# Patient Record
Sex: FEMALE | Race: WHITE | Employment: OTHER | URBAN - METROPOLITAN AREA
[De-identification: names, ages, dates, MRNs, and addresses within clinical notes are randomized per-mention and may not be internally consistent; named-entity substitution may affect disease eponyms.]

---

## 2017-01-05 DIAGNOSIS — E03.8 OTHER SPECIFIED HYPOTHYROIDISM: Primary | ICD-10-CM

## 2017-01-05 RX ORDER — LEVOTHYROXINE SODIUM 100 UG/1
100 TABLET ORAL DAILY
Qty: 31 TABLET | Status: SHIPPED
Start: 2017-01-05 | End: 2017-01-27

## 2017-01-23 NOTE — PROGRESS NOTES
[facility]  Chief Complaint   Patient presents with     Annual Visit       HPI:    Deanne Zayas is a 83 year old  (6/19/1933), who is being seen today for an annual comprehensive visit at [facility]. Today's concerns are:  Late onset Alzheimer's disease without behavioral disturbance  Appears stable in 24 hour memory care unit. Pleasantly conversed with writer. Currently taking Lexapro 10 mg PO qday.     Essential hypertension  /77 on lisinopril 40 mg PO qday and Norvasc 2.5 mg PO qday.   -Denies chest pain, SOB, or palpation.     Hypothyroidism due to acquired atrophy of thyroid  - Denies fatigue, constipation, depression.    Vitamin B12 deficiency disease  Receives cyanocobalamin 1000 mcg injections monthly.   Last vitamin B12 level 1254 on 10/20/16.     Mixed incontinence  Urinary incontinence leading to dermatitis - managed with nystatin powder  Hydrozyzine discontinued 10/2016- doing well off medication.      Based on JNC-8 goals,  patients age of 83 year old, no presence of diabetes or CKD, and goals of care goal BP is <150/90 mm Hg. patient is stable and continue without pharmacological invention with routine assessment.    Depression screen done: PHQ-9 Given screen score and clinical assessment patient is stable without any signs of depression and no futher interventions warrented at this time.    ALLERGIES: Donepezil  PROBLEM LIST:  Patient Active Problem List   Diagnosis     Hyperlipidemia LDL goal <130     Hypothyroidism due to acquired atrophy of thyroid     Vitamin B12 deficiency disease     Vitamin D deficiency disease     Essential hypertension     Osteopenia     Advance care planning     Hypertension goal BP (blood pressure) < 140/90     Mixed incontinence     Anxiety     Diaper dermatitis     Late onset Alzheimer's disease without behavioral disturbance     PAST MEDICAL HISTORY:  has no past medical history on file.  PAST SURGICAL HISTORY:  has past surgical  "history that includes Cholecystectomy; surgical history of -  (2012 ); and Cystoscopy (N/A, 8/29/2014).  FAMILY HISTORY: family history includes Family History Negative in her father and mother; Obesity in her sister; Unknown/Adopted in her mother.  SOCIAL HISTORY:  reports that she has never smoked. She has never used smokeless tobacco. She reports that she does not drink alcohol or use illicit drugs.  IMMUNIZATIONS:  Most Recent Immunizations   Administered Date(s) Administered     Influenza (High Dose) 3 valent vaccine 09/27/2016     Influenza (IIV3) 09/19/2013     Influenza Vaccine IM 3yrs+ 4 Valent IIV4 10/08/2014     Pneumococcal (PCV 13) 09/24/2015     Pneumococcal 23 valent 08/16/2005     TD (ADULT, 7+) 10/13/2009     TDAP (ADACEL AGES 11-64) 10/16/2012     Above immunizations pulled from [facility]. [facility] and facility records also reconciled. Outstanding information sent to  to update [facility] .  Future immunizations are not needed at this point as all recommended immunizations are up to date.   MEDICATIONS:  Current Outpatient Prescriptions   Medication Sig Dispense Refill     levothyroxine (SYNTHROID/LEVOTHROID) 100 MCG tablet Take 1 tablet (100 mcg) by mouth daily 31 tablet PRN     ferrous sulfate (IRON) 325 (65 FE) MG tablet Take 1 tablet (325 mg) by mouth daily (with breakfast) 30 tablet 2     cyanocobalamin (VITAMIN B12) 1000 MCG/ML injection Inject 1 mL (1,000 mcg) Subcutaneous every 30 days 1 mL 11     syringe/needle, disp, 25G X 1\" 3 ML MISC USE AS DIRECTED WITH B12 INECTION 1 each 11     ondansetron (ZOFRAN) 4 MG tablet Take 1 tablet (4 mg) by mouth every 8 hours as needed for nausea 18 tablet 6     escitalopram (LEXAPRO) 10 MG tablet Take 1 tablet (10 mg) by mouth daily 31 tablet PRN     aspirin 81 MG chewable tablet Take 1 tablet (81 mg) by mouth daily 31 tablet 12     senna-docusate (SENNA S) 8.6-50 MG per tablet Take 1 tablet by mouth daily 31 tablet 12     amLODIPine " (NORVASC) 2.5 MG tablet Take 1 tablet (2.5 mg) by mouth daily 31 tablet 12     lisinopril (PRINIVIL,ZESTRIL) 40 MG tablet Take 1 tablet (40 mg) by mouth daily 31 tablet 12     cholecalciferol (VITAMIN D3) 1000 UNIT tablet Take 2 tablets (2,000 Units) by mouth daily 62 tablet 12     nystatin (MYCOSTATIN) 291529 UNIT/GM POWD Apply topically as needed APPLY TOPICALLY TO AFFECTED AREA(S) TID PRN 30 g PRN     hydrOXYzine (VISTARIL) 25 MG capsule Take 1 capsule (25 mg) by mouth every evening as needed for itching 31 capsule 12     acetaminophen (TYLENOL) 325 MG tablet Take 2 tablets (650 mg) by mouth every 6 hours as needed for mild pain or headaches 120 tablet 12     menthol-zinc oxide (CALMOSEPTINE) 0.44-20.625 % OINT Apply topically 4 times daily as needed for skin protection 71 g 3     Medications reviewed:  Medications reconciled to facility chart and changes were made to reflect current medications as identified as above med list. Below are the changes that were made:   Medications stopped since last EPIC medication reconciliation:   There are no discontinued medications.    Medications started since last [facility] medication reconciliation:  No orders of the defined types were placed in this encounter.     Case Management:  I have reviewed the Assisted Living care plan, current immunizations and preventive care/cancer screening..Future cancer screening is not clinically indicated secondary to age/goals of care Patient's desire to return to the community is not present. Current Level of Care is appropriate.    Advance Directive Discussion:    I reviewed the current advanced directives as reflected in [facility] and the facility chart. I contacted the first party Leeanna Moran and left a message regarding the plan of Care. I reviewed the POLST, resigned, dated and sent to [facility].  I did not due to cognitive impairment review the advance directives with the resident.     Team Discussion:  I communicated with the  appropriate disciplines involved with the Plan of Care:   Nursing,  and Dietician    Patient Goal:  Patient's goal is pain control and comfort.    Information reviewed:  Medications, vital signs, orders, and nursing notes.    ROS:  4 point ROS including Respiratory, CV, GI and , other than that noted in the HPI,  is negative    Exam:  /77 mmHg  Pulse 69  Temp(Src) 98.9  F (37.2  C)  Resp 16  Wt 206 lb (93.441 kg)  SpO2 98%    GENERAL APPEARANCE:  Alert, in no distress  ENT:  Mouth and posterior oropharynx normal, moist mucous membranes  EYES:  EOM, conjunctivae, lids, pupils and irises normal  RESP:  respiratory effort and palpation of chest normal, lungs clear to auscultation , no respiratory distress  CV:  Palpation and auscultation of heart done , regular rate and rhythm, no murmur, rub, or gallop  ABDOMEN:  normal bowel sounds, soft, nontender, no hepatosplenomegaly or other masses  M/S:   Gait and station abnormal up with walker independently  Digits and nails normal  SKIN:  Inspection of skin and subcutaneous tissue baseline, Palpation of skin and subcutaneous tissue baseline, Per area not viewed during today's visit per resident's request  NEURO:   Cranial nerves 2-12 are normal tested and grossly at patient's baseline  PSYCH:  memory impaired , affect and mood normal     Lab/Diagnostic data:    NA      141   1/8/2015   POTASSIUM      3.9   10/20/2016  CHLORIDE      107   1/8/2015  RUBIN      8.7   1/8/2015  CO2       29   1/8/2015  BUN       17   1/8/2015  CR     1.14   10/20/2016  GLC      100   1/8/2015  WBC      4.4   7/10/2014  RBC     3.48   7/10/2014  HGB     11.0   11/17/2016  HCT     31.0   7/10/2014  MCV       89   7/10/2014  MCH     28.7   7/10/2014  MCHC     32.3   7/10/2014  RDW     13.5   7/10/2014  PLT      245   7/10/2014      ASSESSMENT/PLAN  (G30.1,  F02.80) Late onset Alzheimer's disease without behavioral disturbance  (primary encounter diagnosis)  Stable on current  medication. Resides on secure dementia unit.    (I10) Essential hypertension  Keep SBP> 130 mmHg and DBP > 65 mmHg (levels below these increase mortality as shown by standard studies and observations).     (E03.4) Hypothyroidism due to acquired atrophy of thyroid  TSH   Date Value Ref Range Status   10/20/2016 3.67 0.40 - 4.00 mU/L Final     -Currently taking Synthroid 100 mcg qday.   -Check TSH next lab day, and keep level b/w 4-5 in elderly.      (E53.8) Vitamin B12 deficiency disease  Continue supplement.   -Check B12 level next lab day.    (N39.46) Mixed incontinence  Stable on barrier cream. Continue current POC without change at this time and adjustments as needed.     (M81.0) Senile osteoporosis  Continue PRN acetaminophen as ordered.     (Z13.89) Screening for depression  See above    (Z13.6) Screening for hypertension  See above    Orders:  See Above.   Electronically signed by:  Florina Welch NP

## 2017-01-24 ENCOUNTER — ASSISTED LIVING VISIT (OUTPATIENT)
Dept: GERIATRICS | Facility: CLINIC | Age: 82
End: 2017-01-24
Payer: MEDICARE

## 2017-01-24 VITALS
HEART RATE: 69 BPM | WEIGHT: 206 LBS | TEMPERATURE: 98.9 F | BODY MASS INDEX: 34.28 KG/M2 | SYSTOLIC BLOOD PRESSURE: 145 MMHG | OXYGEN SATURATION: 98 % | DIASTOLIC BLOOD PRESSURE: 77 MMHG | RESPIRATION RATE: 16 BRPM

## 2017-01-24 DIAGNOSIS — G30.1 LATE ONSET ALZHEIMER'S DISEASE WITHOUT BEHAVIORAL DISTURBANCE (H): Primary | ICD-10-CM

## 2017-01-24 DIAGNOSIS — N39.46 MIXED INCONTINENCE: ICD-10-CM

## 2017-01-24 DIAGNOSIS — E03.4 HYPOTHYROIDISM DUE TO ACQUIRED ATROPHY OF THYROID: ICD-10-CM

## 2017-01-24 DIAGNOSIS — E53.8 VITAMIN B12 DEFICIENCY DISEASE: ICD-10-CM

## 2017-01-24 DIAGNOSIS — I10 ESSENTIAL HYPERTENSION: ICD-10-CM

## 2017-01-24 DIAGNOSIS — Z13.31 SCREENING FOR DEPRESSION: ICD-10-CM

## 2017-01-24 DIAGNOSIS — M81.0 SENILE OSTEOPOROSIS: ICD-10-CM

## 2017-01-24 DIAGNOSIS — Z13.6 SCREENING FOR HYPERTENSION: ICD-10-CM

## 2017-01-24 DIAGNOSIS — F02.80 LATE ONSET ALZHEIMER'S DISEASE WITHOUT BEHAVIORAL DISTURBANCE (H): Primary | ICD-10-CM

## 2017-01-24 PROCEDURE — 99207 ZZC CDG-CORRECTLY CODED, REVIEWED AND AGREE: CPT | Performed by: NURSE PRACTITIONER

## 2017-01-24 PROCEDURE — 99318 ZZC ANNUAL NURSING FAC ASSESSMNT, STABLE: CPT | Performed by: NURSE PRACTITIONER

## 2017-01-24 ASSESSMENT — PAIN SCALES - GENERAL: PAINLEVEL: NO PAIN (0)

## 2017-01-26 ENCOUNTER — HOSPITAL LABORATORY (OUTPATIENT)
Facility: OTHER | Age: 82
End: 2017-01-26

## 2017-01-26 LAB
TSH SERPL DL<=0.05 MIU/L-ACNC: 9.96 MU/L (ref 0.4–4)
VIT B12 SERPL-MCNC: 452 PG/ML (ref 193–986)

## 2017-01-27 ENCOUNTER — TELEPHONE (OUTPATIENT)
Dept: GERIATRICS | Facility: CLINIC | Age: 82
End: 2017-01-27

## 2017-01-27 DIAGNOSIS — E03.8 OTHER SPECIFIED HYPOTHYROIDISM: Primary | ICD-10-CM

## 2017-01-27 RX ORDER — LEVOTHYROXINE SODIUM 112 UG/1
112 TABLET ORAL DAILY
Qty: 60 TABLET | Refills: 3 | Status: SHIPPED
Start: 2017-01-27 | End: 2017-10-13

## 2017-01-27 NOTE — TELEPHONE ENCOUNTER
TSH   Date Value Ref Range Status   01/26/2017 9.96* 0.40 - 4.00 mU/L Final   10/20/2016 3.67 0.40 - 4.00 mU/L Final   10/15/2015 1.09 0.40 - 4.00 mU/L Final   04/02/2015 0.53 0.40 - 4.00 mU/L Final     Comment:     Effective 7/30/2014, the reference range for this assay has changed to reflect   new instrumentation/methodology.     04/17/2014 1.90 0.4 - 5.0 mU/L Final     Other specified hypothyroidism  Sent to [facility]. Increased Synthroid to 112 mcg. Recheck in 6 weeks.   - levothyroxine (SYNTHROID/LEVOTHROID) 112 MCG tablet; Take 1 tablet (112 mcg) by mouth daily

## 2017-02-01 DIAGNOSIS — D50.8 IRON DEFICIENCY ANEMIA SECONDARY TO INADEQUATE DIETARY IRON INTAKE: Primary | ICD-10-CM

## 2017-02-02 RX ORDER — FERROUS SULFATE 325(65) MG
325 TABLET ORAL DAILY
Qty: 31 TABLET | Status: SHIPPED
Start: 2017-02-02 | End: 2018-01-02

## 2017-03-09 ENCOUNTER — HOSPITAL LABORATORY (OUTPATIENT)
Facility: OTHER | Age: 82
End: 2017-03-09

## 2017-03-09 LAB — TSH SERPL DL<=0.05 MIU/L-ACNC: 4.42 MU/L (ref 0.4–4)

## 2017-04-24 DIAGNOSIS — B37.31 YEAST INFECTION OF THE VAGINA: ICD-10-CM

## 2017-04-24 RX ORDER — NYSTATIN 100000 [USP'U]/G
POWDER TOPICAL PRN
Qty: 30 G | Status: SHIPPED | OUTPATIENT
Start: 2017-04-24 | End: 2017-07-31

## 2017-04-26 DIAGNOSIS — M81.0 SENILE OSTEOPOROSIS: ICD-10-CM

## 2017-05-24 DIAGNOSIS — I10 BENIGN ESSENTIAL HYPERTENSION: ICD-10-CM

## 2017-05-25 RX ORDER — LISINOPRIL 40 MG/1
40 TABLET ORAL DAILY
Qty: 31 TABLET | Status: SHIPPED | OUTPATIENT
Start: 2017-05-25 | End: 2017-10-13

## 2017-06-21 DIAGNOSIS — I25.10 CORONARY ARTERY DISEASE INVOLVING NATIVE HEART WITHOUT ANGINA PECTORIS, UNSPECIFIED VESSEL OR LESION TYPE: ICD-10-CM

## 2017-06-21 DIAGNOSIS — K59.00 CONSTIPATION, UNSPECIFIED CONSTIPATION TYPE: ICD-10-CM

## 2017-06-21 DIAGNOSIS — I10 BENIGN ESSENTIAL HYPERTENSION: ICD-10-CM

## 2017-06-22 ENCOUNTER — DOCUMENTATION ONLY (OUTPATIENT)
Dept: OTHER | Facility: CLINIC | Age: 82
End: 2017-06-22

## 2017-06-22 DIAGNOSIS — Z71.89 ADVANCE CARE PLANNING: Chronic | ICD-10-CM

## 2017-06-22 RX ORDER — AMLODIPINE BESYLATE 2.5 MG/1
2.5 TABLET ORAL DAILY
Qty: 31 TABLET | Status: SHIPPED | OUTPATIENT
Start: 2017-06-22 | End: 2017-08-01

## 2017-07-19 DIAGNOSIS — I25.10 CORONARY ARTERY DISEASE INVOLVING NATIVE HEART WITHOUT ANGINA PECTORIS, UNSPECIFIED VESSEL OR LESION TYPE: ICD-10-CM

## 2017-07-19 DIAGNOSIS — K59.00 CONSTIPATION, UNSPECIFIED CONSTIPATION TYPE: ICD-10-CM

## 2017-07-19 RX ORDER — AMOXICILLIN 250 MG
1 CAPSULE ORAL DAILY
Qty: 31 TABLET | Status: SHIPPED | OUTPATIENT
Start: 2017-07-19 | End: 2017-12-12

## 2017-07-19 RX ORDER — ASPIRIN 81 MG/1
81 TABLET, CHEWABLE ORAL DAILY
Qty: 31 TABLET | Status: SHIPPED | OUTPATIENT
Start: 2017-07-19 | End: 2017-12-27

## 2017-07-24 ENCOUNTER — HOSPITAL ENCOUNTER (INPATIENT)
Facility: CLINIC | Age: 82
LOS: 1 days | Discharge: NURSING, REHAB OR LONG-TERM CARE | DRG: 065 | End: 2017-07-27
Attending: EMERGENCY MEDICINE | Admitting: FAMILY MEDICINE
Payer: MEDICARE

## 2017-07-24 ENCOUNTER — APPOINTMENT (OUTPATIENT)
Dept: CT IMAGING | Facility: CLINIC | Age: 82
DRG: 065 | End: 2017-07-24
Attending: EMERGENCY MEDICINE
Payer: MEDICARE

## 2017-07-24 ENCOUNTER — APPOINTMENT (OUTPATIENT)
Dept: GENERAL RADIOLOGY | Facility: CLINIC | Age: 82
DRG: 065 | End: 2017-07-24
Attending: EMERGENCY MEDICINE
Payer: MEDICARE

## 2017-07-24 ENCOUNTER — MEDICAL CORRESPONDENCE (OUTPATIENT)
Dept: HEALTH INFORMATION MANAGEMENT | Facility: CLINIC | Age: 82
End: 2017-07-24

## 2017-07-24 DIAGNOSIS — I63.312 CEREBROVASCULAR ACCIDENT (CVA) DUE TO THROMBOSIS OF LEFT MIDDLE CEREBRAL ARTERY (H): Primary | ICD-10-CM
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
## 2017-07-24 DIAGNOSIS — I63.012 CEREBROVASCULAR ACCIDENT (CVA) DUE TO THROMBOSIS OF LEFT VERTEBRAL ARTERY (H): ICD-10-CM

## 2017-07-24 DIAGNOSIS — N30.00 ACUTE CYSTITIS WITHOUT HEMATURIA: ICD-10-CM

## 2017-07-24 PROBLEM — N39.0 UTI (URINARY TRACT INFECTION): Status: ACTIVE | Noted: 2017-07-24

## 2017-07-24 PROBLEM — R29.810 FACIAL DROOP: Status: ACTIVE | Noted: 2017-07-24

## 2017-07-24 LAB
ABO + RH BLD: NORMAL
ABO + RH BLD: NORMAL
ALBUMIN SERPL-MCNC: 3.8 G/DL (ref 3.4–5)
ALBUMIN UR-MCNC: 10 MG/DL
ALP SERPL-CCNC: 69 U/L (ref 40–150)
ALT SERPL W P-5'-P-CCNC: 18 U/L (ref 0–50)
ANION GAP SERPL CALCULATED.3IONS-SCNC: 6 MMOL/L (ref 3–14)
APPEARANCE UR: ABNORMAL
AST SERPL W P-5'-P-CCNC: 16 U/L (ref 0–45)
BACTERIA #/AREA URNS HPF: ABNORMAL /HPF
BASOPHILS # BLD AUTO: 0.1 10E9/L (ref 0–0.2)
BASOPHILS NFR BLD AUTO: 0.9 %
BILIRUB SERPL-MCNC: 0.5 MG/DL (ref 0.2–1.3)
BILIRUB UR QL STRIP: NEGATIVE
BLD GP AB SCN SERPL QL: NORMAL
BLOOD BANK CMNT PATIENT-IMP: NORMAL
BUN SERPL-MCNC: 15 MG/DL (ref 7–30)
CALCIUM SERPL-MCNC: 9.6 MG/DL (ref 8.5–10.1)
CHLORIDE SERPL-SCNC: 106 MMOL/L (ref 94–109)
CO2 SERPL-SCNC: 28 MMOL/L (ref 20–32)
COLOR UR AUTO: YELLOW
CREAT SERPL-MCNC: 1.41 MG/DL (ref 0.52–1.04)
DIFFERENTIAL METHOD BLD: NORMAL
EOSINOPHIL # BLD AUTO: 0.2 10E9/L (ref 0–0.7)
EOSINOPHIL NFR BLD AUTO: 2.6 %
ERYTHROCYTE [DISTWIDTH] IN BLOOD BY AUTOMATED COUNT: 13.1 % (ref 10–15)
GFR SERPL CREATININE-BSD FRML MDRD: 36 ML/MIN/1.7M2
GLUCOSE BLDC GLUCOMTR-MCNC: 121 MG/DL (ref 70–99)
GLUCOSE SERPL-MCNC: 123 MG/DL (ref 70–99)
GLUCOSE UR STRIP-MCNC: NEGATIVE MG/DL
HCT VFR BLD AUTO: 37.8 % (ref 35–47)
HGB BLD-MCNC: 12.5 G/DL (ref 11.7–15.7)
HGB UR QL STRIP: ABNORMAL
IMM GRANULOCYTES # BLD: 0 10E9/L (ref 0–0.4)
IMM GRANULOCYTES NFR BLD: 0.2 %
INR PPP: 0.91 (ref 0.86–1.14)
KETONES UR STRIP-MCNC: NEGATIVE MG/DL
LEUKOCYTE ESTERASE UR QL STRIP: ABNORMAL
LYMPHOCYTES # BLD AUTO: 1 10E9/L (ref 0.8–5.3)
LYMPHOCYTES NFR BLD AUTO: 16.6 %
MCH RBC QN AUTO: 31.5 PG (ref 26.5–33)
MCHC RBC AUTO-ENTMCNC: 33.1 G/DL (ref 31.5–36.5)
MCV RBC AUTO: 95 FL (ref 78–100)
MONOCYTES # BLD AUTO: 0.6 10E9/L (ref 0–1.3)
MONOCYTES NFR BLD AUTO: 9.4 %
MUCOUS THREADS #/AREA URNS LPF: PRESENT /LPF
NEUTROPHILS # BLD AUTO: 4.1 10E9/L (ref 1.6–8.3)
NEUTROPHILS NFR BLD AUTO: 70.3 %
NITRATE UR QL: POSITIVE
PH UR STRIP: 7 PH (ref 5–7)
PLATELET # BLD AUTO: 233 10E9/L (ref 150–450)
POTASSIUM SERPL-SCNC: 4.3 MMOL/L (ref 3.4–5.3)
PROT SERPL-MCNC: 6.8 G/DL (ref 6.8–8.8)
RBC # BLD AUTO: 3.97 10E12/L (ref 3.8–5.2)
RBC #/AREA URNS AUTO: 18 /HPF (ref 0–2)
SODIUM SERPL-SCNC: 140 MMOL/L (ref 133–144)
SP GR UR STRIP: 1.02 (ref 1–1.03)
SPECIMEN EXP DATE BLD: NORMAL
SQUAMOUS #/AREA URNS AUTO: 10 /HPF (ref 0–1)
URN SPEC COLLECT METH UR: ABNORMAL
UROBILINOGEN UR STRIP-MCNC: NORMAL MG/DL (ref 0–2)
WBC # BLD AUTO: 5.8 10E9/L (ref 4–11)
WBC #/AREA URNS AUTO: 41 /HPF (ref 0–2)

## 2017-07-24 PROCEDURE — 73502 X-RAY EXAM HIP UNI 2-3 VIEWS: CPT

## 2017-07-24 PROCEDURE — 93005 ELECTROCARDIOGRAM TRACING: CPT

## 2017-07-24 PROCEDURE — 80053 COMPREHEN METABOLIC PANEL: CPT | Performed by: EMERGENCY MEDICINE

## 2017-07-24 PROCEDURE — 85025 COMPLETE CBC W/AUTO DIFF WBC: CPT | Performed by: EMERGENCY MEDICINE

## 2017-07-24 PROCEDURE — 25000125 ZZHC RX 250: Performed by: EMERGENCY MEDICINE

## 2017-07-24 PROCEDURE — 96365 THER/PROPH/DIAG IV INF INIT: CPT

## 2017-07-24 PROCEDURE — 00000146 ZZHCL STATISTIC GLUCOSE BY METER IP

## 2017-07-24 PROCEDURE — 85610 PROTHROMBIN TIME: CPT | Performed by: EMERGENCY MEDICINE

## 2017-07-24 PROCEDURE — 87186 SC STD MICRODIL/AGAR DIL: CPT | Performed by: EMERGENCY MEDICINE

## 2017-07-24 PROCEDURE — 70450 CT HEAD/BRAIN W/O DYE: CPT | Mod: XS

## 2017-07-24 PROCEDURE — 99207 ZZC CDG-CODE CATEGORY CHANGED: CPT | Performed by: FAMILY MEDICINE

## 2017-07-24 PROCEDURE — 86850 RBC ANTIBODY SCREEN: CPT | Performed by: EMERGENCY MEDICINE

## 2017-07-24 PROCEDURE — 25000128 H RX IP 250 OP 636: Performed by: EMERGENCY MEDICINE

## 2017-07-24 PROCEDURE — 81001 URINALYSIS AUTO W/SCOPE: CPT | Performed by: EMERGENCY MEDICINE

## 2017-07-24 PROCEDURE — G0378 HOSPITAL OBSERVATION PER HR: HCPCS

## 2017-07-24 PROCEDURE — 99285 EMERGENCY DEPT VISIT HI MDM: CPT | Mod: 25 | Performed by: EMERGENCY MEDICINE

## 2017-07-24 PROCEDURE — 99207 ZZC MOONLIGHTING INDICATOR: CPT | Performed by: FAMILY MEDICINE

## 2017-07-24 PROCEDURE — 87088 URINE BACTERIA CULTURE: CPT | Performed by: EMERGENCY MEDICINE

## 2017-07-24 PROCEDURE — 70498 CT ANGIOGRAPHY NECK: CPT

## 2017-07-24 PROCEDURE — 86900 BLOOD TYPING SEROLOGIC ABO: CPT | Performed by: EMERGENCY MEDICINE

## 2017-07-24 PROCEDURE — 87086 URINE CULTURE/COLONY COUNT: CPT | Performed by: EMERGENCY MEDICINE

## 2017-07-24 PROCEDURE — 86901 BLOOD TYPING SEROLOGIC RH(D): CPT | Performed by: EMERGENCY MEDICINE

## 2017-07-24 PROCEDURE — 99220 ZZC INITIAL OBSERVATION CARE,LEVL III: CPT | Performed by: FAMILY MEDICINE

## 2017-07-24 PROCEDURE — 93010 ELECTROCARDIOGRAM REPORT: CPT | Performed by: EMERGENCY MEDICINE

## 2017-07-24 PROCEDURE — 99285 EMERGENCY DEPT VISIT HI MDM: CPT | Mod: 25

## 2017-07-24 RX ORDER — ESCITALOPRAM OXALATE 10 MG/1
10 TABLET ORAL DAILY
Status: DISCONTINUED | OUTPATIENT
Start: 2017-07-25 | End: 2017-07-27 | Stop reason: HOSPADM

## 2017-07-24 RX ORDER — IOPAMIDOL 755 MG/ML
70 INJECTION, SOLUTION INTRAVASCULAR ONCE
Status: COMPLETED | OUTPATIENT
Start: 2017-07-24 | End: 2017-07-24

## 2017-07-24 RX ORDER — ONDANSETRON 2 MG/ML
4 INJECTION INTRAMUSCULAR; INTRAVENOUS EVERY 6 HOURS PRN
Status: DISCONTINUED | OUTPATIENT
Start: 2017-07-24 | End: 2017-07-27 | Stop reason: HOSPADM

## 2017-07-24 RX ORDER — ACETAMINOPHEN 325 MG/1
650 TABLET ORAL EVERY 4 HOURS PRN
Status: DISCONTINUED | OUTPATIENT
Start: 2017-07-24 | End: 2017-07-27 | Stop reason: HOSPADM

## 2017-07-24 RX ORDER — NALOXONE HYDROCHLORIDE 0.4 MG/ML
.1-.4 INJECTION, SOLUTION INTRAMUSCULAR; INTRAVENOUS; SUBCUTANEOUS
Status: DISCONTINUED | OUTPATIENT
Start: 2017-07-24 | End: 2017-07-27 | Stop reason: HOSPADM

## 2017-07-24 RX ORDER — CEFTRIAXONE 1 G/1
1 INJECTION, POWDER, FOR SOLUTION INTRAMUSCULAR; INTRAVENOUS EVERY 24 HOURS
Status: DISCONTINUED | OUTPATIENT
Start: 2017-07-24 | End: 2017-07-27 | Stop reason: HOSPADM

## 2017-07-24 RX ORDER — ASPIRIN 81 MG/1
81 TABLET, CHEWABLE ORAL DAILY
Status: DISCONTINUED | OUTPATIENT
Start: 2017-07-25 | End: 2017-07-27 | Stop reason: HOSPADM

## 2017-07-24 RX ORDER — FERROUS SULFATE 325(65) MG
325 TABLET ORAL DAILY
Status: DISCONTINUED | OUTPATIENT
Start: 2017-07-25 | End: 2017-07-27 | Stop reason: HOSPADM

## 2017-07-24 RX ORDER — MINERAL OIL/HYDROPHIL PETROLAT
OINTMENT (GRAM) TOPICAL PRN
COMMUNITY
End: 2017-07-28

## 2017-07-24 RX ORDER — AMLODIPINE BESYLATE 2.5 MG/1
2.5 TABLET ORAL DAILY
Status: DISCONTINUED | OUTPATIENT
Start: 2017-07-25 | End: 2017-07-27 | Stop reason: HOSPADM

## 2017-07-24 RX ORDER — AMOXICILLIN 250 MG
1 CAPSULE ORAL DAILY
Status: DISCONTINUED | OUTPATIENT
Start: 2017-07-25 | End: 2017-07-27 | Stop reason: HOSPADM

## 2017-07-24 RX ORDER — SULFAMETHOXAZOLE AND TRIMETHOPRIM 400; 80 MG/1; MG/1
1 TABLET ORAL 2 TIMES DAILY
Qty: 14 TABLET | Refills: 0 | Status: SHIPPED | OUTPATIENT
Start: 2017-07-24 | End: 2017-07-31

## 2017-07-24 RX ORDER — LEVOTHYROXINE SODIUM 112 UG/1
112 TABLET ORAL
Status: DISCONTINUED | OUTPATIENT
Start: 2017-07-25 | End: 2017-07-27 | Stop reason: HOSPADM

## 2017-07-24 RX ORDER — ONDANSETRON 4 MG/1
4 TABLET, ORALLY DISINTEGRATING ORAL EVERY 6 HOURS PRN
Status: DISCONTINUED | OUTPATIENT
Start: 2017-07-24 | End: 2017-07-27 | Stop reason: HOSPADM

## 2017-07-24 RX ORDER — LISINOPRIL 40 MG/1
40 TABLET ORAL DAILY
Status: DISCONTINUED | OUTPATIENT
Start: 2017-07-25 | End: 2017-07-27 | Stop reason: HOSPADM

## 2017-07-24 RX ADMIN — IOPAMIDOL 70 ML: 755 INJECTION, SOLUTION INTRAVENOUS at 15:07

## 2017-07-24 RX ADMIN — SODIUM CHLORIDE: 9 INJECTION, SOLUTION INTRAVENOUS at 14:31

## 2017-07-24 RX ADMIN — SODIUM CHLORIDE 100 ML: 9 INJECTION, SOLUTION INTRAVENOUS at 15:07

## 2017-07-24 RX ADMIN — CEFTRIAXONE 1 G: 1 INJECTION, POWDER, FOR SOLUTION INTRAMUSCULAR; INTRAVENOUS at 17:26

## 2017-07-24 ASSESSMENT — ENCOUNTER SYMPTOMS
CARDIOVASCULAR NEGATIVE: 1
FACIAL ASYMMETRY: 1
RESPIRATORY NEGATIVE: 1
GASTROINTESTINAL NEGATIVE: 1
CONSTITUTIONAL NEGATIVE: 1
HEMATOLOGIC/LYMPHATIC NEGATIVE: 1
ENDOCRINE NEGATIVE: 1
EYES NEGATIVE: 1
ALLERGIC/IMMUNOLOGIC NEGATIVE: 1
CONFUSION: 1
MUSCULOSKELETAL NEGATIVE: 1

## 2017-07-24 NOTE — IP AVS SNAPSHOT
` `     [facility] SURGICAL: [phone]            Medication Administration Report for Deanne Zayas as of 07/27/17 1123   Legend:    Given Hold Not Given Due Canceled Entry Other Actions    Time Time (Time) Time  Time-Action       Inactive    Active    Linked        Medications 07/21/17 07/22/17 07/23/17 07/24/17 07/25/17 07/26/17 07/27/17    acetaminophen (TYLENOL) tablet 650 mg  Dose: 650 mg Freq: EVERY 4 HOURS PRN Route: PO  PRN Reason: mild pain  Start: 07/24/17 2053   Admin Instructions: Alternate ibuprofen (if ordered) with acetaminophen.  Maximum acetaminophen dose from all sources = 75 mg/kg/day not to exceed 4 grams/day.               amLODIPine (NORVASC) tablet 2.5 mg  Dose: 2.5 mg Freq: DAILY Route: PO  Start: 07/25/17 0800        0834 (2.5 mg)-Given        1026 (2.5 mg)-Given        0811 (2.5 mg)-Given           aspirin chewable tablet 81 mg  Dose: 81 mg Freq: DAILY Route: PO  Start: 07/25/17 0800        0834 (81 mg)-Given        1027 (81 mg)-Given        0810 (81 mg)-Given           atorvastatin (LIPITOR) tablet 20 mg  Dose: 20 mg Freq: DAILY Route: PO  Start: 07/27/17 1045          [ ] 1045           cefTRIAXone (ROCEPHIN) 1 g vial to attach to  mL bag for ADULTS or NS 50 mL bag for PEDS  Dose: 1 g Freq: EVERY 24 HOURS Route: IV  Indications of Use: URINARY TRACT INFECTION  Last Dose: 0 g (07/24/17 1803)  Start: 07/24/17 1649       1726 (1 g)-New Bag       1803-Stopped        1611 (1 g)-New Bag        1719 (1 g)-New Bag        [ ] 1649           cholecalciferol (vitamin D) tablet 2,000 Units  Dose: 2,000 Units Freq: DAILY Route: PO  Start: 07/25/17 0800        0833 (2,000 Units)-Given        1027 (2,000 Units)-Given        0810 (2,000 Units)-Given           escitalopram (LEXAPRO) tablet 10 mg  Dose: 10 mg Freq: DAILY Route: PO  Start: 07/25/17 0800        0834 (10 mg)-Given        1027 (10 mg)-Given        0810 (10 mg)-Given           ferrous sulfate (IRON) tablet 325  mg  Dose: 325 mg Freq: DAILY Route: PO  Start: 07/25/17 0800   Admin Instructions: Absorbed best on an empty stomach. If stomach upset occurs, can take with meals.         0834 (325 mg)-Given        1026 (325 mg)-Given        0811 (325 mg)-Given           heparin sodium PF injection 5,000 Units  Dose: 5,000 Units Freq: EVERY 12 HOURS Route: SC  Start: 07/25/17 1815   Admin Instructions: High concentration HEParin. Not for line flush or cath care.         1930 (5,000 Units)-Given        1028 (5,000 Units)-Given       2005 (5,000 Units)-Given        0811 (5,000 Units)-Given       [ ] 2000           levothyroxine (SYNTHROID/LEVOTHROID) tablet 112 mcg  Dose: 112 mcg Freq: EVERY MORNING BEFORE BREAKFAST Route: PO  Start: 07/25/17 0630        0701 (112 mcg)-Given        0635 (112 mcg)-Given        0721 (112 mcg)-Given           lisinopril (PRINIVIL/ZESTRIL) tablet 40 mg  Dose: 40 mg Freq: DAILY Route: PO  Start: 07/25/17 0800        0834 (40 mg)-Given        1027 (40 mg)-Given        0811 (40 mg)-Given           miconazole (MICATIN; MICRO GUARD) 2 % powder  Freq: 2 TIMES DAILY Route: Top  Start: 07/24/17 2100   Admin Instructions: Apply to groin/perineum<br><br>Formulary alternate for Nystatin powder<br>        (2232)-Not Given [C]        0800 ( )-Given       2055 ( )-Given        1028 ( )-Given       2000 ( )-Given        [ ] 1000       [ ] 2000           naloxone (NARCAN) injection 0.1-0.4 mg  Dose: 0.1-0.4 mg Freq: EVERY 2 MIN PRN Route: IV  PRN Reason: opioid reversal  Start: 07/24/17 2053   Admin Instructions: For respiratory rate LESS than or EQUAL to 8.  Partial reversal dose:  0.1 mg titrated q 2 minutes for Analgesia Side Effects Monitoring Sedation Level of 3 (frequently drowsy, arousable, drifts to sleep during conversation).Full reversal dose:  0.4 mg bolus for Analgesia Side Effects Monitoring Sedation Level of 4 (somnolent, minimal or no response to stimulation).               ondansetron (ZOFRAN-ODT) ODT  tab 4 mg  Dose: 4 mg Freq: EVERY 6 HOURS PRN Route: PO  PRN Reasons: nausea,vomiting  Start: 07/24/17 2053   Admin Instructions: This is Step 1 of nausea and vomiting management.  If nausea not resolved in 15 minutes, go to Step 2 prochlorperazine (COMPAZINE). Do not push through foil backing. Peel back foil and gently remove. Place on tongue immediately. Administration with liquid unnecessary              Or  ondansetron (ZOFRAN) injection 4 mg  Dose: 4 mg Freq: EVERY 6 HOURS PRN Route: IV  PRN Reasons: nausea,vomiting  Start: 07/24/17 2053   Admin Instructions: This is Step 1 of nausea and vomiting management.  If nausea not resolved in 15 minutes, go to Step 2 prochlorperazine (COMPAZINE).  Irritant.               senna-docusate (SENOKOT-S;PERICOLACE) 8.6-50 MG per tablet 1 tablet  Dose: 1 tablet Freq: DAILY Route: PO  Start: 07/25/17 0800        0834 (1 tablet)-Given        1027 (1 tablet)-Given        0810 (1 tablet)-Given           sodium chloride (PF) 0.9% PF flush 3 mL  Dose: 3 mL Freq: EVERY 8 HOURS Route: IK  Start: 07/27/17 0330          0317 (3 mL)-Given [C]       [ ] 1130       [ ] 1930          Completed Medications  Medications 07/21/17 07/22/17 07/23/17 07/24/17 07/25/17 07/26/17 07/27/17         Dose: 500 mL Freq: ONCE Route: IV  Start: 07/24/17 1424   End: 07/24/17 1531       1431 ( )-New Bag                Dose: 70 mL Freq: ONCE Route: IV  Start: 07/24/17 1426   End: 07/24/17 1507       1507 (70 mL)-Given                Dose: 100 mL Freq: ONCE Route: AS INSTRUCTE  Start: 07/24/17 1426   End: 07/24/17 1507   Admin Instructions: This entry is for use by Radiology to intermittently used as a flush in patients receiving a CT scan.        1507 (100 mL)-Given

## 2017-07-24 NOTE — DISCHARGE INSTRUCTIONS
Uncertain Causes of Fall  You have had a fall today. But the cause of your fall is not certain. Falls can occur due to slipping, tripping or losing your balance. A fall can also occur from a fainting spell or seizure.  While a fall can happen for a simple reason (tripping over something), falls in elderly people are often caused by a combination of things:    Age-related decline in function with worsening balance, stability, vision, and muscle strength    Chronic illness such as heart arrhythmias, heart valve disease, vascular disease, COPD, diabetes, strokes, arthritis    Effects or side effects of medicines    Dehydration.    Environmental hazards such as uneven or slippery ground, unfamiliar place, obstacles, uneven surfaces, or slippery ground    Situational factors (related to the activity being done, e.g., rushing to the bathroom)  Because the cause of your fall today is not certain, it is possible that a fainting spell or seizure was the cause. This means that it could happen again, without warning. If you fall again, without a cause, then you should return to this facility promptly to have further tests. Otherwise, follow up with your doctor as explained below.  It is normal to feel sore and tight in your muscles and back the next day, and not just the muscles you initially injured. Remember, all the parts of your body are connected, so while initially one area hurts, the next day another may hurt. Also, when you injure yourself, it causes inflammation, which then causes the muscles to tighten up and hurt more. After the initial worsening, it should gradually improve over the next few days. However, more severe pain should be reported.  Even without a definite head injury, you can still get a concussion. Concussions and even bleeding can still occur, especially if you have had a recent injury or take blood thinner medicine. It is not unusual to have a mild headache and feel tired and even nauseous or  dizzy.  Home care    Rest today and resume your normal activities as soon as you are feeling back to normal. It is best to remain with someone who can check on you for the next 24 hours to watch for another episode of falling.    If you were injured during the fall, follow the advice from your doctor regarding care of your injury.     If you become light-headed or dizzy, lie down immediately or sit and lean forward with your head down.    As a precaution, do not drive a car or operate dangerous equipment, do not take a bath alone (use a shower instead) and do not swim alone until you see your doctor. A condition causing fainting or seizures must be ruled out before resuming these activities.    You may use acetaminophen or ibuprofen to control pain, unless another pain medicine was prescribed. If you have chronic liver or kidney disease or ever had a stomach ulcer or gastrointestinal bleeding, talk with your doctor before using these medicines.    Keep your appointments for any further testing that may have been scheduled for you.  Follow-up care  Follow up with your healthcare provider, or as advised.  If X-rays or CT scan were done, you will be notified if there is a change in the reading, especially if it affects treatment.  Call 911  Call 911 if any of these occur:    Trouble breathing    Confused or difficulty arousing    Fainting or loss of consciousness    Rapid or very slow heart rate    Seizure    Difficulty with speech or vision, weakness of an arm or leg    Difficulty walking or talking, loss of balance, numbness or weakness in one side of your body, facial droop  When to seek medical advice  Call your healthcare provider right away if any of these occur:    Another unexplained fall    Dizziness    Severe headache    Nausea and vomiting    Blood in vomit, stools (black or red color)  Date Last Reviewed: 11/5/2015 2000-2017 [place]. [address] [place] [address], [place] [address]. All rights  reserved. This information is not intended as a substitute for professional medical care. Always follow your healthcare professional's instructions.

## 2017-07-24 NOTE — ED PROVIDER NOTES
History     Chief Complaint   Patient presents with     Fall     FACIAL DROOP ENROUTE HERE     HPI  Deanne Love Zayas is a 84 year old female with a history of hyperlipidemia, hypothyroidism, hypertension, and dementia who presents after a fall. The patient was brought in by ambulance after a fall. The patients history is limited due to her dementia and is reported primarily by EMS. She lives at [facility] in the dementia unit  The patient fell in the bathroom at 1:10 PM and was propped up on her right side when she was found by her care takers. She complains of right hip pain. The patient began talking funny at 1:20/25 PM. The EMS were called at 1:38 PM. The EMS reported a left sided facial droop upon arrival in the ED. She usually uses a walker to walk and is very conversational which she wasn't this morning. The patient was seen at her baseline during lunch at 1:00 PM before returning to her room. The EMS report her blood sugar was 121. The patient denies a headache.     Social History: Lives at [facility] in the memory care unit.  Here in the emergency department alone.    Past Medical History: Hypertension, dementia, hypothyroidism, vitamin B12 deficiency,      Medications:  Current Outpatient Prescriptions   Medication Sig Dispense Refill     mineral oil-hydrophilic petrolatum (AQUAPHOR) Apply topically as needed       sulfamethoxazole-trimethoprim (BACTRIM/SEPTRA) 400-80 MG per tablet Take 1 tablet by mouth 2 times daily 14 tablet 0     aspirin 81 MG chewable tablet Take 1 tablet (81 mg) by mouth daily 31 tablet PRN     senna-docusate (SENNA S) 8.6-50 MG per tablet Take 1 tablet by mouth daily 31 tablet PRN     amLODIPine (NORVASC) 2.5 MG tablet Take 1 tablet (2.5 mg) by mouth daily 31 tablet PRN     lisinopril (PRINIVIL/ZESTRIL) 40 MG tablet Take 1 tablet (40 mg) by mouth daily 31 tablet PRN     cholecalciferol (VITAMIN D3) 1000 UNIT tablet Take 2 tablets (2,000 Units) by mouth daily 62  "tablet PRN     nystatin (MYCOSTATIN) 816445 UNIT/GM POWD Apply topically as needed APPLY TOPICALLY TO AFFECTED AREA(S) TID PRN (Patient taking differently: Apply topically 2 times daily APPLY TOPICALLY TO AFFECTED AREA(S) TID PRN) 30 g PRN     ferrous sulfate (IRON) 325 (65 FE) MG tablet Take 1 tablet (325 mg) by mouth daily 31 tablet PRN     levothyroxine (SYNTHROID/LEVOTHROID) 112 MCG tablet Take 1 tablet (112 mcg) by mouth daily 60 tablet 3     cyanocobalamin (VITAMIN B12) 1000 MCG/ML injection Inject 1 mL (1,000 mcg) Subcutaneous every 30 days 1 mL 11     escitalopram (LEXAPRO) 10 MG tablet Take 1 tablet (10 mg) by mouth daily 31 tablet PRN     syringe/needle, disp, 25G X 1\" 3 ML MISC USE AS DIRECTED WITH B12 INECTION 1 each 11       Allergies:     Allergies   Allergen Reactions     Donepezil Other (See Comments)     At 10 mg, tremulousness and decreased appetite       I have reviewed the Medications, Allergies, Past Medical and Surgical History, and Social History in the Epic system.         Review of Systems   Constitutional: Negative.    HENT: Negative.    Eyes: Negative.    Respiratory: Negative.    Cardiovascular: Negative.    Gastrointestinal: Negative.    Endocrine: Negative.    Genitourinary: Negative.    Musculoskeletal: Negative.         Right hip pain and discomfort.   Skin: Negative.    Allergic/Immunologic: Negative.    Neurological: Positive for facial asymmetry.   Hematological: Negative.    Psychiatric/Behavioral: Positive for confusion (with underlying history of dementia).       Physical Exam      Physical Exam   Constitutional: She appears well-developed and well-nourished. No distress.   HENT:   Head: Normocephalic and atraumatic.   Eyes: Conjunctivae and EOM are normal. Pupils are equal, round, and reactive to light. Right eye exhibits no discharge. Left eye exhibits no discharge. No scleral icterus.   Neck: Normal range of motion. Neck supple. No JVD present. No tracheal deviation present. " No thyromegaly present.   Cardiovascular: Normal rate.    Pulmonary/Chest: Effort normal and breath sounds normal. No stridor. No respiratory distress. She has no wheezes. She has no rales. She exhibits no tenderness.   Abdominal: Soft.   Lymphadenopathy:     She has no cervical adenopathy.   Neurological: She is alert. She displays no atrophy. She exhibits normal muscle tone. She displays no seizure activity. GCS eye subscore is 4. GCS verbal subscore is 5. GCS motor subscore is 6.   Skin: She is not diaphoretic.   Psychiatric: She has a normal mood and affect. Her behavior is normal. Judgment and thought content normal.       ED Course     ED Course     Procedures             EKG Interpretation:      Interpreted by Wily Carter  Time reviewed: 15:40  Symptoms at time of EKG: Facial droop, confusion, unwitnessed fall.   Rhythm: normal sinus   Rate: Normal  Axis: Normal  Ectopy: none  Conduction: left anterior fasciclar block  ST Segments/ T Waves: Non-specific ST-T wave changes  Q Waves: nonspecific  Comparison to prior: No old EKG available    Clinical Impression: no acute changes          Critical Care time:  none                 ED Medications:  Medications   cefTRIAXone (ROCEPHIN) 1 g vial to attach to  mL bag for ADULTS or NS 50 mL bag for PEDS (1 g Intravenous New Bag 7/24/17 1726)   0.9% sodium chloride BOLUS ( Intravenous New Bag 7/24/17 1431)   iopamidol (ISOVUE-370) solution 70 mL (70 mLs Intravenous Given 7/24/17 1507)   sodium chloride 0.9 % for CT scan flush dose 100 mL (100 mLs As instructed Given 7/24/17 1507)       ED Vitals:  Vitals:    07/24/17 1645 07/24/17 1700 07/24/17 1715 07/24/17 1730   BP: 168/81 (!) 166/96 149/77 150/72   Resp: 11 11 12 15   Temp:       TempSrc:       SpO2:    96%       ED Labs and Imaging:  Results for orders placed or performed during the hospital encounter of 07/24/17 (from the past 24 hour(s))   Glucose by meter   Result Value Ref Range    Glucose 121  (H) 70 - 99 mg/dL   CBC with platelets differential   Result Value Ref Range    WBC 5.8 4.0 - 11.0 10e9/L    RBC Count 3.97 3.8 - 5.2 10e12/L    Hemoglobin 12.5 11.7 - 15.7 g/dL    Hematocrit 37.8 35.0 - 47.0 %    MCV 95 78 - 100 fl    MCH 31.5 26.5 - 33.0 pg    MCHC 33.1 31.5 - 36.5 g/dL    RDW 13.1 10.0 - 15.0 %    Platelet Count 233 150 - 450 10e9/L    Diff Method Automated Method     % Neutrophils 70.3 %    % Lymphocytes 16.6 %    % Monocytes 9.4 %    % Eosinophils 2.6 %    % Basophils 0.9 %    % Immature Granulocytes 0.2 %    Absolute Neutrophil 4.1 1.6 - 8.3 10e9/L    Absolute Lymphocytes 1.0 0.8 - 5.3 10e9/L    Absolute Monocytes 0.6 0.0 - 1.3 10e9/L    Absolute Eosinophils 0.2 0.0 - 0.7 10e9/L    Absolute Basophils 0.1 0.0 - 0.2 10e9/L    Abs Immature Granulocytes 0.0 0 - 0.4 10e9/L   Comprehensive metabolic panel   Result Value Ref Range    Sodium 140 133 - 144 mmol/L    Potassium 4.3 3.4 - 5.3 mmol/L    Chloride 106 94 - 109 mmol/L    Carbon Dioxide 28 20 - 32 mmol/L    Anion Gap 6 3 - 14 mmol/L    Glucose 123 (H) 70 - 99 mg/dL    Urea Nitrogen 15 7 - 30 mg/dL    Creatinine 1.41 (H) 0.52 - 1.04 mg/dL    GFR Estimate 36 (L) >60 mL/min/1.7m2    GFR Estimate If Black 43 (L) >60 mL/min/1.7m2    Calcium 9.6 8.5 - 10.1 mg/dL    Bilirubin Total 0.5 0.2 - 1.3 mg/dL    Albumin 3.8 3.4 - 5.0 g/dL    Protein Total 6.8 6.8 - 8.8 g/dL    Alkaline Phosphatase 69 40 - 150 U/L    ALT 18 0 - 50 U/L    AST 16 0 - 45 U/L   ABO/Rh type and screen   Result Value Ref Range    ABO AB     RH(D)  Neg     Antibody Screen Neg     Test Valid Only At [facility]     Specimen Expires 07/27/2017    INR   Result Value Ref Range    INR 0.91 0.86 - 1.14   CT Head w/o Contrast    Narrative    CT SCAN OF THE HEAD WITHOUT CONTRAST   7/24/2017 2:51 PM     HISTORY: Unwitnessed fall at care facility. Left-sided facial droop.  History of dementia.    TECHNIQUE: Axial images of the head and coronal reformations without  IV contrast  material. Radiation dose for this scan was reduced using  automated exposure control, adjustment of the mA and/or kV according  to patient size, or iterative reconstruction technique.    COMPARISON: None.    FINDINGS: There is no evidence of intracranial hemorrhage, mass, acute  infarct or anomaly. There is generalized atrophy of the brain. There  is low attenuation in the white matter of the cerebral hemispheres  consistent with sequelae of small vessel ischemic disease.     The visualized portions of the sinuses and mastoids appear normal.  There is no evidence of trauma.       Impression    IMPRESSION:  1. No evidence of acute intracranial hemorrhage, mass, or herniation.  2. There is generalized atrophy of the brain. White matter changes are  present in the cerebral hemispheres that are consistent with small  vessel ischemic disease in this age patient.      YSABEL HAM MD   Pelvis XR w/ unilateral hip right    Narrative    PELVIS AND RIGHT HIP ONE VIEW  7/24/2017 3:24 PM     COMPARISON: None.    HISTORY: Unwitnessed fall onto right side. Right hip pain and  discomfort. Evaluate for acute bony abnormality versus other.    FINDINGS: The visualized bones and joint spaces are within normal  limits.      Impression    IMPRESSION: No evidence for fracture, dislocation or significant  degenerative change of the pelvis or right hip.   CT Head Neck Angio w/o & w Contrast    Narrative    CTA ANGIOGRAM HEAD NECK  7/24/2017 3:26 PM     HISTORY: Evaluate for dissection/thromboembolism, unwitnessed fall,  dementia.    TECHNIQUE:  Precontrast localizing scans were followed by CT  angiography with an injection of IV contrast with scans through the  head and neck.  Images were transferred to a separate 3-D workstation  where multiplanar reformations and 3-D images were created.  Estimates  of carotid stenoses are made relative to the distal internal carotid  artery diameters except as noted. Radiation dose for this scan  was  reduced using automated exposure control, adjustment of the mA and/or  kV according to patient size, or iterative reconstruction technique.    COMPARISON: None.    FINDINGS: Estimates of stenosis at the carotid bifurcations are  relative to the distal internal carotids.    Arch: Normal anatomy.    Neck:  Right Carotid:  The right common carotid artery is normal.  Calcified  atherosclerotic plaque is seen at the right carotid bifurcation. The  stenosis is less than 50%..  The right internal carotid artery is  negative.     Left Carotid:  The left common carotid artery is unremarkable.   Calcified atherosclerotic plaque is seen at the left carotid  bifurcation. The stenosis is less than 50%..  The left internal  carotid is negative.      Vertebrals:  The vertebral arteries are normal.    Head:  Right Carotid:No occluded vessels are seen. .    Left Carotid:  No occluded vessels are identified. .    Basilar:  The basilar artery and its branches appear normal.     Miscellaneous: The venous sinuses appear patent.      Impression    IMPRESSION:   1. No occluded intracranial vessels. No high-grade intracranial  vascular stenosis.  2. Atherosclerotic plaque is present at both carotid bifurcations. The  degree of stenosis is less than 50% at both carotid bifurcations.  3. No arterial dissection is identified.    STELLA ARREGUIN MD   UA reflex to Microscopic   Result Value Ref Range    Color Urine Yellow     Appearance Urine Slightly Cloudy     Glucose Urine Negative NEG mg/dL    Bilirubin Urine Negative NEG    Ketones Urine Negative NEG mg/dL    Specific Gravity Urine 1.018 1.003 - 1.035    Blood Urine Trace (A) NEG    pH Urine 7.0 5.0 - 7.0 pH    Protein Albumin Urine 10 (A) NEG mg/dL    Urobilinogen mg/dL Normal 0.0 - 2.0 mg/dL    Nitrite Urine Positive (A) NEG    Leukocyte Esterase Urine Large (A) NEG    Source Midstream Urine     RBC Urine 18 (H) 0 - 2 /HPF    WBC Urine 41 (H) 0 - 2 /HPF    Bacteria Urine Moderate (A) NEG  /HPF    Squamous Epithelial /HPF Urine 10 (H) 0 - 1 /HPF    Mucous Urine Present (A) NEG /LPF       2:16 PM Patient assessed.   2:21 PM Stroke evaluation called.     Assessments & Plan (with Medical Decision Making)   Clinical impression:  84-year-ol female who presented by EMS after an unwitnessed fall at her care facility.  History is limited due to underlying history of dementia.  History is obtained primarily from EMS and transfer records.  She has a history of hypertension and hypothyroidism.  Patient was last seen normal around lunchtime at her care facility around 1 PM.  She is reported to have return to her apartment and sustained a fall at 1:10 PM.  EMS was called to her care facility of 1:38 PM.  Patient arrived in the emergency department on 2.20PM  when the stroke evaluation was called. Patient was seen immediately on arrival in emergency departments.  She has no complaints.  She reports some discomfort over the right hip and right leg.  EMS tells me patient was found in the bathroom against her right side.  Patient is alert and oriented.  GCS is 15.  Blood sugar prior to arrival was 120.  Patient is able to follow commands but reports pain with attempted raising her right leg because of pain and discomfort.  EMS was transporting the patient is a fall.  On arrival in the emergency department she was noted to have facial droop over the left side.  There was concern she was having progressive neurologic symptoms with facial droop and concern for confusion and a stroke evaluation was called.  I talked with the EMS crew who transported the patient.       ED course and plan:  There was no family at the bedside during the time of assessment and evaluation.  EMS was able to talk to staff at the care facility to collaborate history of presentation.  Decision was made to evaluate the patient is a stroke evaluation in the context of fall.  It appears the patient fell first and then was noted to have some confusion  although she has history of dementia with facial droop per EMS.  It is hard to collaborate if she has a prior history of facial droop as there is no family at the bedside.  CT of the head as well as a CT of the angiogram head and neck was obtained patient is currently being seen 90 minutes after reported onset of symptoms.  Because of patient's age, inability to consent the patient for tPA with underlying history of dementia and family on their way I elected to proceed with care as a stroke evaluation.  Blood work was obtained. I reviewed her record from her care facility.  I elected to proceed with a CT angiogram of the head and neck to exclude ischemic process despite known history of renal dysfunction with a baseline creatinine ranging from 1.1-1.3.  Creatinine today is 1.41.  In light of EMS reports of progressive facial droop with an unwitnessed fall.  CT of the head without contrast showed no acute process. CT angiogram of the head and neck showed no acute process. See radiologist interpretation in detailed report above. Family later arrived during course of care and reported patient is at baseline and prone to urinary tract infection. Last urine culture on file is from 10/2014.   Patient grew Klebsiella pneumonia as well as mixed enoc that was pan-susceptible.  Ambulatory road testing in the ED showed pateint unable to ambulate independently without support.    Patient continued to have some intermittent episodes of speech changes although she would return to baseline per daughter.  Daughter at the bedside reports patient is DNR/DNI.  At 5:30 PM daughter is unsure if she would want her mother to have IV TPA even if she had an acute stroke.  At this time plan is to admit for IV hydration recheck of her labs including her creatinine in the morning.  Admit for serial neurologic exam given acute UTI, cannot exclude symptoms of delirium.    I spoke with Dr. Connie Whitaker who is the admitting hospitalist at 6:30  PM, who agreed to assume care and admission after reviewing rationale for hospitalization, ED course and workup, interventions to the emergency department and imaging. Patient is admitted for unwitnessed fall, acute urinary tract infection, left-sided facial droop and probable delirium in the context of urinary tract infection.      Disclaimer: This note consists of symbols derived from keyboarding, dictation and/or voice recognition software. As a result, there may be errors in the script that have gone undetected. Please consider this when interpreting information found in this chart.    I have reviewed the nursing notes.    I have reviewed the findings, diagnosis, plan and need for follow up with the patient.       New Prescriptions    SULFAMETHOXAZOLE-TRIMETHOPRIM (BACTRIM/SEPTRA) 400-80 MG PER TABLET    Take 1 tablet by mouth 2 times daily       Final diagnoses:   Fall, initial encounter - unwitnessed at care facility from standing height.   Acute cystitis without hematuria       7/24/2017   [facility] EMERGENCY DEPARTMEN history is limited as the patient has a history of dementia.  History is obtained from EMS as well as transfer records and care staff.  She has a history of hypertension, hypothyroidism, hypokalemia.  She has an allergy to donepezil.Wily Layne MD  07/24/17 6125

## 2017-07-24 NOTE — LETTER
Transition Communication Hand-off for Care Transitions to Next Level of Care Provider    Name: Deanne Zayas  MRN #: [id number]  Primary Care Provider: [facility]     Primary Clinic: No address on file  Primary Care Clinic Name:  ([facility] )  Reason for Hospitalization:  Acute cystitis without hematuria [N30.00]  Fall, initial encounter [W19.XXXA]  Admit Date/Time: 7/24/2017  2:15 PM  Discharge Date: 7/27/17  Payor Source: Payor: MEDICARE / Plan: MEDICARE / Product Type: Medicare /     Readmission Assessment Measure (MATTHIEU) Risk Score/category: Average           Reason for Communication Hand-off Referral: Fragility    Discharge Plan: [place] on [facility] (Phone: [phone] Fax: [phone])       Discharge Needs Assessment:  Needs       Most Recent Value    PAS Number [id number]          Follow-up plan:  Future Appointments  Date Time Provider Department Center   7/27/2017 12:30 PM Alexandrea Sim PT [facility]             [facility] Recommendations:  Patient is a [facility] Medicare Waiver patient    DAIANA Stanley  [facility] [phone]/ [facility] [phone]      AVS/Discharge Summary is the source of truth; this is a helpful guide for improved communication of patient story

## 2017-07-24 NOTE — IP AVS SNAPSHOT
"` `           [facility] SURGICAL: [phone]                                              INTERAGENCY TRANSFER FORM - NURSING   2017                    Hospital Admission Date: 2017  BOBBY KING   : 1933  Sex: Female        Attending Provider: Shaun Osman MD     Allergies:  Donepezil    Infection:  None   Service:  INTERNAL MED    Ht:  1.702 m (5' 7\")   Wt:  87.8 kg (193 lb 9 oz)   Admission Wt:  87.8 kg (193 lb 9 oz)    BMI:  30.32 kg/m 2   BSA:  2.04 m 2            Patient PCP Information     Provider PCP Type    [facility]      Current Code Status     Date Active Code Status Order ID Comments User Context       Prior      Code Status History     Date Active Date Inactive Code Status Order ID Comments User Context    2013 11:56 AM  DNR/DNI [id number]  Justine Small NP Outpatient    2013  3:39 PM 2013 11:56 AM Full Code [id number]  Justine Small NP Outpatient      Advance Directives        Does patient have a scanned Advance Directive/ACP document in EPIC?           Yes        Hospital Problems as of 2017              Priority Class Noted POA    Hyperlipidemia LDL goal <130 Medium  2012 Yes    Hypothyroidism due to acquired atrophy of thyroid Medium  2012 Yes    Hypertension goal BP (blood pressure) < 140/90 Medium  2013 Yes    Diaper dermatitis Medium  10/8/2015 Yes    Late onset Alzheimer's disease without behavioral disturbance Medium  12/10/2015 Yes    Facial droop Medium  2017 Yes    * (Principal)Acute cystitis without hematuria Medium  2017 Yes    UTI (urinary tract infection) Medium  2017 Yes    Fall Medium  2017 Yes    Stroke (H) Medium  2017 Yes      Non-Hospital Problems as of 2017              Priority Class Noted    Vitamin B12 deficiency disease Medium  2012    Vitamin D deficiency disease Medium  2012    Essential hypertension Medium  " "8/24/2012    Osteopenia Medium  9/28/2012    Advance care planning Medium  10/16/2012    Mixed incontinence Medium  9/25/2014    Anxiety Medium  10/9/2014      Immunizations     Name Date      Influenza (High Dose) 3 valent vaccine 09/27/16     Influenza (High Dose) 3 valent vaccine 10/07/15     Influenza (IIV3) 09/19/13     Influenza (IIV3) 09/28/12     Influenza (IIV3) 09/27/11     Influenza (IIV3) 10/19/10     Influenza (IIV3) 11/06/07     Influenza (IIV3) 11/03/03     Influenza Vaccine IM 3yrs+ 4 Valent IIV4 10/08/14     Pneumococcal (PCV 13) 09/24/15     Pneumococcal 23 valent 08/16/05     TD (ADULT, 7+) 10/13/09     TD (ADULT, 7+) 01/01/96     TDAP Vaccine (Adacel) 10/16/12          END      ASSESSMENT     Discharge Profile Flowsheet     EXPECTED DISCHARGE     Resources List  Skilled Nursing Facility 07/27/17 1103    Expected Discharge Date  07/26/17 07/25/17 1150   PAS Number  [id number] 07/27/17 1103    GASTROINTESTINAL (ADULT,PEDIATRIC,OB)     SKIN      GI WDL  WDL 07/27/17 0940   Inspection of bony prominences  Full 07/27/17 0940    Last Bowel Movement  07/26/17 07/27/17 0353   Skin WDL  ex 07/27/17 0940    Passing flatus  yes 07/26/17 2203   Skin Color/Characteristics  other (see comments) (kassandra/rectal redness) 07/27/17 0940    COMMUNICATION ASSESSMENT     SAFETY      Patient's communication style  spoken language (English or Bilingual) 07/24/17 1418   Safety WDL  WDL 07/27/17 0940    FINAL RESOURCES                        Assessment WDL (Within Defined Limits) Definitions           Safety WDL     Effective: 09/28/15    Row Information: <b>WDL Definition:</b> Bed in low position, wheels locked; call light in reach; upper side rails up x 2; ID band on<br> <font color=\"gray\"><i>Item=AS safety wdl>>List=AS safety wdl>>Version=F14</i></font>      Skin WDL     Effective: 09/28/15    Row Information: <b>WDL Definition:</b> Warm; dry; intact; elastic; without discoloration; pressure points without redness<br> " "<font color=\"gray\"><i>Item=AS skin wdl>>List=AS skin wdl>>Version=F14</i></font>      Vitals     Vital Signs Flowsheet     VITAL SIGNS     BMI (Calculated)  30.38 07/24/17 2059    Temp  97.5  F (36.4  C) 07/27/17 0730   JATINDER COMA SCALE      Temp src  Oral 07/27/17 0730   Best Eye Response  4-->(E4) spontaneous 07/26/17 2203    Resp  16 07/27/17 0730   Best Motor Response  6-->(M6) obeys commands 07/26/17 2203    Pulse  67 07/26/17 1507   Best Verbal Response  4-->(V4) confused 07/26/17 2203    Heart Rate  73 07/27/17 0730   [place] Coma Scale Score  14 07/26/17 2203    Pulse/Heart Rate Source  Monitor 07/27/17 0730   CLINICALLY ALIGNED PAIN ASSESSMENT (CAPA) ([facility], [facility] AND [facility] ADULTS ONLY)      BP  161/71 07/27/17 0730   Comfort  negligible pain 07/27/17 0318    BP Location  Left arm 07/27/17 0730   Change in Pain  getting better 07/24/17 2037    OXYGEN THERAPY     Pain Control  fully effective 07/24/17 2037    SpO2  93 % 07/27/17 0730   DAILY CARE      O2 Device  None (Room air) 07/27/17 0730   Activity Type  up in chair;up to commode 07/27/17 0902    PAIN/COMFORT     Activity Level of Assistance  assistance, 2 people 07/27/17 0902    Patient Currently in Pain  denies 07/25/17 1645   Activity Assistive Device  gait belt (pivot to commode and chair ) 07/27/17 0902    Patient's Stated Pain Goal  No pain 07/24/17 2037   Additional Documentation  Activity Device Assistance (Row) 07/25/17 1045    0-10 Pain Scale  0 07/24/17 1429   POSITIONING      HEIGHT AND WEIGHT     Body Position  left, side-lying 07/27/17 0452    Height  1.702 m (5' 7\") 07/24/17 2059   Head of Bed (HOB)  HOB at 20-30 degrees 07/27/17 0452    Height Method  Stated 07/24/17 2059   Chair  Upright in chair 07/26/17 1859    Weight  87.8 kg (193 lb 9 oz) 07/24/17 2059   Positioning/Transfer Devices  pillows;in use 07/27/17 0452    BSA (Calculated - sq m)  2.04 07/24/17 2059                 Patient Lines/Drains/Airways Status    Active " LINES/DRAINS/AIRWAYS     Name: Placement date: Placement time: Site: Days: Last dressing change:    Urethral Catheter Double-lumen;Non-latex;Straight-tip 16 fr 08/29/14 2012   Double-lumen;Non-latex;Straight-tip   1062     Peripheral IV 07/27/17 07/27/17   0358      less than 1             Patient Lines/Drains/Airways Status    Active PICC/CVC     None            Intake/Output Detail Report     Date Intake     Output Net    Shift P.O. I.V. IV Piggyback Total Urine Total       Noc 07/25/17 2300 - 07/26/17 0659 -- -- -- -- -- -- 0    Day 07/26/17 0700 - 07/26/17 1459 540 -- -- 540 -- -- 540    Winnie 07/26/17 1500 - 07/26/17 2259 420 -- -- 420 -- -- 420    Noc 07/26/17 2300 - 07/27/17 0659 0 -- -- -- -- -- 0    Day 07/27/17 0700 - 07/27/17 1459 -- -- -- -- -- -- 0      Last Void/BM       Most Recent Value    Urine Occurrence 1 at 07/27/2017 0318    Stool Occurrence       Case Management/Discharge Planning     Case Management/Discharge Planning Flowsheet     REFERRAL INFORMATION     COPING/STRESS      Did the Initial Social Work Assessment result in a Social Work Case?  Yes 07/25/17 1150   Major Change/Loss/Stressor  none 07/26/17 1443    Admission Type  observation 07/25/17 1150   EXPECTED DISCHARGE      Arrived From  long-term care 07/25/17 1150   Expected Discharge Date  07/26/17 07/25/17 1150    Referral Source  nursing 07/25/17 1150   FINAL NOTE      Primary Care Clinic Name  -- ([facility] ) 07/25/17 1150   Final Note  D/C to [facility]  07/27/17 1103    LIVING ENVIRONMENT     FINAL RESOURCES      Lives With  facility resident (MCU) 07/26/17 1219   Resources List  Skilled Nursing Facility 07/27/17 1103    Living Arrangements  assisted living 07/25/17 1636   PAS Number  [id number] 07/27/17 1103    Provides Primary Care For  no one, unable/limited ability to care for self 07/25/17 1150   ABUSE RISK SCREEN      Primary Care Provided By  child(claudia) (specify) 07/25/17 1150   QUESTION TO PATIENT:  Has a member of your family  or a partner(now or in the past) intimidated, hurt, manipulated, or controlled you in any way?  no 07/24/17 1418    ASSESSMENT OF FAMILY/SOCIAL SUPPORT     QUESTION TO PATIENT: Do you feel safe going back to the place where you are living?  yes 07/24/17 1418    Who is your support system?  Children;Facility resident(s)/Staff 07/25/17 1150   OBSERVATION: Is there reason to believe there has been maltreatment of a vulnerable adult (ie. Physical/Sexual/Emotional abuse, self neglect, lack of adequate food, shelter, medical care, or financial exploitation)?  no 07/24/17 1418    Description of Support System  Supportive;Involved 07/25/17 1150   (R) MENTAL HEALTH SUICIDE RISK      Support Assessment  Adequate family and caregiver support;Adequate social supports 07/25/17 1150   Are you depressed or being treated for depression?  No 07/24/17 2100

## 2017-07-24 NOTE — ED NOTES
Daughter came out to the nurses station and stated her mother was having slurred speech. Upon assessment of patient with provider, pt has garbled speech that lasted roughly 5 minutes then cleared up and back to normal.

## 2017-07-24 NOTE — IP AVS SNAPSHOT
"          [facility]: [phone]                                              INTERAGENCY TRANSFER FORM - LAB / IMAGING / EKG / EMG RESULTS   2017                    Hospital Admission Date: 2017  BOBBY KING   : 1933  Sex: Female        Attending Provider: Shaun Osman MD     Allergies:  Donepezil    Infection:  None   Service:  INTERNAL MED    Ht:  1.702 m (5' 7\")   Wt:  87.8 kg (193 lb 9 oz)   Admission Wt:  87.8 kg (193 lb 9 oz)    BMI:  30.32 kg/m 2   BSA:  2.04 m 2            Patient PCP Information     Provider PCP Type    [facility]         Lab Results - 3 Days      Urine Culture [[id number]] (Abnormal)  Resulted: 17 1035, Result status: Preliminary result    Ordering provider: Wily Carter MD  17 1648 Resulting lab: [facility]    Specimen Information    Type Source Collected On   Urine  17 1535          Components       Value Reference Range Flag Lab   Specimen Description Midstream Urine   59   Culture Micro --  A 59   Result:         >100,000 colonies/mL Gram negative rods  >100,000 colonies/mL Strain 2 Gram negative rods  >100,000 colonies/mL mixed urogenital enoc  Identification and susceptibility to follow     Micro Report Status Pending   59   Result:              Basic metabolic panel [[id number]] (Abnormal)  Resulted: 17 0716, Result status: Final result    Ordering provider: Shaun Osman MD  17 0000 Resulting lab: [facility]    Specimen Information    Type Source Collected On   Blood  17 0640          Components       Value Reference Range Flag Lab   Sodium 140 133 - 144 mmol/L  59   Potassium 3.7 3.4 - 5.3 mmol/L  59   Chloride 106 94 - 109 mmol/L  59   Carbon Dioxide 26 20 - 32 mmol/L  59   Anion Gap 8 3 - 14 mmol/L  59   Glucose 154 70 - 99 mg/dL H 59   Urea Nitrogen 17 7 - 30 mg/dL  59   Creatinine 1.19 0.52 - 1.04 mg/dL H 59   GFR " Estimate 43 >60 mL/min/1.7m2 L 59   Comment:  Non  GFR Calc   GFR Estimate If Black 52 >60 mL/min/1.7m2 L 59   Comment:  African American GFR Calc   Calcium 9.6 8.5 - 10.1 mg/dL  59            Basic metabolic panel [[id number]] (Abnormal)  Resulted: 07/25/17 0646, Result status: Final result    Ordering provider: Wily Carter MD  07/25/17 0001 Resulting lab: [facility]    Specimen Information    Type Source Collected On   Blood  07/25/17 0617          Components       Value Reference Range Flag Lab   Sodium 138 133 - 144 mmol/L  59   Potassium 3.7 3.4 - 5.3 mmol/L  59   Chloride 104 94 - 109 mmol/L  59   Carbon Dioxide 25 20 - 32 mmol/L  59   Anion Gap 9 3 - 14 mmol/L  59   Glucose 139 70 - 99 mg/dL H 59   Urea Nitrogen 12 7 - 30 mg/dL  59   Creatinine 0.99 0.52 - 1.04 mg/dL  59   GFR Estimate 53 >60 mL/min/1.7m2 L 59   Comment:  Non  GFR Calc   GFR Estimate If Black 65 >60 mL/min/1.7m2  59   Comment:  African American GFR Calc   Calcium 9.1 8.5 - 10.1 mg/dL  59            CBC with platelets differential [[id number]]  Resulted: 07/25/17 0624, Result status: Final result    Ordering provider: Wily Carter MD  07/25/17 0001 Resulting lab: [facility]    Specimen Information    Type Source Collected On   Blood  07/25/17 0617          Components       Value Reference Range Flag Lab   WBC 6.6 4.0 - 11.0 10e9/L  59   RBC Count 4.40 3.8 - 5.2 10e12/L  59   Hemoglobin 13.7 11.7 - 15.7 g/dL  59   Hematocrit 41.0 35.0 - 47.0 %  59   MCV 93 78 - 100 fl  59   MCH 31.1 26.5 - 33.0 pg  59   MCHC 33.4 31.5 - 36.5 g/dL  59   RDW 13.1 10.0 - 15.0 %  59   Platelet Count 231 150 - 450 10e9/L  59   Diff Method Automated Method   59   % Neutrophils 74.8 %  59   % Lymphocytes 11.3 %  59   % Monocytes 8.6 %  59   % Eosinophils 3.8 %  59   % Basophils 1.2 %  59   % Immature Granulocytes 0.3 %  59   Absolute Neutrophil 5.0 1.6 - 8.3 10e9/L  59    Absolute Lymphocytes 0.8 0.8 - 5.3 10e9/L  59   Absolute Monocytes 0.6 0.0 - 1.3 10e9/L  59   Absolute Eosinophils 0.3 0.0 - 0.7 10e9/L  59   Absolute Basophils 0.1 0.0 - 0.2 10e9/L  59   Abs Immature Granulocytes 0.0 0 - 0.4 10e9/L  59            UA reflex to Microscopic [[id number]] (Abnormal)  Resulted: 07/24/17 1554, Result status: Final result    Ordering provider: Wily Carter MD  07/24/17 1538 Resulting lab: [facility]    Specimen Information    Type Source Collected On   Urine Urine clean catch 07/24/17 1535          Components       Value Reference Range Flag Lab   Color Urine Yellow   59   Appearance Urine Slightly Cloudy   59   Glucose Urine Negative NEG mg/dL  59   Bilirubin Urine Negative NEG  59   Ketones Urine Negative NEG mg/dL  59   Specific Gravity Urine 1.018 1.003 - 1.035  59   Blood Urine Trace NEG A 59   pH Urine 7.0 5.0 - 7.0 pH  59   Protein Albumin Urine 10 NEG mg/dL A 59   Urobilinogen mg/dL Normal 0.0 - 2.0 mg/dL  59   Nitrite Urine Positive NEG A 59   Leukocyte Esterase Urine Large NEG A 59   Source Midstream Urine   59   RBC Urine 18 0 - 2 /HPF H 59   WBC Urine 41 0 - 2 /HPF H 59   Bacteria Urine Moderate NEG /HPF A 59   Squamous Epithelial /HPF Urine 10 0 - 1 /HPF H 59   Mucous Urine Present NEG /LPF A 59            Comprehensive metabolic panel [[id number]] (Abnormal)  Resulted: 07/24/17 1501, Result status: Final result    Ordering provider: Wily Carter MD  07/24/17 1423 Resulting lab: [facility]    Specimen Information    Type Source Collected On   Blood  07/24/17 1425          Components       Value Reference Range Flag Lab   Sodium 140 133 - 144 mmol/L  59   Potassium 4.3 3.4 - 5.3 mmol/L  59   Chloride 106 94 - 109 mmol/L  59   Carbon Dioxide 28 20 - 32 mmol/L  59   Anion Gap 6 3 - 14 mmol/L  59   Glucose 123 70 - 99 mg/dL H 59   Urea Nitrogen 15 7 - 30 mg/dL  59   Creatinine 1.41 0.52 - 1.04 mg/dL H 59   GFR Estimate 36  >60 mL/min/1.7m2 L 59   Comment:  Non  GFR Calc   GFR Estimate If Black 43 >60 mL/min/1.7m2 L 59   Comment:  African American GFR Calc   Calcium 9.6 8.5 - 10.1 mg/dL  59   Bilirubin Total 0.5 0.2 - 1.3 mg/dL  59   Albumin 3.8 3.4 - 5.0 g/dL  59   Protein Total 6.8 6.8 - 8.8 g/dL  59   Alkaline Phosphatase 69 40 - 150 U/L  59   ALT 18 0 - 50 U/L  59   AST 16 0 - 45 U/L  59            INR [[id number]]  Resulted: 07/24/17 1452, Result status: Final result    Ordering provider: Wily Carter MD  07/24/17 1423 Resulting lab: [facility]    Specimen Information    Type Source Collected On   Blood  07/24/17 1425          Components       Value Reference Range Flag Lab   INR 0.91 0.86 - 1.14  59            CBC with platelets differential [[id number]]  Resulted: 07/24/17 1444, Result status: Final result    Ordering provider: Wily Carter MD  07/24/17 1428 Resulting lab: [facility]    Specimen Information    Type Source Collected On   Blood  07/24/17 1425          Components       Value Reference Range Flag Lab   WBC 5.8 4.0 - 11.0 10e9/L  59   RBC Count 3.97 3.8 - 5.2 10e12/L  59   Hemoglobin 12.5 11.7 - 15.7 g/dL  59   Hematocrit 37.8 35.0 - 47.0 %  59   MCV 95 78 - 100 fl  59   MCH 31.5 26.5 - 33.0 pg  59   MCHC 33.1 31.5 - 36.5 g/dL  59   RDW 13.1 10.0 - 15.0 %  59   Platelet Count 233 150 - 450 10e9/L  59   Diff Method Automated Method   59   % Neutrophils 70.3 %  59   % Lymphocytes 16.6 %  59   % Monocytes 9.4 %  59   % Eosinophils 2.6 %  59   % Basophils 0.9 %  59   % Immature Granulocytes 0.2 %  59   Absolute Neutrophil 4.1 1.6 - 8.3 10e9/L  59   Absolute Lymphocytes 1.0 0.8 - 5.3 10e9/L  59   Absolute Monocytes 0.6 0.0 - 1.3 10e9/L  59   Absolute Eosinophils 0.2 0.0 - 0.7 10e9/L  59   Absolute Basophils 0.1 0.0 - 0.2 10e9/L  59   Abs Immature Granulocytes 0.0 0 - 0.4 10e9/L  59            Glucose by meter [[id number]] (Abnormal)  Resulted:  07/24/17 1426, Result status: Final result    Ordering provider: Wily Carter MD  07/24/17 1421 Resulting lab: POINT OF CARE TEST, GLUCOSE    Specimen Information    Type Source Collected On     07/24/17 1421          Components       Value Reference Range Flag Lab   Glucose 121 70 - 99 mg/dL H 170            Testing Performed By     Lab - Abbreviation Name Director Address Valid Date Range    59 - Unknown [facility] Unknown [address] [facility] [address] 12/31/14 1006 - Present    170 - Unknown POINT OF CARE TEST, GLUCOSE Unknown Unknown 10/31/11 1114 - Present            Unresulted Labs (24h ago through future)    Start       Ordered    07/28/17 0600  Platelet function P2Y12  AM DRAW,   Routine      07/27/17 1035         Imaging Results - 3 Days      MRI Brain w/o contrast [[id number]]  Resulted: 07/26/17 1851, Result status: Final result    Ordering provider: Shaun Osman MD  07/26/17 1147 Resulted by: Jaden Castro MD    Performed: 07/26/17 1415 - 07/26/17 1454 Resulting lab: RADIOLOGY RESULTS    Narrative:       MRI BRAIN WITHOUT CONTRAST  7/26/2017 2:54 PM    HISTORY: Weakness. Right upper limb with speech difficulty.    TECHNIQUE: Multiplanar, multisequence MRI of the brain without  gadolinium IV contrast material.    COMPARISON: None.    FINDINGS: Diffusion-weighted images show restricted diffusion in the  left paramedian russell, consistent with acute ischemic infarct. There is  no associated mass effect or hemorrhage. There is some associated  abnormal increased signal intensity in the T2 and FLAIR images. There  is also scattered supratentorial white matter signal hyperintensities  on FLAIR and T2-weighted images which are not associated with any mass  effect or enhancement. There is an old microbleed in the right basal  ganglia region. Moderate cerebral atrophy is present. Vascular  structures are patent at the skull base.      Impression:       IMPRESSION:  1.  Acute ischemic infarct in left paramedian russell.  2. Old microbleed in right basal ganglia region.  3. Cerebral atrophy with brainstem and supratentorial signal  abnormalities which are likely due to chronic small vessel ischemic  disease.    JADEN RAMIREZ MD      CT Head w/o Contrast [[id number]]  Resulted: 07/26/17 0757, Result status: Final result    Ordering provider: Shaun Osman MD  07/26/17 0005 Resulted by: Jaden Ramirez MD    Performed: 07/26/17 0727 - 07/26/17 0737 Resulting lab: RADIOLOGY RESULTS    Narrative:       CT SCAN OF THE HEAD WITHOUT CONTRAST   7/26/2017 7:37 AM     HISTORY: Right-sided weakness.    TECHNIQUE:  Axial images of the head and coronal reformations without  IV contrast material.  Radiation dose for this scan was reduced using  automated exposure control, adjustment of the mA and/or kV according  to patient size, or iterative reconstruction technique.    COMPARISON: 7/24/2017.    FINDINGS: Moderate cerebral atrophy is present. Patchy white matter  changes are seen in both hemispheres without mass effect. There is no  evidence for intracranial hemorrhage, mass effect, acute infarct, or  skull fracture.      Impression:       IMPRESSION: Chronic changes. No evidence for intracranial hemorrhage  or any acute process.    JADEN RAMIREZ MD      Pelvis XR w/ unilateral hip right [[id number]]  Resulted: 07/24/17 2112, Result status: Final result    Ordering provider: Wily Carter MD  07/24/17 1430 Resulted by: Ye Ritchie MD    Performed: 07/24/17 1512 - 07/24/17 1524 Resulting lab: RADIOLOGY RESULTS    Narrative:       PELVIS AND RIGHT HIP ONE VIEW  7/24/2017 3:24 PM     COMPARISON: None.    HISTORY: Unwitnessed fall onto right side. Right hip pain and  discomfort. Evaluate for acute bony abnormality versus other.    FINDINGS: The visualized bones and joint spaces are within normal  limits.      Impression:       IMPRESSION: No evidence for fracture, dislocation or  significant  degenerative change of the pelvis or right hip.    CLINTON BURGESS MD      CT Head w/o Contrast [[id number]]  Resulted: 07/24/17 1631, Result status: Final result    Ordering provider: Wily Carter MD  07/24/17 1422 Resulted by: Ysabel Samayoa MD    Performed: 07/24/17 1445 - 07/24/17 1451 Resulting lab: RADIOLOGY RESULTS    Narrative:       CT SCAN OF THE HEAD WITHOUT CONTRAST   7/24/2017 2:51 PM     HISTORY: Unwitnessed fall at care facility. Left-sided facial droop.  History of dementia.    TECHNIQUE: Axial images of the head and coronal reformations without  IV contrast material. Radiation dose for this scan was reduced using  automated exposure control, adjustment of the mA and/or kV according  to patient size, or iterative reconstruction technique.    COMPARISON: None.    FINDINGS: There is no evidence of intracranial hemorrhage, mass, acute  infarct or anomaly. There is generalized atrophy of the brain. There  is low attenuation in the white matter of the cerebral hemispheres  consistent with sequelae of small vessel ischemic disease.     The visualized portions of the sinuses and mastoids appear normal.  There is no evidence of trauma.       Impression:       IMPRESSION:  1. No evidence of acute intracranial hemorrhage, mass, or herniation.  2. There is generalized atrophy of the brain. White matter changes are  present in the cerebral hemispheres that are consistent with small  vessel ischemic disease in this age patient.      YSABEL SAMAYOA MD      CT Head Neck Angio w/o & w Contrast [[id number]]  Resulted: 07/24/17 1629, Result status: Final result    Ordering provider: Wily Carter MD  07/24/17 1422 Resulted by: Odilon Suresh MD    Performed: 07/24/17 1502 - 07/24/17 1526 Resulting lab: RADIOLOGY RESULTS    Narrative:       CTA ANGIOGRAM HEAD NECK  7/24/2017 3:26 PM     HISTORY: Evaluate for dissection/thromboembolism, unwitnessed fall,  dementia.    TECHNIQUE:   Precontrast localizing scans were followed by CT  angiography with an injection of IV contrast with scans through the  head and neck.  Images were transferred to a separate 3-D workstation  where multiplanar reformations and 3-D images were created.  Estimates  of carotid stenoses are made relative to the distal internal carotid  artery diameters except as noted. Radiation dose for this scan was  reduced using automated exposure control, adjustment of the mA and/or  kV according to patient size, or iterative reconstruction technique.    COMPARISON: None.    FINDINGS: Estimates of stenosis at the carotid bifurcations are  relative to the distal internal carotids.    Arch: Normal anatomy.    Neck:  Right Carotid:  The right common carotid artery is normal.  Calcified  atherosclerotic plaque is seen at the right carotid bifurcation. The  stenosis is less than 50%..  The right internal carotid artery is  negative.     Left Carotid:  The left common carotid artery is unremarkable.   Calcified atherosclerotic plaque is seen at the left carotid  bifurcation. The stenosis is less than 50%..  The left internal  carotid is negative.      Vertebrals:  The vertebral arteries are normal.    Head:  Right Carotid:No occluded vessels are seen. .    Left Carotid:  No occluded vessels are identified. .    Basilar:  The basilar artery and its branches appear normal.     Miscellaneous: The venous sinuses appear patent.      Impression:       IMPRESSION:   1. No occluded intracranial vessels. No high-grade intracranial  vascular stenosis.  2. Atherosclerotic plaque is present at both carotid bifurcations. The  degree of stenosis is less than 50% at both carotid bifurcations.  3. No arterial dissection is identified.    STELLA ARREGUIN MD      Testing Performed By     Lab - Abbreviation Name Director Address Valid Date Range    104 - Rad Rslts RADIOLOGY RESULTS Unknown Unknown 02/16/05 1553 - Present            Encounter-Level Documents:      There are no encounter-level documents.      Order-Level Documents:     There are no order-level documents.

## 2017-07-24 NOTE — ED NOTES
According to daughter who remains @ the bedside, pt lives in a memory care facility and had an unwitnessed fall in the bathroom, found by staff. Estimated time down was 20 minutes. Possible injuries to head and pelvis upon arrival. In route per EMS, pt has possible facial droop ( left side) and was evaluated for stroke. Pt is pleasant but confused to place and date, pt believes that she is 57 years old.

## 2017-07-24 NOTE — IP AVS SNAPSHOT
` ` Patient Information     Patient Name Sex Deanne Dee ([id number]) Female 1933       Room Bed    2401 7741-11      Patient Demographics     Address Phone    c/o Leeanna Moran  [address] [facility] [address] [phone] (Home)  [phone] (Mobile)      Patient Ethnicity & Race     Ethnic Group Patient Race    American White      Emergency Contact(s)     Name Relation Home Work Mobile    Leeanna Moran Daughter [phone]    DeanGutierrez quesada  [phone]    Mariela Chen  [phone]        Documents on File        Status Date Received Description       Documents for the Patient    Privacy Notice - [place]       Insurance Card       External Medication Information Consent       Patient ID Scan Refused 17     Consent for Services - Hospital/Clinic  ()      Insurance Card Received 12     Consent for Services - Hospital/Clinic Received () 12     External Medication Information Consent Accepted () 12     Privacy Notice - [place] Received 12     Consent to Communicate   Authorization to discuss protected information with daughter Mayra Moran -signed 2012    HIM [facility] Authorization - File Only   [facility] 12    Consent for EHR Access  13 Copied from existing Consent for services - C/HOD collected on 2012    [facility] Specified Other       Physical Therapy Certification Received 13 Medicare Cert Sent for 3/26/13 to 13  Dr Cristobal    Consent for Services - Hospital/Clinic Received () 13     External Medication Information Consent Patient Refused 13     Consent for Services - Geriatrics Received 13     Consent for Services - Geriatrics Received 13     Advance Directives and Living Will Received 13 POLST 2013     Advance Directives and Living Will Received 10/11/13 POLST 10/10/2013     Advance  Directives and Living Will Not Received  Validation of AD 2013    Advance Directives and Living Will Received 13 Health Care Directive 2013    Business/Insurance/Care Coordination/Health Form - Patient   [facility] registration Form    Business/Insurance/Care Coordination/Health Form - Patient   [facility] patient enrollment forms    Consent for Services - Geriatrics   [facility] Consent and HIPAA    HIM AMANDA Authorization   [facility]    Insurance Card   Medicare, [employer]    Consent for Services - Hospital/Clinic Received () 14     Consent for Services - Hospital/Clinic Received () 10/19/15     Business/Insurance/Care Coordination/Health Form - Patient  10/28/15 INVALID AUTHORIZATION NOTICE 10/19/2015    HIM AMANDA Authorization - File Only  10/28/15 [facility] [employer] [facility]  10/19/2015    [facility] AMANDA Authorization - File Only  16 [facility] [employer]    Consent for Services/Privacy Notice - Hospital/Clinic Received 17     Advance Directives and Living Will Received 16 POLST 2016       Documents for the Encounter    CMS IM for Patient Signature Received 17     Discharge Attachment   URINARY TRACT INFECTIONS (UTIS), UNDERSTANDING (ENGLISH)    Discharge Attachment   SULFAMETHOXAZOLE, TRIMETHOPRIM ORAL TABLET (ENGLISH)    EMS/Ambulance Record  17 [facility][note 3] AMBULANCE [facility] HANDOFF    ECG   ECG Report      Admission Information     Attending Provider Admitting Provider Admission Type Admission Date/Time    Shaun Osman MD Anderson, Connie PARSON MD Emergency 17  1415    Discharge Date Hospital Service Auth/Cert Status Service Area     Internal Medicine [facility]    Unit Room/Bed Admission Status       [facility] 2401/2401- Admission (Confirmed)       Admission     Complaint    UTI (urinary tract infection), Fall, Stroke (H)      Hospital  Account     Name Acct ID Class Status Primary Coverage    Deanne Zayas [id number] Inpatient Open MEDICARE - MEDICARE            Guarantor Account (for Hospital Account #[id number])     Name Relation to Pt Service Area Active? Acct Type    Deanne Zayas Self FCS Yes Personal/Family    Address Phone          c/o Leeanna Dean  [address] [place] [address] [phone](H)              Coverage Information (for Hospital Account #[id number])     1. MEDICARE/MEDICARE     F/O Payor/Plan Precert #    MEDICARE/MEDICARE     Subscriber Subscriber #    Deanne Zayas [id number]    Address Phone    ATTN CLAIMS  [address]  [facility] [place] [address] [phone]          2. // FOR LIFE     F/O Payor/Plan Precert #    // FOR LIFE     Subscriber Subscriber #    [place] [id number]    Address Phone     FOR LIFE  [address]  [place] [address]

## 2017-07-24 NOTE — ED NOTES
Attempted to get patient out of bed to ambulate with walker, pt unable to her own bear weight. Assisted back to bed with 3 staff. VSS

## 2017-07-24 NOTE — IP AVS SNAPSHOT
` `     [facility] SURGICAL: [phone]                 INTERAGENCY TRANSFER FORM - NOTES (H&P, Discharge Summary, Consults, Procedures, Therapies)   2017                    Hospital Admission Date: 2017  BOBBY ZAYAS   : 1933  Sex: Female        Patient PCP Information     Provider PCP Type    [facility]         History & Physicals      H&P by Connie Whitaker MD at 2017  7:29 PM     Author:  Connie Whitaker MD Service:  Family Medicine Author Type:  Physician    Filed:  2017  7:50 PM Date of Service:  2017  7:29 PM Creation Time:  2017  7:28 PM    Status:  Signed :  Connie Whitaker MD (Physician)         [facility]    History and Physical  Hospitalist       Date of Admission:  2017[AA1.1]    Assessment & Plan[AA1.2]   Bobby Zayas is a 84 year old female who presents after fall at Memory Care unit and was also noted to have left facial droop.[AA1.1]    Principal Problem:    Acute cystitis without hematuria    Assessment:[AA1.3] she does not have a catheter.  She has positive nitrites and large leuk esterase. Possible that the UTI is contributing to her weakness, fall, speech/facial droop.    Pl[AA1.1]an:[AA1.3] started on Rocephin in the ER, urine culture is ordered.       Facial droop    Assessment: talked at length with her daughter at the bedside.  Not a candidate for TPA at this point given the length of time symptoms have been present.  CT/CTA of the head unremarkable. Possible that this could be a result of metabolic encephalopathy given the intermittently slurred speech and generalized weakness as well    Plan: continue to monitor symptoms    CKD stage III   assessment: baseline Cr 1.14 in 2016.  Today is 1.41 and did receive contrast for her head CT.     plan: will push fluids and repeat Cr in the am.  Because we don't have a really recent BMP, hard to know if this is  gradual change over time or more acute with the UTI.[AA1.1]         Hypothyroidism due to acquired atrophy of thyroid    Assessment:[AA1.3] last TSH 3/2017 was 4.42, reasonable for age[AA1.1]    Plan:[AA1.3] continue levothyroxine.[AA1.1]       Hypertension goal BP (blood pressure) < 140/90    Assessment:[AA1.3] blood pressure has been well controlled in ER[AA1.1]    Plan:[AA1.3] continue lisinopril and norvasc[AA1.1]      Diaper dermatitis    Assessment:[AA1.3] uses nystatin powder[AA1.1]    Plan:[AA1.3] will continue nystatin[AA1.1]      Late onset Alzheimer's disease without behavioral disturbance    Assessment:[AA1.3] currently resides at memory care unit at [facility]. Continue lexapro.[AA1.1]     Plan:[AA1.3] daughter plans to stay the night with her.  Anticipate some worsening of memory, etc with change in environment.       DVT Prophylaxis: Defer to primary service  Code Status: DNR / DNI - confirmed with daughter    Disposition: Expected discharge in 1-2 days once stable for return to [facility].[AA1.1]    Connie Whitaker[AA1.2], MD[AA1.1]    Primary Care Physician     [facility]    Chief Complaint[AA1.2]   Fall at memory care, left facial droop    History is obtained from the patient's daughter[AA1.1]    History of Present Illness[AA1.2]   Deanne Zayas is a 84 year old female with dementia, hyperlipidemia, hypertension and hypothyroidismwho presents with fall at her memory care unit today at 1310 in the bathroom.  Found by caretakers.  Noted at 1320 or so to have some slurred speech and daughter notes she seems more confused than her baseline.  EMS noted left facial droop.  After initial treatment for the UTI in the ER, attempts to walk her noted some weakness and unsteadiness on her feet.[AA1.1]      Past Medical History[AA1.2]    Patient Active Problem List    Diagnosis Date Noted     Facial droop 07/24/2017     Priority: Medium     Acute cystitis without hematuria  07/24/2017     Priority: Medium     Late onset Alzheimer's disease without behavioral disturbance 12/10/2015     Priority: Medium     Diaper dermatitis 10/08/2015     Priority: Medium     Anxiety 10/09/2014     Priority: Medium     Mixed incontinence 09/25/2014     Priority: Medium     Hypertension goal BP (blood pressure) < 140/90 08/13/2013     Priority: Medium     Advance care planning 10/16/2012     Priority: Medium     Advance Care Planning 9/14/2016: Receipt of ACP document:  Received: POLST which was signed and dated by provider on 7/7/16.  Document previously scanned on 7/21/16.  Order reviewed and found to be valid.  Code Status reflects choices in most recent ACP document.  Also received POLSTs dated 10/10/13 and 9/27/13.  Confirmed/documented designated decision maker(s).  Added by Opal Chanel    Advance Care Planning Liaison  Advance Care Planning 9/14/2016: Receipt of ACP document:  Received: Health Care Directive which was witnessed or notarized on 12/9/13.  Document previously scanned on 12/16/13.  Validation form completed and sent to be scanned.  Code Status reflects choices in most recent ACP document.  Confirmed/documented designated decision maker(s).  Added by Opal Chanel   Advance Care Planning Liaison  Advance Care Planning 10/16/2012: Discussed advance care planning with patient; information given to patient to review.                   Osteopenia 09/28/2012     Priority: Medium     Essential hypertension 08/24/2012     Priority: Medium     Problem list name updated by automated process. Provider to review       Vitamin D deficiency disease 07/19/2012     Priority: Medium     Hyperlipidemia LDL goal <130 07/17/2012     Priority: Medium     Hypothyroidism due to acquired atrophy of thyroid 07/17/2012     Priority: Medium     Vitamin B12 deficiency disease 07/17/2012     Priority: Medium[AA1.4]         Past Surgical History[AA1.2]   I have reviewed this patient's surgical  history and updated it with pertinent information if needed.[AA1.1]  Past Surgical History:   Procedure Laterality Date     CHOLECYSTECTOMY       CYSTOSCOPY N/A 8/29/2014    Procedure: CYSTOSCOPY;  Surgeon: ANNAMARIE Kern MD;  Location: [facility] OR     SURGICAL HISTORY OF -   2012     cataract surgery bilat.[AA1.4]        Prior to Admission Medications   Prior to Admission Medications   Prescriptions Last Dose Informant Patient Reported? Taking?   amLODIPine (NORVASC) 2.5 MG tablet 7/23/2017 at 1900 long-term No Yes   Sig: Take 1 tablet (2.5 mg) by mouth daily   aspirin 81 MG chewable tablet 7/24/2017 at 0900 Nursing Home No Yes   Sig: Take 1 tablet (81 mg) by mouth daily   cholecalciferol (VITAMIN D3) 1000 UNIT tablet 7/23/2017 at 1900 long-term No Yes   Sig: Take 2 tablets (2,000 Units) by mouth daily   cyanocobalamin (VITAMIN B12) 1000 MCG/ML injection 6/26/2017 Nursing Home No Yes   Sig: Inject 1 mL (1,000 mcg) Subcutaneous every 30 days   escitalopram (LEXAPRO) 10 MG tablet 7/23/2017 at 1900 long-term No Yes   Sig: Take 1 tablet (10 mg) by mouth daily   ferrous sulfate (IRON) 325 (65 FE) MG tablet 7/24/2017 at am Nursing Home No Yes   Sig: Take 1 tablet (325 mg) by mouth daily   levothyroxine (SYNTHROID/LEVOTHROID) 112 MCG tablet 7/24/2017 at am Nursing Home No Yes   Sig: Take 1 tablet (112 mcg) by mouth daily   lisinopril (PRINIVIL/ZESTRIL) 40 MG tablet 7/24/2017 at am Nursing Home No Yes   Sig: Take 1 tablet (40 mg) by mouth daily   mineral oil-hydrophilic petrolatum (AQUAPHOR) 7/22/2017 Nursing Home Yes Yes   Sig: Apply topically as needed   nystatin (MYCOSTATIN) 929682 UNIT/GM POWD 7/24/2017 at am Nursing Home No Yes   Sig: Apply topically as needed APPLY TOPICALLY TO AFFECTED AREA(S) TID PRN   Patient taking differently: Apply topically 2 times daily APPLY TOPICALLY TO AFFECTED AREA(S) TID PRN   senna-docusate (SENNA S) 8.6-50 MG per tablet 7/23/2017 at pm Nursing Home No Yes   Sig: Take 1 tablet by  "mouth daily   syringe/needle, disp, 25G X 1\" 3 ML [facility]  Nursing Home No No   Sig: USE AS DIRECTED WITH B12 INECTION      Facility-Administered Medications: None     Allergies   Allergies   Allergen Reactions     Donepezil Other (See Comments)     At 10 mg, tremulousness and decreased appetite       Social History[AA1.2]   I have reviewed this patient's social history and updated it with pertinent information if needed. Deanne Zayas[AA1.1]  reports that she has never smoked. She has never used smokeless tobacco. She reports that she does not drink alcohol or use illicit drugs.[AA1.4]      Family History[AA1.2]   I have reviewed this patient's family history and updated it with pertinent information if needed.[AA1.1]   Family History   Problem Relation Age of Onset     Family History Negative Mother      childbirth     Unknown/Adopted Mother       during child birth at a young age     Family History Negative Father      fell off roof broke neck     Obesity Sister[AA1.4]        Review of Systems[AA1.2]   Review of systems not obtained due to patient factors - mental status[AA1.1]    Physical Exam   Temp: 98.7  F (37.1  C) Temp src: Oral BP: 136/64   Heart Rate: 70 Resp: 17 SpO2: 93 % O2 Device: None (Room air)[AA1.2]    Vital Signs with Ranges[AA1.1]  Temp:  [98.7  F (37.1  C)] 98.7  F (37.1  C)  Heart Rate:  [60-74] 70  Resp:  [9-23] 17  BP: (136-172)/(64-96) 136/64  SpO2:  [92 %-96 %] 93 %  0 lbs 0 oz[AA1.2]    Constitutional: up in bed, pleasant, confused.  Oriented only to self  Eyes: PERRL, sclera non-icteric  HEENT: oral mucous membranes are moist  Respiratory: clear to auscultation bilaterally  Cardiovascular: regular rate and rhythm without murmur/gallop or rub  GI: soft/nt/nd  Lymph/Hematologic: no enlarged nodes  Genitourinary: deferred  Skin: no rashes  Musculoskeletal: no lower extremity edema  Neurologic: there is a left facial droop.  She is alert.  She has normal tone.  Speech is understandable " and at times a bit slurred sounding.    Psychiatric: normal mood/affect.  She thinks she's in [facility] and that it's 1976.[AA1.1]    Data[AA1.2]   Data reviewed today:  I personally reviewed no images or EKG's today.[AA1.1]    Recent Labs  Lab 07/24/17  1425   WBC 5.8   HGB 12.5   MCV 95      INR 0.91      POTASSIUM 4.3   CHLORIDE 106   CO2 28   BUN 15   CR 1.41*   ANIONGAP 6   RUBIN 9.6   *   ALBUMIN 3.8   PROTTOTAL 6.8   BILITOTAL 0.5   ALKPHOS 69   ALT 18   AST 16       Recent Results (from the past 24 hour(s))   CT Head w/o Contrast    Narrative    CT SCAN OF THE HEAD WITHOUT CONTRAST   7/24/2017 2:51 PM     HISTORY: Unwitnessed fall at care facility. Left-sided facial droop.  History of dementia.    TECHNIQUE: Axial images of the head and coronal reformations without  IV contrast material. Radiation dose for this scan was reduced using  automated exposure control, adjustment of the mA and/or kV according  to patient size, or iterative reconstruction technique.    COMPARISON: None.    FINDINGS: There is no evidence of intracranial hemorrhage, mass, acute  infarct or anomaly. There is generalized atrophy of the brain. There  is low attenuation in the white matter of the cerebral hemispheres  consistent with sequelae of small vessel ischemic disease.     The visualized portions of the sinuses and mastoids appear normal.  There is no evidence of trauma.       Impression    IMPRESSION:  1. No evidence of acute intracranial hemorrhage, mass, or herniation.  2. There is generalized atrophy of the brain. White matter changes are  present in the cerebral hemispheres that are consistent with small  vessel ischemic disease in this age patient.      YSABEL HAM MD   Pelvis XR w/ unilateral hip right    Narrative    PELVIS AND RIGHT HIP ONE VIEW  7/24/2017 3:24 PM     COMPARISON: None.    HISTORY: Unwitnessed fall onto right side. Right hip pain and  discomfort. Evaluate for acute bony abnormality  versus other.    FINDINGS: The visualized bones and joint spaces are within normal  limits.      Impression    IMPRESSION: No evidence for fracture, dislocation or significant  degenerative change of the pelvis or right hip.   CT Head Neck Angio w/o & w Contrast    Narrative    CTA ANGIOGRAM HEAD NECK  7/24/2017 3:26 PM     HISTORY: Evaluate for dissection/thromboembolism, unwitnessed fall,  dementia.    TECHNIQUE:  Precontrast localizing scans were followed by CT  angiography with an injection of IV contrast with scans through the  head and neck.  Images were transferred to a separate 3-D workstation  where multiplanar reformations and 3-D images were created.  Estimates  of carotid stenoses are made relative to the distal internal carotid  artery diameters except as noted. Radiation dose for this scan was  reduced using automated exposure control, adjustment of the mA and/or  kV according to patient size, or iterative reconstruction technique.    COMPARISON: None.    FINDINGS: Estimates of stenosis at the carotid bifurcations are  relative to the distal internal carotids.    Arch: Normal anatomy.    Neck:  Right Carotid:  The right common carotid artery is normal.  Calcified  atherosclerotic plaque is seen at the right carotid bifurcation. The  stenosis is less than 50%..  The right internal carotid artery is  negative.     Left Carotid:  The left common carotid artery is unremarkable.   Calcified atherosclerotic plaque is seen at the left carotid  bifurcation. The stenosis is less than 50%..  The left internal  carotid is negative.      Vertebrals:  The vertebral arteries are normal.    Head:  Right Carotid:No occluded vessels are seen. .    Left Carotid:  No occluded vessels are identified. .    Basilar:  The basilar artery and its branches appear normal.     Miscellaneous: The venous sinuses appear patent.      Impression    IMPRESSION:   1. No occluded intracranial vessels. No high-grade intracranial  vascular  "stenosis.  2. Atherosclerotic plaque is present at both carotid bifurcations. The  degree of stenosis is less than 50% at both carotid bifurcations.  3. No arterial dissection is identified.    STELLA ARREGUIN MD[AA1.2]          Revision History        User Key Date/Time User Provider Type Action    > AA1.4 7/24/2017  7:50 PM Connie Whitaker MD Physician Sign     AA1.3 7/24/2017  7:31 PM Connie Whitaker MD Physician      AA1.2 7/24/2017  7:29 PM Connie Whitaker MD Physician      AA1.1 7/24/2017  7:28 PM Connie Whitaker MD Physician                   Discharge Summaries     No notes of this type exist for this encounter.      Consult Notes     No notes of this type exist for this encounter.         Progress Notes - Physician (Notes from 07/24/17 through 07/27/17)      Progress Notes by Rogerio Johnson at 7/27/2017 11:07 AM     Author:  Rogerio Johnson Service:  Spiritual Health Author Type:      Filed:  7/27/2017 11:14 AM Date of Service:  7/27/2017 11:07 AM Creation Time:  7/27/2017 11:07 AM    Status:  Signed :  Rogerio Johnson ()         SPIRITUAL HEALTH SERVICES  SPIRITUAL ASSESSMENT Progress Note  [facility] - Med/Surg    PRIMARY FOCUS:     Emotional/spiritual/Yazidism distress    Support for coping    ILLNESS CIRCUMSTANCES:   Reviewed documentation. Reflective conversation shared with Deanne noriega, and daughterLeeanna, which integrated elements of jenn narratives.     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  She told me about the device she found for pushing the wheelchair in an easier way - \"almost like a stroller\"    GOALS OF CARE:    Goals of Care - To return to the [facility]    Meaning/Sense-Making - Leeanna stated she didn't know if her Mom would understand a visit and was pleased to hear she engaged.  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    PLAN: No follow up visit planned unless requested by staff or pt.    Rogerio Johnson M.A., Psychiatric  Staff   [facility]  Office: [phone]  Cell: [phone]  Pager [phone][MC1.1]       Revision History        User Key Date/Time User Provider Type Action    > MC1.1 7/27/2017 11:14 AM Rogerio Johnson Sign            Progress Notes by Ledy Menendez at 7/27/2017 11:03 AM     Author:  Ledy Menendez Service:  (none) Author Type:      Filed:  7/27/2017 11:05 AM Date of Service:  7/27/2017 11:03 AM Creation Time:  7/27/2017 11:03 AM    Status:  Signed :  Ledy Menendez ()         Name: Deanne Zayas    MRN#: [id number]    Reason for Hospitalization: Acute cystitis without hematuria [N30.00]  Fall, initial encounter [W19.XXXA]    Discharge Date: 7/27/2017    Patient / Family response to discharge plan: in agreement    Follow-Up Appt: Future Appointments  Date Time Provider Department Center   7/27/2017 12:30 PM Alexandrea Sim, PT [facility] [place] [facility]       Other Providers (Care Coordinator, County Services, PCA services etc): No    Discharge Disposition: transitional care unit - [facility] (Phone: [phone] Fax: " [phone]) via family transport    [facility] [phone]/ [facility] [phone][JK1.1]           Revision History        User Key Date/Time User Provider Type Action    > JK1.1 7/27/2017 11:05 AM Ledy Menendez  Sign            Progress Notes by Vanesa Dimas RN at 7/27/2017  8:12 AM     Author:  Vanesa Dimas RN Service:  Skilled Nursing Author Type:  Registered Nurse    Filed:  7/27/2017  8:14 AM Date of Service:  7/27/2017  8:12 AM Creation Time:  7/27/2017  8:12 AM    Status:  Signed :  Vanesa Dimas RN (Registered Nurse)         Spoke to Dr SENAIT Osman MD re: MRI results from 7/26/17 and Dr Osman did verify that he was aware of results yesterday.[SB1.1]      Revision History        User Key Date/Time User Provider Type Action    > SB1.1 7/27/2017  8:14 AM Vanesa Dimas RN Registered Nurse Sign            Progress Notes by Mary Kate Mason LICSW at 7/26/2017  3:11 PM     Author:  Mary Kate Mason LICSW Service:  (none) Author Type:      Filed:  7/26/2017  3:28 PM Date of Service:  7/26/2017  3:11 PM Creation Time:  7/26/2017  3:11 PM    Status:  Addendum :  Mary Kate Mason LICSW ()         Reason for Follow up: DC Planning    Anticipated discharge needs: Pt has been accepted at [facility] (Phone: [phone] Fax: [phone])[AK1.1] for as early as Friday. Pt is a [facility] pt and her 3 day stay has been waived at the TCU. Pts dtr notified and in agreement with dc plan.[AK1.2]     PAS-RR    Per DHS regulation, CTS team completed and submitted PAS-RR to [place] Board on Aging Direct Connect via the Senior LinkAge Line. CTS team advised SNF and they are aware a PAS-RR has been submitted.     CTS team reviewed with pt or health care agent that they may be contacted for a follow up appointment within 10 days of hospital discharge if SNF stay is <30 days. Contact information for Senior LinkAge Line was also provided.     Pt or  health care agent verbalized understanding.     PAS-RR # [id number]      Next steps:[AK1.1] TCU[AK1.2]    Mary Kate Mason MSW, [facility] Center[facility], [facility] [phone]    Discharge Planner   Discharge Plans in progress: TCU  Barriers to discharge plan: medical stability, finances  Follow up plan: CTS to follow       Entered by: Mary Kate Mason 07/26/2017 3:11 PM[AK1.1]            Revision History        User Key Date/Time User Provider Type Action    > AK1.2 7/26/2017  3:28 PM Mary Kate Mason LICSW  Addend     AK1.1 7/26/2017  3:16 PM Mary Kate Mason LICSW  Sign            Progress Notes by Alexandrea Sim PT at 7/26/2017  2:13 PM     Author:  Alexandrea Sim PT Service:  (none) Author Type:  Physical Therapist    Filed:  7/26/2017  2:13 PM Date of Service:  7/26/2017  2:13 PM Creation Time:  7/26/2017  2:13 PM    Status:  Signed :  Alexandrea Sim PT (Physical Therapist)            07/26/17 1400   Signing Clinician's Name / Credentials   Signing clinician's name / credentials Alexandrea Sim PT   Quick Adds   Rehab Discipline PT   Additional Documentation   PT Plan cancel- procedure/ MRI; will continue per POC 7/27/17[CS1.1]        Revision History        User Key Date/Time User Provider Type Action    > CS1.1 7/26/2017  2:13 PM Alexandrea Sim PT Physical Therapist Sign            Progress Notes by Soraida Lobo OT at 7/26/2017 12:29 PM     Author:  Soraida Lobo OT Service:  (none) Author Type:  Occupational Therapist    Filed:  7/26/2017 12:29 PM Date of Service:  7/26/2017 12:29 PM Creation Time:  7/26/2017 12:29 PM    Status:  Signed :  Soraida Lobo OT (Occupational Therapist)            07/26/17 1200   Quick Adds   Type of Visit Initial Occupational Therapy Evaluation   Living Environment   Lives With facility resident  (MCU)   Living Environment Comment No family present and PLOF filled out per chart review (PT discussed this with daughter yesterday)   Self-Care   Dominant Hand  right   Functional Level Prior   Ambulation 1-->assistive equipment  (4WW)   Transferring 1-->assistive equipment   Toileting 1-->assistive equipment   Bathing 3-->assistive equipment and person   Dressing 0-->independent   Eating 0-->independent   Communication 0-->understands/communicates without difficulty   Swallowing 0-->swallows foods/liquids without difficulty   Cognition 1 - attention or memory deficits   Fall history within last six months yes   General Information   Onset of Illness/Injury or Date of Surgery - Date 07/24/17   Referring Physician Shaun Osman MD   Patient/Family Goals Statement None stated   Additional Occupational Profile Info/Pertinent History of Current Problem Daenne Zayas is a 84 year old female who presents after fall at Memory Care unit and was also noted to have left facial droop.   Precautions/Limitations fall precautions   Cognitive Status Examination   Orientation person;place   Level of Consciousness alert   Able to Follow Commands mild impairment   Cognitive Comment Pt appears to attempt to follow all commands during MMT. Answers 100% questions- not always accurate. Difficult to understand at times d/t slurred speech.    Pain Assessment   Patient Currently in Pain No   Strength   Strength Comments L UE strength: 4-/5.    MMT: Shoulder   Left Flexion (4-/5) good minus, left   Right Flexion (0/5) zero, right   MMT: Wrist   Right Flexion (3-/5) fair minus, right   Left Flexion (4/5) good, left   Hand Strength   Hand Strength Comments L  strength: WFL, R  strength: 2/5- would be unable to grasp objects.    Coordination   Coordination Comments Unable to test  R UE coordination d/t limited strength/ROM.    Transfer Skill: Bed to Chair/Chair to Bed   Level of [place]: Bed to Chair dependent (less than 25% patients effort)   Transfer Skill: Sit to Stand   Level of [place]: Sit/Stand dependent (less than 25% patients effort)   Transfer Skill: Toilet Transfer  "  Level of [place]: Toilet dependent (less than 25% patients effort)   Upper Body Dressing   Level of [place]: Dress Upper Body maximum assist (25% patients effort)   Lower Body Dressing   Level of [place]: Dress Lower Body maximum assist (25% patients effort)   Activities of Daily Living Analysis   Impairments Contributing to Impaired Activities of Daily Living ROM decreased;strength decreased   General Therapy Interventions   Planned Therapy Interventions ADL retraining;neuromuscular re-education;ROM;strengthening;progressive activity/exercise   Clinical Impression   Criteria for Skilled Therapeutic Interventions Met yes, treatment indicated   OT Diagnosis decreased independence with ADLs and functional mobility    Influenced by the following impairments significant R sided weakness   Assessment of Occupational Performance 3-5 Performance Deficits   Identified Performance Deficits dressing, toileting, showering and functional mobility/transfers.    Clinical Decision Making (Complexity) Low complexity   Therapy Frequency daily   Predicted Duration of Therapy Intervention (days/wks) 2-3 days   Anticipated Equipment Needs at Discharge (TBD at TCU)   Anticipated Discharge Disposition Transitional Care Facility   Risks and Benefits of Treatment have been explained. Yes   Patient, Family & other staff in agreement with plan of care Yes   [facility] AM-PAC  \"6 Clicks\" Daily Activity Inpatient Short Form   1. Putting on and taking off regular lower body clothing? 1 - Total   2. Bathing (including washing, rinsing, drying)? 2 - A Lot   3. Toileting, which includes using toilet, bedpan or urinal? 1 - Total   4. Putting on and taking off regular upper body clothing? 2 - A Lot   5. Taking care of personal grooming such as brushing teeth? 2 - A Lot   6. Eating meals? 2 - A Lot   Daily Activity Raw Score (Score out of 24.Lower scores equate to lower levels of function) 10   Total Evaluation Time   Total " Evaluation Time (Minutes) 5[LC1.1]        Revision History        User Key Date/Time User Provider Type Action    > LC1.1 7/26/2017 12:29 PM Soraida Lobo, OT Occupational Therapist Sign            Progress Notes by Shaun Osman MD at 7/26/2017  7:44 AM     Author:  Shaun Osman MD Service:  Hospitalist Author Type:  Physician    Filed:  7/26/2017 12:02 PM Date of Service:  7/26/2017  7:44 AM Creation Time:  7/26/2017  7:44 AM    Status:  Signed :  Shaun Osman MD (Physician)         [facility] Medicine Progress Note  Date of Service: 07/26/2017        Assessment & Plan   Deanne Zayas is a 84 year old female who presents after fall at Memory Care unit and was also noted to have left facial droop.    Principal Problem:      Acute CAUTI ? due to cystitis without hematuria  Patient is on Rocephin  Awaiting Urine cultures  Will monitor closely and if growing GPC will add Vancomycin / Unasyn    Facial Droop  Initial CT was negative[RK1.1] a 2nd CT done today was also negative[RK1.2]  But patient continues to have Right Sided weakness and difficulty in her speech[RK1.1] which is getting better and worse[RK1.2]  Will recheck[RK1.1] MRI[RK1.2] scan of Brain to document if there is any stroke    Stage III chronic kidney disease  Monitor creatinine and fluid overload[RK1.1]  Creatinine has improved since the time of admission[RK1.2]     Dementia  Without any signs of delirium for now      Hyperlipidemia LDL goal <130    Hypothyroidism due to acquired atrophy of thyroid    Hypertension goal BP (blood pressure) < 140/90    Diaper dermatitis    Late onset Alzheimer's disease without behavioral disturbance    Facial droop    UTI (urinary tract infection)    Fall  L1 to find a hematoma, doing well until a    DVT Prophylaxis: Heparin SQ  Code Status: Prior         Plan:[RK1.1]   Will check a MRI scan of the brain to diagnose if at all there is a stroke[RK1.2]  Await urine  culture results[RK1.1]  Change patient to inpatient with stroke symptoms persisting[RK1.2]  Disposition: Anticipate discharge  1-2 days               Interval History   Patient is pleasantly confused denying any symptoms[RK1.1]  Briefly in the morning she was more awake and oriented[RK1.2]        Physical Exam   Temp:  [98  F (36.7  C)-98.5  F (36.9  C)] 98.5  F (36.9  C)  Heart Rate:  [59-64] 63  Resp:  [18-20] 20  BP: (187-193)/(69-76) 188/72  SpO2:  [94 %-97 %] 94 %    Weights:   Vitals:    07/24/17 2058   Weight: 87.8 kg (193 lb 9 oz)    Body mass index is 30.32 kg/(m^2).    Constitutional: Not in any acute distress   EYES: Extra Occular movements are intact, Conjunctiva is clear   NECK: No JVD  CVS: S1 S2  Regular  Respiratory: Clear to auscultation without crepitations or Wheezing  bilaterally  GI: Soft, non tender, Bowel Sounds are Positive   Skin: Warm and dry, No Rashes  CNS: Alert and pleasantly confused.  I do not see much for facial drooping but her right upper extremity is weaker[RK1.1] though better than yesterday and as[RK1.2] compared to the left[RK1.1] side.  Right lower extremity has normal power.  Speech is slurred but better than yesterday[RK1.2]  Musculoskeletal: Lower Limbs without Pedal Edema            Data     Recent Labs  Lab 07/25/17  0617 07/24/17  1425   WBC 6.6 5.8   HGB 13.7 12.5   MCV 93 95    233   INR  --  0.91    140   POTASSIUM 3.7 4.3   CHLORIDE 104 106   CO2 25 28   BUN 12 15   CR 0.99 1.41*   ANIONGAP 9 6   RUBIN 9.1 9.6   * 123*   ALBUMIN  --  3.8   PROTTOTAL  --  6.8   BILITOTAL  --  0.5   ALKPHOS  --  69   ALT  --  18   AST  --  16         Recent Labs  Lab 07/25/17  0617 07/24/17  1425 07/24/17  1421   * 123*  --    BGM  --   --  121*        Unresulted Labs Ordered in the Past 30 Days of this Admission     Date and Time Order Name Status Description    7/24/2017 1648 Urine Culture Preliminary            Imaging  No results found for this or any  previous visit (from the past 24 hour(s)).        Medications        heparin  5,000 Units Subcutaneous Q12H     cefTRIAXone  1 g Intravenous Q24H     amLODIPine  2.5 mg Oral Daily     aspirin  81 mg Oral Daily     cholecalciferol  2,000 Units Oral Daily     escitalopram  10 mg Oral Daily     ferrous sulfate  325 mg Oral Daily     levothyroxine  112 mcg Oral QAM AC     lisinopril  40 mg Oral Daily     miconazole   Topical BID     senna-docusate  1 tablet Oral Daily              Shaun HIGGINS  Hospitalist - Internal Medicine  [facility][RK1.1]        Revision History        User Key Date/Time User Provider Type Action    > RK1.2 7/26/2017 12:02 PM Shaun Osman MD Physician Sign     RK1.1 7/26/2017  7:44 AM Shaun Osman MD Physician             ED Notes signed by Virginia Gloria-Provider at 7/26/2017  9:44 AM      Author:  Faizan Non-Provider Service:  (none) Author Type:  (none)    Filed:  7/26/2017  9:44 AM Date of Service:  7/25/2017  9:58 PM Creation Time:  7/26/2017  9:44 AM    Status:  Signed :  Faizan Non-Provider     Scan on 7/26/2017  9:44 AM by Faizan Non-Provider : [facility] HANDOFF 1          Revision History        User Key Date/Time User Provider Type Action    > [N/A] 7/26/2017  9:44 AM Scan Non-Provider (none) Sign            Progress Notes by Shaun Osman MD at 7/25/2017  9:24 AM     Author:  Shaun Osman MD Service:  Hospitalist Author Type:  Physician    Filed:  7/25/2017  6:12 PM Date of Service:  7/25/2017  9:24 AM Creation Time:  7/25/2017  9:24 AM    Status:  Signed :  Shaun Osman MD (Physician)         [facility] Medicine Progress Note  Date of Service: 07/25/2017        Assessment & Plan[RK1.1]   Deanne Zayas is a 84 year old female who presents after fall at Memory Care unit and was also noted to have left facial droop.[RK1.2]    Principal Problem:      Acute[RK1.1] CAUTI ? due  to[RK1.2] cystitis without hematuria[RK1.1]  Patient is on Rocephin  Awaiting Urine cultures  Will monitor closely and if growing GPC will add Vancomycin / Unasyn    Facial Droop  Initial CT was negative   But patient continues to have Right Sided weakness and difficulty in her speech  Will recheck CT scan of Brain in the morning to document if there is any stroke    Stage III chronic kidney disease  Monitor creatinine and fluid overload    Dementia  Without any signs of delirium for now[RK1.2]      Hyperlipidemia LDL goal <130    Hypothyroidism due to acquired atrophy of thyroid    Hypertension goal BP (blood pressure) < 140/90    Diaper dermatitis    Late onset Alzheimer's disease without behavioral disturbance    Facial droop    UTI (urinary tract infection)    Fall[RK1.1]  L1 to find a hematoma, doing well until a[RK1.2]    DVT Prophylaxis:[RK1.1] Heparin SQ[RK1.2]  Code Status: Prior         Plan:[RK1.1]   CT scan of the brain in the morning  Await urine culture results  Heparin for DVT prophylaxis[RK1.2]  Disposition: Anticipate discharge[RK1.1]  1-2 days[RK1.2]               Interval History[RK1.1]   Patient is pleasantly confused denying any symptoms[RK1.2]      Physical Exam   Temp:  [97.8  F (36.6  C)-98.9  F (37.2  C)] 97.9  F (36.6  C)  Heart Rate:  [60-81] 65  Resp:  [9-23] 18  BP: (128-184)/(64-96) 184/72  SpO2:  [92 %-97 %] 97 %    Weights:   Vitals:    07/24/17 2058   Weight: 87.8 kg (193 lb 9 oz)    Body mass index is 30.32 kg/(m^2).    Constitutional: Not in any acute distress   EYES: Extra Occular movements are intact, Conjunctiva is clear   NECK:[RK1.1] No[RK1.2] JVD  CVS: S1 S2  Regular  Respiratory: Clear to auscultation without crepitations or Wheezing  bilaterally  GI: Soft, non tender, Bowel Sounds are Positive   Skin: Warm and dry, No Rashes  CNS: Alert and[RK1.1] pleasantly confused.  I do not see much for facial drooping but her right upper extremity is weaker 4/5 compared to the  left[RK1.2]  Musculoskeletal: Lower Limbs with[RK1.1]out[RK1.2] Pedal Edema            Data     Recent Labs  Lab 07/25/17  0617 07/24/17  1425   WBC 6.6 5.8   HGB 13.7 12.5   MCV 93 95    233   INR  --  0.91    140   POTASSIUM 3.7 4.3   CHLORIDE 104 106   CO2 25 28   BUN 12 15   CR 0.99 1.41*   ANIONGAP 9 6   RUBIN 9.1 9.6   * 123*   ALBUMIN  --  3.8   PROTTOTAL  --  6.8   BILITOTAL  --  0.5   ALKPHOS  --  69   ALT  --  18   AST  --  16         Recent Labs  Lab 07/25/17  0617 07/24/17  1425 07/24/17  1421   * 123*  --    BGM  --   --  121*        Unresulted Labs Ordered in the Past 30 Days of this Admission     Date and Time Order Name Status Description    7/24/2017 1648 Urine Culture In process            Imaging  Recent Results (from the past 24 hour(s))   CT Head w/o Contrast    Narrative    CT SCAN OF THE HEAD WITHOUT CONTRAST   7/24/2017 2:51 PM     HISTORY: Unwitnessed fall at care facility. Left-sided facial droop.  History of dementia.    TECHNIQUE: Axial images of the head and coronal reformations without  IV contrast material. Radiation dose for this scan was reduced using  automated exposure control, adjustment of the mA and/or kV according  to patient size, or iterative reconstruction technique.    COMPARISON: None.    FINDINGS: There is no evidence of intracranial hemorrhage, mass, acute  infarct or anomaly. There is generalized atrophy of the brain. There  is low attenuation in the white matter of the cerebral hemispheres  consistent with sequelae of small vessel ischemic disease.     The visualized portions of the sinuses and mastoids appear normal.  There is no evidence of trauma.       Impression    IMPRESSION:  1. No evidence of acute intracranial hemorrhage, mass, or herniation.  2. There is generalized atrophy of the brain. White matter changes are  present in the cerebral hemispheres that are consistent with small  vessel ischemic disease in this age patient.      YSABEL  BLANK HAM MD   Pelvis XR w/ unilateral hip right    Narrative    PELVIS AND RIGHT HIP ONE VIEW  7/24/2017 3:24 PM     COMPARISON: None.    HISTORY: Unwitnessed fall onto right side. Right hip pain and  discomfort. Evaluate for acute bony abnormality versus other.    FINDINGS: The visualized bones and joint spaces are within normal  limits.      Impression    IMPRESSION: No evidence for fracture, dislocation or significant  degenerative change of the pelvis or right hip.    CLINTON BURGESS MD   CT Head Neck Angio w/o & w Contrast    Narrative    CTA ANGIOGRAM HEAD NECK  7/24/2017 3:26 PM     HISTORY: Evaluate for dissection/thromboembolism, unwitnessed fall,  dementia.    TECHNIQUE:  Precontrast localizing scans were followed by CT  angiography with an injection of IV contrast with scans through the  head and neck.  Images were transferred to a separate 3-D workstation  where multiplanar reformations and 3-D images were created.  Estimates  of carotid stenoses are made relative to the distal internal carotid  artery diameters except as noted. Radiation dose for this scan was  reduced using automated exposure control, adjustment of the mA and/or  kV according to patient size, or iterative reconstruction technique.    COMPARISON: None.    FINDINGS: Estimates of stenosis at the carotid bifurcations are  relative to the distal internal carotids.    Arch: Normal anatomy.    Neck:  Right Carotid:  The right common carotid artery is normal.  Calcified  atherosclerotic plaque is seen at the right carotid bifurcation. The  stenosis is less than 50%..  The right internal carotid artery is  negative.     Left Carotid:  The left common carotid artery is unremarkable.   Calcified atherosclerotic plaque is seen at the left carotid  bifurcation. The stenosis is less than 50%..  The left internal  carotid is negative.      Vertebrals:  The vertebral arteries are normal.    Head:  Right Carotid:No occluded vessels are seen. .    Left  Carotid:  No occluded vessels are identified. .    Basilar:  The basilar artery and its branches appear normal.     Miscellaneous: The venous sinuses appear patent.      Impression    IMPRESSION:   1. No occluded intracranial vessels. No high-grade intracranial  vascular stenosis.  2. Atherosclerotic plaque is present at both carotid bifurcations. The  degree of stenosis is less than 50% at both carotid bifurcations.  3. No arterial dissection is identified.    STELLA ARREGUIN MD           Medications        cefTRIAXone  1 g Intravenous Q24H     amLODIPine  2.5 mg Oral Daily     aspirin  81 mg Oral Daily     cholecalciferol  2,000 Units Oral Daily     escitalopram  10 mg Oral Daily     ferrous sulfate  325 mg Oral Daily     levothyroxine  112 mcg Oral QAM AC     lisinopril  40 mg Oral Daily     miconazole   Topical BID     senna-docusate  1 tablet Oral Daily              Shaun HIGGINS  Hospitalist - Internal Medicine  [facility][RK1.1]        Revision History        User Key Date/Time User Provider Type Action    > RK1.2 7/25/2017  6:12 PM Shaun Osman MD Physician Sign     RK1.1 7/25/2017  9:24 AM Shaun Osman MD Physician             Progress Notes by Alexandrea Sim PT at 7/25/2017  4:47 PM     Author:  Alexandrea Sim PT Service:  (none) Author Type:  Physical Therapist    Filed:  7/25/2017  4:49 PM Date of Service:  7/25/2017  4:47 PM Creation Time:  7/25/2017  4:47 PM    Status:  Signed :  Alexandrea Sim PT (Physical Therapist)          07/25/17 1600   Quick Adds   Type of Visit Initial PT Evaluation   Living Environment   Lives With facility resident   Living Arrangements assisted living   Functional Level Prior   Ambulation 1-->assistive equipment   Transferring 1-->assistive equipment   Toileting 1-->assistive equipment   Bathing 3-->assistive equipment and person   Dressing 0-->independent   Eating 0-->independent   Communication 0-->understands/communicates without difficulty    Swallowing 0-->swallows foods/liquids without difficulty   Cognition 1 - attention or memory deficits   Fall history within last six months yes   Prior Functional Level Comment Per daughter-  Pt resides in [facility]; independent w/ ambualtion using a 4ww w/in a structured environment,   General Information   Onset of Illness/Injury or Date of Surgery - Date 07/24/17   Referring Physician Dawson   Patient/Family Goals Statement Pt unable to state gaol; daughter present   Pertinent History of Current Problem (include personal factors and/or comorbidities that impact the POC) 84 year old female with dementia, hyperlipidemia, hypertension and hypothyroidismwho presents with fall at her memory care unit today at 1310 in the bathroom.  Found by caretakers.  Noted at 1320 or so to have some slurred speech and daughter notes she seems more confused than her baseline. Initiial CT negative; has UTI    Precautions/Limitations fall precautions   General Observations Pt alert, up in the chair-  leaning to the left in sitting.      Cognitive Status Examination   Level of Consciousness alert   Follows Commands and Answers Questions able to follow single-step instructions   Memory impaired   Pain Assessment   Patient Currently in Pain No   Range of Motion (ROM)   ROM Comment WFL, limited shoulder FF AROM due to weakness/ PROM intact    Strength   Strength Comments Slight difficulty following cues w/ MMT, but weakness noted on right.    iintact, but decreased on right    MMT: Shoulder   Shoulder Flexion - Left Side (3+/5) fair plus, left   Shoulder Internal Rotation - Left Side (4-/5) good minus, left   MMT: Elbow/Forearm, Rehab Eval   Elbow Flexion - Left Side (4/5) good, left   Elbow Extension - Left Side (4/5) good, left   Elbow Flexion - Right Side (4-/5) good minus, right   Elbow Extension - Right Side (4-/5) good minus, right   MMT: Hip, Rehab Eval   Hip Flexion - Left Side (4/5) good, left   Hip Flexion - Right Side (4-/5) good  minus, right   MMT: Knee, Rehab Eval   Knee Flexion - Left Side (4/5) good, left   Knee Extension - Left Side (4/5) good, left   Knee Flexion - Right Side (4-/5) good minus, right   Knee Extension - Right Side (4/5) good, right   MMT: Ankle, Rehab Eval   Ankle Dorsiflexion - Left Side (3+/5) fair plus, left   Ankle Dorsiflexion - Right Side (3/5) fair, left   Bed Mobility   Bed Mobility Comments NT   Transfer Skills   Transfer Comments Pt stood x2 -sit> stand with walker and min/moderate. assistance of 2; wide JERRY and cues to wthsft foward, unable to  initiate steps/ perform pre-gait activities   Gait   Gait Comments unable   Balance   Balance Comments fair in sitting and stand due to weakness, decreased balance   Sensory Examination   Sensory Perception no deficits were identified   Coordination   Coordination Comments FTN- difficulty tracking to midline and to the right w/ RUE.   NT in LE   Muscle Tone   Muscle Tone Comments Postive Babinski Right ; slight increased tone Right LE    General Therapy Interventions   Planned Therapy Interventions balance training;bed mobility training;gait training;neuromuscular re-education;strengthening   Clinical Impression   Criteria for Skilled Therapeutic Intervention yes, treatment indicated   PT Diagnosis Imapired gait   Influenced by the following impairments Decreased strength- Right UE, LE. Decreased balance.   Functional limitations due to impairments altered mobility- pt significantly below baseline w/  Mobility, dependent w/ all mobility    Clinical Presentation Stable/Uncomplicated   Clinical Presentation Rationale clinical judgment   Clinical Decision Making (Complexity) Low complexity   Therapy Frequency` daily   Predicted Duration of Therapy Intervention (days/wks) 2 days   Anticipated Discharge Disposition Transitional Care Facility   Risk & Benefits of therapy have been explained Yes   Patient, Family & other staff in agreement with plan of care Yes   Clinical  "Impression Comments Pt significantly below baseline w/ mobility ,appears to have a neurological  deficit   [facility] AM-PAC  \"6 Clicks\" V.2 Basic Mobility Inpatient Short Form   1. Turning from your back to your side while in a flat bed without using bedrails? 2 - A Lot   2. Moving from lying on your back to sitting on the side of a flat bed without using bedrails? 2 - A Lot   3. Moving to and from a bed to a chair (including a wheelchair)? 1 - Total   4. Standing up from a chair using your arms (e.g., wheelchair, or bedside chair)? 2 - A Lot   5. To walk in hospital room? 1 - Total   6. Climbing 3-5 steps with a railing? 1 - Total   Basic Mobility Raw Score (Score out of 24.Lower scores equate to lower levels of function) 9   Total Evaluation Time   Total Evaluation Time (Minutes) 20[CS1.1]        Revision History        User Key Date/Time User Provider Type Action    > CS1.1 7/25/2017  4:49 PM Alexandrea Sim, PT Physical Therapist Sign            Progress Notes by Mary Kate Mason LICSW at 7/25/2017 11:51 AM     Author:  Mary Kate Mason LICSW Service:  (none) Author Type:      Filed:  7/25/2017  2:10 PM Date of Service:  7/25/2017 11:51 AM Creation Time:  7/25/2017 11:50 AM    Status:  Addendum :  Mary Kate Mason LICSW ()         CARE TRANSITION SOCIAL WORK INITIAL ASSESSMENT:      Met with: Patient and Family.    DATA  Principal Problem:    Acute cystitis without hematuria  Active Problems:    Hyperlipidemia LDL goal <130    Hypothyroidism due to acquired atrophy of thyroid    Hypertension goal BP (blood pressure) < 140/90    Diaper dermatitis    Late onset Alzheimer's disease without behavioral disturbance    Facial droop    UTI (urinary tract infection)    Fall       Primary Care Clinic Name:  ([facility] )     Contact information and PCP information verified: Yes      ASSESSMENT  Cognitive Status: awake and disoriented.       Resources List: Transitional Care, Home Care   "   Lives With: facility resident  Living Arrangements: assisted living     Description of Support System: Supportive, Involved   Who is your support system?: Children, Facility resident(s)/Staff   Support Assessment: Adequate family and caregiver support, Adequate social supports   Insurance Concerns: No Insurance issues identified        This writer met with pt and pts dtr introduced self and role. Discussed discharge planning and medicare guidelines in regards to home care, TCU and LTC. Dtr reports that she would like her mother to dc [facility] (Phone: [phone] Fax: [phone]). Referral is pending. Dtr reports that pt has been on MA in the past and now is private pay. Discussed with dtr private pay status d/t being registered here as OBS. This writer will discuss payment with [facility] admission staff.       PLAN    TCU    Discharge Planner   Discharge Plans in progress: TCu  Barriers to discharge plan: medical stability  Follow up plan: CTS to follow       Entered by: Mary Kate Mason 07/25/2017 11:50 AM             MIA Sanz, [facility] [phone][AK1.1]     addendum: [facility] (Phone: [phone] Fax: [phone]). Pts dtr will complete MA application here, as pt will need $3000 up front. PAS to be completed prior to dc. MIA Sanz, [facility] [phone][AK1.2]       Revision History        User Key Date/Time User Provider Type Action    > AK1.2 7/25/2017  2:10 PM Mary Kate Mason LICSW  Addend     AK1.1 7/25/2017 11:51 AM Mary Kate Mason LICSW  Sign            Progress Notes by Priya Wilder RN at 7/24/2017  9:04 PM     Author:  Priya Wilder RN Service:  (none) Author Type:  Registered Nurse    Filed:  7/24/2017  9:05 PM Date of Service:  7/24/2017  9:04 PM Creation Time:  7/24/2017  9:04 PM    Status:  Signed :  Priya Wilder RN (Registered Nurse)      "    [facility] ADMISSION NOTE    Patient admitted to room 2401 at approximately 2040 via cart from emergency room. Patient was accompanied by transport tech.     Verbal SBAR report received from [facility] prior to patient arrival.     Patient trasferred to bed via air karla. Patient alert and oriented X 3. The patient is not having any pain. 0-10 Pain Scale: 0. Admission vital signs: Blood pressure 166/64, temperature 98.9  F (37.2  C), temperature source Oral, resp. rate 16, height 1.702 m (5' 7\"), weight 87.8 kg (193 lb 9 oz), SpO2 92 %. Patient was oriented to plan of care, call light, bed controls, tv, telephone, bathroom and visiting hours.     The following safety risks were identified during admission: fall. Yellow risk band applied: YES.     Priya Wilder RN[ET1.1]       Revision History        User Key Date/Time User Provider Type Action    > ET1.1 7/24/2017  9:05 PM Priya Wilder RN Registered Nurse Sign            ED Provider Notes by Wily Carter MD at 7/24/2017  2:15 PM     Author:  Wily Carter MD Service:  Emergency Medicine Author Type:  Physician    Filed:  7/24/2017  8:39 PM Date of Service:  7/24/2017  2:15 PM Creation Time:  7/24/2017  2:16 PM    Status:  Signed :  Wily Carter MD (Physician)           History[WF1.1]     Chief Complaint   Patient presents with     Fall     FACIAL DROOP ENROUTE HERE[EA1.1]     HPI[WF1.1]  Deanne Zayas is a 84 year old female with a history of hyperlipidemia, hypothyroidism, hypertension, and dementia who presents after a fall. The patient was brought in by ambulance after a fall. The patients history is limited due to her dementia and is reported primarily by EMS. She lives at [facility] in the dementia unit  The patient fell in the bathroom at 1:10 PM and was propped up on her right side when she was found by her care takers. She complains of right hip pain. The patient began talking funny at 1:20/25 PM. " The EMS were called at 1:38 PM. The EMS reported a left sided facial droop upon arrival in the ED. She usually uses a walker to walk and is very conversational which she wasn't this morning. The patient was seen at her baseline during lunch at 1:00 PM before returning to her room. The EMS report her blood sugar was 121. The patient denies a headache.[WF1.2]     Social History: Lives[WF1.1] at [facility][WF1.2] in the memory care unit.  Here in the emergency department alone.[EA1.2]    Past Medical History:[WF1.1] Hypertension, dementia, hypothyroidism, vitamin B12 deficiency,[EA1.2]      Medications:[WF1.1]  Current Outpatient Prescriptions   Medication Sig Dispense Refill     mineral oil-hydrophilic petrolatum (AQUAPHOR) Apply topically as needed       sulfamethoxazole-trimethoprim (BACTRIM/SEPTRA) 400-80 MG per tablet Take 1 tablet by mouth 2 times daily 14 tablet 0     aspirin 81 MG chewable tablet Take 1 tablet (81 mg) by mouth daily 31 tablet PRN     senna-docusate (SENNA S) 8.6-50 MG per tablet Take 1 tablet by mouth daily 31 tablet PRN     amLODIPine (NORVASC) 2.5 MG tablet Take 1 tablet (2.5 mg) by mouth daily 31 tablet PRN     lisinopril (PRINIVIL/ZESTRIL) 40 MG tablet Take 1 tablet (40 mg) by mouth daily 31 tablet PRN     cholecalciferol (VITAMIN D3) 1000 UNIT tablet Take 2 tablets (2,000 Units) by mouth daily 62 tablet PRN     nystatin (MYCOSTATIN) 646171 UNIT/GM POWD Apply topically as needed APPLY TOPICALLY TO AFFECTED AREA(S) TID PRN (Patient taking differently: Apply topically 2 times daily APPLY TOPICALLY TO AFFECTED AREA(S) TID PRN) 30 g PRN     ferrous sulfate (IRON) 325 (65 FE) MG tablet Take 1 tablet (325 mg) by mouth daily 31 tablet PRN     levothyroxine (SYNTHROID/LEVOTHROID) 112 MCG tablet Take 1 tablet (112 mcg) by mouth daily 60 tablet 3     cyanocobalamin (VITAMIN B12) 1000 MCG/ML injection Inject 1 mL (1,000 mcg) Subcutaneous every 30 days 1 mL 11     escitalopram (LEXAPRO) 10 MG  "tablet Take 1 tablet (10 mg) by mouth daily 31 tablet PRN     syringe/needle, disp, 25G X 1\" 3 ML MISC USE AS DIRECTED WITH B12 INECTION 1 each 11[EA1.1]       Allergies:[WF1.1]     Allergies   Allergen Reactions     Donepezil Other (See Comments)     At 10 mg, tremulousness and decreased appetite[EA1.1]       I have reviewed the Medications, Allergies, Past Medical and Surgical History, and Social History in the Epic system.[WF1.1]         Review of Systems   Constitutional: Negative.    HENT: Negative.    Eyes: Negative.    Respiratory: Negative.    Cardiovascular: Negative.    Gastrointestinal: Negative.    Endocrine: Negative.    Genitourinary: Negative.    Musculoskeletal: Negative.         Right hip pain and discomfort.   Skin: Negative.    Allergic/Immunologic: Negative.    Neurological: Positive for facial asymmetry.   Hematological: Negative.    Psychiatric/Behavioral: Positive for confusion (with underlying history of dementia).[EA1.3]       Physical Exam[WF1.1]      Physical Exam   Constitutional: She appears well-developed and well-nourished. No distress.   HENT:   Head: Normocephalic and atraumatic.   Eyes: Conjunctivae and EOM are normal. Pupils are equal, round, and reactive to light. Right eye exhibits no discharge. Left eye exhibits no discharge. No scleral icterus.   Neck: Normal range of motion. Neck supple. No JVD present. No tracheal deviation present. No thyromegaly present.   Cardiovascular: Normal rate.    Pulmonary/Chest: Effort normal and breath sounds normal. No stridor. No respiratory distress. She has no wheezes. She has no rales. She exhibits no tenderness.   Abdominal: Soft.   Lymphadenopathy:     She has no cervical adenopathy.   Neurological: She is alert. She displays no atrophy. She exhibits normal muscle tone. She displays no seizure activity. GCS eye subscore is 4. GCS verbal subscore is 5. GCS motor subscore is 6.   Skin: She is not diaphoretic.   Psychiatric: She has a normal " mood and affect. Her behavior is normal. Judgment and thought content normal.[EA1.3]       ED Course[WF1.1]     ED Course[EA1.1]     Procedures[WF1.1]             EKG Interpretation:      Interpreted by Wily Carter  Time reviewed:[EA1.3] 15:40[EA1.4]  Symptoms at time of EKG:[EA1.3] Facial droop, confusion, unwitnessed fall.[EA1.4]   Rhythm:[EA1.3] normal sinus[EA1.4]   Rate:[EA1.3] Normal[EA1.4]  Axis:[EA1.3] Normal[EA1.4]  Ectopy:[EA1.3] none[EA1.4]  Conduction:[EA1.3] left anterior fasciclar block[EA1.4]  ST Segments/ T Waves:[EA1.3] Non-specific ST-T wave changes[EA1.4]  Q Waves:[EA1.3] nonspecific[EA1.4]  Comparison to prior:[EA1.3] No old EKG available[EA1.4]    Clinical Impression:[EA1.3] no acute changes[EA1.4]          Critical Care time:[WF1.1]  none[EA1.5]                 ED Medications:[WF1.1]  Medications   cefTRIAXone (ROCEPHIN) 1 g vial to attach to  mL bag for ADULTS or NS 50 mL bag for PEDS (1 g Intravenous New Bag 7/24/17 1726)   0.9% sodium chloride BOLUS ( Intravenous New Bag 7/24/17 1431)   iopamidol (ISOVUE-370) solution 70 mL (70 mLs Intravenous Given 7/24/17 1507)   sodium chloride 0.9 % for CT scan flush dose 100 mL (100 mLs As instructed Given 7/24/17 1507)[EA1.1]       ED Vitals:[WF1.1]  Vitals:    07/24/17 1645 07/24/17 1700 07/24/17 1715 07/24/17 1730   BP: 168/81 (!) 166/96 149/77 150/72   Resp: 11 11 12 15   Temp:       TempSrc:       SpO2:    96%[EA1.1]       ED Labs and Imaging:[WF1.1]  Results for orders placed or performed during the hospital encounter of 07/24/17 (from the past 24 hour(s))   Glucose by meter   Result Value Ref Range    Glucose 121 (H) 70 - 99 mg/dL   CBC with platelets differential   Result Value Ref Range    WBC 5.8 4.0 - 11.0 10e9/L    RBC Count 3.97 3.8 - 5.2 10e12/L    Hemoglobin 12.5 11.7 - 15.7 g/dL    Hematocrit 37.8 35.0 - 47.0 %    MCV 95 78 - 100 fl    MCH 31.5 26.5 - 33.0 pg    MCHC 33.1 31.5 - 36.5 g/dL    RDW 13.1 10.0 - 15.0 %     Platelet Count 233 150 - 450 10e9/L    Diff Method Automated Method     % Neutrophils 70.3 %    % Lymphocytes 16.6 %    % Monocytes 9.4 %    % Eosinophils 2.6 %    % Basophils 0.9 %    % Immature Granulocytes 0.2 %    Absolute Neutrophil 4.1 1.6 - 8.3 10e9/L    Absolute Lymphocytes 1.0 0.8 - 5.3 10e9/L    Absolute Monocytes 0.6 0.0 - 1.3 10e9/L    Absolute Eosinophils 0.2 0.0 - 0.7 10e9/L    Absolute Basophils 0.1 0.0 - 0.2 10e9/L    Abs Immature Granulocytes 0.0 0 - 0.4 10e9/L   Comprehensive metabolic panel   Result Value Ref Range    Sodium 140 133 - 144 mmol/L    Potassium 4.3 3.4 - 5.3 mmol/L    Chloride 106 94 - 109 mmol/L    Carbon Dioxide 28 20 - 32 mmol/L    Anion Gap 6 3 - 14 mmol/L    Glucose 123 (H) 70 - 99 mg/dL    Urea Nitrogen 15 7 - 30 mg/dL    Creatinine 1.41 (H) 0.52 - 1.04 mg/dL    GFR Estimate 36 (L) >60 mL/min/1.7m2    GFR Estimate If Black 43 (L) >60 mL/min/1.7m2    Calcium 9.6 8.5 - 10.1 mg/dL    Bilirubin Total 0.5 0.2 - 1.3 mg/dL    Albumin 3.8 3.4 - 5.0 g/dL    Protein Total 6.8 6.8 - 8.8 g/dL    Alkaline Phosphatase 69 40 - 150 U/L    ALT 18 0 - 50 U/L    AST 16 0 - 45 U/L   ABO/Rh type and screen   Result Value Ref Range    ABO AB     RH(D)  Neg     Antibody Screen Neg     Test Valid Only At [facility]     Specimen Expires 07/27/2017    INR   Result Value Ref Range    INR 0.91 0.86 - 1.14   CT Head w/o Contrast    Narrative    CT SCAN OF THE HEAD WITHOUT CONTRAST   7/24/2017 2:51 PM     HISTORY: Unwitnessed fall at care facility. Left-sided facial droop.  History of dementia.    TECHNIQUE: Axial images of the head and coronal reformations without  IV contrast material. Radiation dose for this scan was reduced using  automated exposure control, adjustment of the mA and/or kV according  to patient size, or iterative reconstruction technique.    COMPARISON: None.    FINDINGS: There is no evidence of intracranial hemorrhage, mass, acute  infarct or anomaly. There is generalized  atrophy of the brain. There  is low attenuation in the white matter of the cerebral hemispheres  consistent with sequelae of small vessel ischemic disease.     The visualized portions of the sinuses and mastoids appear normal.  There is no evidence of trauma.       Impression    IMPRESSION:  1. No evidence of acute intracranial hemorrhage, mass, or herniation.  2. There is generalized atrophy of the brain. White matter changes are  present in the cerebral hemispheres that are consistent with small  vessel ischemic disease in this age patient.      YSABEL HAM MD   Pelvis XR w/ unilateral hip right    Narrative    PELVIS AND RIGHT HIP ONE VIEW  7/24/2017 3:24 PM     COMPARISON: None.    HISTORY: Unwitnessed fall onto right side. Right hip pain and  discomfort. Evaluate for acute bony abnormality versus other.    FINDINGS: The visualized bones and joint spaces are within normal  limits.      Impression    IMPRESSION: No evidence for fracture, dislocation or significant  degenerative change of the pelvis or right hip.   CT Head Neck Angio w/o & w Contrast    Narrative    CTA ANGIOGRAM HEAD NECK  7/24/2017 3:26 PM     HISTORY: Evaluate for dissection/thromboembolism, unwitnessed fall,  dementia.    TECHNIQUE:  Precontrast localizing scans were followed by CT  angiography with an injection of IV contrast with scans through the  head and neck.  Images were transferred to a separate 3-D workstation  where multiplanar reformations and 3-D images were created.  Estimates  of carotid stenoses are made relative to the distal internal carotid  artery diameters except as noted. Radiation dose for this scan was  reduced using automated exposure control, adjustment of the mA and/or  kV according to patient size, or iterative reconstruction technique.    COMPARISON: None.    FINDINGS: Estimates of stenosis at the carotid bifurcations are  relative to the distal internal carotids.    Arch: Normal anatomy.    Neck:  Right Carotid:   The right common carotid artery is normal.  Calcified  atherosclerotic plaque is seen at the right carotid bifurcation. The  stenosis is less than 50%..  The right internal carotid artery is  negative.     Left Carotid:  The left common carotid artery is unremarkable.   Calcified atherosclerotic plaque is seen at the left carotid  bifurcation. The stenosis is less than 50%..  The left internal  carotid is negative.      Vertebrals:  The vertebral arteries are normal.    Head:  Right Carotid:No occluded vessels are seen. .    Left Carotid:  No occluded vessels are identified. .    Basilar:  The basilar artery and its branches appear normal.     Miscellaneous: The venous sinuses appear patent.      Impression    IMPRESSION:   1. No occluded intracranial vessels. No high-grade intracranial  vascular stenosis.  2. Atherosclerotic plaque is present at both carotid bifurcations. The  degree of stenosis is less than 50% at both carotid bifurcations.  3. No arterial dissection is identified.    STELLA ARREGUIN MD   UA reflex to Microscopic   Result Value Ref Range    Color Urine Yellow     Appearance Urine Slightly Cloudy     Glucose Urine Negative NEG mg/dL    Bilirubin Urine Negative NEG    Ketones Urine Negative NEG mg/dL    Specific Gravity Urine 1.018 1.003 - 1.035    Blood Urine Trace (A) NEG    pH Urine 7.0 5.0 - 7.0 pH    Protein Albumin Urine 10 (A) NEG mg/dL    Urobilinogen mg/dL Normal 0.0 - 2.0 mg/dL    Nitrite Urine Positive (A) NEG    Leukocyte Esterase Urine Large (A) NEG    Source Midstream Urine     RBC Urine 18 (H) 0 - 2 /HPF    WBC Urine 41 (H) 0 - 2 /HPF    Bacteria Urine Moderate (A) NEG /HPF    Squamous Epithelial /HPF Urine 10 (H) 0 - 1 /HPF    Mucous Urine Present (A) NEG /LPF[EA1.1]       2:16 PM Patient assessed.   2:21 PM Stroke evaluation called.[WF1.3]     Assessments & Plan (with Medical Decision Making)   Clinical impression:[WF1.1]  84-year-ol female who presented by EMS after an unwitnessed fall  at her care facility.  History is limited due to underlying history of dementia.  History is obtained primarily from EMS and transfer records.  She has a history of hypertension and hypothyroidism.  Patient was last seen normal around lunchtime at her care facility around 1 PM.  She is reported to have return to her apartment and sustained a fall at 1:10 PM.  EMS was called to her care facility of 1:38 PM.  Patient arrived in the emergency department on 2.20PM  when the stroke evaluation was called. Patient was seen immediately on arrival in emergency departments.  She has no complaints.  She reports some discomfort over the right hip and right leg.  EMS tells me patient was found in the bathroom against her right side.  Patient is alert and oriented.  GCS is 15.  Blood sugar prior to arrival was 120.  Patient is able to follow commands but reports pain with attempted raising her right leg because of pain and discomfort.  EMS was transporting the patient is a fall.  On arrival in the emergency department she was noted to have facial droop over the left side.  There was concern she was having progressive neurologic symptoms with facial droop and concern for confusion and a stroke evaluation was called.  I talked with the EMS crew who transported the patient.[EA1.3]       ED course and plan:[WF1.1]  There was no family at the bedside during the time of assessment and evaluation.  EMS was able to talk to staff at the care facility to collaborate history of presentation.  Decision was made to evaluate the patient is a stroke evaluation in the context of fall.  It appears the patient fell first and then was noted to have some confusion although she has history of dementia with facial droop per EMS.  It is hard to collaborate if she has a prior history of facial droop as there is no family at the bedside.  CT of the head as well as a CT of the angiogram head and neck was obtained patient is currently being seen 90 minutes  after reported onset of symptoms.  Because of patient's age, inability to consent the patient for tPA with underlying history of dementia and family on their way I elected to proceed with care as a stroke evaluation.  Blood work was obtained. I reviewed her record[EA1.3] from her care facility.  I elected to proceed with a CT angiogram of the head and neck to exclude ischemic process despite known history of renal dysfunction with a baseline creatinine ranging from 1.1-1.3.  Creatinine today is 1.41.  In light of EMS reports of progressive facial droop with an unwitnessed fall.  CT of the head without contrast showed no acute process.[EA1.2] CT angiogram of the head and neck showed no acute process. See radiologist interpretation in detailed report above. Family later arrived during course of care and reported patient is at baseline and prone to urinary tract infection. Last urine culture on file is from 10/2014.   Patient grew Klebsiella pneumonia as well as mixed enoc that was pan-susceptible.  Ambulatory road testing in the ED showed pateint[EA1.5] unable to ambulate independently without support.    Patient continued to have some intermittent episodes of speech changes although she would return to baseline per daughter.  Daughter at the bedside reports patient is DNR/DNI.  At 5:30 PM daughter is unsure if she would want her mother to have IV TPA even if she had an acute stroke.  At this time plan is to admit for IV hydration recheck of her labs including her creatinine in the morning.  Admit for serial neurologic exam given acute UTI, cannot exclude symptoms of delirium.    I spoke with Dr. Connie Whitaker who is the admitting hospitalist at 6:30 PM, who agreed to assume care and admission after reviewing rationale for hospitalization, ED course and workup, interventions to the emergency department and imaging. Patient is admitted for unwitnessed fall, acute urinary tract infection, left-sided facial droop and  probable delirium in the context of urinary tract infection.[EA1.6]      Disclaimer: This note consists of symbols derived from keyboarding, dictation and/or voice recognition software. As a result, there may be errors in the script that have gone undetected. Please consider this when interpreting information found in this chart.[EA1.5]    I have reviewed the nursing notes.    I have reviewed the findings, diagnosis, plan and need for follow up with the patient.[WF1.1]       New Prescriptions    SULFAMETHOXAZOLE-TRIMETHOPRIM (BACTRIM/SEPTRA) 400-80 MG PER TABLET    Take 1 tablet by mouth 2 times daily       Final diagnoses:   Fall, initial encounter - unwitnessed at care facility from standing height.   Acute cystitis without hematuria[EA1.1]       7/24/2017   [facility] EMERGENCY DEPARTMEN[WF1.1] history is limited as the patient has a history of dementia.  History is obtained from EMS as well as transfer records and care staff.  She has a history of hypertension, hypothyroidism, hypokalemia.  She has an allergy to donepezil.[EA1.3]T[WF1.1]     Wily Carter MD  07/24/17 2039  [EA1.7]     Revision History        User Key Date/Time User Provider Type Action    > EA1.7 7/24/2017  8:39 PM Wily Carter MD Physician Sign     EA1.4 7/24/2017  8:36 PM Wily Carter MD Physician      EA1.1 7/24/2017  7:54 PM Wily Carter MD Physician Share     EA1.6 7/24/2017  6:19 PM Wily Carter MD Physician      EA1.5 7/24/2017  6:07 PM Wily Carter MD Physician Share     EA1.2 7/24/2017  3:22 PM Wily Carter MD Physician Share     WF1.2 7/24/2017  2:49 PM Tiny Lizarraga Share     WF1.3 7/24/2017  2:43 PM Tiny Lizararga Share     EA1.3 7/24/2017  2:33 PM Wily Carter MD Physician Share     WF1.1 7/24/2017  2:21 PM Tiny Lizarraga Share            ED Notes by Karen Marquez RN at 7/24/2017  7:46 PM     Author:  Burgess  Karen ZARCO RN Service:  (none) Author Type:  Registered Nurse    Filed:  7/24/2017  7:46 PM Date of Service:  7/24/2017  7:46 PM Creation Time:  7/24/2017  7:46 PM    Status:  Signed :  Karen Marquez RN (Registered Nurse)         Plan of care is for admission[MB1.1]     Revision History        User Key Date/Time User Provider Type Action    > MB1.1 7/24/2017  7:46 PM Karen Marquez RN Registered Nurse Sign            ED Notes by Karen Marquez RN at 7/24/2017  6:15 PM     Author:  Karen Marquez RN Service:  (none) Author Type:  Registered Nurse    Filed:  7/24/2017  6:28 PM Date of Service:  7/24/2017  6:15 PM Creation Time:  7/24/2017  6:28 PM    Status:  Signed :  Karen Marquez RN (Registered Nurse)         Daughter came out to the nurses station and stated her mother was having slurred speech. Upon assessment of patient with provider, pt has garbled speech that lasted roughly 5 minutes then cleared up and back to normal.[MB1.1]     Revision History        User Key Date/Time User Provider Type Action    > MB1.1 7/24/2017  6:28 PM Karen Marquez RN Registered Nurse Sign            ED Notes by Karen Marquez RN at 7/24/2017  6:26 PM     Author:  Karen Marquez RN Service:  (none) Author Type:  Registered Nurse    Filed:  7/24/2017  6:27 PM Date of Service:  7/24/2017  6:26 PM Creation Time:  7/24/2017  6:27 PM    Status:  Signed :  Karen Marquez RN (Registered Nurse)         Attempted to get patient out of bed to ambulate with walker, pt unable to her own bear weight. Assisted back to bed with 3 staff. VSS[MB1.1]     Revision History        User Key Date/Time User Provider Type Action    > MB1.1 7/24/2017  6:27 PM Karen Marquez RN Registered Nurse Sign            ED Notes by Karen Marquez RN at 7/24/2017  5:00 PM     Author:  Karen Marquez RN Service:  (none) Author Type:  Registered Nurse    Filed:  7/24/2017  5:39 PM Date of  Service:  7/24/2017  5:00 PM Creation Time:  7/24/2017  5:39 PM    Status:  Signed :  Karen Marquez RN (Registered Nurse)         According to daughter who remains @ the bedside, pt lives in a memory care facility and had an unwitnessed fall in the bathroom, found by staff. Estimated time down was 20 minutes. Possible injuries to head and pelvis upon arrival. In route per EMS, pt has possible facial droop ( left side) and was evaluated for stroke. Pt is pleasant but confused to place and date, pt believes that she is 57 years old.[MB1.1]     Revision History        User Key Date/Time User Provider Type Action    > MB1.1 7/24/2017  5:39 PM Karen Marquez RN Registered Nurse Sign            ED Notes by Karen Marquez RN at 7/24/2017  3:01 PM     Author:  Karen Marquez RN Service:  (none) Author Type:  Registered Nurse    Filed:  7/24/2017  3:01 PM Date of Service:  7/24/2017  3:01 PM Creation Time:  7/24/2017  3:01 PM    Status:  Signed :  Karen Marquez RN (Registered Nurse)         Pt remains down @ CT[MB1.1]     Revision History        User Key Date/Time User Provider Type Action    > MB1.1 7/24/2017  3:01 PM Karen Marquez RN Registered Nurse Sign            ED Notes by Karen Marquez RN at 7/24/2017  3:01 PM     Author:  Karen Marquez RN Service:  (none) Author Type:  Registered Nurse    Filed:  7/24/2017  3:01 PM Date of Service:  7/24/2017  3:01 PM Creation Time:  7/24/2017  3:01 PM    Status:  Signed :  Karen Marquez RN (Registered Nurse)         Bed: ED23  Expected date:   Expected time:   Means of arrival:   Comments:  EMS     Revision History        User Key Date/Time User Provider Type Action    > MB1.1 7/24/2017  3:01 PM Karen Marquez RN Registered Nurse Sign            ED Notes by Karen Barobsa at 7/24/2017  2:31 PM     Author:  Karen Barbosa Service:  (none) Author Type:  Technician    Filed:  7/24/2017  2:32 PM Date of  Service:  7/24/2017  2:31 PM Creation Time:  7/24/2017  2:31 PM    Status:  Signed :  Karen Barbosa (Technician)         Patients blood glucose was 121.[MH1.1]     Revision History        User Key Date/Time User Provider Type Action    > MH1.1 7/24/2017  2:32 PM Familia Karen Technician Sign            ED Notes by Latrice Martinez RN at 7/24/2017  2:15 PM     Author:  Latrice Martinez RN Service:  (none) Author Type:  Registered Nurse    Filed:  7/24/2017  2:15 PM Date of Service:  7/24/2017  2:15 PM Creation Time:  7/24/2017  2:15 PM    Status:  Signed :  Latrice Martinez RN (Registered Nurse)         Bed: ED16  Expected date:   Expected time:   Means of arrival:   Comments:  Deanne     Revision History        User Key Date/Time User Provider Type Action    > RR1.1 7/24/2017  2:15 PM Latrice Martinez RN Registered Nurse Sign                  Procedure Notes     No notes of this type exist for this encounter.         Progress Notes - Therapies (Notes from 07/24/17 through 07/27/17)      Progress Notes by Alexandrea Sim PT at 7/26/2017  2:13 PM     Author:  Alexandrea Sim PT Service:  (none) Author Type:  Physical Therapist    Filed:  7/26/2017  2:13 PM Date of Service:  7/26/2017  2:13 PM Creation Time:  7/26/2017  2:13 PM    Status:  Signed :  Alexandrea Sim PT (Physical Therapist)            07/26/17 1400   Signing Clinician's Name / Credentials   Signing clinician's name / credentials Alexandrea Sim PT   Quick Adds   Rehab Discipline PT   Additional Documentation   PT Plan cancel- procedure/ MRI; will continue per POC 7/27/17[CS1.1]        Revision History        User Key Date/Time User Provider Type Action    > CS1.1 7/26/2017  2:13 PM Alexandrea Sim PT Physical Therapist Sign            Progress Notes by Soraida Lobo OT at 7/26/2017 12:29 PM     Author:  Soraida Lobo OT Service:  (none) Author Type:  Occupational Therapist    Filed:  7/26/2017 12:29 PM Date of Service:  7/26/2017 12:29  PM Creation Time:  7/26/2017 12:29 PM    Status:  Signed :  Soraida Lobo OT (Occupational Therapist)            07/26/17 1200   Quick Adds   Type of Visit Initial Occupational Therapy Evaluation   Living Environment   Lives With facility resident  (MCU)   Living Environment Comment No family present and PLOF filled out per chart review (PT discussed this with daughter yesterday)   Self-Care   Dominant Hand right   Functional Level Prior   Ambulation 1-->assistive equipment  (4WW)   Transferring 1-->assistive equipment   Toileting 1-->assistive equipment   Bathing 3-->assistive equipment and person   Dressing 0-->independent   Eating 0-->independent   Communication 0-->understands/communicates without difficulty   Swallowing 0-->swallows foods/liquids without difficulty   Cognition 1 - attention or memory deficits   Fall history within last six months yes   General Information   Onset of Illness/Injury or Date of Surgery - Date 07/24/17   Referring Physician Shaun Osman MD   Patient/Family Goals Statement None stated   Additional Occupational Profile Info/Pertinent History of Current Problem Deanne Zayas is a 84 year old female who presents after fall at Memory Care unit and was also noted to have left facial droop.   Precautions/Limitations fall precautions   Cognitive Status Examination   Orientation person;place   Level of Consciousness alert   Able to Follow Commands mild impairment   Cognitive Comment Pt appears to attempt to follow all commands during MMT. Answers 100% questions- not always accurate. Difficult to understand at times d/t slurred speech.    Pain Assessment   Patient Currently in Pain No   Strength   Strength Comments L UE strength: 4-/5.    MMT: Shoulder   Left Flexion (4-/5) good minus, left   Right Flexion (0/5) zero, right   MMT: Wrist   Right Flexion (3-/5) fair minus, right   Left Flexion (4/5) good, left   Hand Strength   Hand Strength Comments L  strength: WFL, R   "strength: 2/5- would be unable to grasp objects.    Coordination   Coordination Comments Unable to test  R UE coordination d/t limited strength/ROM.    Transfer Skill: Bed to Chair/Chair to Bed   Level of [place]: Bed to Chair dependent (less than 25% patients effort)   Transfer Skill: Sit to Stand   Level of [place]: Sit/Stand dependent (less than 25% patients effort)   Transfer Skill: Toilet Transfer   Level of [place]: Toilet dependent (less than 25% patients effort)   Upper Body Dressing   Level of [place]: Dress Upper Body maximum assist (25% patients effort)   Lower Body Dressing   Level of [place]: Dress Lower Body maximum assist (25% patients effort)   Activities of Daily Living Analysis   Impairments Contributing to Impaired Activities of Daily Living ROM decreased;strength decreased   General Therapy Interventions   Planned Therapy Interventions ADL retraining;neuromuscular re-education;ROM;strengthening;progressive activity/exercise   Clinical Impression   Criteria for Skilled Therapeutic Interventions Met yes, treatment indicated   OT Diagnosis decreased independence with ADLs and functional mobility    Influenced by the following impairments significant R sided weakness   Assessment of Occupational Performance 3-5 Performance Deficits   Identified Performance Deficits dressing, toileting, showering and functional mobility/transfers.    Clinical Decision Making (Complexity) Low complexity   Therapy Frequency daily   Predicted Duration of Therapy Intervention (days/wks) 2-3 days   Anticipated Equipment Needs at Discharge (TBD at TCU)   Anticipated Discharge Disposition Transitional Care Facility   Risks and Benefits of Treatment have been explained. Yes   Patient, Family & other staff in agreement with plan of care Yes   [facility] AM-PAC  \"6 Clicks\" Daily Activity Inpatient Short Form   1. Putting on and taking off regular lower body clothing? 1 - Total   2. Bathing " (including washing, rinsing, drying)? 2 - A Lot   3. Toileting, which includes using toilet, bedpan or urinal? 1 - Total   4. Putting on and taking off regular upper body clothing? 2 - A Lot   5. Taking care of personal grooming such as brushing teeth? 2 - A Lot   6. Eating meals? 2 - A Lot   Daily Activity Raw Score (Score out of 24.Lower scores equate to lower levels of function) 10   Total Evaluation Time   Total Evaluation Time (Minutes) 5[LC1.1]        Revision History        User Key Date/Time User Provider Type Action    > LC1.1 7/26/2017 12:29 PM Soraida Lobo OT Occupational Therapist Sign            Progress Notes by Alexandrea Sim PT at 7/25/2017  4:47 PM     Author:  Alexandrea Sim PT Service:  (none) Author Type:  Physical Therapist    Filed:  7/25/2017  4:49 PM Date of Service:  7/25/2017  4:47 PM Creation Time:  7/25/2017  4:47 PM    Status:  Signed :  Alexandrea Sim PT (Physical Therapist)          07/25/17 1600   Quick Adds   Type of Visit Initial PT Evaluation   Living Environment   Lives With facility resident   Living Arrangements assisted living   Functional Level Prior   Ambulation 1-->assistive equipment   Transferring 1-->assistive equipment   Toileting 1-->assistive equipment   Bathing 3-->assistive equipment and person   Dressing 0-->independent   Eating 0-->independent   Communication 0-->understands/communicates without difficulty   Swallowing 0-->swallows foods/liquids without difficulty   Cognition 1 - attention or memory deficits   Fall history within last six months yes   Prior Functional Level Comment Per daughter-  Pt resides in [facility]; independent w/ ambualtion using a 4ww w/in a structured environment,   General Information   Onset of Illness/Injury or Date of Surgery - Date 07/24/17   Referring Physician Dawson   Patient/Family Goals Statement Pt unable to state gaol; daughter present   Pertinent History of Current Problem (include personal factors and/or comorbidities that  impact the POC) 84 year old female with dementia, hyperlipidemia, hypertension and hypothyroidismwho presents with fall at her memory care unit today at 1310 in the bathroom.  Found by caretakers.  Noted at 1320 or so to have some slurred speech and daughter notes she seems more confused than her baseline. Initiial CT negative; has UTI    Precautions/Limitations fall precautions   General Observations Pt alert, up in the chair-  leaning to the left in sitting.      Cognitive Status Examination   Level of Consciousness alert   Follows Commands and Answers Questions able to follow single-step instructions   Memory impaired   Pain Assessment   Patient Currently in Pain No   Range of Motion (ROM)   ROM Comment WFL, limited shoulder FF AROM due to weakness/ PROM intact    Strength   Strength Comments Slight difficulty following cues w/ MMT, but weakness noted on right.    iintact, but decreased on right    MMT: Shoulder   Shoulder Flexion - Left Side (3+/5) fair plus, left   Shoulder Internal Rotation - Left Side (4-/5) good minus, left   MMT: Elbow/Forearm, Rehab Eval   Elbow Flexion - Left Side (4/5) good, left   Elbow Extension - Left Side (4/5) good, left   Elbow Flexion - Right Side (4-/5) good minus, right   Elbow Extension - Right Side (4-/5) good minus, right   MMT: Hip, Rehab Eval   Hip Flexion - Left Side (4/5) good, left   Hip Flexion - Right Side (4-/5) good minus, right   MMT: Knee, Rehab Eval   Knee Flexion - Left Side (4/5) good, left   Knee Extension - Left Side (4/5) good, left   Knee Flexion - Right Side (4-/5) good minus, right   Knee Extension - Right Side (4/5) good, right   MMT: Ankle, Rehab Eval   Ankle Dorsiflexion - Left Side (3+/5) fair plus, left   Ankle Dorsiflexion - Right Side (3/5) fair, left   Bed Mobility   Bed Mobility Comments NT   Transfer Skills   Transfer Comments Pt stood x2 -sit> stand with walker and min/moderate. assistance of 2; wide JERRY and cues to wthsft foward, unable to   "initiate steps/ perform pre-gait activities   Gait   Gait Comments unable   Balance   Balance Comments fair in sitting and stand due to weakness, decreased balance   Sensory Examination   Sensory Perception no deficits were identified   Coordination   Coordination Comments FTN- difficulty tracking to midline and to the right w/ RUE.   NT in LE   Muscle Tone   Muscle Tone Comments Postive Babinski Right ; slight increased tone Right LE    General Therapy Interventions   Planned Therapy Interventions balance training;bed mobility training;gait training;neuromuscular re-education;strengthening   Clinical Impression   Criteria for Skilled Therapeutic Intervention yes, treatment indicated   PT Diagnosis Imapired gait   Influenced by the following impairments Decreased strength- Right UE, LE. Decreased balance.   Functional limitations due to impairments altered mobility- pt significantly below baseline w/  Mobility, dependent w/ all mobility    Clinical Presentation Stable/Uncomplicated   Clinical Presentation Rationale clinical judgment   Clinical Decision Making (Complexity) Low complexity   Therapy Frequency` daily   Predicted Duration of Therapy Intervention (days/wks) 2 days   Anticipated Discharge Disposition Transitional Care Facility   Risk & Benefits of therapy have been explained Yes   Patient, Family & other staff in agreement with plan of care Yes   Clinical Impression Comments Pt significantly below baseline w/ mobility ,appears to have a neurological  deficit   [facility] AM-PAC  \"6 Clicks\" V.2 Basic Mobility Inpatient Short Form   1. Turning from your back to your side while in a flat bed without using bedrails? 2 - A Lot   2. Moving from lying on your back to sitting on the side of a flat bed without using bedrails? 2 - A Lot   3. Moving to and from a bed to a chair (including a wheelchair)? 1 - Total   4. Standing up from a chair using your arms (e.g., wheelchair, or bedside chair)? 2 - A Lot   5. " To walk in hospital room? 1 - Total   6. Climbing 3-5 steps with a railing? 1 - Total   Basic Mobility Raw Score (Score out of 24.Lower scores equate to lower levels of function) 9   Total Evaluation Time   Total Evaluation Time (Minutes) 20[CS1.1]        Revision History        User Key Date/Time User Provider Type Action    > CS1.1 7/25/2017  4:49 PM Alexandrea Sim, PT Physical Therapist Sign

## 2017-07-25 ENCOUNTER — APPOINTMENT (OUTPATIENT)
Dept: PHYSICAL THERAPY | Facility: CLINIC | Age: 82
DRG: 065 | End: 2017-07-25
Payer: MEDICARE

## 2017-07-25 LAB
ANION GAP SERPL CALCULATED.3IONS-SCNC: 9 MMOL/L (ref 3–14)
BASOPHILS # BLD AUTO: 0.1 10E9/L (ref 0–0.2)
BASOPHILS NFR BLD AUTO: 1.2 %
BUN SERPL-MCNC: 12 MG/DL (ref 7–30)
CALCIUM SERPL-MCNC: 9.1 MG/DL (ref 8.5–10.1)
CHLORIDE SERPL-SCNC: 104 MMOL/L (ref 94–109)
CO2 SERPL-SCNC: 25 MMOL/L (ref 20–32)
CREAT SERPL-MCNC: 0.99 MG/DL (ref 0.52–1.04)
DIFFERENTIAL METHOD BLD: NORMAL
EOSINOPHIL # BLD AUTO: 0.3 10E9/L (ref 0–0.7)
EOSINOPHIL NFR BLD AUTO: 3.8 %
ERYTHROCYTE [DISTWIDTH] IN BLOOD BY AUTOMATED COUNT: 13.1 % (ref 10–15)
GFR SERPL CREATININE-BSD FRML MDRD: 53 ML/MIN/1.7M2
GLUCOSE SERPL-MCNC: 139 MG/DL (ref 70–99)
HCT VFR BLD AUTO: 41 % (ref 35–47)
HGB BLD-MCNC: 13.7 G/DL (ref 11.7–15.7)
IMM GRANULOCYTES # BLD: 0 10E9/L (ref 0–0.4)
IMM GRANULOCYTES NFR BLD: 0.3 %
LYMPHOCYTES # BLD AUTO: 0.8 10E9/L (ref 0.8–5.3)
LYMPHOCYTES NFR BLD AUTO: 11.3 %
MCH RBC QN AUTO: 31.1 PG (ref 26.5–33)
MCHC RBC AUTO-ENTMCNC: 33.4 G/DL (ref 31.5–36.5)
MCV RBC AUTO: 93 FL (ref 78–100)
MONOCYTES # BLD AUTO: 0.6 10E9/L (ref 0–1.3)
MONOCYTES NFR BLD AUTO: 8.6 %
NEUTROPHILS # BLD AUTO: 5 10E9/L (ref 1.6–8.3)
NEUTROPHILS NFR BLD AUTO: 74.8 %
PLATELET # BLD AUTO: 231 10E9/L (ref 150–450)
POTASSIUM SERPL-SCNC: 3.7 MMOL/L (ref 3.4–5.3)
RBC # BLD AUTO: 4.4 10E12/L (ref 3.8–5.2)
SODIUM SERPL-SCNC: 138 MMOL/L (ref 133–144)
WBC # BLD AUTO: 6.6 10E9/L (ref 4–11)

## 2017-07-25 PROCEDURE — 25000128 H RX IP 250 OP 636: Performed by: EMERGENCY MEDICINE

## 2017-07-25 PROCEDURE — 99207 ZZC CDG-CODE CATEGORY CHANGED: CPT | Performed by: INTERNAL MEDICINE

## 2017-07-25 PROCEDURE — 96376 TX/PRO/DX INJ SAME DRUG ADON: CPT

## 2017-07-25 PROCEDURE — G0378 HOSPITAL OBSERVATION PER HR: HCPCS

## 2017-07-25 PROCEDURE — 99225 ZZC SUBSEQUENT OBSERVATION CARE,LEVEL II: CPT | Performed by: INTERNAL MEDICINE

## 2017-07-25 PROCEDURE — 40000193 ZZH STATISTIC PT WARD VISIT: Performed by: PHYSICAL THERAPIST

## 2017-07-25 PROCEDURE — 25000132 ZZH RX MED GY IP 250 OP 250 PS 637: Mod: GY | Performed by: FAMILY MEDICINE

## 2017-07-25 PROCEDURE — 80048 BASIC METABOLIC PNL TOTAL CA: CPT | Performed by: EMERGENCY MEDICINE

## 2017-07-25 PROCEDURE — 25000128 H RX IP 250 OP 636: Performed by: INTERNAL MEDICINE

## 2017-07-25 PROCEDURE — 96372 THER/PROPH/DIAG INJ SC/IM: CPT

## 2017-07-25 PROCEDURE — 97161 PT EVAL LOW COMPLEX 20 MIN: CPT | Mod: GP | Performed by: PHYSICAL THERAPIST

## 2017-07-25 PROCEDURE — A9270 NON-COVERED ITEM OR SERVICE: HCPCS | Mod: GY | Performed by: FAMILY MEDICINE

## 2017-07-25 PROCEDURE — 36415 COLL VENOUS BLD VENIPUNCTURE: CPT | Performed by: EMERGENCY MEDICINE

## 2017-07-25 PROCEDURE — 85025 COMPLETE CBC W/AUTO DIFF WBC: CPT | Performed by: EMERGENCY MEDICINE

## 2017-07-25 RX ORDER — HEPARIN SODIUM 5000 [USP'U]/.5ML
5000 INJECTION, SOLUTION INTRAVENOUS; SUBCUTANEOUS EVERY 12 HOURS
Status: DISCONTINUED | OUTPATIENT
Start: 2017-07-25 | End: 2017-07-27 | Stop reason: HOSPADM

## 2017-07-25 RX ADMIN — CEFTRIAXONE 1 G: 1 INJECTION, POWDER, FOR SOLUTION INTRAMUSCULAR; INTRAVENOUS at 16:11

## 2017-07-25 RX ADMIN — FERROUS SULFATE TAB 325 MG (65 MG ELEMENTAL FE) 325 MG: 325 (65 FE) TAB at 08:34

## 2017-07-25 RX ADMIN — VITAMIN D, TAB 1000IU (100/BT) 2000 UNITS: 25 TAB at 08:33

## 2017-07-25 RX ADMIN — LISINOPRIL 40 MG: 40 TABLET ORAL at 08:34

## 2017-07-25 RX ADMIN — SENNOSIDES AND DOCUSATE SODIUM 1 TABLET: 8.6; 5 TABLET ORAL at 08:34

## 2017-07-25 RX ADMIN — MICONAZOLE NITRATE: 2 POWDER TOPICAL at 08:00

## 2017-07-25 RX ADMIN — LEVOTHYROXINE SODIUM 112 MCG: 112 TABLET ORAL at 07:01

## 2017-07-25 RX ADMIN — ASPIRIN 81 MG 81 MG: 81 TABLET ORAL at 08:34

## 2017-07-25 RX ADMIN — HEPARIN SODIUM 5000 UNITS: 10000 INJECTION, SOLUTION INTRAVENOUS; SUBCUTANEOUS at 19:30

## 2017-07-25 RX ADMIN — ESCITALOPRAM OXALATE 10 MG: 10 TABLET ORAL at 08:34

## 2017-07-25 RX ADMIN — MICONAZOLE NITRATE: 2 POWDER TOPICAL at 20:55

## 2017-07-25 RX ADMIN — AMLODIPINE BESYLATE 2.5 MG: 2.5 TABLET ORAL at 08:34

## 2017-07-25 NOTE — UTILIZATION REVIEW
"  Concomitant stay Status; Secondary Review Determination     Admission Date: 7/24/2017  2:15 PM      Under the authority of the Utilization Management Committee, the utilization review process indicated a secondary review on the above patient.  The review outcome is based on review of the medical records, discussions with staff, and applying clinical experience noted on the date of the review.        (x)      Inpatient Status Appropriate - This patient's medical care is consistent with medical management for inpatient care and reasonable inpatient medical practice.      () Observation Status Appropriate - This patient does not meet hospital inpatient criteria and is placed in observation status. If this patient's primary payer is Medicare and was admitted as an inpatient, Condition Code 44 should be used and patient status changed to \"observation\".   () Admission Status NOT Appropriate - This patient's medical care is not consistent with medical management for Inpatient or Observation Status.          RATIONALE FOR DETERMINATION   Patient is an 84-year-old female with a past medical history significant for dementia, hypertension, hypothyroidism, and hyperlipidemia who presented to the hospital with weakness after a fall.  She was found to have a urinary tract infection.  She was started on IV ceftriaxone.  She was initially placed in the hospital on observation status with the hope that she would improve quickly and be able to discharge from the hospital today. Unfortunately,she continues to require hospitalization with need for continued IV ceftriaxone to treat her urinary tract infection.  She will now require a greater than two midnight stay at the hospital. Due to this patient's advanced age, complex past medical history, UTI requiring IV ceftriaxone, and need for a greater than two midnight stay at the hospital, it is appropriate to admit this patient to the hospital as an inpatient.      The severity of " illness, intensity of service provided, expected LOS and risk for adverse outcome make the care complex, high risk and appropriate for hospital admission.        The information on this document is developed by the utilization review team in order for the business office to ensure compliance.  This only denotes the appropriateness of proper admission status and does not reflect the quality of care rendered.         The definitions of Inpatient Status and Observation Status used in making the determination above are those provided in the CMS Coverage Manual, Chapter 1 and Chapter 6, section 70.4.      Sincerely,     Clayton Alvarado D.O.  Utilization Review/ Case Management  [facility].

## 2017-07-25 NOTE — H&P
[facility]    History and Physical  Hospitalist       Date of Admission:  7/24/2017    Assessment & Plan   Deanne Zayas is a 84 year old female who presents after fall at Memory Care unit and was also noted to have left facial droop.    Principal Problem:    Acute cystitis without hematuria    Assessment: she does not have a catheter.  She has positive nitrites and large leuk esterase. Possible that the UTI is contributing to her weakness, fall, speech/facial droop.    Plan: started on Rocephin in the ER, urine culture is ordered.       Facial droop    Assessment: talked at length with her daughter at the bedside.  Not a candidate for TPA at this point given the length of time symptoms have been present.  CT/CTA of the head unremarkable. Possible that this could be a result of metabolic encephalopathy given the intermittently slurred speech and generalized weakness as well    Plan: continue to monitor symptoms    CKD stage III   assessment: baseline Cr 1.14 in October 2016.  Today is 1.41 and did receive contrast for her head CT.     plan: will push fluids and repeat Cr in the am.  Because we don't have a really recent BMP, hard to know if this is gradual change over time or more acute with the UTI.         Hypothyroidism due to acquired atrophy of thyroid    Assessment: last TSH 3/2017 was 4.42, reasonable for age    Plan: continue levothyroxine.       Hypertension goal BP (blood pressure) < 140/90    Assessment: blood pressure has been well controlled in ER    Plan: continue lisinopril and norvasc      Diaper dermatitis    Assessment: uses nystatin powder    Plan: will continue nystatin      Late onset Alzheimer's disease without behavioral disturbance    Assessment: currently resides at memory care unit at [facility]. Continue lexapro.     Plan: daughter plans to stay the night with her.  Anticipate some worsening of memory, etc with change in environment.       DVT  Prophylaxis: Defer to primary service  Code Status: DNR / DNI - confirmed with daughter    Disposition: Expected discharge in 1-2 days once stable for return to [facility].    Connie Whitaker MD    Primary Care Physician     [facility]    Chief Complaint   Fall at memory care, left facial droop    History is obtained from the patient's daughter    History of Present Illness   Deanne Zayas is a 84 year old female with dementia, hyperlipidemia, hypertension and hypothyroidismwho presents with fall at her memory care unit today at 1310 in the bathroom.  Found by caretakers.  Noted at 1320 or so to have some slurred speech and daughter notes she seems more confused than her baseline.  EMS noted left facial droop.  After initial treatment for the UTI in the ER, attempts to walk her noted some weakness and unsteadiness on her feet.      Past Medical History    Patient Active Problem List    Diagnosis Date Noted     Facial droop 07/24/2017     Priority: Medium     Acute cystitis without hematuria 07/24/2017     Priority: Medium     Late onset Alzheimer's disease without behavioral disturbance 12/10/2015     Priority: Medium     Diaper dermatitis 10/08/2015     Priority: Medium     Anxiety 10/09/2014     Priority: Medium     Mixed incontinence 09/25/2014     Priority: Medium     Hypertension goal BP (blood pressure) < 140/90 08/13/2013     Priority: Medium     Advance care planning 10/16/2012     Priority: Medium     Advance Care Planning 9/14/2016: Receipt of ACP document:  Received: POLST which was signed and dated by provider on 7/7/16.  Document previously scanned on 7/21/16.  Order reviewed and found to be valid.  Code Status reflects choices in most recent ACP document.  Also received POLSTs dated 10/10/13 and 9/27/13.  Confirmed/documented designated decision maker(s).  Added by Opal Chanel    Advance Care Planning Liaison  Advance Care Planning 9/14/2016: Receipt of ACP document:   Received: Health Care Directive which was witnessed or notarized on 12/9/13.  Document previously scanned on 12/16/13.  Validation form completed and sent to be scanned.  Code Status reflects choices in most recent ACP document.  Confirmed/documented designated decision maker(s).  Added by Opal Chanel   Advance Care Planning Liaison  Advance Care Planning 10/16/2012: Discussed advance care planning with patient; information given to patient to review.                   Osteopenia 09/28/2012     Priority: Medium     Essential hypertension 08/24/2012     Priority: Medium     Problem list name updated by automated process. Provider to review       Vitamin D deficiency disease 07/19/2012     Priority: Medium     Hyperlipidemia LDL goal <130 07/17/2012     Priority: Medium     Hypothyroidism due to acquired atrophy of thyroid 07/17/2012     Priority: Medium     Vitamin B12 deficiency disease 07/17/2012     Priority: Medium         Past Surgical History   I have reviewed this patient's surgical history and updated it with pertinent information if needed.  Past Surgical History:   Procedure Laterality Date     CHOLECYSTECTOMY       CYSTOSCOPY N/A 8/29/2014    Procedure: CYSTOSCOPY;  Surgeon: ANNAMARIE Kern MD;  Location: [facility] OR     SURGICAL HISTORY OF -   2012     cataract surgery bilat.        Prior to Admission Medications   Prior to Admission Medications   Prescriptions Last Dose Informant Patient Reported? Taking?   amLODIPine (NORVASC) 2.5 MG tablet 7/23/2017 at 1900 [facility] No Yes   Sig: Take 1 tablet (2.5 mg) by mouth daily   aspirin 81 MG chewable tablet 7/24/2017 at 0900 Nursing Home No Yes   Sig: Take 1 tablet (81 mg) by mouth daily   cholecalciferol (VITAMIN D3) 1000 UNIT tablet 7/23/2017 at 1900 [facility] No Yes   Sig: Take 2 tablets (2,000 Units) by mouth daily   cyanocobalamin (VITAMIN B12) 1000 MCG/ML injection 6/26/2017 Nursing Home No Yes   Sig: Inject 1 mL (1,000 mcg) Subcutaneous every 30  "days   escitalopram (LEXAPRO) 10 MG tablet 2017 at 1900 long term No Yes   Sig: Take 1 tablet (10 mg) by mouth daily   ferrous sulfate (IRON) 325 (65 FE) MG tablet 2017 at am Nursing Home No Yes   Sig: Take 1 tablet (325 mg) by mouth daily   levothyroxine (SYNTHROID/LEVOTHROID) 112 MCG tablet 2017 at am Nursing Home No Yes   Sig: Take 1 tablet (112 mcg) by mouth daily   lisinopril (PRINIVIL/ZESTRIL) 40 MG tablet 2017 at am Nursing Home No Yes   Sig: Take 1 tablet (40 mg) by mouth daily   mineral oil-hydrophilic petrolatum (AQUAPHOR) 2017 Nursing Home Yes Yes   Sig: Apply topically as needed   nystatin (MYCOSTATIN) 330020 UNIT/GM POWD 2017 at am Nursing Home No Yes   Sig: Apply topically as needed APPLY TOPICALLY TO AFFECTED AREA(S) TID PRN   Patient taking differently: Apply topically 2 times daily APPLY TOPICALLY TO AFFECTED AREA(S) TID PRN   senna-docusate (SENNA S) 8.6-50 MG per tablet 2017 at pm Nursing Home No Yes   Sig: Take 1 tablet by mouth daily   syringe/needle, disp, 25G X 1\" 3 ML [facility] [place] [place]  Nursing Home No No   Sig: USE AS DIRECTED WITH B12 INECTION      Facility-Administered Medications: None     Allergies   Allergies   Allergen Reactions     Donepezil Other (See Comments)     At 10 mg, tremulousness and decreased appetite       Social History   I have reviewed this patient's social history and updated it with pertinent information if needed. Deanne Zayas  reports that she has never smoked. She has never used smokeless tobacco. She reports that she does not drink alcohol or use illicit drugs.      Family History   I have reviewed this patient's family history and updated it with pertinent information if needed.   Family History   Problem Relation Age of Onset     Family History Negative Mother      childbirth     Unknown/Adopted Mother       during child birth at a young age     Family History Negative Father      fell off roof broke neck     Obesity Sister  "       Review of Systems   Review of systems not obtained due to patient factors - mental status    Physical Exam   Temp: 98.7  F (37.1  C) Temp src: Oral BP: 136/64   Heart Rate: 70 Resp: 17 SpO2: 93 % O2 Device: None (Room air)    Vital Signs with Ranges  Temp:  [98.7  F (37.1  C)] 98.7  F (37.1  C)  Heart Rate:  [60-74] 70  Resp:  [9-23] 17  BP: (136-172)/(64-96) 136/64  SpO2:  [92 %-96 %] 93 %  0 lbs 0 oz    Constitutional: up in bed, pleasant, confused.  Oriented only to self  Eyes: PERRL, sclera non-icteric  HEENT: oral mucous membranes are moist  Respiratory: clear to auscultation bilaterally  Cardiovascular: regular rate and rhythm without murmur/gallop or rub  GI: soft/nt/nd  Lymph/Hematologic: no enlarged nodes  Genitourinary: deferred  Skin: no rashes  Musculoskeletal: no lower extremity edema  Neurologic: there is a left facial droop.  She is alert.  She has normal tone.  Speech is understandable and at times a bit slurred sounding.    Psychiatric: normal mood/affect.  She thinks she's in [facility] and that it's 1976.    Data   Data reviewed today:  I personally reviewed no images or EKG's today.    Recent Labs  Lab 07/24/17  1425   WBC 5.8   HGB 12.5   MCV 95      INR 0.91      POTASSIUM 4.3   CHLORIDE 106   CO2 28   BUN 15   CR 1.41*   ANIONGAP 6   RUBIN 9.6   *   ALBUMIN 3.8   PROTTOTAL 6.8   BILITOTAL 0.5   ALKPHOS 69   ALT 18   AST 16       Recent Results (from the past 24 hour(s))   CT Head w/o Contrast    Narrative    CT SCAN OF THE HEAD WITHOUT CONTRAST   7/24/2017 2:51 PM     HISTORY: Unwitnessed fall at care facility. Left-sided facial droop.  History of dementia.    TECHNIQUE: Axial images of the head and coronal reformations without  IV contrast material. Radiation dose for this scan was reduced using  automated exposure control, adjustment of the mA and/or kV according  to patient size, or iterative reconstruction technique.    COMPARISON: None.    FINDINGS: There is no  evidence of intracranial hemorrhage, mass, acute  infarct or anomaly. There is generalized atrophy of the brain. There  is low attenuation in the white matter of the cerebral hemispheres  consistent with sequelae of small vessel ischemic disease.     The visualized portions of the sinuses and mastoids appear normal.  There is no evidence of trauma.       Impression    IMPRESSION:  1. No evidence of acute intracranial hemorrhage, mass, or herniation.  2. There is generalized atrophy of the brain. White matter changes are  present in the cerebral hemispheres that are consistent with small  vessel ischemic disease in this age patient.      YSABEL HAM MD   Pelvis XR w/ unilateral hip right    Narrative    PELVIS AND RIGHT HIP ONE VIEW  7/24/2017 3:24 PM     COMPARISON: None.    HISTORY: Unwitnessed fall onto right side. Right hip pain and  discomfort. Evaluate for acute bony abnormality versus other.    FINDINGS: The visualized bones and joint spaces are within normal  limits.      Impression    IMPRESSION: No evidence for fracture, dislocation or significant  degenerative change of the pelvis or right hip.   CT Head Neck Angio w/o & w Contrast    Narrative    CTA ANGIOGRAM HEAD NECK  7/24/2017 3:26 PM     HISTORY: Evaluate for dissection/thromboembolism, unwitnessed fall,  dementia.    TECHNIQUE:  Precontrast localizing scans were followed by CT  angiography with an injection of IV contrast with scans through the  head and neck.  Images were transferred to a separate 3-D workstation  where multiplanar reformations and 3-D images were created.  Estimates  of carotid stenoses are made relative to the distal internal carotid  artery diameters except as noted. Radiation dose for this scan was  reduced using automated exposure control, adjustment of the mA and/or  kV according to patient size, or iterative reconstruction technique.    COMPARISON: None.    FINDINGS: Estimates of stenosis at the carotid bifurcations  are  relative to the distal internal carotids.    Arch: Normal anatomy.    Neck:  Right Carotid:  The right common carotid artery is normal.  Calcified  atherosclerotic plaque is seen at the right carotid bifurcation. The  stenosis is less than 50%..  The right internal carotid artery is  negative.     Left Carotid:  The left common carotid artery is unremarkable.   Calcified atherosclerotic plaque is seen at the left carotid  bifurcation. The stenosis is less than 50%..  The left internal  carotid is negative.      Vertebrals:  The vertebral arteries are normal.    Head:  Right Carotid:No occluded vessels are seen. .    Left Carotid:  No occluded vessels are identified. .    Basilar:  The basilar artery and its branches appear normal.     Miscellaneous: The venous sinuses appear patent.      Impression    IMPRESSION:   1. No occluded intracranial vessels. No high-grade intracranial  vascular stenosis.  2. Atherosclerotic plaque is present at both carotid bifurcations. The  degree of stenosis is less than 50% at both carotid bifurcations.  3. No arterial dissection is identified.    STELLA ARREGUIN MD

## 2017-07-25 NOTE — PLAN OF CARE
Problem: Goal Outcome Summary  Goal: Goal Outcome Summary  Outcome: No Change  Pt is very weak, up in chair this morning with use of ceiling lift, then up to commode and back to chair later with a heavy assist of 2.  Pt alert to self and able to state she was at the hospital by [facility].  Pt voiding but also has some incontinence.  Skin is intact, barrier cream in use and chair cushion in use in chair.  Denies pain.  PT to see.

## 2017-07-25 NOTE — PROGRESS NOTES
"[facility] ADMISSION NOTE    Patient admitted to room 2401 at approximately 2040 via cart from emergency room. Patient was accompanied by transport tech.     Verbal SBAR report received from [place] prior to patient arrival.     Patient trasferred to bed via air karla. Patient alert and oriented X 3. The patient is not having any pain. 0-10 Pain Scale: 0. Admission vital signs: Blood pressure 166/64, temperature 98.9  F (37.2  C), temperature source Oral, resp. rate 16, height 1.702 m (5' 7\"), weight 87.8 kg (193 lb 9 oz), SpO2 92 %. Patient was oriented to plan of care, call light, bed controls, tv, telephone, bathroom and visiting hours.     The following safety risks were identified during admission: fall. Yellow risk band applied: YES.     Priya Wilder RN    "

## 2017-07-25 NOTE — PROGRESS NOTES
CARE TRANSITION SOCIAL WORK INITIAL ASSESSMENT:      Met with: Patient and Family.    DATA  Principal Problem:    Acute cystitis without hematuria  Active Problems:    Hyperlipidemia LDL goal <130    Hypothyroidism due to acquired atrophy of thyroid    Hypertension goal BP (blood pressure) < 140/90    Diaper dermatitis    Late onset Alzheimer's disease without behavioral disturbance    Facial droop    UTI (urinary tract infection)    Fall       Primary Care Clinic Name:  ([facility] )     Contact information and PCP information verified: Yes      ASSESSMENT  Cognitive Status: awake and disoriented.       Resources List: Transitional Care, Home Care     Lives With: facility resident  Living Arrangements: assisted living     Description of Support System: Supportive, Involved   Who is your support system?: Children, Facility resident(s)/Staff   Support Assessment: Adequate family and caregiver support, Adequate social supports   Insurance Concerns: No Insurance issues identified        This writer met with pt and pts dtr introduced self and role. Discussed discharge planning and medicare guidelines in regards to home care, TCU and LTC. Dtr reports that she would like her mother to dc to TCU, [facility] (Phone: [phone] Fax: [phone]). Referral is pending. Dtr reports that pt has been on MA in the past and now is private pay. Discussed with dtr private pay status d/t being registered here as OBS. This writer will discuss payment with [facility] admission staff.       PLAN    TCU    Discharge Planner   Discharge Plans in progress: TCu  Barriers to discharge plan: medical stability  Follow up plan: CTS to follow       Entered by: Mary Kate Mason 07/25/2017 11:50 AM             Mary Kate Mason MSW, LICSW, [facility] [phone]     addendum: Pt has been accepted at [facility] (Phone: [phone] Fax: [phone]). Pts dtr will complete MA application here, as pt will need $3000 up  front. PAS to be completed prior to dc. Mary Kate MCGHEE, [facility], [facility] [phone]

## 2017-07-25 NOTE — PLAN OF CARE
Problem: Goal Outcome Summary  Goal: Goal Outcome Summary  PT-  Evaluation completed.  Pt up in the chair-leaning to the left. . Weakness in Right UE and right LE.  Pt stood w/ assistance of 2 using the walker, fair balance and unable to initiate steps.  Pt also w/ difficultly  when tracking to midline and to the right. Positive Babinski  on right      REC- Pt significantly below baseline  w/ mobility- planning on a TCU stay.  The patient's dsughter reports at baseline pt resides at a [facility], was ambulating independently w/ a 4ww w/in the [facility]

## 2017-07-25 NOTE — PROGRESS NOTES
[facility] Medicine Progress Note  Date of Service: 07/25/2017        Assessment & Plan   Deanne Zayas is a 84 year old female who presents after fall at Memory Care unit and was also noted to have left facial droop.    Principal Problem:      Acute CAUTI ? due to cystitis without hematuria  Patient is on Rocephin  Awaiting Urine cultures  Will monitor closely and if growing GPC will add Vancomycin / Unasyn    Facial Droop  Initial CT was negative   But patient continues to have Right Sided weakness and difficulty in her speech  Will recheck CT scan of Brain in the morning to document if there is any stroke    Stage III chronic kidney disease  Monitor creatinine and fluid overload    Dementia  Without any signs of delirium for now      Hyperlipidemia LDL goal <130    Hypothyroidism due to acquired atrophy of thyroid    Hypertension goal BP (blood pressure) < 140/90    Diaper dermatitis    Late onset Alzheimer's disease without behavioral disturbance    Facial droop    UTI (urinary tract infection)    Fall  L1 to find a hematoma, doing well until a    DVT Prophylaxis: Heparin SQ  Code Status: Prior         Plan:   CT scan of the brain in the morning  Await urine culture results  Heparin for DVT prophylaxis  Disposition: Anticipate discharge  1-2 days               Interval History   Patient is pleasantly confused denying any symptoms      Physical Exam   Temp:  [97.8  F (36.6  C)-98.9  F (37.2  C)] 97.9  F (36.6  C)  Heart Rate:  [60-81] 65  Resp:  [9-23] 18  BP: (128-184)/(64-96) 184/72  SpO2:  [92 %-97 %] 97 %    Weights:   Vitals:    07/24/17 2058   Weight: 87.8 kg (193 lb 9 oz)    Body mass index is 30.32 kg/(m^2).    Constitutional: Not in any acute distress   EYES: Extra Occular movements are intact, Conjunctiva is clear   NECK: No JVD  CVS: S1 S2  Regular  Respiratory: Clear to auscultation without crepitations or Wheezing  bilaterally  GI: Soft, non tender, Bowel  Sounds are Positive   Skin: Warm and dry, No Rashes  CNS: Alert and pleasantly confused.  I do not see much for facial drooping but her right upper extremity is weaker 4/5 compared to the left  Musculoskeletal: Lower Limbs without Pedal Edema            Data     Recent Labs  Lab 07/25/17  0617 07/24/17  1425   WBC 6.6 5.8   HGB 13.7 12.5   MCV 93 95    233   INR  --  0.91    140   POTASSIUM 3.7 4.3   CHLORIDE 104 106   CO2 25 28   BUN 12 15   CR 0.99 1.41*   ANIONGAP 9 6   RUBIN 9.1 9.6   * 123*   ALBUMIN  --  3.8   PROTTOTAL  --  6.8   BILITOTAL  --  0.5   ALKPHOS  --  69   ALT  --  18   AST  --  16         Recent Labs  Lab 07/25/17  0617 07/24/17  1425 07/24/17  1421   * 123*  --    BGM  --   --  121*        Unresulted Labs Ordered in the Past 30 Days of this Admission     Date and Time Order Name Status Description    7/24/2017 1648 Urine Culture In process            Imaging  Recent Results (from the past 24 hour(s))   CT Head w/o Contrast    Narrative    CT SCAN OF THE HEAD WITHOUT CONTRAST   7/24/2017 2:51 PM     HISTORY: Unwitnessed fall at care facility. Left-sided facial droop.  History of dementia.    TECHNIQUE: Axial images of the head and coronal reformations without  IV contrast material. Radiation dose for this scan was reduced using  automated exposure control, adjustment of the mA and/or kV according  to patient size, or iterative reconstruction technique.    COMPARISON: None.    FINDINGS: There is no evidence of intracranial hemorrhage, mass, acute  infarct or anomaly. There is generalized atrophy of the brain. There  is low attenuation in the white matter of the cerebral hemispheres  consistent with sequelae of small vessel ischemic disease.     The visualized portions of the sinuses and mastoids appear normal.  There is no evidence of trauma.       Impression    IMPRESSION:  1. No evidence of acute intracranial hemorrhage, mass, or herniation.  2. There is generalized  atrophy of the brain. White matter changes are  present in the cerebral hemispheres that are consistent with small  vessel ischemic disease in this age patient.      YSABEL HAM MD   Pelvis XR w/ unilateral hip right    Narrative    PELVIS AND RIGHT HIP ONE VIEW  7/24/2017 3:24 PM     COMPARISON: None.    HISTORY: Unwitnessed fall onto right side. Right hip pain and  discomfort. Evaluate for acute bony abnormality versus other.    FINDINGS: The visualized bones and joint spaces are within normal  limits.      Impression    IMPRESSION: No evidence for fracture, dislocation or significant  degenerative change of the pelvis or right hip.    CLINTON BURGESS MD   CT Head Neck Angio w/o & w Contrast    Narrative    CTA ANGIOGRAM HEAD NECK  7/24/2017 3:26 PM     HISTORY: Evaluate for dissection/thromboembolism, unwitnessed fall,  dementia.    TECHNIQUE:  Precontrast localizing scans were followed by CT  angiography with an injection of IV contrast with scans through the  head and neck.  Images were transferred to a separate 3-D workstation  where multiplanar reformations and 3-D images were created.  Estimates  of carotid stenoses are made relative to the distal internal carotid  artery diameters except as noted. Radiation dose for this scan was  reduced using automated exposure control, adjustment of the mA and/or  kV according to patient size, or iterative reconstruction technique.    COMPARISON: None.    FINDINGS: Estimates of stenosis at the carotid bifurcations are  relative to the distal internal carotids.    Arch: Normal anatomy.    Neck:  Right Carotid:  The right common carotid artery is normal.  Calcified  atherosclerotic plaque is seen at the right carotid bifurcation. The  stenosis is less than 50%..  The right internal carotid artery is  negative.     Left Carotid:  The left common carotid artery is unremarkable.   Calcified atherosclerotic plaque is seen at the left carotid  bifurcation. The stenosis is less  than 50%..  The left internal  carotid is negative.      Vertebrals:  The vertebral arteries are normal.    Head:  Right Carotid:No occluded vessels are seen. .    Left Carotid:  No occluded vessels are identified. .    Basilar:  The basilar artery and its branches appear normal.     Miscellaneous: The venous sinuses appear patent.      Impression    IMPRESSION:   1. No occluded intracranial vessels. No high-grade intracranial  vascular stenosis.  2. Atherosclerotic plaque is present at both carotid bifurcations. The  degree of stenosis is less than 50% at both carotid bifurcations.  3. No arterial dissection is identified.    STELLA ARREGUIN MD           Medications        cefTRIAXone  1 g Intravenous Q24H     amLODIPine  2.5 mg Oral Daily     aspirin  81 mg Oral Daily     cholecalciferol  2,000 Units Oral Daily     escitalopram  10 mg Oral Daily     ferrous sulfate  325 mg Oral Daily     levothyroxine  112 mcg Oral QAM AC     lisinopril  40 mg Oral Daily     miconazole   Topical BID     senna-docusate  1 tablet Oral Daily              Shaun HIGGINS  Hospitalist - Internal Medicine  [facility]

## 2017-07-25 NOTE — PLAN OF CARE
Problem: Goal Outcome Summary  Goal: Goal Outcome Summary  Outcome: Improving  Pt's speech has improved during the night, was slurred and hard to understand at the beginning of the night. Pt is now able to talk and pronounce complete words. Facial droop appears improved. Neuro intact, pt denies any numbness or tingling. She is forgetful, has to re-orientated of where she is. Pt tried to sit up in and get out of bed with assist x2 this night, was unable to do so due to weakness. Used the bedpan in bed, was incontinent of urine x1. Pt's daughter stayed overnight. Bed alarm on for safety.

## 2017-07-25 NOTE — PROGRESS NOTES
07/25/17 1600   Quick Adds   Type of Visit Initial PT Evaluation   Living Environment   Lives With facility resident   Living Arrangements assisted living   Functional Level Prior   Ambulation 1-->assistive equipment   Transferring 1-->assistive equipment   Toileting 1-->assistive equipment   Bathing 3-->assistive equipment and person   Dressing 0-->independent   Eating 0-->independent   Communication 0-->understands/communicates without difficulty   Swallowing 0-->swallows foods/liquids without difficulty   Cognition 1 - attention or memory deficits   Fall history within last six months yes   Prior Functional Level Comment Per daughter-  Pt resides in [facility]U; independent w/ ambualtion using a 4ww w/in a structured environment,   General Information   Onset of Illness/Injury or Date of Surgery - Date 07/24/17   Referring Physician Dawson   Patient/Family Goals Statement Pt unable to state gaol; daughter present   Pertinent History of Current Problem (include personal factors and/or comorbidities that impact the POC) 84 year old female with dementia, hyperlipidemia, hypertension and hypothyroidismwho presents with fall at her memory care unit today at 1310 in the bathroom.  Found by caretakers.  Noted at 1320 or so to have some slurred speech and daughter notes she seems more confused than her baseline. Initiial CT negative; has UTI    Precautions/Limitations fall precautions   General Observations Pt alert, up in the chair-  leaning to the left in sitting.      Cognitive Status Examination   Level of Consciousness alert   Follows Commands and Answers Questions able to follow single-step instructions   Memory impaired   Pain Assessment   Patient Currently in Pain No   Range of Motion (ROM)   ROM Comment WFL, limited shoulder FF AROM due to weakness/ PROM intact    Strength   Strength Comments Slight difficulty following cues w/ MMT, but weakness noted on right.    iintact, but decreased on right    MMT: Shoulder    Shoulder Flexion - Left Side (3+/5) fair plus, left   Shoulder Internal Rotation - Left Side (4-/5) good minus, left   MMT: Elbow/Forearm, Rehab Eval   Elbow Flexion - Left Side (4/5) good, left   Elbow Extension - Left Side (4/5) good, left   Elbow Flexion - Right Side (4-/5) good minus, right   Elbow Extension - Right Side (4-/5) good minus, right   MMT: Hip, Rehab Eval   Hip Flexion - Left Side (4/5) good, left   Hip Flexion - Right Side (4-/5) good minus, right   MMT: Knee, Rehab Eval   Knee Flexion - Left Side (4/5) good, left   Knee Extension - Left Side (4/5) good, left   Knee Flexion - Right Side (4-/5) good minus, right   Knee Extension - Right Side (4/5) good, right   MMT: Ankle, Rehab Eval   Ankle Dorsiflexion - Left Side (3+/5) fair plus, left   Ankle Dorsiflexion - Right Side (3/5) fair, left   Bed Mobility   Bed Mobility Comments NT   Transfer Skills   Transfer Comments Pt stood x2 -sit> stand with walker and min/moderate. assistance of 2; wide JERRY and cues to wthsft foward, unable to  initiate steps/ perform pre-gait activities   Gait   Gait Comments unable   Balance   Balance Comments fair in sitting and stand due to weakness, decreased balance   Sensory Examination   Sensory Perception no deficits were identified   Coordination   Coordination Comments FTN- difficulty tracking to midline and to the right w/ RUE.   NT in LE   Muscle Tone   Muscle Tone Comments Postive Babinski Right ; slight increased tone Right LE    General Therapy Interventions   Planned Therapy Interventions balance training;bed mobility training;gait training;neuromuscular re-education;strengthening   Clinical Impression   Criteria for Skilled Therapeutic Intervention yes, treatment indicated   PT Diagnosis Imapired gait   Influenced by the following impairments Decreased strength- Right UE, LE. Decreased balance.   Functional limitations due to impairments altered mobility- pt significantly below baseline w/  Mobility,  "dependent w/ all mobility    Clinical Presentation Stable/Uncomplicated   Clinical Presentation Rationale clinical judgment   Clinical Decision Making (Complexity) Low complexity   Therapy Frequency` daily   Predicted Duration of Therapy Intervention (days/wks) 2 days   Anticipated Discharge Disposition Transitional Care Facility   Risk & Benefits of therapy have been explained Yes   Patient, Family & other staff in agreement with plan of care Yes   Clinical Impression Comments Pt significantly below baseline w/ mobility ,appears to have a neurological  deficit   [facility] AM-PAC  \"6 Clicks\" V.2 Basic Mobility Inpatient Short Form   1. Turning from your back to your side while in a flat bed without using bedrails? 2 - A Lot   2. Moving from lying on your back to sitting on the side of a flat bed without using bedrails? 2 - A Lot   3. Moving to and from a bed to a chair (including a wheelchair)? 1 - Total   4. Standing up from a chair using your arms (e.g., wheelchair, or bedside chair)? 2 - A Lot   5. To walk in hospital room? 1 - Total   6. Climbing 3-5 steps with a railing? 1 - Total   Basic Mobility Raw Score (Score out of 24.Lower scores equate to lower levels of function) 9   Total Evaluation Time   Total Evaluation Time (Minutes) 20     "

## 2017-07-25 NOTE — PLAN OF CARE
Problem: Urinary Tract Infection (Adult)  Goal: Signs and Symptoms of Listed Potential Problems Will be Absent or Manageable (Urinary Tract Infection)  Signs and symptoms of listed potential problems will be absent or manageable by discharge/transition of care (reference Urinary Tract Infection (Adult) CPG).  Outcome: No Change  Pt continues to c/o of need to void q 15 minutes. Diaper in place, d/t incontinence. No attempts made post arrival to m/s floor to get up out of bed. Lift sheet on bed for getting up. Daughter/Mayra at bedside for night. Pt oriented to self only, pleasant & cooperative. No facial droop or slurring of words noted. Neuros intact. Bed alarm on for safety. Dtr aware to call for staff assistance with pt getting up out of bed. VSS, afebrile. Priya Wilder RN

## 2017-07-26 ENCOUNTER — APPOINTMENT (OUTPATIENT)
Dept: CT IMAGING | Facility: CLINIC | Age: 82
DRG: 065 | End: 2017-07-26
Attending: INTERNAL MEDICINE
Payer: MEDICARE

## 2017-07-26 ENCOUNTER — APPOINTMENT (OUTPATIENT)
Dept: MRI IMAGING | Facility: CLINIC | Age: 82
DRG: 065 | End: 2017-07-26
Attending: INTERNAL MEDICINE
Payer: MEDICARE

## 2017-07-26 ENCOUNTER — APPOINTMENT (OUTPATIENT)
Dept: OCCUPATIONAL THERAPY | Facility: CLINIC | Age: 82
DRG: 065 | End: 2017-07-26
Payer: MEDICARE

## 2017-07-26 PROBLEM — I63.9 STROKE (H): Status: ACTIVE | Noted: 2017-07-26

## 2017-07-26 PROCEDURE — 97165 OT EVAL LOW COMPLEX 30 MIN: CPT | Mod: GO

## 2017-07-26 PROCEDURE — A9270 NON-COVERED ITEM OR SERVICE: HCPCS | Mod: GY | Performed by: FAMILY MEDICINE

## 2017-07-26 PROCEDURE — G0378 HOSPITAL OBSERVATION PER HR: HCPCS

## 2017-07-26 PROCEDURE — 12000000 ZZH R&B MED SURG/OB

## 2017-07-26 PROCEDURE — 97110 THERAPEUTIC EXERCISES: CPT | Mod: GO

## 2017-07-26 PROCEDURE — 25000128 H RX IP 250 OP 636: Performed by: INTERNAL MEDICINE

## 2017-07-26 PROCEDURE — 96372 THER/PROPH/DIAG INJ SC/IM: CPT

## 2017-07-26 PROCEDURE — 99232 SBSQ HOSP IP/OBS MODERATE 35: CPT | Performed by: INTERNAL MEDICINE

## 2017-07-26 PROCEDURE — 25000132 ZZH RX MED GY IP 250 OP 250 PS 637: Mod: GY | Performed by: FAMILY MEDICINE

## 2017-07-26 PROCEDURE — 70551 MRI BRAIN STEM W/O DYE: CPT

## 2017-07-26 PROCEDURE — 25000128 H RX IP 250 OP 636: Performed by: EMERGENCY MEDICINE

## 2017-07-26 PROCEDURE — 40000133 ZZH STATISTIC OT WARD VISIT

## 2017-07-26 PROCEDURE — 70450 CT HEAD/BRAIN W/O DYE: CPT

## 2017-07-26 RX ADMIN — MICONAZOLE NITRATE: 2 POWDER TOPICAL at 10:28

## 2017-07-26 RX ADMIN — FERROUS SULFATE TAB 325 MG (65 MG ELEMENTAL FE) 325 MG: 325 (65 FE) TAB at 10:26

## 2017-07-26 RX ADMIN — LISINOPRIL 40 MG: 40 TABLET ORAL at 10:27

## 2017-07-26 RX ADMIN — HEPARIN SODIUM 5000 UNITS: 10000 INJECTION, SOLUTION INTRAVENOUS; SUBCUTANEOUS at 20:05

## 2017-07-26 RX ADMIN — SENNOSIDES AND DOCUSATE SODIUM 1 TABLET: 8.6; 5 TABLET ORAL at 10:27

## 2017-07-26 RX ADMIN — HEPARIN SODIUM 5000 UNITS: 10000 INJECTION, SOLUTION INTRAVENOUS; SUBCUTANEOUS at 10:28

## 2017-07-26 RX ADMIN — MICONAZOLE NITRATE: 2 POWDER TOPICAL at 20:00

## 2017-07-26 RX ADMIN — ESCITALOPRAM OXALATE 10 MG: 10 TABLET ORAL at 10:27

## 2017-07-26 RX ADMIN — ASPIRIN 81 MG 81 MG: 81 TABLET ORAL at 10:27

## 2017-07-26 RX ADMIN — AMLODIPINE BESYLATE 2.5 MG: 2.5 TABLET ORAL at 10:26

## 2017-07-26 RX ADMIN — LEVOTHYROXINE SODIUM 112 MCG: 112 TABLET ORAL at 06:35

## 2017-07-26 RX ADMIN — CEFTRIAXONE 1 G: 1 INJECTION, POWDER, FOR SOLUTION INTRAMUSCULAR; INTRAVENOUS at 17:19

## 2017-07-26 RX ADMIN — VITAMIN D, TAB 1000IU (100/BT) 2000 UNITS: 25 TAB at 10:27

## 2017-07-26 NOTE — PLAN OF CARE
Problem: Goal Outcome Summary  Goal: Goal Outcome Summary  OT: Eval complete. At time of OT evaluation- R UE strength: shoulder with no muscle twitch, biceps with trace muscle flexion, wrist flex/ext 3-/5 and weak  strength. Muscle testing completed 3 times with no improvement. Pt oriented to place, not year or situation. Participated in R UE PROM-AAROM exercises with good tolerance. R UE positioned with pillows at end of therapy session to protect shoulder and for pt to see R UE.      REC: TCU to increase independence with ADLs and functional mobility

## 2017-07-26 NOTE — PLAN OF CARE
Problem: Activity Intolerance (Adult)  Goal: Identify Related Risk Factors and Signs and Symptoms  Related risk factors and signs and symptoms are identified upon initiation of Human Response Clinical Practice Guideline (CPG)   Outcome: No Change  Patient has had periods in which is is alert, speaks clearly with symmetrical facial tones then will have a period of leaning to the left with what appears to be a droop of the right side of her face or leaning to the left. She has not always been able to bear her jaylon, follow direction. She denies pain, discomfort. She has made statements of needing to urinate frequently but does not always void. She has been incontinent which her daughter states is not unusual for her but seems to be more frequently here. She has gone for her head MRI.

## 2017-07-26 NOTE — PROGRESS NOTES
Reason for Follow up: DC Planning    Anticipated discharge needs: [facility] (Phone: [phone] Fax: [phone]) for as early as Friday. Pt is a [employer] [facility] pt and her 3 day stay has been waived at the TCU. Pts dtr notified and in agreement with dc plan.     PAS-RR    Per DHS regulation, CTS team completed and submitted PAS-RR to [place] [employer] on [employer]ging Direct Connect via the Senior [employer]. CTS team advised SNF and they are aware a PAS-RR has been submitted.     CTS team reviewed with pt or health care agent that they may be contacted for a follow up appointment within 10 days of hospital discharge if SNF stay is <30 days. Contact information for [employer] was also provided.     Pt or health care agent verbalized understanding.     PAS-RR # [id number]      Next steps: TCU    Mary Kate Mason MSW, [facility], [facility] [phone]    Discharge Planner   Discharge Plans in progress: TCU  Barriers to discharge plan: medical stability, finances  Follow up plan: CTS to follow       Entered by: Mary Kate Mason 07/26/2017 3:11 PM

## 2017-07-26 NOTE — PLAN OF CARE
Problem: Goal Outcome Summary  Goal: Goal Outcome Summary  Outcome: Improving  Pt continues to sleep/rest well this night. VSS. Pt's speech is clear when answering questions. Pt denies the need to void at this time. Daughter remains with patient in room. Will continue to monitor.

## 2017-07-26 NOTE — PLAN OF CARE
Problem: Activity Intolerance (Adult)  Goal: Identify Related Risk Factors and Signs and Symptoms  Related risk factors and signs and symptoms are identified upon initiation of Human Response Clinical Practice Guideline (CPG)  Outcome: No Change  Daughter states that her speech has improved greatly this evening. Patient tolerated sitting in chair for most of the evening. When transferring to bed or commode is yady to stand but unable to take any steps to pivot, turn and sit. Right side remains weaker. Fair appetite. Voiding on commode and incontinent of urine at times.

## 2017-07-26 NOTE — PROGRESS NOTES
[facility] Medicine Progress Note  Date of Service: 07/26/2017        Assessment & Plan   Deanne Zayas is a 84 year old female who presents after fall at Memory Care unit and was also noted to have left facial droop.    Principal Problem:      Acute CAUTI ? due to cystitis without hematuria  Patient is on Rocephin  Awaiting Urine cultures  Will monitor closely and if growing GPC will add Vancomycin / Unasyn    Facial Droop  Initial CT was negative a 2nd CT done today was also negative  But patient continues to have Right Sided weakness and difficulty in her speech which is getting better and worse  Will recheck MRI scan of Brain to document if there is any stroke    Stage III chronic kidney disease  Monitor creatinine and fluid overload  Creatinine has improved since the time of admission     Dementia  Without any signs of delirium for now      Hyperlipidemia LDL goal <130    Hypothyroidism due to acquired atrophy of thyroid    Hypertension goal BP (blood pressure) < 140/90    Diaper dermatitis    Late onset Alzheimer's disease without behavioral disturbance    Facial droop    UTI (urinary tract infection)    Fall  L1 to find a hematoma, doing well until a    DVT Prophylaxis: Heparin SQ  Code Status: Prior         Plan:   Will check a MRI scan of the brain to diagnose if at all there is a stroke  Await urine culture results  Change patient to inpatient with stroke symptoms persisting  Disposition: Anticipate discharge  1-2 days               Interval History   Patient is pleasantly confused denying any symptoms  Briefly in the morning she was more awake and oriented        Physical Exam   Temp:  [98  F (36.7  C)-98.5  F (36.9  C)] 98.5  F (36.9  C)  Heart Rate:  [59-64] 63  Resp:  [18-20] 20  BP: (187-193)/(69-76) 188/72  SpO2:  [94 %-97 %] 94 %    Weights:   Vitals:    07/24/17 2058   Weight: 87.8 kg (193 lb 9 oz)    Body mass index is 30.32 kg/(m^2).    Constitutional:  Not in any acute distress   EYES: Extra Occular movements are intact, Conjunctiva is clear   NECK: No JVD  CVS: S1 S2  Regular  Respiratory: Clear to auscultation without crepitations or Wheezing  bilaterally  GI: Soft, non tender, Bowel Sounds are Positive   Skin: Warm and dry, No Rashes  CNS: Alert and pleasantly confused.  I do not see much for facial drooping but her right upper extremity is weaker though better than yesterday and as compared to the left side.  Right lower extremity has normal power.  Speech is slurred but better than yesterday  Musculoskeletal: Lower Limbs without Pedal Edema            Data     Recent Labs  Lab 07/25/17  0617 07/24/17  1425   WBC 6.6 5.8   HGB 13.7 12.5   MCV 93 95    233   INR  --  0.91    140   POTASSIUM 3.7 4.3   CHLORIDE 104 106   CO2 25 28   BUN 12 15   CR 0.99 1.41*   ANIONGAP 9 6   RUBIN 9.1 9.6   * 123*   ALBUMIN  --  3.8   PROTTOTAL  --  6.8   BILITOTAL  --  0.5   ALKPHOS  --  69   ALT  --  18   AST  --  16         Recent Labs  Lab 07/25/17  0617 07/24/17  1425 07/24/17  1421   * 123*  --    BGM  --   --  121*        Unresulted Labs Ordered in the Past 30 Days of this Admission     Date and Time Order Name Status Description    7/24/2017 1648 Urine Culture Preliminary            Imaging  No results found for this or any previous visit (from the past 24 hour(s)).        Medications        heparin  5,000 Units Subcutaneous Q12H     cefTRIAXone  1 g Intravenous Q24H     amLODIPine  2.5 mg Oral Daily     aspirin  81 mg Oral Daily     cholecalciferol  2,000 Units Oral Daily     escitalopram  10 mg Oral Daily     ferrous sulfate  325 mg Oral Daily     levothyroxine  112 mcg Oral QAM AC     lisinopril  40 mg Oral Daily     miconazole   Topical BID     senna-docusate  1 tablet Oral Daily              Shaun HIGGINS  Hospitalist - Internal Medicine  [facility]

## 2017-07-26 NOTE — PROGRESS NOTES
07/26/17 1400   Signing Clinician's Name / Credentials   Signing clinician's name / credentials Alexandrea Sim PT   Quick Adds   Rehab Discipline PT   Additional Documentation   PT Plan cancel- procedure/ MRI; will continue per POC 7/27/17

## 2017-07-27 VITALS
HEART RATE: 67 BPM | WEIGHT: 193.56 LBS | DIASTOLIC BLOOD PRESSURE: 71 MMHG | TEMPERATURE: 97.5 F | SYSTOLIC BLOOD PRESSURE: 161 MMHG | HEIGHT: 67 IN | BODY MASS INDEX: 30.38 KG/M2 | RESPIRATION RATE: 16 BRPM | OXYGEN SATURATION: 93 %

## 2017-07-27 LAB
ANION GAP SERPL CALCULATED.3IONS-SCNC: 8 MMOL/L (ref 3–14)
BUN SERPL-MCNC: 17 MG/DL (ref 7–30)
CALCIUM SERPL-MCNC: 9.6 MG/DL (ref 8.5–10.1)
CHLORIDE SERPL-SCNC: 106 MMOL/L (ref 94–109)
CO2 SERPL-SCNC: 26 MMOL/L (ref 20–32)
CREAT SERPL-MCNC: 1.19 MG/DL (ref 0.52–1.04)
GFR SERPL CREATININE-BSD FRML MDRD: 43 ML/MIN/1.7M2
GLUCOSE SERPL-MCNC: 154 MG/DL (ref 70–99)
POTASSIUM SERPL-SCNC: 3.7 MMOL/L (ref 3.4–5.3)
SODIUM SERPL-SCNC: 140 MMOL/L (ref 133–144)

## 2017-07-27 PROCEDURE — 99238 HOSP IP/OBS DSCHRG MGMT 30/<: CPT | Performed by: INTERNAL MEDICINE

## 2017-07-27 PROCEDURE — 25000128 H RX IP 250 OP 636: Performed by: INTERNAL MEDICINE

## 2017-07-27 PROCEDURE — 25000132 ZZH RX MED GY IP 250 OP 250 PS 637: Mod: GY | Performed by: FAMILY MEDICINE

## 2017-07-27 PROCEDURE — 25000132 ZZH RX MED GY IP 250 OP 250 PS 637: Mod: GY | Performed by: INTERNAL MEDICINE

## 2017-07-27 PROCEDURE — A9270 NON-COVERED ITEM OR SERVICE: HCPCS | Mod: GY | Performed by: INTERNAL MEDICINE

## 2017-07-27 PROCEDURE — 80048 BASIC METABOLIC PNL TOTAL CA: CPT | Performed by: INTERNAL MEDICINE

## 2017-07-27 PROCEDURE — A9270 NON-COVERED ITEM OR SERVICE: HCPCS | Mod: GY | Performed by: FAMILY MEDICINE

## 2017-07-27 PROCEDURE — 36415 COLL VENOUS BLD VENIPUNCTURE: CPT | Performed by: INTERNAL MEDICINE

## 2017-07-27 RX ORDER — CLOPIDOGREL BISULFATE 75 MG/1
75 TABLET ORAL DAILY
Status: DISCONTINUED | OUTPATIENT
Start: 2017-07-27 | End: 2017-07-27 | Stop reason: HOSPADM

## 2017-07-27 RX ORDER — ATORVASTATIN CALCIUM 20 MG/1
20 TABLET, FILM COATED ORAL DAILY
Status: DISCONTINUED | OUTPATIENT
Start: 2017-07-27 | End: 2017-07-27 | Stop reason: HOSPADM

## 2017-07-27 RX ORDER — ATORVASTATIN CALCIUM 20 MG/1
20 TABLET, FILM COATED ORAL DAILY
Qty: 30 TABLET | Refills: 0 | Status: SHIPPED | OUTPATIENT
Start: 2017-07-27 | End: 2017-10-06

## 2017-07-27 RX ORDER — CLOPIDOGREL BISULFATE 75 MG/1
75 TABLET ORAL DAILY
Qty: 30 TABLET | Refills: 1 | Status: SHIPPED | OUTPATIENT
Start: 2017-07-27 | End: 2017-10-06

## 2017-07-27 RX ORDER — CEFDINIR 300 MG/1
300 CAPSULE ORAL 2 TIMES DAILY
Qty: 4 CAPSULE | Refills: 0 | Status: SHIPPED | OUTPATIENT
Start: 2017-07-27 | End: 2017-07-29

## 2017-07-27 RX ADMIN — LEVOTHYROXINE SODIUM 112 MCG: 112 TABLET ORAL at 07:21

## 2017-07-27 RX ADMIN — FERROUS SULFATE TAB 325 MG (65 MG ELEMENTAL FE) 325 MG: 325 (65 FE) TAB at 08:11

## 2017-07-27 RX ADMIN — ESCITALOPRAM OXALATE 10 MG: 10 TABLET ORAL at 08:10

## 2017-07-27 RX ADMIN — SENNOSIDES AND DOCUSATE SODIUM 1 TABLET: 8.6; 5 TABLET ORAL at 08:10

## 2017-07-27 RX ADMIN — MICONAZOLE NITRATE: 2 POWDER TOPICAL at 12:01

## 2017-07-27 RX ADMIN — HEPARIN SODIUM 5000 UNITS: 10000 INJECTION, SOLUTION INTRAVENOUS; SUBCUTANEOUS at 08:11

## 2017-07-27 RX ADMIN — VITAMIN D, TAB 1000IU (100/BT) 2000 UNITS: 25 TAB at 08:10

## 2017-07-27 RX ADMIN — ATORVASTATIN CALCIUM 20 MG: 20 TABLET, FILM COATED ORAL at 12:01

## 2017-07-27 RX ADMIN — AMLODIPINE BESYLATE 2.5 MG: 2.5 TABLET ORAL at 08:11

## 2017-07-27 RX ADMIN — ASPIRIN 81 MG 81 MG: 81 TABLET ORAL at 08:10

## 2017-07-27 RX ADMIN — LISINOPRIL 40 MG: 40 TABLET ORAL at 08:11

## 2017-07-27 NOTE — PLAN OF CARE
Problem: Goal Outcome Summary  Goal: Goal Outcome Summary  Occupational Therapy Discharge Summary     Reason for therapy discharge:    Discharged to transitional care facility.     Progress towards therapy goal(s). See goals on Care Plan in [facility] electronic health record for goal details.  Goals partially met.  Barriers to achieving goals:   discharge from facility.     Therapy recommendation(s):    Continued therapy is recommended.  Rationale/Recommendations:  TCU to increase independence with ADLs. .

## 2017-07-27 NOTE — PROGRESS NOTES
[facility] be [facility] plavix as it mentions it in Dr. Osman's note, but they did not receive an order for the med. Dr. Osman updated and they were called with a telephone order for the med.

## 2017-07-27 NOTE — PROGRESS NOTES
[facility] DISCHARGE NOTE    Patient discharged to transitional care unit at 1:11 PM via cart. Accompanied by daughter and staff. Discharge instructions reviewed with patient, daughter and [facility] RN, opportunity offered to ask questions. Prescriptions sent to patients preferred pharmacy. All belongings sent with patient.    Catrachita Pichardo

## 2017-07-27 NOTE — PLAN OF CARE
Problem: Activity Intolerance (Adult)  Goal: Identify Related Risk Factors and Signs and Symptoms  Related risk factors and signs and symptoms are identified upon initiation of Human Response Clinical Practice Guideline (CPG)   Outcome: No Change  Pt unable to stand this evening so all transfers were with ceiling lift.  Pt able to move well in bed.  Speech was clear most of the evening.  No facial droop noted.  As evening wore on, pt became more confused and restless and speech, at times, became less clear, but understandable.  Alarms on at all times.  Pt likes to move around.  LAURA Delgado RN

## 2017-07-27 NOTE — PROGRESS NOTES
Name: Deanne Zayas    MRN#: [id number]    Reason for Hospitalization: Acute cystitis without hematuria [N30.00]  Fall, initial encounter [W19.XXXA]    Discharge Date: 7/27/2017    Patient / Family response to discharge plan: in agreement    Follow-Up Appt: Future Appointments  Date Time Provider Department Center   7/27/2017 12:30 PM Alexandrea Sim, PT [facility]       Other Providers (Care Coordinator, County Services, PCA services etc): No    [facility]charge [facility] (Phone: [phone] Fax: [phone]) via family transport    DAIANA Stanley  [facility] [phone]/ [facility] [phone]

## 2017-07-27 NOTE — DISCHARGE SUMMARY
[facility]    Discharge Summary  Hospital Medicine    Date of Admission:  7/24/2017  Date of Discharge:  7/27/2017   Discharging Provider: Shaun Osman  Date of Service: 7/27/2017     Primary Care     Center, [facility] address on file      Identification and Chief Compaint: Deanne Zayas is a 84 year old female who presented on 7/24/2017 with complaint of fall at Memory Care unit and was also noted to have left facial droop.    Discharge Diagnoses     Acute ischemic infarct left paramedian russell    Acute cystitis without hematuria or catheter     Hyperlipidemia LDL goal <130    Hypothyroidism due to acquired atrophy of thyroid    Hypertension goal BP (blood pressure) < 140/90    Diaper dermatitis    Late onset Alzheimer's disease without behavioral disturbance    Facial droop    UTI (urinary tract infection)    Fall            Discharge Disposition   Discharged to rehabilitation facility    Discharge Orders     General info for SNF   Length of Stay Estimate: Short Term Care: Estimated # of Days <30  Condition at Discharge: Improving  Level of care:skilled   Rehabilitation Potential: Good  Admission H&P remains valid and up-to-date: Yes  Recent Chemotherapy: N/A  Use Nursing Home Standing Orders: Yes     Mantoux instructions   Give two-step Mantoux (PPD) Per Facility Policy Yes     Reason for your hospital stay   Presents after fall at Memory Care unit with left facial droop. 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 and some speech difficulty     Activity - Up ad bony     Physical Therapy Adult Consult   Evaluate and treat as clinically indicated.    Reason:  weaknes with stroke     Occupational Therapy Adult Consult   Evaluate and treat as clinically indicated.    Reason:  Right-sided weakness with stroke     Speech Language Path Adult Consult   Evaluate and treat as clinically  indicated.    Reason:  Speech difficulty in this patient with acute stroke and also dementia     Fall precautions           Further instructions from your care team         Uncertain Causes of Fall  You have had a fall today. But the cause of your fall is not certain. Falls can occur due to slipping, tripping or losing your balance. A fall can also occur from a fainting spell or seizure.  While a fall can happen for a simple reason (tripping over something), falls in elderly people are often caused by a combination of things:    Age-related decline in function with worsening balance, stability, vision, and muscle strength    Chronic illness such as heart arrhythmias, heart valve disease, vascular disease, COPD, diabetes, strokes, arthritis    Effects or side effects of medicines    Dehydration.    Environmental hazards such as uneven or slippery ground, unfamiliar place, obstacles, uneven surfaces, or slippery ground    Situational factors (related to the activity being done, e.g., rushing to the bathroom)  Because the cause of your fall today is not certain, it is possible that a fainting spell or seizure was the cause. This means that it could happen again, without warning. If you fall again, without a cause, then you should return to this facility promptly to have further tests. Otherwise, follow up with your doctor as explained below.  It is normal to feel sore and tight in your muscles and back the next day, and not just the muscles you initially injured. Remember, all the parts of your body are connected, so while initially one area hurts, the next day another may hurt. Also, when you injure yourself, it causes inflammation, which then causes the muscles to tighten up and hurt more. After the initial worsening, it should gradually improve over the next few days. However, more severe pain should be reported.  Even without a definite head injury, you can still get a concussion. Concussions and even bleeding can  still occur, especially if you have had a recent injury or take blood thinner medicine. It is not unusual to have a mild headache and feel tired and even nauseous or dizzy.  Home care    Rest today and resume your normal activities as soon as you are feeling back to normal. It is best to remain with someone who can check on you for the next 24 hours to watch for another episode of falling.    If you were injured during the fall, follow the advice from your doctor regarding care of your injury.     If you become light-headed or dizzy, lie down immediately or sit and lean forward with your head down.    As a precaution, do not drive a car or operate dangerous equipment, do not take a bath alone (use a shower instead) and do not swim alone until you see your doctor. A condition causing fainting or seizures must be ruled out before resuming these activities.    You may use acetaminophen or ibuprofen to control pain, unless another pain medicine was prescribed. If you have chronic liver or kidney disease or ever had a stomach ulcer or gastrointestinal bleeding, talk with your doctor before using these medicines.    Keep your appointments for any further testing that may have been scheduled for you.  Follow-up care  Follow up with your healthcare provider, or as advised.  If X-rays or CT scan were done, you will be notified if there is a change in the reading, especially if it affects treatment.  Call 911  Call 911 if any of these occur:    Trouble breathing    Confused or difficulty arousing    Fainting or loss of consciousness    Rapid or very slow heart rate    Seizure    Difficulty with speech or vision, weakness of an arm or leg    Difficulty walking or talking, loss of balance, numbness or weakness in one side of your body, facial droop  When to seek medical advice  Call your healthcare provider right away if any of these occur:    Another unexplained fall    Dizziness    Severe headache    Nausea and  vomiting    Blood in vomit, stools (black or red color)  Date Last Reviewed: 11/5/2015 2000-2017 [employer]. [address] [place] [address], [place] [address]. All rights reserved. This information is not intended as a substitute for professional medical care. Always follow your healthcare professional's instructions.             Discharge Medications   Current Discharge Medication List      START taking these medications    Details   atorvastatin (LIPITOR) 20 MG tablet Take 1 tablet (20 mg) by mouth daily  Qty: 30 tablet, Refills: 0    Associated Diagnoses: Cerebrovascular accident (CVA) due to thrombosis of left middle cerebral artery (H)      cefdinir (OMNICEF) 300 MG capsule Take 1 capsule (300 mg) by mouth 2 times daily for 2 days  Qty: 4 capsule, Refills: 0    Associated Diagnoses: Acute cystitis without hematuria      clopidogrel (PLAVIX) 75 MG tablet Take 1 tablet (75 mg) by mouth daily  Qty: 30 tablet, Refills: 1    Associated Diagnoses: 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)      sulfamethoxazole-trimethoprim (BACTRIM/SEPTRA) 400-80 MG per tablet Take 1 tablet by mouth 2 times daily  Qty: 14 tablet, Refills: 0         CONTINUE these medications which have NOT CHANGED    Details   mineral oil-hydrophilic petrolatum (AQUAPHOR) Apply topically as needed      aspirin 81 MG chewable tablet Take 1 tablet (81 mg) by mouth daily  Qty: 31 tablet, Refills: PRN    Associated Diagnoses: Coronary artery disease involving native heart without angina pectoris, unspecified vessel or lesion type      senna-docusate (SENNA S) 8.6-50 MG per tablet Take 1 tablet by mouth daily  Qty: 31 tablet, Refills: PRN    Associated Diagnoses: Constipation, unspecified constipation type      amLODIPine (NORVASC) 2.5 MG tablet Take 1 tablet (2.5 mg) by mouth daily  Qty: 31 tablet, Refills: PRN    Associated Diagnoses: Benign essential hypertension      lisinopril (PRINIVIL/ZESTRIL) 40 MG  "tablet Take 1 tablet (40 mg) by mouth daily  Qty: 31 tablet, Refills: PRN    Associated Diagnoses: Benign essential hypertension      cholecalciferol (VITAMIN D3) 1000 UNIT tablet Take 2 tablets (2,000 Units) by mouth daily  Qty: 62 tablet, Refills: PRN    Associated Diagnoses: Senile osteoporosis      nystatin (MYCOSTATIN) 401815 UNIT/GM POWD Apply topically as needed APPLY TOPICALLY TO AFFECTED AREA(S) TID PRN  Qty: 30 g, Refills: PRN    Associated Diagnoses: Yeast infection of the vagina      ferrous sulfate (IRON) 325 (65 FE) MG tablet Take 1 tablet (325 mg) by mouth daily  Qty: 31 tablet, Refills: PRN    Associated Diagnoses: Iron deficiency anemia secondary to inadequate dietary iron intake      levothyroxine (SYNTHROID/LEVOTHROID) 112 MCG tablet Take 1 tablet (112 mcg) by mouth daily  Qty: 60 tablet, Refills: 3    Associated Diagnoses: Other specified hypothyroidism      cyanocobalamin (VITAMIN B12) 1000 MCG/ML injection Inject 1 mL (1,000 mcg) Subcutaneous every 30 days  Qty: 1 mL, Refills: 11    Associated Diagnoses: Vitamin B 12 deficiency      escitalopram (LEXAPRO) 10 MG tablet Take 1 tablet (10 mg) by mouth daily  Qty: 31 tablet, Refills: PRN    Associated Diagnoses: Anxiety      syringe/needle, disp, 25G X 1\" 3 ML MISC USE AS DIRECTED WITH B12 INECTION  Qty: 1 each, Refills: 11    Associated Diagnoses: Vitamin B 12 deficiency           Allergies   Allergies   Allergen Reactions     Donepezil Other (See Comments)     At 10 mg, tremulousness and decreased appetite       Consultations This Hospital Stay  PHYSICAL THERAPY ADULT IP CONSULT  OCCUPATIONAL THERAPY ADULT IP CONSULT  SPIRITUAL HEALTH SERVICES IP CONSULT  PHYSICAL THERAPY ADULT IP CONSULT  OCCUPATIONAL THERAPY ADULT IP CONSULT  SPEECH LANGUAGE PATH ADULT IP CONSULT    Significant Results and Procedures   Procedures    None    Data   Results for orders placed or performed during the hospital encounter of 07/24/17   CT Head w/o Contrast    " Narrative    CT SCAN OF THE HEAD WITHOUT CONTRAST   7/24/2017 2:51 PM     HISTORY: Unwitnessed fall at care facility. Left-sided facial droop.  History of dementia.    TECHNIQUE: Axial images of the head and coronal reformations without  IV contrast material. Radiation dose for this scan was reduced using  automated exposure control, adjustment of the mA and/or kV according  to patient size, or iterative reconstruction technique.    COMPARISON: None.    FINDINGS: There is no evidence of intracranial hemorrhage, mass, acute  infarct or anomaly. There is generalized atrophy of the brain. There  is low attenuation in the white matter of the cerebral hemispheres  consistent with sequelae of small vessel ischemic disease.     The visualized portions of the sinuses and mastoids appear normal.  There is no evidence of trauma.       Impression    IMPRESSION:  1. No evidence of acute intracranial hemorrhage, mass, or herniation.  2. There is generalized atrophy of the brain. White matter changes are  present in the cerebral hemispheres that are consistent with small  vessel ischemic disease in this age patient.      YSABEL HAM MD   CT Head Neck Angio w/o & w Contrast    Narrative    CTA ANGIOGRAM HEAD NECK  7/24/2017 3:26 PM     HISTORY: Evaluate for dissection/thromboembolism, unwitnessed fall,  dementia.    TECHNIQUE:  Precontrast localizing scans were followed by CT  angiography with an injection of IV contrast with scans through the  head and neck.  Images were transferred to a separate 3-D workstation  where multiplanar reformations and 3-D images were created.  Estimates  of carotid stenoses are made relative to the distal internal carotid  artery diameters except as noted. Radiation dose for this scan was  reduced using automated exposure control, adjustment of the mA and/or  kV according to patient size, or iterative reconstruction technique.    COMPARISON: None.    FINDINGS: Estimates of stenosis at the carotid  bifurcations are  relative to the distal internal carotids.    Arch: Normal anatomy.    Neck:  Right Carotid:  The right common carotid artery is normal.  Calcified  atherosclerotic plaque is seen at the right carotid bifurcation. The  stenosis is less than 50%..  The right internal carotid artery is  negative.     Left Carotid:  The left common carotid artery is unremarkable.   Calcified atherosclerotic plaque is seen at the left carotid  bifurcation. The stenosis is less than 50%..  The left internal  carotid is negative.      Vertebrals:  The vertebral arteries are normal.    Head:  Right Carotid:No occluded vessels are seen. .    Left Carotid:  No occluded vessels are identified. .    Basilar:  The basilar artery and its branches appear normal.     Miscellaneous: The venous sinuses appear patent.      Impression    IMPRESSION:   1. No occluded intracranial vessels. No high-grade intracranial  vascular stenosis.  2. Atherosclerotic plaque is present at both carotid bifurcations. The  degree of stenosis is less than 50% at both carotid bifurcations.  3. No arterial dissection is identified.    STELLA ARREGUIN MD   Pelvis XR w/ unilateral hip right    Narrative    PELVIS AND RIGHT HIP ONE VIEW  7/24/2017 3:24 PM     COMPARISON: None.    HISTORY: Unwitnessed fall onto right side. Right hip pain and  discomfort. Evaluate for acute bony abnormality versus other.    FINDINGS: The visualized bones and joint spaces are within normal  limits.      Impression    IMPRESSION: No evidence for fracture, dislocation or significant  degenerative change of the pelvis or right hip.    CLINTON BURGESS MD   CT Head w/o Contrast    Narrative    CT SCAN OF THE HEAD WITHOUT CONTRAST   7/26/2017 7:37 AM     HISTORY: Right-sided weakness.    TECHNIQUE:  Axial images of the head and coronal reformations without  IV contrast material.  Radiation dose for this scan was reduced using  automated exposure control, adjustment of the mA and/or kV  according  to patient size, or iterative reconstruction technique.    COMPARISON: 7/24/2017.    FINDINGS: Moderate cerebral atrophy is present. Patchy white matter  changes are seen in both hemispheres without mass effect. There is no  evidence for intracranial hemorrhage, mass effect, acute infarct, or  skull fracture.      Impression    IMPRESSION: Chronic changes. No evidence for intracranial hemorrhage  or any acute process.    KIARRA RAMIREZ MD   MRI Brain w/o contrast    Narrative    MRI BRAIN WITHOUT CONTRAST  7/26/2017 2:54 PM    HISTORY: Weakness. Right upper limb with speech difficulty.    TECHNIQUE: Multiplanar, multisequence MRI of the brain without  gadolinium IV contrast material.    COMPARISON: None.    FINDINGS: Diffusion-weighted images show restricted diffusion in the  left paramedian russell, consistent with acute ischemic infarct. There is  no associated mass effect or hemorrhage. There is some associated  abnormal increased signal intensity in the T2 and FLAIR images. There  is also scattered supratentorial white matter signal hyperintensities  on FLAIR and T2-weighted images which are not associated with any mass  effect or enhancement. There is an old microbleed in the right basal  ganglia region. Moderate cerebral atrophy is present. Vascular  structures are patent at the skull base.      Impression    IMPRESSION:  1. Acute ischemic infarct in left paramedian russell.  2. Old microbleed in right basal ganglia region.  3. Cerebral atrophy with brainstem and supratentorial signal  abnormalities which are likely due to chronic small vessel ischemic  disease.    KIARRA RAMIREZ MD   Echo     History of Present Illness   (From H&P)  Deanne Zayas is a 84 year old female who presents after fall at Memory Care unit and was also noted to have left facial droop.    Hospital Course   Deanne Zayas was admitted on 7/24/2017.  The following problems were addressed during her hospitalization:    1.  Acute  UTI probably cystitis growing gram-negative rods greater than 100,000 colony counts.  Started on ceftriaxone which is changed to us Cefdinir twice daily for a total of 5 days of treatment.    2.  Patient persistently had her right-sided weakness associated with some slurred speech CT scan done at admission and also 24 hours later did not show any acute ischemia therefore MRI was done which showed acute ischemic infarct in the left paramedial russell.  Patient was started on Plavix, statins, continued on aspirin.  She was given heparin for DVT prophylaxis in the hospital.  She'll be discharged to rehab with physical therapy occupational therapy and speech evaluation and treatment at that facility.     Pending Results   Unresulted Labs Ordered in the Past 30 Days of this Admission     Date and Time Order Name Status Description    7/24/2017 1648 Urine Culture Preliminary           Physical Exam   Temp:  [97.1  F (36.2  C)-98.2  F (36.8  C)] 97.5  F (36.4  C)  Pulse:  [67] 67  Heart Rate:  [66-73] 73  Resp:  [16-18] 16  BP: (145-183)/(61-75) 161/71  SpO2:  [93 %-97 %] 93 %  Vitals:    07/24/17 2058   Weight: 87.8 kg (193 lb 9 oz)       Constitutional: Patient did not look in any acute distress  CV: Regular  Respiratory: CTA bilaterally  GI: Soft, nontender  Skin: Warm and dry  CNS patient is alert and pleasantly confused her speech is somewhat slurred, right upper extremity power is 4 over 5 upper extremities look normal    The discharge plan was discussed with the patient     Total time on this discharge was 30 minutes.    Shaun Osman

## 2017-07-27 NOTE — PROGRESS NOTES
"SPIRITUAL HEALTH SERVICES  SPIRITUAL ASSESSMENT Progress Note  [facility] [place] [facility] - Med/Surg    PRIMARY FOCUS:     Emotional/spiritual/Mandaeism distress    Support for coping    ILLNESS CIRCUMSTANCES:   Reviewed documentation. Reflective conversation shared with pt, Deanne, and daughter, Leeanna, which integrated elements of jenn narratives.     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  She told me about the device she found for pushing the wheelchair in an easier way - \"almost like a stroller\"    GOALS OF CARE:    Goals of Care - To return to the [facility]    Meaning/Sense-Making - Leeanna stated she didn't know if her Mom would understand a visit and was pleased to hear she engaged.  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    PLAN: No follow up visit planned unless requested by staff or pt.    Rogerio Johnson M.A., [facility]  Staff   [facility]  Office: [phone]  Cell: [phone]  Pager [phone]    "

## 2017-07-27 NOTE — PROGRESS NOTES
Spoke to Dr SENAIT Osman MD re: MRI results from 7/26/17 and Dr Osman did verify that he was aware of results yesterday.

## 2017-07-27 NOTE — PLAN OF CARE
Problem: Goal Outcome Summary  Goal: Goal Outcome Summary  Outcome: No Change  Patient slept most of the night. Repositioned with assist of two. Incontinence of urine. Patient is alert to self. Droop noted on right eye. Strength slightly less on her left side. Speech is understandable. Patient has not attempted to get out of bed. Bed alarm in use.

## 2017-07-28 ENCOUNTER — NURSING HOME VISIT (OUTPATIENT)
Dept: GERIATRICS | Facility: CLINIC | Age: 82
End: 2017-07-28
Payer: MEDICARE

## 2017-07-28 VITALS
TEMPERATURE: 97.2 F | RESPIRATION RATE: 18 BRPM | SYSTOLIC BLOOD PRESSURE: 168 MMHG | DIASTOLIC BLOOD PRESSURE: 96 MMHG | BODY MASS INDEX: 29.76 KG/M2 | HEART RATE: 86 BPM | WEIGHT: 190 LBS

## 2017-07-28 DIAGNOSIS — Z86.73 HISTORY OF CVA (CEREBROVASCULAR ACCIDENT): Primary | ICD-10-CM

## 2017-07-28 DIAGNOSIS — F02.80 LATE ONSET ALZHEIMER'S DISEASE WITHOUT BEHAVIORAL DISTURBANCE (H): ICD-10-CM

## 2017-07-28 DIAGNOSIS — L22 DIAPER DERMATITIS: ICD-10-CM

## 2017-07-28 DIAGNOSIS — N30.00 ACUTE CYSTITIS WITHOUT HEMATURIA: ICD-10-CM

## 2017-07-28 DIAGNOSIS — I69.359 HEMIPLEGIA AFFECTING DOMINANT SIDE, POST-STROKE (H): ICD-10-CM

## 2017-07-28 DIAGNOSIS — G30.1 LATE ONSET ALZHEIMER'S DISEASE WITHOUT BEHAVIORAL DISTURBANCE (H): ICD-10-CM

## 2017-07-28 DIAGNOSIS — E78.5 HYPERLIPIDEMIA LDL GOAL <130: ICD-10-CM

## 2017-07-28 DIAGNOSIS — E03.4 HYPOTHYROIDISM DUE TO ACQUIRED ATROPHY OF THYROID: ICD-10-CM

## 2017-07-28 DIAGNOSIS — I10 HYPERTENSION GOAL BP (BLOOD PRESSURE) < 140/90: ICD-10-CM

## 2017-07-28 LAB
BACTERIA SPEC CULT: ABNORMAL
MICRO REPORT STATUS: ABNORMAL
MICROORGANISM SPEC CULT: ABNORMAL
MICROORGANISM SPEC CULT: ABNORMAL
SPECIMEN SOURCE: ABNORMAL

## 2017-07-28 PROCEDURE — 99207 ZZC CDG-CORRECTLY CODED, REVIEWED AND AGREE: CPT | Performed by: NURSE PRACTITIONER

## 2017-07-28 PROCEDURE — 99310 SBSQ NF CARE HIGH MDM 45: CPT | Performed by: NURSE PRACTITIONER

## 2017-07-28 NOTE — PROGRESS NOTES
[place] [facility]  PRIMARY CARE PROVIDER AND CLINIC:  [facility]   Chief Complaint   Patient presents with     Hospital F/U       HPI:    Deanne Zayas is a 84 year old  (6/19/1933),admitted to the [facility] from Hospital  [facility].  Hospital stay 7/24/2017 through 7/27/2017 after a fall and showing acute CVA symptoms [place] MRI confirmed CVA. + UTI also found. PMH + for dementia - lives at [facility].      Admitted to this facility for  rehab, medical management and nursing care.  HPI information obtained from: facility chart records, facility staff, patient report and [facility] chart review.Current issues:      1. CVA Presented to ER with right-sided weakness and slurred speech.  CT scan done at admission and 24 hours later did not show any acute ischemia. MRI showed acute ischemic infarct in the left paramedial russell.  Patient started on Plavix and statin - will  continue ASA . CVA has resulting R side Hemiplegia with R foot cramps - foot cramps still happening today causing distress in pt.     2.  Acute UTI  gram-negative rods greater than 100,000 colony counts.  Started on ceftriaxone which is changed to us Cefdinir twice daily for a total of 5 days of treatment and Bactrim BID x 7 days.      CODE STATUS/ADVANCE DIRECTIVES DISCUSSION:   DNR / DNI  Patient's living condition: lives in an assisted living facility    ALLERGIES:Donepezil  PAST MEDICAL HISTORY:  has no past medical history on file.  PAST SURGICAL HISTORY:  has a past surgical history that includes Cholecystectomy; surgical history of - (2012 ); and Cystoscopy (N/A, 8/29/2014).  FAMILY HISTORY: family history includes Family History Negative in her father and mother; Obesity in her sister; Unknown/Adopted in her mother.  SOCIAL HISTORY:  reports that she has never smoked. She has never used smokeless tobacco. She reports that she does not drink alcohol or use  "illicit drugs.    Post Discharge Medication Reconciliation Status: discharge medications reconciled, continue medications without change.  Current Outpatient Prescriptions   Medication Sig Dispense Refill     ACETAMINOPHEN PO Take 650 mg by mouth every 6 hours as needed for pain       ROPINIRole HCl (REQUIP PO) Take 25 mg by mouth every evening       MAGNESIUM-OXIDE PO Take 400 mg by mouth 2 times daily Take for 3 days then start Magnesium-Oxide 400mg QD Dx: Leg Cramps       atorvastatin (LIPITOR) 20 MG tablet Take 1 tablet (20 mg) by mouth daily 30 tablet 0     cefdinir (OMNICEF) 300 MG capsule Take 1 capsule (300 mg) by mouth 2 times daily for 2 days 4 capsule 0     clopidogrel (PLAVIX) 75 MG tablet Take 1 tablet (75 mg) by mouth daily 30 tablet 1     sulfamethoxazole-trimethoprim (BACTRIM/SEPTRA) 400-80 MG per tablet Take 1 tablet by mouth 2 times daily 14 tablet 0     aspirin 81 MG chewable tablet Take 1 tablet (81 mg) by mouth daily 31 tablet PRN     senna-docusate (SENNA S) 8.6-50 MG per tablet Take 1 tablet by mouth daily 31 tablet PRN     amLODIPine (NORVASC) 2.5 MG tablet Take 1 tablet (2.5 mg) by mouth daily 31 tablet PRN     lisinopril (PRINIVIL/ZESTRIL) 40 MG tablet Take 1 tablet (40 mg) by mouth daily 31 tablet PRN     cholecalciferol (VITAMIN D3) 1000 UNIT tablet Take 2 tablets (2,000 Units) by mouth daily 62 tablet PRN     nystatin (MYCOSTATIN) 704345 UNIT/GM POWD Apply topically as needed APPLY TOPICALLY TO AFFECTED AREA(S) TID PRN 30 g PRN     ferrous sulfate (IRON) 325 (65 FE) MG tablet Take 1 tablet (325 mg) by mouth daily 31 tablet PRN     levothyroxine (SYNTHROID/LEVOTHROID) 112 MCG tablet Take 1 tablet (112 mcg) by mouth daily 60 tablet 3     cyanocobalamin (VITAMIN B12) 1000 MCG/ML injection Inject 1 mL (1,000 mcg) Subcutaneous every 30 days 1 mL 11     syringe/needle, disp, 25G X 1\" 3 ML MISC USE AS DIRECTED WITH B12 INECTION 1 each 11     escitalopram (LEXAPRO) 10 MG tablet Take 1 tablet (10 " mg) by mouth daily 31 tablet PRN       ROS:  4 point ROS including Respiratory, CV, GI and , other than that noted in the HPI,  is negative    Exam:  BP (!) 168/96  Pulse 86  Temp 97.2  F (36.2  C)  Resp 18  Wt 190 lb (86.2 kg)  BMI 29.76 kg/m2  GENERAL APPEARANCE:  Alert, in no distress with notable R side facial droop  RESP:  respiratory effort and palpation of chest normal, auscultation of lungs clear , no respiratory distress  CV:  Palpation and auscultation of heart done , rate and rhythm reg, no murmur, no  peripheral edema  ABDOMEN:  normal bowel sounds, soft, nontender, no hepatosplenomegaly or other masses  M/S:   Gait and station assist 2 transfer - new 2/2 cva, Digits and nails normal, R foot cramping up with great toe dorsi flex and other toes plantar flex.   SKIN:  Inspection and Palpation of skin and subcutaneous tissue intact  PSYCH:  insight and judgement, memory baseline fair , affect and mood normal      Lab/Diagnostic data:     CBC RESULTS:   Recent Labs   Lab Test  07/25/17   0617  07/24/17   1425   WBC  6.6  5.8   RBC  4.40  3.97   HGB  13.7  12.5   HCT  41.0  37.8   MCV  93  95   MCH  31.1  31.5   MCHC  33.4  33.1   RDW  13.1  13.1   PLT  231  233       Last Basic Metabolic Panel:  Recent Labs   Lab Test  07/27/17   0640  07/25/17   0617   NA  140  138   POTASSIUM  3.7  3.7   CHLORIDE  106  104   RUBIN  9.6  9.1   CO2  26  25   BUN  17  12   CR  1.19*  0.99   GLC  154*  139*       Liver Function Studies -   Recent Labs   Lab Test  07/24/17   1425  10/25/12   0830  08/24/11   PROTTOTAL  6.8   --    --   6.9   ALBUMIN  3.8   --    --   4.2   BILITOTAL  0.5   --    --   1.4*   ALKPHOS  69   --    --   55   AST  16  26   < >  25   ALT  18  22   < >  16   BILIDIRECT   --    --    --   0.1    < > = values in this interval not displayed.       TSH   Date Value Ref Range Status   03/09/2017 4.42 (H) 0.40 - 4.00 mU/L Final   01/26/2017 9.96 (H) 0.40 - 4.00 mU/L Final   ]    Lab Results   Component  Value Date    A1C 5.9 08/21/2012       ASSESSMENT/PLAN:  History of CVA (cerebrovascular accident)/Hemiplegia affecting dominant side, post-stroke (H)  PT/OT/ST starting now  Cont plavix, ASA  Will hold statin x 5 days to see if that is causing the muscle cramps.   Noted dgt believes the cramps started before the statin was started.   Will work up foot cramps with mineral deficiency (mag/ or fe) - not normal Ca -   Will add Mag to see if that helps.   Might need AFO.   Can be caused by CVA and will resolve with time.   Will also try requip given severity of problem and affect of pain on pt now.     Acute cystitis without hematuria  Cont aggressive abx treatment per hospital providers.     Hyperlipidemia LDL goal <130  Cont statin after trial off.     Hypothyroidism due to acquired atrophy of thyroid  Noted TSH swings in past - recommend TSH in 2-3 weeks - don't due now 2/2 ETSS.     Hypertension goal BP (blood pressure) < 140/90  Noted slightly elevated - will cont slightly elevated given increased profusion -   F/u Monday and if still high will malvin poc.    BP Readings from Last 3 Encounters:   07/28/17 (!) 168/96   07/27/17 161/71   01/24/17 145/77     Diaper dermatitis  Hx of sig - yeast + dermatitis - at risk now given abx - monitor - staff today say c/d/i.     Late onset Alzheimer's disease without behavioral disturbance  Goal to rehab back to MCU status.        Orders:  1. Magneseum, BMP and Iron Studies with transferrin levels on 7/31   2. Start Requip tablet 0.25mg PO QPM, Dx: RCS  3. Start Magnesium Oxide 400mg PO BID x3 days then QD. Dx: Leg cramps  4. Hold Atvorstatin x5 days. Dx: Muscle Cramps    Total time spent with patient visit at the skilled nursing facility was 35 min including patient visit, review of past records and phone call to patient contact. Greater than 50% of total time spent with counseling and coordinating care due to medical management of above issues/ discussion of R foot with dgt  and treatment options    Electronically signed by:  YASMINE Torrez CNP

## 2017-07-29 ENCOUNTER — TELEPHONE (OUTPATIENT)
Dept: GERIATRICS | Facility: CLINIC | Age: 82
End: 2017-07-29

## 2017-07-29 NOTE — TELEPHONE ENCOUNTER
Patient with recent CVA, ongoing right foot cramps. Saw NP yesterday - added Mag Oxide, labs for Monday, started requip last night. This AM symptoms recured. Patient miserable per staff.     PLAN  Give Requip 0.25 mg PO daily PRN in addition to HS dose - give dose now.   Warm pack to foot.  Tylenol 1000 mg PO now.   Staff to update provider if not effective.     Electronically signed by YASMINE Hussein, GNP

## 2017-07-30 ENCOUNTER — TELEPHONE (OUTPATIENT)
Dept: GERIATRICS | Facility: CLINIC | Age: 82
End: 2017-07-30

## 2017-07-30 NOTE — TELEPHONE ENCOUNTER
Patient with hemiplegia and spasms in affected foot. Requip helpful but doesn't last long enough    PLAN  Increase requip to 0.5 mg PO HS and 0.25 mg PO daily PRN  Vistaril 25 mg PO TID PRN spasms.     Electronically signed by YASMINE Hussein, GNP

## 2017-07-31 ENCOUNTER — HOSPITAL LABORATORY (OUTPATIENT)
Facility: OTHER | Age: 82
End: 2017-07-31

## 2017-07-31 ENCOUNTER — NURSING HOME VISIT (OUTPATIENT)
Dept: GERIATRICS | Facility: CLINIC | Age: 82
End: 2017-07-31
Payer: MEDICARE

## 2017-07-31 VITALS
HEART RATE: 84 BPM | TEMPERATURE: 97.9 F | WEIGHT: 189 LBS | DIASTOLIC BLOOD PRESSURE: 88 MMHG | BODY MASS INDEX: 29.6 KG/M2 | SYSTOLIC BLOOD PRESSURE: 166 MMHG | RESPIRATION RATE: 16 BRPM

## 2017-07-31 DIAGNOSIS — N30.00 ACUTE CYSTITIS WITHOUT HEMATURIA: ICD-10-CM

## 2017-07-31 DIAGNOSIS — I10 HYPERTENSION GOAL BP (BLOOD PRESSURE) < 140/90: ICD-10-CM

## 2017-07-31 DIAGNOSIS — N28.9 RENAL INSUFFICIENCY: ICD-10-CM

## 2017-07-31 DIAGNOSIS — Z86.73 HISTORY OF CVA (CEREBROVASCULAR ACCIDENT): Primary | ICD-10-CM

## 2017-07-31 DIAGNOSIS — M79.661 PAIN OF RIGHT LOWER LEG: ICD-10-CM

## 2017-07-31 DIAGNOSIS — I69.359 HEMIPLEGIA AFFECTING DOMINANT SIDE, POST-STROKE (H): ICD-10-CM

## 2017-07-31 LAB
ANION GAP SERPL CALCULATED.3IONS-SCNC: 8 MMOL/L (ref 3–14)
BUN SERPL-MCNC: 16 MG/DL (ref 7–30)
CALCIUM SERPL-MCNC: 10.5 MG/DL (ref 8.5–10.1)
CHLORIDE SERPL-SCNC: 103 MMOL/L (ref 94–109)
CO2 SERPL-SCNC: 28 MMOL/L (ref 20–32)
CREAT SERPL-MCNC: 1.37 MG/DL (ref 0.52–1.04)
GFR SERPL CREATININE-BSD FRML MDRD: 37 ML/MIN/1.7M2
GLUCOSE SERPL-MCNC: 138 MG/DL (ref 70–99)
MAGNESIUM SERPL-MCNC: 2.6 MG/DL (ref 1.6–2.3)
POTASSIUM SERPL-SCNC: 4.3 MMOL/L (ref 3.4–5.3)
SODIUM SERPL-SCNC: 139 MMOL/L (ref 133–144)

## 2017-07-31 PROCEDURE — 99309 SBSQ NF CARE MODERATE MDM 30: CPT | Performed by: NURSE PRACTITIONER

## 2017-07-31 RX ORDER — HYDROXYZINE PAMOATE 25 MG/1
25 CAPSULE ORAL 3 TIMES DAILY PRN
COMMUNITY
End: 2017-08-04

## 2017-07-31 NOTE — PROGRESS NOTES
[place] [facility]    Chief Complaint   Patient presents with     RECHECK       HPI:    Deanne Zayas is a 84 year old  (6/19/1933), who is being [facility] today [facility] CVA/UTI hospitalization with subsequent R foot/leg cramps now being biggest problem.    HPI information obtained from: facility chart records, facility staff, patient report and [facility] chart review.     Today's concern is:  History of CVA (cerebrovascular accident)/Hemiplegia affecting dominant side, post-stroke (H)/Pain of right lower leg  CVA confirmed by MRI - started statin and plavix - cont on asa  Improved speech from Friday but leg pain worse - started Acute at [facility] with CVA sx per dgt -   Now getting worse -   W/u included labs (Fe still pending) - but Mag started on 7/28 and f/u labs today elevated,   Ca elevated with noted RI - worsening since hosp. -  Did have X ray for hip/pelvis in hosp - neg for fracture.   Pain reports now from foot from Friday to upper femer/full leg - can visually see muscle cramps.     Acute cystitis without hematuria  On septra from ER - noted start date was 7 days ago  And on cefdinir (now off) - denies symptoms.    Renal insufficiency  Worsening - suspect septra - noted likely causing Ca/Mag elevation today.     GFR Estimate   Date Value Ref Range Status   07/31/2017 37 (L) >60 mL/min/1.7m2 Final     Comment:     Non  GFR Calc   07/27/2017 43 (L) >60 mL/min/1.7m2 Final     Comment:     Non  GFR Calc   07/25/2017 53 (L) >60 mL/min/1.7m2 Final     Comment:     Non  GFR Calc     Hypertension goal BP (blood pressure) < 140/90  F/u today as borderline elevated on Friday - on aCE  supsect bactrim/RI making worse.    REVIEW OF SYSTEMS:  Unobtainable secondary to cognitive impairment or aphasia, but today pt reports R leg pain/muscle cramps.     PMH/PSH reviewed in EPIC today      /88  Pulse 84  Temp  97.9  F (36.6  C)  Resp 16  Wt 189 lb (85.7 kg)  BMI 29.6 kg/m2  GENERAL APPEARANCE:  Alert, in no distress  RESP:  respiratory effort and palpation of chest normal, auscultation of lungs clear , no respiratory distress  CV:  Palpation and auscultation of heart done , rate and rhythm reg, no murmur, no  peripheral edema  ABDOMEN:  normal bowel sounds, soft, nontender, no hepatosplenomegaly or other masses  M/S:   Gait and station R leg weakness - improved R arm strength, Digits and nails normal  SKIN:  Inspection and Palpation of skin and subcutaneous tissue intact  PSYCH:  insight and judgement, memory baseline - poor.  , affect and mood normal    Hospital Laboratory on 07/31/2017   Component Date Value Ref Range Status     Sodium 07/31/2017 139  133 - 144 mmol/L Final     Potassium 07/31/2017 4.3  3.4 - 5.3 mmol/L Final     Chloride 07/31/2017 103  94 - 109 mmol/L Final     Carbon Dioxide 07/31/2017 28  20 - 32 mmol/L Final     Anion Gap 07/31/2017 8  3 - 14 mmol/L Final     Glucose 07/31/2017 138* 70 - 99 mg/dL Final     Urea Nitrogen 07/31/2017 16  7 - 30 mg/dL Final     Creatinine 07/31/2017 1.37* 0.52 - 1.04 mg/dL Final     GFR Estimate 07/31/2017 37* >60 mL/min/1.7m2 Final    Non  GFR Calc     GFR Estimate If Black 07/31/2017 44* >60 mL/min/1.7m2 Final    African American GFR Calc     Calcium 07/31/2017 10.5* 8.5 - 10.1 mg/dL Final     Magnesium 07/31/2017 2.6* 1.6 - 2.3 mg/dL Final     Pending FE studies.     ASSESSMENT/PLAN:  History of CVA (cerebrovascular accident)/Hemiplegia affecting dominant side, post-stroke (H)/Pain of right lower leg  Cont poc with CVA of ASA, plavix, statin, therapy  Cont work on R leg - X ray r/o lower fx - doubt - but eval  Will add [place]  F/u by [facility] provider later this week.     Acute cystitis without hematuria  resoved- suspect septra (end  Date 8/3 ) is over treatment - will DC      Renal insufficiency  Recheck later this week with DC of bactrim - enc  fluids    Hypertension goal BP (blood pressure) < 140/90  Too elevated - but suspect RI driving - will increase CCB and f/u later in the week      Orders:  1. Recheck BMP, Mag on 8/3 Dx: Renal insufficeny   2. D/C magnesium Oxide  3. Increase Norvasc to 5mg PO Qpm First dose today Dx: HTN  4. D/C Septra  5. Start Neurontin 300mg PO QHS Dx: Neuropathy R leg  6. X ray of R leg - r/o injury/fx        Justine Small, APRN CNP

## 2017-08-01 ENCOUNTER — NURSING HOME VISIT (OUTPATIENT)
Dept: GERIATRICS | Facility: CLINIC | Age: 82
End: 2017-08-01
Payer: MEDICARE

## 2017-08-01 VITALS
HEART RATE: 87 BPM | WEIGHT: 189 LBS | RESPIRATION RATE: 20 BRPM | DIASTOLIC BLOOD PRESSURE: 77 MMHG | TEMPERATURE: 98.3 F | BODY MASS INDEX: 29.6 KG/M2 | SYSTOLIC BLOOD PRESSURE: 163 MMHG

## 2017-08-01 DIAGNOSIS — M62.838 MUSCLE SPASM OF RIGHT LOWER EXTREMITY: ICD-10-CM

## 2017-08-01 DIAGNOSIS — N28.9 RENAL INSUFFICIENCY: ICD-10-CM

## 2017-08-01 DIAGNOSIS — I69.359 HEMIPLEGIA AFFECTING DOMINANT SIDE, POST-STROKE (H): Primary | ICD-10-CM

## 2017-08-01 DIAGNOSIS — G30.1 LATE ONSET ALZHEIMER'S DISEASE WITHOUT BEHAVIORAL DISTURBANCE (H): ICD-10-CM

## 2017-08-01 DIAGNOSIS — R53.81 PHYSICAL DECONDITIONING: ICD-10-CM

## 2017-08-01 DIAGNOSIS — F02.80 LATE ONSET ALZHEIMER'S DISEASE WITHOUT BEHAVIORAL DISTURBANCE (H): ICD-10-CM

## 2017-08-01 DIAGNOSIS — I10 HYPERTENSION GOAL BP (BLOOD PRESSURE) < 140/90: ICD-10-CM

## 2017-08-01 DIAGNOSIS — B37.0 ORAL THRUSH: ICD-10-CM

## 2017-08-01 DIAGNOSIS — N30.00 ACUTE CYSTITIS WITHOUT HEMATURIA: ICD-10-CM

## 2017-08-01 LAB
FERRITIN SERPL-MCNC: 151 NG/ML (ref 8–252)
IRON SERPL-MCNC: 54 UG/DL (ref 35–180)

## 2017-08-01 PROCEDURE — 99207 ZZC CDG-CORRECTLY CODED, REVIEWED AND AGREE: CPT | Performed by: FAMILY MEDICINE

## 2017-08-01 PROCEDURE — 99306 1ST NF CARE HIGH MDM 50: CPT | Performed by: FAMILY MEDICINE

## 2017-08-01 NOTE — PROGRESS NOTES
[place] [facility]  PRIMARY CARE PROVIDER AND CLINIC:  [facility]   Chief Complaint   Patient presents with     Hospital F/U       HPI:    Deanne Zayas is a 84 year old  (6/19/1933),admitted to the [facility] from [facility].  Hospital stay 7/24/17 through 7/27/17.  Admitted to this facility for  rehab, medical management and nursing care.  HPI information obtained from: facility chart records, facility staff, patient report and [facility] chart review.      Current issues are:      Hemiplegia affecting dominant side, post-stroke (H)  Pain of right lower leg  - Pt reports pain in right leg, in the thigh area, severe at times, sharp. Reports pain occurs more at night. Cannot recall she had a stroke.   - cannot recall if attends a therapy.  - RN reports right leg spasms then left side becomes restless, Requip helps to take the edge of the spasm but the pain does not go away and tylenol has not been helping much.   - RN reports diet is pureed diet and changed from thick nectar to thin liquid, and no issue so far .  - OT reports has been working with right arm regarding outreaching.     Renal insufficiency  - RN reports Pt is UI, and has been trying to push oral fluid.       Late onset Alzheimer's disease without behavioral disturbance  - RN reports Pt is forgetful but pleasant and no behavior issue.       CODE STATUS/ADVANCE DIRECTIVES DISCUSSION:   DNR / DNI  Patient's living condition: at next door AL (memory unit)    ALLERGIES:Donepezil  PAST MEDICAL HISTORY:  has no past medical history on file.  PAST SURGICAL HISTORY:  has a past surgical history that includes Cholecystectomy; surgical history of - (2012 ); and Cystoscopy (N/A, 8/29/2014).  FAMILY HISTORY: family history includes Family History Negative in her father and mother; Obesity in her sister; Unknown/Adopted in her mother.  SOCIAL HISTORY:  reports that she has never smoked.  "She has never used smokeless tobacco. She reports that she does not drink alcohol or use illicit drugs.    Post Discharge Medication Reconciliation Status: discharge medications reconciled and changed, per note/orders (see AVS).  Current Outpatient Prescriptions   Medication Sig Dispense Refill     miconazole (MICATIN) 2 % AERP powder Apply topically 3 times daily as needed       hydrOXYzine (VISTARIL) 25 MG capsule Take 25 mg by mouth 3 times daily as needed for itching       Gabapentin (NEURONTIN PO) Take 300 mg by mouth At Bedtime       ROPINIRole HCl (REQUIP PO) Take 0.5 mg by mouth every evening And give .0.25mg PRN       atorvastatin (LIPITOR) 20 MG tablet Take 1 tablet (20 mg) by mouth daily 30 tablet 0     clopidogrel (PLAVIX) 75 MG tablet Take 1 tablet (75 mg) by mouth daily 30 tablet 1     aspirin 81 MG chewable tablet Take 1 tablet (81 mg) by mouth daily 31 tablet PRN     senna-docusate (SENNA S) 8.6-50 MG per tablet Take 1 tablet by mouth daily 31 tablet PRN     amLODIPine (NORVASC) 2.5 MG tablet Take 1 tablet (2.5 mg) by mouth daily 31 tablet PRN     lisinopril (PRINIVIL/ZESTRIL) 40 MG tablet Take 1 tablet (40 mg) by mouth daily 31 tablet PRN     cholecalciferol (VITAMIN D3) 1000 UNIT tablet Take 2 tablets (2,000 Units) by mouth daily 62 tablet PRN     ferrous sulfate (IRON) 325 (65 FE) MG tablet Take 1 tablet (325 mg) by mouth daily 31 tablet PRN     levothyroxine (SYNTHROID/LEVOTHROID) 112 MCG tablet Take 1 tablet (112 mcg) by mouth daily 60 tablet 3     cyanocobalamin (VITAMIN B12) 1000 MCG/ML injection Inject 1 mL (1,000 mcg) Subcutaneous every 30 days 1 mL 11     syringe/needle, disp, 25G X 1\" 3 ML MISC USE AS DIRECTED WITH B12 INECTION 1 each 11     escitalopram (LEXAPRO) 10 MG tablet Take 1 tablet (10 mg) by mouth daily 31 tablet PRN       ROS:   Limited due to the Resident's dementia, otherwise negative except as in the HPI      Exam:  There were no vitals taken for this visit.  GENERAL " APPEARANCE:  Alert, in no distress, cooperative  ENT:  edentulous  with denture plateboth level. Dry oral mucosa. Whytish-yellowish patch over tounge surface. , .  EYES:  EOM, conjunctivae, lids, pupils and irises normal  NECK:  No adenopathy,masses or thyromegaly  RESP:  respiratory effort and palpation of chest normal, lungs clear to auscultation , no respiratory distress  CV:  Palpation and auscultation of heart done , regular rate and rhythm, no murmur, rub, or gallop, no edema  ABDOMEN:  normal bowel sounds, soft, nontender, no hepatosplenomegaly or other masses  M/S:   Gait and station abnormal Uses a walker and a WC. Rt leg rigidity with RLL movement at knee and dorsiflexion  SKIN:  dry and thin skin.   NEURO:   Cranial nerves 2-12 are normal tested and grossly at patient's baseline, Ms Strenght: 2/5 RLE, 4/5 RUE. DTR 3+ knee. , no purposeful movement in upper and lower extremities  PSYCH:  oriented to person only. , insight and judgement impaired, memory impaired     Lab/Diagnostic data:   CBC RESULTS:   Recent Labs   Lab Test  07/25/17   0617  07/24/17   1425   WBC  6.6  5.8   RBC  4.40  3.97   HGB  13.7  12.5   HCT  41.0  37.8   MCV  93  95   MCH  31.1  31.5   MCHC  33.4  33.1   RDW  13.1  13.1   PLT  231  233       Last Basic Metabolic Panel:  Recent Labs   Lab Test  07/31/17   0810  07/27/17   0640   NA  139  140   POTASSIUM  4.3  3.7   CHLORIDE  103  106   RUBIN  10.5*  9.6   CO2  28  26   BUN  16  17   CR  1.37*  1.19*   GLC  138*  154*       Liver Function Studies -   Recent Labs   Lab Test  07/24/17   1425  10/25/12   0830  08/24/11   PROTTOTAL  6.8   --    --   6.9   ALBUMIN  3.8   --    --   4.2   BILITOTAL  0.5   --    --   1.4*   ALKPHOS  69   --    --   55   AST  16  26   < >  25   ALT  18  22   < >  16   BILIDIRECT   --    --    --   0.1    < > = values in this interval not displayed.       TSH   Date Value Ref Range Status   03/09/2017 4.42 (H) 0.40 - 4.00 mU/L Final   01/26/2017 9.96 (H) 0.40 -  4.00 mU/L Final   ]    Lab Results   Component Value Date    A1C 5.9 08/21/2012       ASSESSMENT/PLAN:  Hemiplegia affecting dominant side, post-stroke (H)  - RLE very affected, RUE has 4/5 strength and expect to recover further.   - continue therapy.  - was on ASA 81 mg, Plavix added. Keep in mind risk of bleeding especially in elderly and after ischemic stroke. Given limited life expectancy, further discussion with PCP/neuro is warranted.    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 Will dc all  - Start Baclofen 5 mg tid, reassess in three days and may increase dose. Weight risks vs benefit  - Continue therapy. Electrical stimulation may help.   - If this issue continues/worsen, Botox injection or phenol local injection to affected muscles might help, to be done by Pain Meds specialist.     Renal insufficiency  -Most likely 2/2 Bactrim- better avoided in elderly with dementia (known for causing delirium)  - Stage 3b from stage 3a  - encourage oral fluid intake.   - repeat in one week    Oral thrush  - likely 2/2 to oral ABX.   - start Nystatin 500 K units, swish and swallow for 14 days.     Hypertension goal BP (blood pressure) < 140/90     BP Readings from Last 3 Encounters:   08/01/17 163/77   07/31/17 166/88   07/28/17 (!) 168/96   - on lisinopril and amlodipine  - Keep SBP around 145 mmHg    Physical deconditioning  - some improvement with therapy.     Acute cystitis without hematuria  - resolved. Completed cipro and bactrim (dc two days prior, had total ? 5 days).     Late onset Alzheimer's disease without behavioral disturbance  - Continue to anticipate needs. Chronic condition, ongoing decline expected.   -  Continue to provide redirection and reassurance as needed. Maintain safe living situation with goals focused on comfort.         Orders:  - See above, otherwise, continue the rest of the current POC.     Electronically signed  by:  Elisa Antoine MD

## 2017-08-03 ENCOUNTER — HOSPITAL LABORATORY (OUTPATIENT)
Facility: OTHER | Age: 82
End: 2017-08-03

## 2017-08-03 LAB
ANION GAP SERPL CALCULATED.3IONS-SCNC: 9 MMOL/L (ref 3–14)
BUN SERPL-MCNC: 27 MG/DL (ref 7–30)
CALCIUM SERPL-MCNC: 10.2 MG/DL (ref 8.5–10.1)
CHLORIDE SERPL-SCNC: 103 MMOL/L (ref 94–109)
CO2 SERPL-SCNC: 25 MMOL/L (ref 20–32)
CREAT SERPL-MCNC: 1.3 MG/DL (ref 0.52–1.04)
GFR SERPL CREATININE-BSD FRML MDRD: 39 ML/MIN/1.7M2
GLUCOSE SERPL-MCNC: 159 MG/DL (ref 70–99)
MAGNESIUM SERPL-MCNC: 2.4 MG/DL (ref 1.6–2.3)
POTASSIUM SERPL-SCNC: 3.8 MMOL/L (ref 3.4–5.3)
SODIUM SERPL-SCNC: 137 MMOL/L (ref 133–144)

## 2017-08-04 ENCOUNTER — NURSING HOME VISIT (OUTPATIENT)
Dept: GERIATRICS | Facility: CLINIC | Age: 82
End: 2017-08-04
Payer: MEDICARE

## 2017-08-04 VITALS
SYSTOLIC BLOOD PRESSURE: 169 MMHG | RESPIRATION RATE: 16 BRPM | TEMPERATURE: 98.9 F | DIASTOLIC BLOOD PRESSURE: 75 MMHG | BODY MASS INDEX: 29.6 KG/M2 | WEIGHT: 189 LBS | HEART RATE: 71 BPM

## 2017-08-04 DIAGNOSIS — M25.551 HIP PAIN, RIGHT: ICD-10-CM

## 2017-08-04 DIAGNOSIS — M62.838 MUSCLE SPASM OF RIGHT LOWER EXTREMITY: Primary | ICD-10-CM

## 2017-08-04 DIAGNOSIS — R53.81 PHYSICAL DECONDITIONING: ICD-10-CM

## 2017-08-04 PROCEDURE — 99309 SBSQ NF CARE MODERATE MDM 30: CPT | Performed by: NURSE PRACTITIONER

## 2017-08-04 RX ORDER — NYSTATIN 100000/ML
500000 SUSPENSION, ORAL (FINAL DOSE FORM) ORAL 4 TIMES DAILY
COMMUNITY
End: 2017-08-22

## 2017-08-04 RX ORDER — BACLOFEN 10 MG/1
10 TABLET ORAL 3 TIMES DAILY
Qty: 90 TABLET | Refills: 1
Start: 2017-08-04 | End: 2017-08-04 | Stop reason: SINTOL

## 2017-08-04 NOTE — PROGRESS NOTES
[place] [facility]    Chief Complaint   Patient presents with     RECHECK       HPI:    Deanne Zayas is a 84 year old  (6/19/1933), who is being seen today for an episodic care visit at [facility].  HPI information obtained from: facility chart records, facility staff, patient report and [facility] chart review.    Today's concern is:  Muscle spasm of right lower extremity  Hip pain, right  Physical deconditioning  -Continues to complain of right hip pain. A hematoma has developed on right hip- onset Tuesday 8/1/17. Has had x-ray x2 neg for fracture.   -Does better in therapies if she naps twice a day.  -Started baclofen 5 mg tid on 8/1/17 with some improvement of pain. However, unable to wake this afternoon. Appears weaker on right affected side.   -Vital signs were stable.     ALLERGIES: Donepezil  Past Medical, Surgical, Family and Social History reviewed and updated in [facility].    Current Outpatient Prescriptions   Medication Sig Dispense Refill     ACETAMINOPHEN PO Take 1,000 mg by mouth 3 times daily       nystatin (MYCOSTATIN) 643843 UNIT/ML suspension Take 500,000 Units by mouth 4 times daily       AMLODIPINE BESYLATE PO Take 5 mg by mouth daily       miconazole (MICATIN) 2 % AERP powder Apply topically 3 times daily as needed       atorvastatin (LIPITOR) 20 MG tablet Take 1 tablet (20 mg) by mouth daily 30 tablet 0     clopidogrel (PLAVIX) 75 MG tablet Take 1 tablet (75 mg) by mouth daily 30 tablet 1     aspirin 81 MG chewable tablet Take 1 tablet (81 mg) by mouth daily 31 tablet PRN     senna-docusate (SENNA S) 8.6-50 MG per tablet Take 1 tablet by mouth daily 31 tablet PRN     lisinopril (PRINIVIL/ZESTRIL) 40 MG tablet Take 1 tablet (40 mg) by mouth daily 31 tablet PRN     cholecalciferol (VITAMIN D3) 1000 UNIT tablet Take 2 tablets (2,000 Units) by mouth daily 62 tablet PRN     ferrous sulfate (IRON) 325 (65 FE) MG tablet Take 1 tablet (325 mg) by mouth daily 31 tablet PRN      "levothyroxine (SYNTHROID/LEVOTHROID) 112 MCG tablet Take 1 tablet (112 mcg) by mouth daily 60 tablet 3     cyanocobalamin (VITAMIN B12) 1000 MCG/ML injection Inject 1 mL (1,000 mcg) Subcutaneous every 30 days 1 mL 11     syringe/needle, disp, 25G X 1\" 3 ML MISC USE AS DIRECTED WITH B12 INECTION 1 each 11     escitalopram (LEXAPRO) 10 MG tablet Take 1 tablet (10 mg) by mouth daily 31 tablet PRN     ROPINIRole HCl (REQUIP PO) Take 0.5 mg by mouth every evening And give .0.25mg PRN       Medications reviewed:  Medications reconciled to facility chart and changes were made to reflect current medications as identified as above med list. Below are the changes that were made:   Medications stopped since last EPIC medication reconciliation:   Medications Discontinued During This Encounter   Medication Reason     Gabapentin (NEURONTIN PO) Medication Reconciliation Clean Up     hydrOXYzine (VISTARIL) 25 MG capsule Medication Reconciliation Clean Up       Medications started since last [facility] medication reconciliation:  Orders Placed This Encounter   Medications     ACETAMINOPHEN PO     Sig: Take 1,000 mg by mouth 3 times daily     nystatin (MYCOSTATIN) 134209 UNIT/ML suspension     Sig: Take 500,000 Units by mouth 4 times daily         REVIEW OF SYSTEMS:  4 point ROS including Respiratory, CV, GI and , other than that noted in the HPI,  is negative    Physical Exam:  /75  Pulse 71  Temp 98.9  F (37.2  C)  Resp 16  Wt 189 lb (85.7 kg)  BMI 29.6 kg/m2  GENERAL APPEARANCE:  Alert  RESP:  respiratory effort and palpation of chest normal, lungs clear to auscultation   CV:  Palpation and auscultation of heart done , regular rate and rhythm, no murmur, rub, or gallop  SKIN:  diffuse hematoma right hip    Recent Labs:   Last Basic Metabolic Panel:  Lab Results   Component Value Date     08/03/2017      Lab Results   Component Value Date    POTASSIUM 3.8 08/03/2017     Lab Results   Component Value Date    CHLORIDE " 103 08/03/2017     Lab Results   Component Value Date    RUBIN 10.2 08/03/2017     Lab Results   Component Value Date    CO2 25 08/03/2017     Lab Results   Component Value Date    BUN 27 08/03/2017     Lab Results   Component Value Date    CR 1.30 08/03/2017     Lab Results   Component Value Date     08/03/2017     Lab Results   Component Value Date    WBC 6.6 07/25/2017     Lab Results   Component Value Date    RBC 4.40 07/25/2017     Lab Results   Component Value Date    HGB 13.7 07/25/2017     Lab Results   Component Value Date    HCT 41.0 07/25/2017     Lab Results   Component Value Date    MCV 93 07/25/2017     Lab Results   Component Value Date    MCH 31.1 07/25/2017     Lab Results   Component Value Date    MCHC 33.4 07/25/2017     Lab Results   Component Value Date    RDW 13.1 07/25/2017     Lab Results   Component Value Date     07/25/2017         Assessment/Plan:  (M62.838) Muscle spasm of right lower extremity  (primary encounter diagnosis)  (M25.551) Hip pain, right  (R53.81) Physical deconditioning  -Discontinue Baclofen d/t changes in CNS and increase weakness   -Neuros otherwise intact. Will follow closely.   -Continue acetaminophen 1000 mg PO TID  -Monitor pain x2 hours while awake using FACES scale.     Orders:  See above.     Total time spent with patient visit at the skilled nursing facility was 25 min including patient visit and review of past records. Greater than 50% of total time spent with counseling and coordinating care due to complexity of care    Electronically signed by  YASMINE Currie CNP  [facility]

## 2017-08-07 ENCOUNTER — NURSING HOME VISIT (OUTPATIENT)
Dept: GERIATRICS | Facility: CLINIC | Age: 82
End: 2017-08-07
Payer: MEDICARE

## 2017-08-07 VITALS
DIASTOLIC BLOOD PRESSURE: 71 MMHG | WEIGHT: 186 LBS | BODY MASS INDEX: 29.13 KG/M2 | HEART RATE: 78 BPM | TEMPERATURE: 97 F | SYSTOLIC BLOOD PRESSURE: 116 MMHG | RESPIRATION RATE: 20 BRPM

## 2017-08-07 DIAGNOSIS — R53.81 PHYSICAL DECONDITIONING: ICD-10-CM

## 2017-08-07 DIAGNOSIS — M62.838 MUSCLE SPASM OF RIGHT LOWER EXTREMITY: Primary | ICD-10-CM

## 2017-08-07 PROCEDURE — 99308 SBSQ NF CARE LOW MDM 20: CPT | Performed by: NURSE PRACTITIONER

## 2017-08-07 NOTE — PROGRESS NOTES
[place] [facility]    Chief Complaint   Patient presents with     RECHECK       HPI:    Deanne Zayas is a 84 year old  (6/19/1933), who is being seen today for an episodic [facility].  HPI information obtained from: facility chart records, facility staff, patient report and [facility] chart review.    Today's concern is:  Muscle spasm of right lower extremity  -Improving. No pain noted at today's visit.   -Currently taking scheduled acetaminophen 1000 mg PO TID    Physical deconditioning  -Making progress with physical and occupational therapy      ALLERGIES: Donepezil  Past Medical, Surgical, Family and Social History reviewed and updated in [facility].    Current Outpatient Prescriptions   Medication Sig Dispense Refill     Menthol, Topical Analgesic, (BIOFREEZE) 4 % GEL Externally apply topically 4 times daily as needed       ACETAMINOPHEN PO Take 1,000 mg by mouth 3 times daily       nystatin (MYCOSTATIN) 001896 UNIT/ML suspension Take 500,000 Units by mouth 4 times daily       AMLODIPINE BESYLATE PO Take 5 mg by mouth daily       miconazole (MICATIN) 2 % AERP powder Apply topically 3 times daily as needed       atorvastatin (LIPITOR) 20 MG tablet Take 1 tablet (20 mg) by mouth daily 30 tablet 0     clopidogrel (PLAVIX) 75 MG tablet Take 1 tablet (75 mg) by mouth daily 30 tablet 1     aspirin 81 MG chewable tablet Take 1 tablet (81 mg) by mouth daily 31 tablet PRN     senna-docusate (SENNA S) 8.6-50 MG per tablet Take 1 tablet by mouth daily 31 tablet PRN     lisinopril (PRINIVIL/ZESTRIL) 40 MG tablet Take 1 tablet (40 mg) by mouth daily 31 tablet PRN     cholecalciferol (VITAMIN D3) 1000 UNIT tablet Take 2 tablets (2,000 Units) by mouth daily 62 tablet PRN     ferrous sulfate (IRON) 325 (65 FE) MG tablet Take 1 tablet (325 mg) by mouth daily 31 tablet PRN     levothyroxine (SYNTHROID/LEVOTHROID) 112 MCG tablet Take 1 tablet (112 mcg) by mouth daily 60 tablet 3      "cyanocobalamin (VITAMIN B12) 1000 MCG/ML injection Inject 1 mL (1,000 mcg) Subcutaneous every 30 days 1 mL 11     syringe/needle, disp, 25G X 1\" 3 ML MISC USE AS DIRECTED WITH B12 INECTION 1 each 11     escitalopram (LEXAPRO) 10 MG tablet Take 1 tablet (10 mg) by mouth daily 31 tablet PRN     Medications reviewed:  Medications reconciled to facility chart and changes were made to reflect current medications as identified as above med list. Below are the changes that were made:   Medications stopped since last EPIC medication reconciliation:   Medications Discontinued During This Encounter   Medication Reason     ROPINIRole HCl (REQUIP PO) Medication Reconciliation Clean Up       Medications started since last [facility] medication reconciliation:  Orders Placed This Encounter   Medications     Menthol, Topical Analgesic, (BIOFREEZE) 4 % GEL     Sig: Externally apply topically 4 times daily as needed         REVIEW OF SYSTEMS:  4 point ROS including Respiratory, CV, GI and , other than that noted in the HPI,  is negative    Physical Exam:  /71  Pulse 78  Temp 97  F (36.1  C)  Resp 20  Wt 186 lb (84.4 kg)  BMI 29.13 kg/m2  GENERAL APPEARANCE:  Alert, in no distress  RESP:  respiratory effort and palpation of chest normal, lungs clear to auscultation   CV:  Palpation and auscultation of heart done , regular rate and rhythm, no murmur, rub, or gallop  M/S:   Gait and station normal  Digits and nails normal  SKIN:  Inspection of skin and subcutaneous tissue baseline, Palpation of skin and subcutaneous tissue baseline  NEURO:   Cranial nerves 2-12 are normal tested and grossly at patient's baseline  PSYCH:  memory impaired , affect and mood normal    Recent Labs:    Last Basic Metabolic Panel:  Lab Results   Component Value Date     08/03/2017      Lab Results   Component Value Date    POTASSIUM 3.8 08/03/2017     Lab Results   Component Value Date    CHLORIDE 103 08/03/2017     Lab Results   Component Value " Date    RUBIN 10.2 08/03/2017     Lab Results   Component Value Date    CO2 25 08/03/2017     Lab Results   Component Value Date    BUN 27 08/03/2017     Lab Results   Component Value Date    CR 1.30 08/03/2017     Lab Results   Component Value Date     08/03/2017     Lab Results   Component Value Date    WBC 6.6 07/25/2017     Lab Results   Component Value Date    RBC 4.40 07/25/2017     Lab Results   Component Value Date    HGB 13.7 07/25/2017     Lab Results   Component Value Date    HCT 41.0 07/25/2017     Lab Results   Component Value Date    MCV 93 07/25/2017     Lab Results   Component Value Date    MCH 31.1 07/25/2017     Lab Results   Component Value Date    MCHC 33.4 07/25/2017     Lab Results   Component Value Date    RDW 13.1 07/25/2017     Lab Results   Component Value Date     07/25/2017         Assessment/Plan:  (M62.838) Muscle spasm of right lower extremity  (primary encounter diagnosis)  -Continue tylenol as ordered. Improved since last visit.     (R53.81) Physical deconditioning  -Continue working with OT/PT    Orders:  The current medical regimen is effective; continue present plan and medications.    Total time spent with patient visit at the skilled nursing facility was 25 min including patient visit and review of past records. Greater than 50% of total time spent with counseling and coordinating care due to complexity of care    Electronically signed by  YASMINE Currie [facility]

## 2017-08-09 ENCOUNTER — HOSPITAL LABORATORY (OUTPATIENT)
Facility: OTHER | Age: 82
End: 2017-08-09

## 2017-08-09 LAB
ANION GAP SERPL CALCULATED.3IONS-SCNC: 7 MMOL/L (ref 3–14)
BUN SERPL-MCNC: 32 MG/DL (ref 7–30)
CALCIUM SERPL-MCNC: 10.3 MG/DL (ref 8.5–10.1)
CHLORIDE SERPL-SCNC: 104 MMOL/L (ref 94–109)
CO2 SERPL-SCNC: 27 MMOL/L (ref 20–32)
CREAT SERPL-MCNC: 1.06 MG/DL (ref 0.52–1.04)
GFR SERPL CREATININE-BSD FRML MDRD: 49 ML/MIN/1.7M2
GLUCOSE SERPL-MCNC: 140 MG/DL (ref 70–99)
MAGNESIUM SERPL-MCNC: 2.2 MG/DL (ref 1.6–2.3)
POTASSIUM SERPL-SCNC: 4.1 MMOL/L (ref 3.4–5.3)
SODIUM SERPL-SCNC: 138 MMOL/L (ref 133–144)

## 2017-08-10 ENCOUNTER — NURSING HOME VISIT (OUTPATIENT)
Dept: GERIATRICS | Facility: CLINIC | Age: 82
End: 2017-08-10
Payer: MEDICARE

## 2017-08-10 VITALS
TEMPERATURE: 98 F | WEIGHT: 186 LBS | SYSTOLIC BLOOD PRESSURE: 149 MMHG | HEART RATE: 77 BPM | BODY MASS INDEX: 29.13 KG/M2 | RESPIRATION RATE: 18 BRPM | DIASTOLIC BLOOD PRESSURE: 68 MMHG

## 2017-08-10 DIAGNOSIS — R52 PAIN: Primary | ICD-10-CM

## 2017-08-10 PROCEDURE — 99308 SBSQ NF CARE LOW MDM 20: CPT | Performed by: NURSE PRACTITIONER

## 2017-08-10 NOTE — PROGRESS NOTES
"[place] [facility]    Chief Complaint   Patient presents with     RECHECK       HPI:    Deanne Zayas is a 84 year old  (6/19/1933), who is being seen today for an episodic care visit at [facility].  HPI information obtained from: facility chart records, facility staff, patient report and [facility] chart review.    Today's concern is:  Pain  -Pain improved with time. Rates 2/10 on FACES pain scale.       ALLERGIES: Donepezil  Past Medical, Surgical, Family and Social History reviewed and updated in [facility].    Current Outpatient Prescriptions   Medication Sig Dispense Refill     Menthol, Topical Analgesic, (BIOFREEZE) 4 % GEL Externally apply topically 4 times daily as needed       ACETAMINOPHEN PO Take 1,000 mg by mouth 3 times daily       nystatin (MYCOSTATIN) 318911 UNIT/ML suspension Take 500,000 Units by mouth 4 times daily       AMLODIPINE BESYLATE PO Take 5 mg by mouth daily       miconazole (MICATIN) 2 % AERP powder Apply topically 3 times daily as needed       atorvastatin (LIPITOR) 20 MG tablet Take 1 tablet (20 mg) by mouth daily 30 tablet 0     clopidogrel (PLAVIX) 75 MG tablet Take 1 tablet (75 mg) by mouth daily 30 tablet 1     aspirin 81 MG chewable tablet Take 1 tablet (81 mg) by mouth daily 31 tablet PRN     senna-docusate (SENNA S) 8.6-50 MG per tablet Take 1 tablet by mouth daily 31 tablet PRN     lisinopril (PRINIVIL/ZESTRIL) 40 MG tablet Take 1 tablet (40 mg) by mouth daily 31 tablet PRN     cholecalciferol (VITAMIN D3) 1000 UNIT tablet Take 2 tablets (2,000 Units) by mouth daily 62 tablet PRN     ferrous sulfate (IRON) 325 (65 FE) MG tablet Take 1 tablet (325 mg) by mouth daily 31 tablet PRN     levothyroxine (SYNTHROID/LEVOTHROID) 112 MCG tablet Take 1 tablet (112 mcg) by mouth daily 60 tablet 3     cyanocobalamin (VITAMIN B12) 1000 MCG/ML injection Inject 1 mL (1,000 mcg) Subcutaneous every 30 days 1 mL 11     syringe/needle, disp, 25G X 1\" 3 ML MISC USE AS DIRECTED " WITH B12 INECTION 1 each 11     escitalopram (LEXAPRO) 10 MG tablet Take 1 tablet (10 mg) by mouth daily 31 tablet PRN     Medications reviewed:  Medications reconciled to facility chart and changes were made to reflect current medications as identified as above med list. Below are the changes that were made:   Medications stopped since last EPIC medication reconciliation:   There are no discontinued medications.    Medications started since last [facility] medication reconciliation:  No orders of the defined types were placed in this encounter.        REVIEW OF SYSTEMS:  4 point ROS including Respiratory, CV, GI and , other than that noted in the HPI,  is negative    Physical Exam:  /68  Pulse 77  Temp 98  F (36.7  C)  Resp 18  Wt 186 lb (84.4 kg)  BMI 29.13 kg/m2  GENERAL APPEARANCE:  Alert, in no distress  RESP:  respiratory effort and palpation of chest normal  CV:  Palpation and auscultation of heart done , regular rate and rhythm, no murmur, rub, or gallop  PSYCH:  memory impaired , affect and mood normal    Recent Labs:    Last Basic Metabolic Panel:  Lab Results   Component Value Date     08/09/2017      Lab Results   Component Value Date    POTASSIUM 4.1 08/09/2017     Lab Results   Component Value Date    CHLORIDE 104 08/09/2017     Lab Results   Component Value Date    RUBIN 10.3 08/09/2017     Lab Results   Component Value Date    CO2 27 08/09/2017     Lab Results   Component Value Date    BUN 32 08/09/2017     Lab Results   Component Value Date    CR 1.06 08/09/2017     Lab Results   Component Value Date     08/09/2017     Lab Results   Component Value Date    WBC 6.6 07/25/2017     Lab Results   Component Value Date    RBC 4.40 07/25/2017     Lab Results   Component Value Date    HGB 13.7 07/25/2017     Lab Results   Component Value Date    HCT 41.0 07/25/2017     Lab Results   Component Value Date    MCV 93 07/25/2017     Lab Results   Component Value Date    MCH 31.1 07/25/2017      Lab Results   Component Value [facility]    [facility]     Lab Results   Component Value Date    RDW 13.1 07/25/2017     Lab Results   Component Value Date     07/25/2017         Assessment/Plan:  (R52) Pain  (primary encounter diagnosis)  -Improved with time. Continue to monitor and notify NP with changes.   -Continue tylenol as ordered.   -Discontinue q2 hours pain monitoring.     Orders:  The current medical regimen is effective; continue present plan and medications.         Total time spent with patient visit at the skilled nursing facility was 15 min including patient visit and review of past records. Greater than 50% of total time spent with counseling and coordinating care due to complexity of care    Electronically signed by  YASMINE Currie [facility]

## 2017-08-22 ENCOUNTER — NURSING HOME VISIT (OUTPATIENT)
Dept: GERIATRICS | Facility: CLINIC | Age: 82
End: 2017-08-22
Payer: MEDICARE

## 2017-08-22 VITALS
SYSTOLIC BLOOD PRESSURE: 134 MMHG | HEART RATE: 89 BPM | RESPIRATION RATE: 18 BRPM | WEIGHT: 183 LBS | OXYGEN SATURATION: 98 % | BODY MASS INDEX: 28.66 KG/M2 | DIASTOLIC BLOOD PRESSURE: 77 MMHG | TEMPERATURE: 98.6 F

## 2017-08-22 DIAGNOSIS — I69.359 HEMIPLEGIA AFFECTING DOMINANT SIDE, POST-STROKE (H): ICD-10-CM

## 2017-08-22 DIAGNOSIS — R52 PAIN: Primary | ICD-10-CM

## 2017-08-22 DIAGNOSIS — R53.81 PHYSICAL DECONDITIONING: ICD-10-CM

## 2017-08-22 DIAGNOSIS — I10 HYPERTENSION GOAL BP (BLOOD PRESSURE) < 140/90: ICD-10-CM

## 2017-08-22 PROCEDURE — 99309 SBSQ NF CARE MODERATE MDM 30: CPT | Performed by: NURSE PRACTITIONER

## 2017-08-22 RX ORDER — BISACODYL 10 MG
10 SUPPOSITORY, RECTAL RECTAL DAILY PRN
COMMUNITY
End: 2018-09-04

## 2017-08-22 RX ORDER — POLYETHYLENE GLYCOL 3350 17 G/17G
17 POWDER, FOR SOLUTION ORAL DAILY PRN
COMMUNITY
End: 2018-09-04

## 2017-08-22 NOTE — PROGRESS NOTES
[place] [facility]    Chief Complaint   Patient presents with     Nursing Home Acute       HPI:    Deanne Zayas is a 84 year old  (6/19/1933), who is being seen today for an [facility] visit [facility].  HPI information obtained from: facility chart records, facility staff, patient report and [facility] chart review.    Today's concern is:  Pain  Was recently started on scheduled tylenol for c/o of foot cramping and hip pain. She was seen during her PT session today and denies any pain.     Hemiplegia affecting dominant side, post-stroke (H)  Physical deconditioning  She is currently on ASA, plavix, lipitor. She is working with PT and OT in TCU. Did have issue with garsanjivd specch, but this was clear on exam today. D    Hypertension goal BP (blood pressure) < 140/90  Did have some elevated BP on admission to TCU. She is currently on amlodipine, lisinopril, ASA. Denies any chest pain, lightheadedness, dizziness, headaches or SOB.    Last 3 BPs:134/77, 133/80, 151/80.  Admission Weight: 190lbs  Current Weight: 183lbs      ALLERGIES: Donepezil  Past Medical, Surgical, Family and Social History reviewed and updated in [facility].    Current Outpatient Prescriptions   Medication Sig Dispense Refill     bisacodyl (DULCOLAX) 10 MG Suppository Place 10 mg rectally daily as needed for constipation       polyethylene glycol (MIRALAX/GLYCOLAX) powder Take 17 g by mouth daily as needed for constipation       Menthol, Topical Analgesic, (BIOFREEZE) 4 % GEL Externally apply topically 4 times daily as needed       ACETAMINOPHEN PO Take 1,000 mg by mouth 3 times daily       AMLODIPINE BESYLATE PO Take 5 mg by mouth daily       miconazole (MICATIN) 2 % AERP powder Apply topically 3 times daily as needed       atorvastatin (LIPITOR) 20 MG tablet Take 1 tablet (20 mg) by mouth daily 30 tablet 0     clopidogrel (PLAVIX) 75 MG tablet Take 1 tablet (75 mg) by mouth daily 30 tablet 1     aspirin 81 MG chewable  "tablet Take 1 tablet (81 mg) by mouth daily 31 tablet PRN     senna-docusate (SENNA S) 8.6-50 MG per tablet Take 1 tablet by mouth daily 31 tablet PRN     lisinopril (PRINIVIL/ZESTRIL) 40 MG tablet Take 1 tablet (40 mg) by mouth daily 31 tablet PRN     cholecalciferol (VITAMIN D3) 1000 UNIT tablet Take 2 tablets (2,000 Units) by mouth daily 62 tablet PRN     ferrous sulfate (IRON) 325 (65 FE) MG tablet Take 1 tablet (325 mg) by mouth daily 31 tablet PRN     levothyroxine (SYNTHROID/LEVOTHROID) 112 MCG tablet Take 1 tablet (112 mcg) by mouth daily 60 tablet 3     cyanocobalamin (VITAMIN B12) 1000 MCG/ML injection Inject 1 mL (1,000 mcg) Subcutaneous every 30 days 1 mL 11     syringe/needle, disp, 25G X 1\" 3 ML MISC USE AS DIRECTED WITH B12 INECTION 1 each 11     escitalopram (LEXAPRO) 10 MG tablet Take 1 tablet (10 mg) by mouth daily 31 tablet PRN     Medications reviewed:  Medications reconciled to facility chart and changes were made to reflect current medications as identified as above med list. Below are the changes that were made:   Medications stopped since last EPIC medication reconciliation:   Medications Discontinued During This Encounter   Medication Reason     nystatin (MYCOSTATIN) 114875 UNIT/ML suspension Medication Reconciliation Clean Up       Medications started since last [facility] medication reconciliation:  Orders Placed This Encounter   Medications     bisacodyl (DULCOLAX) 10 MG Suppository     Sig: Place 10 mg rectally daily as needed for constipation     polyethylene glycol (MIRALAX/GLYCOLAX) powder     Sig: Take 17 g by mouth daily as needed for constipation         REVIEW OF SYSTEMS:  10 point ROS of systems including Constitutional, Eyes, Respiratory, Cardiovascular, Gastroenterology, Genitourinary, Integumentary, Muscularskeletal, Psychiatric were all negative except for pertinent positives noted in my HPI.    Physical Exam:  /77  Pulse 89  Temp 98.6  F (37  C)  Resp 18  Wt 183 lb (83 " kg)  SpO2 98%  BMI 28.66 kg/m2  GENERAL APPEARANCE:  Alert, in no distress, cooperative  ENT:  Mouth and posterior oropharynx normal, moist mucous membranes  RESP:  respiratory effort and palpation of chest normal, lungs clear to auscultation , no respiratory distress  CV:  Palpation and auscultation of heart done , regular rate and rhythm, no murmur, rub, or gallop, no edema, +2 pedal pulses  ABDOMEN:  normal bowel sounds, soft, nontender, no hepatosplenomegaly or other masses  M/S:   Gait and station abnormal decreased ROM BLE, slow movement, primarily non-ambulatory, no joint tenderness  SKIN:  Inspection of skin and subcutaneous tissue baseline, Palpation of skin and subcutaneous tissue baseline  NEURO:   Cranial nerves 2-12 are normal tested and grossly at patient's baseline, Examination of sensation by touch normal, BLE 4/5, BUE 3/5  PSYCH:  oriented to self, place, insight and judgement impaired, memory impaired , affect and mood normal        Recent Labs:    CBC RESULTS:   Recent Labs   Lab Test  07/25/17   0617  07/24/17   1425   WBC  6.6  5.8   RBC  4.40  3.97   HGB  13.7  12.5   HCT  41.0  37.8   MCV  93  95   MCH  31.1  31.5   MCHC  33.4  33.1   RDW  13.1  13.1   PLT  231  233       Last Basic Metabolic Panel:  Recent Labs   Lab Test  08/09/17   0818  08/03/17   0615   NA  138  137   POTASSIUM  4.1  3.8   CHLORIDE  104  103   RUBIN  10.3*  10.2*   CO2  27  25   BUN  32*  27   CR  1.06*  1.30*   GLC  140*  159*       Liver Function Studies -   Recent Labs   Lab Test  07/24/17   1425  10/25/12   0830  08/24/11   PROTTOTAL  6.8   --    --   6.9   ALBUMIN  3.8   --    --   4.2   BILITOTAL  0.5   --    --   1.4*   ALKPHOS  69   --    --   55   AST  16  26   < >  25   ALT  18  22   < >  16   BILIDIRECT   --    --    --   0.1    < > = values in this interval not displayed.       TSH   Date Value Ref Range Status   03/09/2017 4.42 (H) 0.40 - 4.00 mU/L Final   01/26/2017 9.96 (H) 0.40 - 4.00 mU/L Final   ]    Lab  Results   Component Value Date    A1C 5.9 08/21/2012             Assessment/Plan:  (R52) Pain  (primary encounter diagnosis)  Comment: controlled  Plan: continue medications as above, monitor and adjust PRN    (I69.359) Hemiplegia affecting dominant side, post-stroke (H)  (R53.81) Physical deconditioning  Comment: stable, working with PT and OT with slow progress  Plan: continue medications as above, continue PT and OT, monitor and adjust PRN.     (I10) Hypertension goal BP (blood pressure) < 140/90  Comment: controlled, creatinine stable  Plan: continue medications as above, monitor VS, BMP PRN        Electronically signed by  YASMINE Tan CNP

## 2017-08-30 ENCOUNTER — NURSING HOME VISIT (OUTPATIENT)
Dept: GERIATRICS | Facility: CLINIC | Age: 82
End: 2017-08-30
Payer: MEDICARE

## 2017-08-30 VITALS
BODY MASS INDEX: 28.51 KG/M2 | SYSTOLIC BLOOD PRESSURE: 120 MMHG | OXYGEN SATURATION: 97 % | HEART RATE: 80 BPM | DIASTOLIC BLOOD PRESSURE: 68 MMHG | WEIGHT: 182 LBS | TEMPERATURE: 98.3 F | RESPIRATION RATE: 20 BRPM

## 2017-08-30 DIAGNOSIS — R53.81 PHYSICAL DECONDITIONING: ICD-10-CM

## 2017-08-30 DIAGNOSIS — I69.359 HEMIPLEGIA AFFECTING DOMINANT SIDE, POST-STROKE (H): Primary | ICD-10-CM

## 2017-08-30 DIAGNOSIS — K59.00 CONSTIPATION, UNSPECIFIED CONSTIPATION TYPE: ICD-10-CM

## 2017-08-30 DIAGNOSIS — I10 HYPERTENSION GOAL BP (BLOOD PRESSURE) < 140/90: ICD-10-CM

## 2017-08-30 PROCEDURE — 99309 SBSQ NF CARE MODERATE MDM 30: CPT | Performed by: NURSE PRACTITIONER

## 2017-08-30 NOTE — PROGRESS NOTES
[place] [facility]    Chief Complaint   Patient presents with     Nursing Home Acute       HPI:    Deanne Zayas is a 84 year old  (6/19/1933), who is being seen today for an [facility]odic [facility].  HPI information obtained from: facility chart records, facility staff, patient report and [facility] chart review.    Today's concern is:  Hemiplegia affecting dominant side, post-stroke (H)  Physical deconditioning  She is currently on ASA, plavix, lipitor. She is working with PT and OT in TCU. Did have issues with garbled speech, but this had cleared. She is tolerating a regular diet. Did previously have issues with muscles spasms and pain. She was stared on scheduled tylenol with improvement.     Hypertension goal BP (blood pressure) < 140/90  She is currently on lisinopril, ASA, norvasc. Denies any chest pain, lightheadedness, SOB, or headaches.   Last 3 BP: 120/68, 137/80, 132/79    Constipation, unspecified constipation type  Has had issues with intermittent constipation. Currently on miralax PRN, senna-s daily, and PRN miralax. Denies any loose stool or constipation recently.       ALLERGIES: Donepezil  Past Medical, Surgical, Family and Social History reviewed and updated in [facility].    Current Outpatient Prescriptions   Medication Sig Dispense Refill     bisacodyl (DULCOLAX) 10 MG Suppository Place 10 mg rectally daily as needed for constipation       polyethylene glycol (MIRALAX/GLYCOLAX) powder Take 17 g by mouth daily as needed for constipation       Menthol, Topical Analgesic, (BIOFREEZE) 4 % GEL Externally apply topically 4 times daily as needed       ACETAMINOPHEN PO Take 1,000 mg by mouth 3 times daily       AMLODIPINE BESYLATE PO Take 5 mg by mouth daily       miconazole (MICATIN) 2 % AERP powder Apply topically 3 times daily as needed       atorvastatin (LIPITOR) 20 MG tablet Take 1 tablet (20 mg) by mouth daily 30 tablet 0     clopidogrel (PLAVIX) 75 MG tablet Take  "1 tablet (75 mg) by mouth daily 30 tablet 1     aspirin 81 MG chewable tablet Take 1 tablet (81 mg) by mouth daily 31 tablet PRN     senna-docusate (SENNA S) 8.6-50 MG per tablet Take 1 tablet by mouth daily 31 tablet PRN     lisinopril (PRINIVIL/ZESTRIL) 40 MG tablet Take 1 tablet (40 mg) by mouth daily 31 tablet PRN     cholecalciferol (VITAMIN D3) 1000 UNIT tablet Take 2 tablets (2,000 Units) by mouth daily 62 tablet PRN     ferrous sulfate (IRON) 325 (65 FE) MG tablet Take 1 tablet (325 mg) by mouth daily 31 tablet PRN     levothyroxine (SYNTHROID/LEVOTHROID) 112 MCG tablet Take 1 tablet (112 mcg) by mouth daily 60 tablet 3     cyanocobalamin (VITAMIN B12) 1000 MCG/ML injection Inject 1 mL (1,000 mcg) Subcutaneous every 30 days 1 mL 11     syringe/needle, disp, 25G X 1\" 3 ML MISC USE AS DIRECTED WITH B12 INECTION 1 each 11     escitalopram (LEXAPRO) 10 MG tablet Take 1 tablet (10 mg) by mouth daily 31 tablet PRN     Medications reviewed:  Medications reconciled to facility chart and changes were made to reflect current medications as identified as above med list. Below are the changes that were made:   Medications stopped since last EPIC medication reconciliation:   There are no discontinued medications.    Medications started since last [facility] medication reconciliation:  No orders of the defined types were placed in this encounter.        REVIEW OF SYSTEMS:  10 point ROS of systems including Constitutional, Eyes, Respiratory, Cardiovascular, Gastroenterology, Genitourinary, Integumentary, Muscularskeletal, Psychiatric were all negative except for pertinent positives noted in my HPI.    Physical Exam:  /68  Pulse 80  Temp 98.3  F (36.8  C)  Resp 20  Wt 182 lb (82.6 kg)  SpO2 97%  BMI 28.51 kg/m2  GENERAL APPEARANCE:  Alert, in no distress, cooperative  RESP:  respiratory effort and palpation of chest normal, lungs clear to auscultation , no respiratory distress  CV:  Palpation and auscultation of heart " done , regular rate and rhythm, no murmur, rub, or gallop, no edema, +2 pedal pulses  ABDOMEN:  normal bowel sounds, soft, nontender, no hepatosplenomegaly or other masses, no guarding or rebound  M/S:   generalized weakness, BUE 3-4/5, BLe 4/5, no joint tenderness  SKIN:  Inspection of skin and subcutaneous tissue baseline, Palpation of skin and subcutaneous tissue baseline  NEURO:   Cranial nerves 2-12 are normal tested and grossly at patient's baseline, Examination of sensation by touch normal, clear speech, no coughing after swallowing  PSYCH:  oriented to self, time, insight and judgement impaired, memory impaired , affect and mood normal    Recent Labs:    CBC RESULTS:   Recent Labs   Lab Test  07/25/17   0617  07/24/17   1425   WBC  6.6  5.8   RBC  4.40  3.97   HGB  13.7  12.5   HCT  41.0  37.8   MCV  93  95   MCH  31.1  31.5   MCHC  33.4  33.1   RDW  13.1  13.1   PLT  231  233       Last Basic Metabolic Panel:  Recent Labs   Lab Test  08/09/17   0818  08/03/17   0615   NA  138  137   POTASSIUM  4.1  3.8   CHLORIDE  104  103   RUBIN  10.3*  10.2*   CO2  27  25   BUN  32*  27   CR  1.06*  1.30*   GLC  140*  159*       Liver Function Studies -   Recent Labs   Lab Test  07/24/17   1425  10/25/12   0830  08/24/11   PROTTOTAL  6.8   --    --   6.9   ALBUMIN  3.8   --    --   4.2   BILITOTAL  0.5   --    --   1.4*   ALKPHOS  69   --    --   55   AST  16  26   < >  25   ALT  18  22   < >  16   BILIDIRECT   --    --    --   0.1    < > = values in this interval not displayed.       TSH   Date Value Ref Range Status   03/09/2017 4.42 (H) 0.40 - 4.00 mU/L Final   01/26/2017 9.96 (H) 0.40 - 4.00 mU/L Final   ]    Lab Results   Component Value Date    A1C 5.9 08/21/2012             Assessment/Plan:  (I69.359) Hemiplegia affecting dominant side, post-stroke (H)  (primary encounter diagnosis)  (R53.81) Physical deconditioning  Comment: stable, mobility improving  Plan: continue PT/OT, medications as above. May be able to  discharge back to [facility] next week    (I10) Hypertension goal BP (blood pressure) < 140/90  Comment: controlled, asymptomatic. In frail elderly keep BP >130/65  Plan: continue medications as above, monitor and adjust PRN    (K59.00) Constipation, unspecified constipation type  Comment: stable,   Plan: continue medications as above, monitor and adjust PRN.       Electronically signed by  YASMINE Tan CNP

## 2017-09-05 ENCOUNTER — DISCHARGE SUMMARY NURSING HOME (OUTPATIENT)
Dept: GERIATRICS | Facility: CLINIC | Age: 82
End: 2017-09-05
Payer: MEDICARE

## 2017-09-05 VITALS
BODY MASS INDEX: 28.51 KG/M2 | HEART RATE: 54 BPM | TEMPERATURE: 98.4 F | RESPIRATION RATE: 18 BRPM | SYSTOLIC BLOOD PRESSURE: 178 MMHG | DIASTOLIC BLOOD PRESSURE: 77 MMHG | WEIGHT: 182 LBS

## 2017-09-05 DIAGNOSIS — I69.359 HEMIPLEGIA AFFECTING DOMINANT SIDE, POST-STROKE (H): ICD-10-CM

## 2017-09-05 DIAGNOSIS — K59.00 CONSTIPATION, UNSPECIFIED CONSTIPATION TYPE: ICD-10-CM

## 2017-09-05 DIAGNOSIS — N30.00 ACUTE CYSTITIS WITHOUT HEMATURIA: ICD-10-CM

## 2017-09-05 DIAGNOSIS — R53.81 PHYSICAL DECONDITIONING: ICD-10-CM

## 2017-09-05 DIAGNOSIS — N28.9 RENAL INSUFFICIENCY: ICD-10-CM

## 2017-09-05 DIAGNOSIS — I10 HYPERTENSION GOAL BP (BLOOD PRESSURE) < 140/90: ICD-10-CM

## 2017-09-05 DIAGNOSIS — Z86.73 HISTORY OF CVA (CEREBROVASCULAR ACCIDENT): Primary | ICD-10-CM

## 2017-09-05 DIAGNOSIS — M62.838 MUSCLE SPASM OF RIGHT LOWER EXTREMITY: ICD-10-CM

## 2017-09-05 PROCEDURE — 99310 SBSQ NF CARE HIGH MDM 45: CPT | Performed by: NURSE PRACTITIONER

## 2017-09-05 NOTE — PROGRESS NOTES
[place] [facility] DISCHARGE SUMMARY    PATIENT'S NAME: Deanne Zayas  YOB: 1933  MEDICAL RECORD NUMBER:  [id number]    PRIMARY CARE PROVIDER AND CLINIC RESPONSIBLE AFTER TRANSFER: [facility] [place] [facility] [address] / [place] [address]     CODE STATUS/ADVANCE DIRECTIVES DISCUSSION:   DNR / DNI       Allergies   Allergen Reactions     Donepezil Other (See Comments)     At 10 mg, tremulousness and decreased appetite       TRANSFERRING PROVIDERS: YASMINE Tan CNP, Elisa Antoine MD  DATE OF SNF ADMISSION:  July / 27 / 2017  DATE OF SNF (anticipated) DISCHARGE: September / 06 / 2017  DISCHARGE DISPOSITION: Assisted Living: [facility]   [facility] to 7/27/17.     Condition on Discharge:  Improving.  Function:  Ambulates 100ft with 4WW with ax1, uses WC for longer distances, occasionally incontinent of bowel and bladder, Ax1 with all ADL's.  Cognitive Scores: SLUMS 9/30    Equipment: walker and wheelchair    DISCHARGE DIAGNOSIS:   1. History of CVA (cerebrovascular accident)    2. Hemiplegia affecting dominant side, post-stroke (H)    3. Physical deconditioning    4. Hypertension goal BP (blood pressure) < 140/90    5. Constipation, unspecified constipation type    6. Muscle spasm of right lower extremity    7. Renal insufficiency    8. Acute cystitis without hematuria        HPI Nursing Facility Course:  HPI information obtained from: facility chart records, facility staff, patient report and [facility] chart review.  History of CVA (cerebrovascular accident)  Hemiplegia affecting dominant side, post-stroke (H)  Physical deconditioning  From hospital discharge summary: Patient persistently had her right-sided weakness associated with some slurred speech CT scan done at admission and also 24 hours later did not show any acute ischemia  therefore MRI was done which showed acute ischemic infarct in the left paramedial russell.  She is currently on ASA, plavix, lipitor. Worked with PT and OT in TCU with improvement. Did initially have garbled speech, which has now cleared. She was seen by SLP and is on a regular diet. Will is discharging back to her custodial and will have outpatient PT.    Hypertension goal BP (blood pressure) < 140/90  She is currently on lisinopril, ASA, norvasc. No concerns during TCU stay.  Last 3 BP's: 178/77, 152/70, 150/57  Admission weight: 190lbs  Current weight: 182lbs    Constipation, unspecified constipation type  Has had issues with intermittent constipation. Currently on miralax PRN, senna-s daily, and PRN miralax. Patient denies any symptoms and staff report regular bowel movements.     Muscle spasm of right lower extremity  Developed pain and muscles spasms to RLE. She was started on scheduled tylenol TID with improvement. Was briefly trialed on baclofen, but this was discontinued as felt to be causing increased weakness and CNS changes.     Renal insufficiency  Creatinine increased to 1.37. Oral fluids were increased and creatinine improved to 1.06.    Acute cystitis without hematuria  Was treated for UTI with cipro and bactrim. Denies any further urinary symptoms.       PAST MEDICAL HISTORY:  has no past medical history on file.    DISCHARGE MEDICATIONS:  Current Outpatient Prescriptions   Medication Sig Dispense Refill     bisacodyl (DULCOLAX) 10 MG Suppository Place 10 mg rectally daily as needed for constipation       polyethylene glycol (MIRALAX/GLYCOLAX) powder Take 17 g by mouth daily as needed for constipation       Menthol, Topical Analgesic, (BIOFREEZE) 4 % GEL Externally apply topically 4 times daily as needed       ACETAMINOPHEN PO Take 1,000 mg by mouth 3 times daily       AMLODIPINE BESYLATE PO Take 5 mg by mouth daily       miconazole (MICATIN) 2 % AERP powder Apply topically 3 times daily as needed        "atorvastatin (LIPITOR) 20 MG tablet Take 1 tablet (20 mg) by mouth daily 30 tablet 0     clopidogrel (PLAVIX) 75 MG tablet Take 1 tablet (75 mg) by mouth daily 30 tablet 1     aspirin 81 MG chewable tablet Take 1 tablet (81 mg) by mouth daily 31 tablet PRN     senna-docusate (SENNA S) 8.6-50 MG per tablet Take 1 tablet by mouth daily 31 tablet PRN     lisinopril (PRINIVIL/ZESTRIL) 40 MG tablet Take 1 tablet (40 mg) by mouth daily 31 tablet PRN     cholecalciferol (VITAMIN D3) 1000 UNIT tablet Take 2 tablets (2,000 Units) by mouth daily 62 tablet PRN     ferrous sulfate (IRON) 325 (65 FE) MG tablet Take 1 tablet (325 mg) by mouth daily 31 tablet PRN     levothyroxine (SYNTHROID/LEVOTHROID) 112 MCG tablet Take 1 tablet (112 mcg) by mouth daily 60 tablet 3     cyanocobalamin (VITAMIN B12) 1000 MCG/ML injection Inject 1 mL (1,000 mcg) Subcutaneous every 30 days 1 mL 11     syringe/needle, disp, 25G X 1\" 3 ML MISC USE AS DIRECTED WITH B12 INECTION 1 each 11     escitalopram (LEXAPRO) 10 MG tablet Take 1 tablet (10 mg) by mouth daily 31 tablet PRN       MEDICATION CHANGES/RATIONALE:   Tylenol added for leg pain.    Controlled medications sent with patient: not applicable/none     ROS:    10 point ROS of systems including Constitutional, Eyes, Respiratory, Cardiovascular, Gastroenterology, Genitourinary, Integumentary, Muscularskeletal, Psychiatric were all negative except for pertinent positives noted in my HPI.    Physical Exam:   Vitals: /77  Pulse 54  Temp 98.4  F (36.9  C)  Resp 18  Wt 182 lb (82.6 kg)  BMI 28.51 kg/m2  BMI= Body mass index is 28.51 kg/(m^2).    GENERAL APPEARANCE:  Alert, in no distress, cooperative  RESP:  respiratory effort and palpation of chest normal, lungs clear to auscultation , no respiratory distress  CV:  Palpation and auscultation of heart done , regular rate and rhythm, no murmur, rub, or gallop, no edema, +2 pedal pulses  ABDOMEN:  normal bowel sounds, soft, nontender, no " hepatosplenomegaly or other masses, no guarding or rebound  M/S:   Gait and station abnormal ambulates wtih walker, no joint tenderness, ambulates with shiffled gait  SKIN:  Inspection of skin and subcutaneous tissue baseline, Palpation of skin and subcutaneous tissue baseline  NEURO:   Cranial nerves 2-12 are normal tested and grossly at patient's baseline, speech clear, BUE 4/5, BLE 4/5  PSYCH:  oriented to self, place, insight and judgement impaired, memory impaired , affect and mood normal    DISCHARGE PLAN:  outpatient PT, OT  Patient instructed to follow-up with:  PCP in 7 days      [facility] scheduled appointments:  No future appointments.    MTM referral needed and placed by this provider: No    Pending labs: None  SNF labs   CBC RESULTS:   Recent Labs   Lab Test  07/25/17   0617  07/24/17   1425   WBC  6.6  5.8   RBC  4.40  3.97   HGB  13.7  12.5   HCT  41.0  37.8   MCV  93  95   MCH  31.1  31.5   MCHC  33.4  33.1   RDW  13.1  13.1   PLT  231  233       Last Basic Metabolic Panel:  Recent Labs   Lab Test  08/09/17   0818  08/03/17   0615   NA  138  137   POTASSIUM  4.1  3.8   CHLORIDE  104  103   RUBIN  10.3*  10.2*   CO2  27  25   BUN  32*  27   CR  1.06*  1.30*   GLC  140*  159*       Liver Function Studies -   Recent Labs   Lab Test  07/24/17   1425  10/25/12   0830  08/24/11   PROTTOTAL  6.8   --    --   6.9   ALBUMIN  3.8   --    --   4.2   BILITOTAL  0.5   --    --   1.4*   ALKPHOS  69   --    --   55   AST  16  26   < >  25   ALT  18  22   < >  16   BILIDIRECT   --    --    --   0.1    < > = values in this interval not displayed.       TSH   Date Value Ref Range Status   03/09/2017 4.42 (H) 0.40 - 4.00 mU/L Final   01/26/2017 9.96 (H) 0.40 - 4.00 mU/L Final   ]    Lab Results   Component Value Date    A1C 5.9 08/21/2012           Discharge Treatments:  F/u with PCP in 7-10 days  Outpatient PT  Medications as above    TOTAL DISCHARGE TIME:   Greater than 30 minutes  Electronically signed  by:  YASMINE Tan CNP

## 2017-09-08 ENCOUNTER — TELEPHONE (OUTPATIENT)
Dept: GERIATRICS | Facility: CLINIC | Age: 82
End: 2017-09-08

## 2017-09-08 DIAGNOSIS — I10 BENIGN ESSENTIAL HYPERTENSION: Primary | ICD-10-CM

## 2017-09-08 RX ORDER — AMLODIPINE BESYLATE 5 MG/1
5 TABLET ORAL DAILY
Qty: 30 TABLET | Refills: 3 | Status: SHIPPED | OUTPATIENT
Start: 2017-09-08 | End: 2017-10-06

## 2017-09-13 DIAGNOSIS — I63.012 CEREBROVASCULAR ACCIDENT (CVA) DUE TO THROMBOSIS OF LEFT VERTEBRAL ARTERY (H): ICD-10-CM

## 2017-09-13 DIAGNOSIS — I63.312 CEREBROVASCULAR ACCIDENT (CVA) DUE TO THROMBOSIS OF LEFT MIDDLE CEREBRAL ARTERY (H): ICD-10-CM

## 2017-09-13 DIAGNOSIS — I10 BENIGN ESSENTIAL HYPERTENSION: ICD-10-CM

## 2017-09-13 RX ORDER — AMLODIPINE BESYLATE 5 MG/1
5 TABLET ORAL DAILY
Qty: 31 TABLET | Status: CANCELLED | OUTPATIENT
Start: 2017-09-13

## 2017-09-13 RX ORDER — CLOPIDOGREL BISULFATE 75 MG/1
75 TABLET ORAL DAILY
Qty: 30 TABLET | Status: CANCELLED | OUTPATIENT
Start: 2017-09-13

## 2017-09-13 RX ORDER — ATORVASTATIN CALCIUM 20 MG/1
20 TABLET, FILM COATED ORAL DAILY
Qty: 30 TABLET | Status: CANCELLED | OUTPATIENT
Start: 2017-09-13

## 2017-09-23 ENCOUNTER — HOSPITAL LABORATORY (OUTPATIENT)
Facility: OTHER | Age: 82
End: 2017-09-23

## 2017-09-23 LAB
ALBUMIN UR-MCNC: NEGATIVE MG/DL
APPEARANCE UR: ABNORMAL
BILIRUB UR QL STRIP: NEGATIVE
COLOR UR AUTO: YELLOW
GLUCOSE UR STRIP-MCNC: NEGATIVE MG/DL
HGB UR QL STRIP: NEGATIVE
KETONES UR STRIP-MCNC: 5 MG/DL
LEUKOCYTE ESTERASE UR QL STRIP: ABNORMAL
MUCOUS THREADS #/AREA URNS LPF: PRESENT /LPF
NITRATE UR QL: POSITIVE
PH UR STRIP: 6.5 PH (ref 5–7)
RBC #/AREA URNS AUTO: 1 /HPF (ref 0–2)
SOURCE: ABNORMAL
SP GR UR STRIP: 1.01 (ref 1–1.03)
SQUAMOUS #/AREA URNS AUTO: 3 /HPF (ref 0–1)
UROBILINOGEN UR STRIP-MCNC: NORMAL MG/DL (ref 0–2)
WBC #/AREA URNS AUTO: 19 /HPF (ref 0–2)

## 2017-09-24 ENCOUNTER — TELEPHONE (OUTPATIENT)
Dept: INTERNAL MEDICINE | Facility: CLINIC | Age: 82
End: 2017-09-24

## 2017-09-24 NOTE — TELEPHONE ENCOUNTER
UA/UC sent last night  UA back and UC pending  No fever, N/V, Abd pain  MCU patient  Orders  Push fluids  Wait for cx  Update PCP

## 2017-09-25 ENCOUNTER — ASSISTED LIVING VISIT (OUTPATIENT)
Dept: GERIATRICS | Facility: CLINIC | Age: 82
End: 2017-09-25
Payer: MEDICARE

## 2017-09-25 VITALS
SYSTOLIC BLOOD PRESSURE: 127 MMHG | BODY MASS INDEX: 28.51 KG/M2 | WEIGHT: 182 LBS | HEART RATE: 97 BPM | TEMPERATURE: 98.8 F | DIASTOLIC BLOOD PRESSURE: 70 MMHG | OXYGEN SATURATION: 97 % | RESPIRATION RATE: 16 BRPM

## 2017-09-25 DIAGNOSIS — I63.312 CEREBROVASCULAR ACCIDENT (CVA) DUE TO THROMBOSIS OF LEFT MIDDLE CEREBRAL ARTERY (H): ICD-10-CM

## 2017-09-25 DIAGNOSIS — N30.00 ACUTE CYSTITIS WITHOUT HEMATURIA: Primary | ICD-10-CM

## 2017-09-25 RX ORDER — CIPROFLOXACIN 250 MG/1
250 TABLET, FILM COATED ORAL 2 TIMES DAILY
Qty: 6 TABLET | Refills: 0 | Status: SHIPPED | OUTPATIENT
Start: 2017-09-25 | End: 2018-01-10

## 2017-09-25 NOTE — PROGRESS NOTES
[place] [facility]    Chief Complaint   Patient presents with     RECHECK       HPI:    Deanne Zayas is a 84 year old  (6/19/1933), who is being seen today for an episodic care visit at [facility].  HPI information obtained from: facility chart records, facility staff, patient report and [facility] chart review.    Today's concern is:  Acute cystitis without hematuria  Staff report low grade fever of 99.5 over the weekend, foul smelling, cloudy urine. Patient is a poor historian so unclear if there were any other urinary symptoms. Staff also reports she was more confused and not acting her usual self. UA/UC sent. UA positive for nitrates, leukocytes. UC came back today and grew >100,000 non-fermenting gram negative rods, sensitivities still pending by end of day.   - ciprofloxacin (CIPRO) 250 MG tablet; Take 1 tablet (250 mg) by mouth 2 times daily    Cerebrovascular accident (CVA) due to thrombosis of left middle cerebral artery (H)  Recently discharged from TCU after hospital stay for CVA. She primarily WC bound but able to walk short distances with her walker. She did have issues with leg spasms in, but these resolved with tylenol. She denies any weakness. She is currently on norvasc, lipitor, plavix, lisinopril, and ASA. She is working with outpatient PT.     ALLERGIES: Donepezil  Past Medical, Surgical, Family and Social History reviewed and updated in [facility].    Current Outpatient Prescriptions   Medication Sig Dispense Refill     amLODIPine (NORVASC) 5 MG tablet Take 1 tablet (5 mg) by mouth daily 30 tablet 3     bisacodyl (DULCOLAX) 10 MG Suppository Place 10 mg rectally daily as needed for constipation       polyethylene glycol (MIRALAX/GLYCOLAX) powder Take 17 g by mouth daily as needed for constipation       Menthol, Topical Analgesic, (BIOFREEZE) 4 % GEL Externally apply topically 4 times daily as needed       ACETAMINOPHEN PO Take 1,000 mg by mouth 3 times daily        "miconazole (MICATIN) 2 % AERP powder Apply topically 3 times daily as needed       atorvastatin (LIPITOR) 20 MG tablet Take 1 tablet (20 mg) by mouth daily 30 tablet 0     clopidogrel (PLAVIX) 75 MG tablet Take 1 tablet (75 mg) by mouth daily 30 tablet 1     aspirin 81 MG chewable tablet Take 1 tablet (81 mg) by mouth daily 31 tablet PRN     senna-docusate (SENNA S) 8.6-50 MG per tablet Take 1 tablet by mouth daily 31 tablet PRN     lisinopril (PRINIVIL/ZESTRIL) 40 MG tablet Take 1 tablet (40 mg) by mouth daily 31 tablet PRN     cholecalciferol (VITAMIN D3) 1000 UNIT tablet Take 2 tablets (2,000 Units) by mouth daily 62 tablet PRN     ferrous sulfate (IRON) 325 (65 FE) MG tablet Take 1 tablet (325 mg) by mouth daily 31 tablet PRN     levothyroxine (SYNTHROID/LEVOTHROID) 112 MCG tablet Take 1 tablet (112 mcg) by mouth daily 60 tablet 3     cyanocobalamin (VITAMIN B12) 1000 MCG/ML injection Inject 1 mL (1,000 mcg) Subcutaneous every 30 days 1 mL 11     syringe/needle, disp, 25G X 1\" 3 ML MISC USE AS DIRECTED WITH B12 INECTION 1 each 11     escitalopram (LEXAPRO) 10 MG tablet Take 1 tablet (10 mg) by mouth daily 31 tablet PRN     Medications reviewed:  Medications reconciled to facility chart and changes were made to reflect current medications as identified as above med list. Below are the changes that were made:   Medications stopped since last EPIC medication reconciliation:   There are no discontinued medications.    Medications started since last [facility] medication reconciliation:  No orders of the defined types were placed in this encounter.        REVIEW OF SYSTEMS:  Unobtainable secondary to cognitive impairment or aphasia, but today pt reports feeling tired.    Physical Exam:  /70  Pulse 97  Temp 98.8  F (37.1  C)  Resp 16  Wt 182 lb (82.6 kg)  SpO2 97%  BMI 28.51 kg/m2  GENERAL APPEARANCE:  Alert, in no distress, cooperative  RESP:  respiratory effort and palpation of chest normal, lungs clear to " auscultation , no respiratory distress  CV:  Palpation and auscultation of heart done , regular rate and rhythm, no murmur, rub, or gallop, no edema, +2 pedal pulses  ABDOMEN:  normal bowel sounds, soft, nontender, no hepatosplenomegaly or other masses  M/S:   Gait and station abnormal WC bound, no joint tenderness, can walk short distances with walker  NEURO:   Cranial nerves 2-12 are normal tested and grossly at patient's baseline, Examination of sensation by touch normal  PSYCH:  oriented to self, insight and judgement impaired, memory impaired , affect and mood normal    Recent Labs:    CBC RESULTS:   Recent Labs   Lab Test  07/25/17   0617  07/24/17   1425   WBC  6.6  5.8   RBC  4.40  3.97   HGB  13.7  12.5   HCT  41.0  37.8   MCV  93  95   MCH  31.1  31.5   MCHC  33.4  33.1   RDW  13.1  13.1   PLT  231  233       Last Basic Metabolic Panel:  Recent Labs   Lab Test  08/09/17   0818  08/03/17   0615   NA  138  137   POTASSIUM  4.1  3.8   CHLORIDE  104  103   RUBIN  10.3*  10.2*   CO2  27  25   BUN  32*  27   CR  1.06*  1.30*   GLC  140*  159*       Liver Function Studies -   Recent Labs   Lab Test  07/24/17   1425  10/25/12   0830  08/24/11   PROTTOTAL  6.8   --    --   6.9   ALBUMIN  3.8   --    --   4.2   BILITOTAL  0.5   --    --   1.4*   ALKPHOS  69   --    --   55   AST  16  26   < >  25   ALT  18  22   < >  16   BILIDIRECT   --    --    --   0.1    < > = values in this interval not displayed.       TSH   Date Value Ref Range Status   03/09/2017 4.42 (H) 0.40 - 4.00 mU/L Final   01/26/2017 9.96 (H) 0.40 - 4.00 mU/L Final   ]    Lab Results   Component Value Date    A1C 5.9 08/21/2012         Assessment/Plan:  (N30.00) Acute cystitis without hematuria  (primary encounter diagnosis)  Comment: + UA/UC, given type of bacteria grown suspect will not be susceptible to bactrim or Macrobid. No s/s of bacteremia noted.   Plan: ciprofloxacin (CIPRO) 250 MG tablet BID x 3 days    CVA  Comment: Stable, mobility at  baseline  Plan: Continue PT, continue medications as above, control BP, monitor and adjust PRN.     Orders:  Cipro 250 mg BID x 3 days for uncomplicated UTI    Total time with an established patient visit in the assisted living: 15 minutes including discussions about the POC and care coordination with the patient and first contact, voicemail left with daughter.. Greater than 50% of total time spent with counseling and coordinating care due to diagnosis, determining treatment plan.    Electronically signed by  YASMINE Tan CNP

## 2017-09-26 LAB
BACTERIA SPEC CULT: ABNORMAL
Lab: ABNORMAL
SPECIMEN SOURCE: ABNORMAL

## 2017-09-28 ENCOUNTER — ASSISTED LIVING VISIT (OUTPATIENT)
Dept: GERIATRICS | Facility: CLINIC | Age: 82
End: 2017-09-28
Payer: MEDICARE

## 2017-09-28 VITALS
TEMPERATURE: 99 F | OXYGEN SATURATION: 96 % | HEART RATE: 79 BPM | DIASTOLIC BLOOD PRESSURE: 63 MMHG | RESPIRATION RATE: 19 BRPM | SYSTOLIC BLOOD PRESSURE: 119 MMHG

## 2017-09-28 DIAGNOSIS — N39.0 URINARY TRACT INFECTION WITHOUT HEMATURIA, SITE UNSPECIFIED: Primary | ICD-10-CM

## 2017-09-28 DIAGNOSIS — M62.81 GENERALIZED MUSCLE WEAKNESS: ICD-10-CM

## 2017-09-28 RX ORDER — CIPROFLOXACIN 250 MG/1
250 TABLET, FILM COATED ORAL 2 TIMES DAILY
Qty: 8 TABLET | Refills: 0 | Status: SHIPPED | OUTPATIENT
Start: 2017-09-28 | End: 2017-10-02

## 2017-09-28 ASSESSMENT — PAIN SCALES - GENERAL: PAINLEVEL: NO PAIN (0)

## 2017-09-28 NOTE — PROGRESS NOTES
[place] [facility]    Chief Complaint   Patient presents with     RECHECK       HPI:    Deanne Zayas is a 84 year old  (6/19/1933), who is being seen today for an episodic care visit at [facility].  HPI information obtained from: facility chart records, facility staff, patient report and [facility] chart review.    Today's concern is:  Urinary tract infection without hematuria, site unspecified  Generalized muscle weakness  Staff reports increased weakness, especially at night. Has been occurring since recent stroke, but worse yesterday and this AM. Did have a near fall yesterday evening and had to be assisted to the floor. No injury. She was been on cipro 250mg BID. UC grew pan-sensitive e.coli. Per patient report she is not drinking much fluid, but report eating well. Denies any dysuria, reports some frequency/urgency but is incontinent at times. Spoke with PT, they have not noticed any increased weakness, but report she is moving more slowly today.   - ciprofloxacin (CIPRO) 250 MG tablet; Take 1 tablet (250 mg) by mouth 2 times daily for 4 days          ALLERGIES: Donepezil  Past Medical, Surgical, Family and Social History reviewed and updated in [facility].    Current Outpatient Prescriptions   Medication Sig Dispense Refill     ciprofloxacin (CIPRO) 250 MG tablet Take 1 tablet (250 mg) by mouth 2 times daily for 4 days 8 tablet 0     ciprofloxacin (CIPRO) 250 MG tablet Take 1 tablet (250 mg) by mouth 2 times daily 6 tablet 0     amLODIPine (NORVASC) 5 MG tablet Take 1 tablet (5 mg) by mouth daily 30 tablet 3     bisacodyl (DULCOLAX) 10 MG Suppository Place 10 mg rectally daily as needed for constipation       polyethylene glycol (MIRALAX/GLYCOLAX) powder Take 17 g by mouth daily as needed for constipation       Menthol, Topical Analgesic, (BIOFREEZE) 4 % GEL Externally apply topically 4 times daily as needed       ACETAMINOPHEN PO Take 1,000 mg by mouth 3 times daily       miconazole  "(MICATIN) 2 % AERP powder Apply topically 3 times daily as needed       atorvastatin (LIPITOR) 20 MG tablet Take 1 tablet (20 mg) by mouth daily 30 tablet 0     clopidogrel (PLAVIX) 75 MG tablet Take 1 tablet (75 mg) by mouth daily 30 tablet 1     aspirin 81 MG chewable tablet Take 1 tablet (81 mg) by mouth daily 31 tablet PRN     senna-docusate (SENNA S) 8.6-50 MG per tablet Take 1 tablet by mouth daily 31 tablet PRN     lisinopril (PRINIVIL/ZESTRIL) 40 MG tablet Take 1 tablet (40 mg) by mouth daily 31 tablet PRN     cholecalciferol (VITAMIN D3) 1000 UNIT tablet Take 2 tablets (2,000 Units) by mouth daily 62 tablet PRN     ferrous sulfate (IRON) 325 (65 FE) MG tablet Take 1 tablet (325 mg) by mouth daily 31 tablet PRN     levothyroxine (SYNTHROID/LEVOTHROID) 112 MCG tablet Take 1 tablet (112 mcg) by mouth daily 60 tablet 3     cyanocobalamin (VITAMIN B12) 1000 MCG/ML injection Inject 1 mL (1,000 mcg) Subcutaneous every 30 days 1 mL 11     syringe/needle, disp, 25G X 1\" 3 ML MISC USE AS DIRECTED WITH B12 INECTION 1 each 11     escitalopram (LEXAPRO) 10 MG tablet Take 1 tablet (10 mg) by mouth daily 31 tablet PRN     Medications reviewed:  Medications reconciled to facility chart and changes were made to reflect current medications as identified as above med list. Below are the changes that were made:   Medications stopped since last EPIC medication reconciliation:   There are no discontinued medications.    Medications started since last [facility] medication reconciliation:  Orders Placed This Encounter   Medications     ciprofloxacin (CIPRO) 250 MG tablet     Sig: Take 1 tablet (250 mg) by mouth 2 times daily for 4 days     Dispense:  8 tablet     Refill:  0         REVIEW OF SYSTEMS:  4 point ROS including Respiratory, CV, GI and , other than that noted in the HPI,  is negative and somewhat limited as patient is a poor historian    Physical Exam:  /63  Pulse 79  Temp 99  F (37.2  C)  Resp 19  SpO2 " 96%  GENERAL APPEARANCE:  Alert, in no distress, cooperative  RESP:  respiratory effort and palpation of chest normal, lungs clear to auscultation , no respiratory distress  CV:  Palpation and auscultation of heart done , regular rate and rhythm, no murmur, rub, or gallop, +2 pedal pulses, peripheral edema 1+ in BLE  ABDOMEN:  normal bowel sounds, soft, nontender, no hepatosplenomegaly or other masses  M/S:   Gait and station abnormal small steps, walks 150 ft with 4WW, primarily WC bound  NEURO:   Cranial nerves 2-12 are normal tested and grossly at patient's baseline, Examination of sensation by touch normal, BLE 4/5  PSYCH:  oriented to self, place, insight and judgement impaired, memory impaired , affect and mood normal    Recent Labs:    CBC RESULTS:   Recent Labs   Lab Test  07/25/17   0617  07/24/17   1425   WBC  6.6  5.8   RBC  4.40  3.97   HGB  13.7  12.5   HCT  41.0  37.8   MCV  93  95   MCH  31.1  31.5   MCHC  33.4  33.1   RDW  13.1  13.1   PLT  231  233       Last Basic Metabolic Panel:  Recent Labs   Lab Test  08/09/17   0818  08/03/17   0615   NA  138  137   POTASSIUM  4.1  3.8   CHLORIDE  104  103   RUBIN  10.3*  10.2*   CO2  27  25   BUN  32*  27   CR  1.06*  1.30*   GLC  140*  159*       Liver Function Studies -   Recent Labs   Lab Test  07/24/17   1425  10/25/12   0830  08/24/11   PROTTOTAL  6.8   --    --   6.9   ALBUMIN  3.8   --    --   4.2   BILITOTAL  0.5   --    --   1.4*   ALKPHOS  69   --    --   55   AST  16  26   < >  25   ALT  18  22   < >  16   BILIDIRECT   --    --    --   0.1    < > = values in this interval not displayed.       TSH   Date Value Ref Range Status   03/09/2017 4.42 (H) 0.40 - 4.00 mU/L Final   01/26/2017 9.96 (H) 0.40 - 4.00 mU/L Final   ]    Lab Results   Component Value Date    A1C 5.9 08/21/2012           Assessment/Plan:  (N39.0) Urinary tract infection without hematuria, site unspecified  (primary encounter diagnosis)  (M62.81) Generalized muscle weakness  Comment:  Suspect weakness r/t inadequate fluid intake in the setting of increased urination from UTI.   Plan: ciprofloxacin (CIPRO) 250 MG tablet - x 4 more days for total 7 days. Encourage fluid intake, continue PT/OT x2 weekly.       Orders:  Cipro 250mg PO x 4 more days (total 7 days)  Encourage fluid intake    Total time with an established patient visit in the assisted living: 15 minutes including discussions about the POC and care coordination with the patient and nursing staff. Greater than 50% of total time spent with counseling and coordinating care due to discussing symptoms, treatment plan, pt/staff education    Electronically signed by  YASMINE Tan CNP

## 2017-10-02 ENCOUNTER — TRANSFERRED RECORDS (OUTPATIENT)
Dept: HEALTH INFORMATION MANAGEMENT | Facility: CLINIC | Age: 82
End: 2017-10-02

## 2017-10-02 ENCOUNTER — ASSISTED LIVING VISIT (OUTPATIENT)
Dept: GERIATRICS | Facility: CLINIC | Age: 82
End: 2017-10-02
Payer: COMMERCIAL

## 2017-10-02 VITALS
SYSTOLIC BLOOD PRESSURE: 103 MMHG | TEMPERATURE: 97.9 F | RESPIRATION RATE: 18 BRPM | HEART RATE: 110 BPM | OXYGEN SATURATION: 97 % | DIASTOLIC BLOOD PRESSURE: 66 MMHG

## 2017-10-02 DIAGNOSIS — N39.0 URINARY TRACT INFECTION WITHOUT HEMATURIA, SITE UNSPECIFIED: ICD-10-CM

## 2017-10-02 DIAGNOSIS — R00.0 SINUS TACHYCARDIA: ICD-10-CM

## 2017-10-02 DIAGNOSIS — M62.81 GENERALIZED MUSCLE WEAKNESS: Primary | ICD-10-CM

## 2017-10-02 NOTE — PROGRESS NOTES
[place] [facility]    Chief Complaint   Patient presents with     RECHECK       HPI:    Deanne Zayas is a 84 year old  (6/19/1933), who is being seen today for an episodic care visit at [facility].  HPI information obtained from: facility chart records, facility staff, patient report and [facility] chart review.    Today's concern is:  Generalized muscle weakness  Called to see patient today due to staff reports of continued weakness and requiring increased assistance with cares. She denies any pain, but does report that she feels more weak. Today is last day of abx for UTI. Staff reports decreased appetite, but report they have been encouraging fluid intake. No further fevers, no chills noted. No diarrhea - had a formed BM this AM. She is working with PT and OT x2/week currently. No cough, denies any abdominal pain. Neuro exam at baseline, did report some intermittent low back pain, none currently and no pain on palpation. Reports that she has been sleeping well.     Urinary tract infection without hematuria, site unspecified  Was found to have UTI last week, pan-sensitive. She was treated with cipro x 7 days, with today being last day. Staff reports urinary frequency reduced and cloudiness/foul smell to urine resolved. She has been afebrile. Denies any discomfort with urination.     Sinus tachycardia  Found to be tachycardic on exam today. EKG ordered showed sinus tachycardia. She denies any chest pain, lightheadedness or dizziness. Does have some mild SOB with activity.       ALLERGIES: Donepezil  Past Medical, Surgical, Family and Social History reviewed and updated in [facility].    Current Outpatient Prescriptions   Medication Sig Dispense Refill     ciprofloxacin (CIPRO) 250 MG tablet Take 1 tablet (250 mg) by mouth 2 times daily 6 tablet 0     amLODIPine (NORVASC) 5 MG tablet Take 1 tablet (5 mg) by mouth daily 30 tablet 3     bisacodyl (DULCOLAX) 10 MG Suppository Place 10 mg  "rectally daily as needed for constipation       polyethylene glycol (MIRALAX/GLYCOLAX) powder Take 17 g by mouth daily as needed for constipation       Menthol, Topical Analgesic, (BIOFREEZE) 4 % GEL Externally apply topically 4 times daily as needed       ACETAMINOPHEN PO Take 1,000 mg by mouth 3 times daily       miconazole (MICATIN) 2 % AERP powder Apply topically 3 times daily as needed       atorvastatin (LIPITOR) 20 MG tablet Take 1 tablet (20 mg) by mouth daily 30 tablet 0     clopidogrel (PLAVIX) 75 MG tablet Take 1 tablet (75 mg) by mouth daily 30 tablet 1     aspirin 81 MG chewable tablet Take 1 tablet (81 mg) by mouth daily 31 tablet PRN     senna-docusate (SENNA S) 8.6-50 MG per tablet Take 1 tablet by mouth daily 31 tablet PRN     lisinopril (PRINIVIL/ZESTRIL) 40 MG tablet Take 1 tablet (40 mg) by mouth daily 31 tablet PRN     cholecalciferol (VITAMIN D3) 1000 UNIT tablet Take 2 tablets (2,000 Units) by mouth daily 62 tablet PRN     ferrous sulfate (IRON) 325 (65 FE) MG tablet Take 1 tablet (325 mg) by mouth daily 31 tablet PRN     levothyroxine (SYNTHROID/LEVOTHROID) 112 MCG tablet Take 1 tablet (112 mcg) by mouth daily 60 tablet 3     cyanocobalamin (VITAMIN B12) 1000 MCG/ML injection Inject 1 mL (1,000 mcg) Subcutaneous every 30 days 1 mL 11     syringe/needle, disp, 25G X 1\" 3 ML MISC USE AS DIRECTED WITH B12 INECTION 1 each 11     escitalopram (LEXAPRO) 10 MG tablet Take 1 tablet (10 mg) by mouth daily 31 tablet PRN     Medications reviewed:  Medications reconciled to facility chart and changes were made to reflect current medications as identified as above med list. Below are the changes that were made:   Medications stopped since last EPIC medication reconciliation:   Medications Discontinued During This Encounter   Medication Reason     ciprofloxacin (CIPRO) 250 MG tablet Medication Reconciliation Clean Up       Medications started since last [facility] medication reconciliation:  No orders of the " defined types were placed in this encounter.        REVIEW OF SYSTEMS:  10 point ROS of systems including Constitutional, Eyes, Respiratory, Cardiovascular, Gastroenterology, Genitourinary, Integumentary, Muscularskeletal, Psychiatric were all negative except for pertinent positives noted in my HPI.    Physical Exam:  /66  Pulse 110  Temp 97.9  F (36.6  C)  Resp 18  SpO2 97%  ENT:  Mouth and posterior oropharynx normal, moist mucous membranes  RESP:  respiratory effort and palpation of chest normal, lungs clear to auscultation , no respiratory distress  CV:  Palpation and auscultation of heart done , no edema, +2 pedal pulses, tachycardic slightly irregular  ABDOMEN:  normal bowel sounds, soft, nontender, no hepatosplenomegaly or other masses, no guarding or rebound  M/S:   Gait and station abnormal generalized weakness, ambulates with small steps, no joint tenderness, no spinal tenderness, no CVA tenderness, BLE 4/5, BUE strength 4/5  SKIN:  Inspection of skin and subcutaneous tissue baseline, Palpation of skin and subcutaneous tissue baseline, exam limited as patiet sitting up and fully clothed  NEURO:   Cranial nerves 2-12 are normal tested and grossly at patient's baseline, Examination of sensation by touch normal, slight left slided facial droop at baseline, speech clear  PSYCH:  oriented to self, place, insight and judgement impaired, memory impaired , affect and mood normal    Recent Labs:    CBC RESULTS:   Recent Labs   Lab Test  07/25/17   0617  07/24/17   1425   WBC  6.6  5.8   RBC  4.40  3.97   HGB  13.7  12.5   HCT  41.0  37.8   MCV  93  95   MCH  31.1  31.5   MCHC  33.4  33.1   RDW  13.1  13.1   PLT  231  233       Last Basic Metabolic Panel:  Recent Labs   Lab Test  08/09/17   0818  08/03/17   0615   NA  138  137   POTASSIUM  4.1  3.8   CHLORIDE  104  103   RUBIN  10.3*  10.2*   CO2  27  25   BUN  32*  27   CR  1.06*  1.30*   GLC  140*  159*       Liver Function Studies -   Recent Labs   Lab  Test  17   1425  10/25/12   0830  11   PROTTOTAL  6.8   --    --   6.9   ALBUMIN  3.8   --    --   4.2   BILITOTAL  0.5   --    --   1.4*   ALKPHOS  69   --    --   55   AST  16  26   < >  25   ALT  18  22   < >  16   BILIDIRECT   --    --    --   0.1    < > = values in this interval not displayed.       TSH   Date Value Ref Range Status   2017 4.42 (H) 0.40 - 4.00 mU/L Final   2017 9.96 (H) 0.40 - 4.00 mU/L Final   ]    Lab Results   Component Value Date    A1C 5.9 2012         Assessment/Plan:  (M62.81) Generalized muscle weakness  (primary encounter diagnosis)  Comment: unclear cause ? Residual weakness from UTI vs other infection vs dehydration vs?  UTI appears to be resolving, neurologically stable, ? New back pain but only intermittent and no pain on exam today, and no pain with palpation  Plan: BMP/CBC on 10/3, staff to encourage fluid/food intake, promote normal sleep, assist with ADL's as needed, continue PT/OT    (N39.0) Urinary tract infection without hematuria, site unspecified  Comment: appears to be resolving, symptoms improved, no further fevers  Plan: complete abx today, monitor for urinary symptoms. BMP/CBC on 10/3    (R00.0) Sinus tachycardia  Comment: ? unknown etioloyg. EKG snowed sinus tachycardia no obvious concerns for ischemia, afebrile, ? R/t to weakness, mild SOB likely due to generalized weakness, otherwise asymptomatic with no CP or palpiations   Plan: Monitor VS, monitor for symptoms. BMP/CBC with diff on 10/3.     Orders:  EKG today  BMP/CBC with diff on 10/3  Encourage oral intake      Total time with a new patient visit in the assisted livin minutes including discussions about the POC and care coordination with the patient and AL staff, voicemail left for rosalio Moran. Greater than 50% of total time spent with counseling and coordinating care due to patient/staff education, examination,      ADDN: Rosalio Durán called back, stated she had  noticed some increased confusion over the weekend, discussed current plan of care and she was agreeable.     Electronically signed by  YASMINE Tan CNP

## 2017-10-03 ENCOUNTER — HOSPITAL LABORATORY (OUTPATIENT)
Facility: OTHER | Age: 82
End: 2017-10-03

## 2017-10-03 LAB
ANION GAP SERPL CALCULATED.3IONS-SCNC: 7 MMOL/L (ref 3–14)
BASOPHILS # BLD AUTO: 0 10E9/L (ref 0–0.2)
BASOPHILS NFR BLD AUTO: 0.7 %
BUN SERPL-MCNC: 16 MG/DL (ref 7–30)
CALCIUM SERPL-MCNC: 9.6 MG/DL (ref 8.5–10.1)
CHLORIDE SERPL-SCNC: 106 MMOL/L (ref 94–109)
CO2 SERPL-SCNC: 26 MMOL/L (ref 20–32)
CREAT SERPL-MCNC: 1.16 MG/DL (ref 0.52–1.04)
DIFFERENTIAL METHOD BLD: ABNORMAL
EOSINOPHIL # BLD AUTO: 0.2 10E9/L (ref 0–0.7)
EOSINOPHIL NFR BLD AUTO: 2.5 %
ERYTHROCYTE [DISTWIDTH] IN BLOOD BY AUTOMATED COUNT: 13.1 % (ref 10–15)
GFR SERPL CREATININE-BSD FRML MDRD: 44 ML/MIN/1.7M2
GLUCOSE SERPL-MCNC: 144 MG/DL (ref 70–99)
HCT VFR BLD AUTO: 33.4 % (ref 35–47)
HGB BLD-MCNC: 10.9 G/DL (ref 11.7–15.7)
IMM GRANULOCYTES # BLD: 0 10E9/L (ref 0–0.4)
IMM GRANULOCYTES NFR BLD: 0.3 %
LYMPHOCYTES # BLD AUTO: 1.3 10E9/L (ref 0.8–5.3)
LYMPHOCYTES NFR BLD AUTO: 21.4 %
MCH RBC QN AUTO: 30.8 PG (ref 26.5–33)
MCHC RBC AUTO-ENTMCNC: 32.6 G/DL (ref 31.5–36.5)
MCV RBC AUTO: 94 FL (ref 78–100)
MONOCYTES # BLD AUTO: 0.6 10E9/L (ref 0–1.3)
MONOCYTES NFR BLD AUTO: 9.8 %
NEUTROPHILS # BLD AUTO: 3.9 10E9/L (ref 1.6–8.3)
NEUTROPHILS NFR BLD AUTO: 65.3 %
PLATELET # BLD AUTO: 270 10E9/L (ref 150–450)
POTASSIUM SERPL-SCNC: 3.9 MMOL/L (ref 3.4–5.3)
RBC # BLD AUTO: 3.54 10E12/L (ref 3.8–5.2)
SODIUM SERPL-SCNC: 139 MMOL/L (ref 133–144)
WBC # BLD AUTO: 6 10E9/L (ref 4–11)

## 2017-10-04 ENCOUNTER — ASSISTED LIVING VISIT (OUTPATIENT)
Dept: GERIATRICS | Facility: CLINIC | Age: 82
End: 2017-10-04
Payer: COMMERCIAL

## 2017-10-04 VITALS
SYSTOLIC BLOOD PRESSURE: 142 MMHG | RESPIRATION RATE: 20 BRPM | TEMPERATURE: 98.7 F | HEART RATE: 84 BPM | DIASTOLIC BLOOD PRESSURE: 79 MMHG | OXYGEN SATURATION: 93 %

## 2017-10-04 DIAGNOSIS — M62.81 GENERALIZED MUSCLE WEAKNESS: Primary | ICD-10-CM

## 2017-10-04 NOTE — PROGRESS NOTES
[place] [facility]    Chief Complaint   Patient presents with     RECHECK       HPI:    Deanne Zayas is a 84 year old  (6/19/1933), who is being seen today for an episodic [facility] at [facility].  HPI information obtained from: facility chart records, facility staff and patient report.    Today's concern is:  Generalized muscle weakness  Seen today for recheck as she has had increased weakness and confusion over the past few days. BMP and CBC 10/3 unremarkable. She recently completed cipro for e.coli UTI that was pan-sensitive. Of note she did receive flu vaccine last week. She has been afebrile. Did have tachycardia earlier this week, EKG showed NSR, and tachycardia now resolved. Urinary symptoms resolved. Patient reports she is feeling better today, and staff reports she is more alert and eating better today. They also report that she is less confused.        ALLERGIES: Donepezil  Past Medical, Surgical, Family and Social History reviewed and updated in [facility].    Current Outpatient Prescriptions   Medication Sig Dispense Refill     ciprofloxacin (CIPRO) 250 MG tablet Take 1 tablet (250 mg) by mouth 2 times daily 6 tablet 0     amLODIPine (NORVASC) 5 MG tablet Take 1 tablet (5 mg) by mouth daily 30 tablet 3     bisacodyl (DULCOLAX) 10 MG Suppository Place 10 mg rectally daily as needed for constipation       polyethylene glycol (MIRALAX/GLYCOLAX) powder Take 17 g by mouth daily as needed for constipation       Menthol, Topical Analgesic, (BIOFREEZE) 4 % GEL Externally apply topically 4 times daily as needed       ACETAMINOPHEN PO Take 1,000 mg by mouth 3 times daily       miconazole (MICATIN) 2 % AERP powder Apply topically 3 times daily as needed       atorvastatin (LIPITOR) 20 MG tablet Take 1 tablet (20 mg) by mouth daily 30 tablet 0     clopidogrel (PLAVIX) 75 MG tablet Take 1 tablet (75 mg) by mouth daily 30 tablet 1     aspirin 81 MG chewable tablet Take 1 tablet (81 mg) by mouth  "daily 31 tablet PRN     senna-docusate (SENNA S) 8.6-50 MG per tablet Take 1 tablet by mouth daily 31 tablet PRN     lisinopril (PRINIVIL/ZESTRIL) 40 MG tablet Take 1 tablet (40 mg) by mouth daily 31 tablet PRN     cholecalciferol (VITAMIN D3) 1000 UNIT tablet Take 2 tablets (2,000 Units) by mouth daily 62 tablet PRN     ferrous sulfate (IRON) 325 (65 FE) MG tablet Take 1 tablet (325 mg) by mouth daily 31 tablet PRN     levothyroxine (SYNTHROID/LEVOTHROID) 112 MCG tablet Take 1 tablet (112 mcg) by mouth daily 60 tablet 3     cyanocobalamin (VITAMIN B12) 1000 MCG/ML injection Inject 1 mL (1,000 mcg) Subcutaneous every 30 days 1 mL 11     syringe/needle, disp, 25G X 1\" 3 ML MISC USE AS DIRECTED WITH B12 INECTION 1 each 11     escitalopram (LEXAPRO) 10 MG tablet Take 1 tablet (10 mg) by mouth daily 31 tablet PRN     Medications reviewed:  Medications reconciled to facility chart and changes were made to reflect current medications as identified as above med list. Below are the changes that were made:   Medications stopped since last EPIC medication reconciliation:   There are no discontinued medications.    Medications started since last [facility] medication reconciliation:  No orders of the defined types were placed in this encounter.        REVIEW OF SYSTEMS:  4 point ROS including Respiratory, CV, GI and , other than that noted in the HPI,  is negative    Physical Exam:  /79  Pulse 84  Temp 98.7  F (37.1  C)  Resp 20  SpO2 93%  GENERAL APPEARANCE:  Alert, in no distress, cooperative  RESP:  respiratory effort and palpation of chest normal, lungs clear to auscultation , no respiratory distress, cough absent  CV:  Palpation and auscultation of heart done , regular rate and rhythm, no murmur, rub, or gallop, no edema, +2 pedal pulses  ABDOMEN:  normal bowel sounds, soft, nontender, no hepatosplenomegaly or other masses  M/S:   Gait and station abnormal primarily WC bounds, able to ambulate with walker  NEURO:   " Cranial nerves 2-12 are normal tested and grossly at patient's baseline, chronic slight left facial droop, BLE 4/5, BUE 4/5  PSYCH:  oriented to self, place, insight and judgement impaired, memory impaired , affect and mood normal, pleasant and smiling    Recent Labs:    CBC RESULTS:   Recent Labs   Lab Test  10/03/17   0800  07/25/17   0617   WBC  6.0  6.6   RBC  3.54*  4.40   HGB  10.9*  13.7   HCT  33.4*  41.0   MCV  94  93   MCH  30.8  31.1   MCHC  32.6  33.4   RDW  13.1  13.1   PLT  270  231       Last Basic Metabolic Panel:  Recent Labs   Lab Test  10/03/17   0800  08/09/17   0818   NA  139  138   POTASSIUM  3.9  4.1   CHLORIDE  106  104   RUBIN  9.6  10.3*   CO2  26  27   BUN  16  32*   CR  1.16*  1.06*   GLC  144*  140*       Liver Function Studies -   Recent Labs   Lab Test  07/24/17   1425  10/25/12   0830  08/24/11   PROTTOTAL  6.8   --    --   6.9   ALBUMIN  3.8   --    --   4.2   BILITOTAL  0.5   --    --   1.4*   ALKPHOS  69   --    --   55   AST  16  26   < >  25   ALT  18  22   < >  16   BILIDIRECT   --    --    --   0.1    < > = values in this interval not displayed.       TSH   Date Value Ref Range Status   03/09/2017 4.42 (H) 0.40 - 4.00 mU/L Final   01/26/2017 9.96 (H) 0.40 - 4.00 mU/L Final   ]    Lab Results   Component Value Date    A1C 5.9 08/21/2012         Assessment/Plan:  (M62.81) Generalized muscle weakness  (primary encounter diagnosis)  Comment: suspect r/t to UTI and flu vaccine that occurred concurrently  Plan: Monitor mentation, encourage oral intake, monitor VS, continue PT    Daughter Leeanna was called and updated on the plan.     Electronically signed by  YASMINE Tan CNP

## 2017-10-06 DIAGNOSIS — I63.012 CEREBROVASCULAR ACCIDENT (CVA) DUE TO THROMBOSIS OF LEFT VERTEBRAL ARTERY (H): ICD-10-CM

## 2017-10-06 DIAGNOSIS — I63.312 CEREBROVASCULAR ACCIDENT (CVA) DUE TO THROMBOSIS OF LEFT MIDDLE CEREBRAL ARTERY (H): ICD-10-CM

## 2017-10-06 DIAGNOSIS — I10 BENIGN ESSENTIAL HYPERTENSION: ICD-10-CM

## 2017-10-06 RX ORDER — CLOPIDOGREL BISULFATE 75 MG/1
75 TABLET ORAL DAILY
Qty: 31 TABLET | Refills: 98 | Status: SHIPPED | OUTPATIENT
Start: 2017-10-06 | End: 2018-10-19

## 2017-10-06 RX ORDER — ATORVASTATIN CALCIUM 20 MG/1
20 TABLET, FILM COATED ORAL DAILY
Qty: 31 TABLET | Refills: 98 | Status: SHIPPED | OUTPATIENT
Start: 2017-10-06 | End: 2018-10-19

## 2017-10-06 RX ORDER — AMLODIPINE BESYLATE 5 MG/1
5 TABLET ORAL DAILY
Qty: 31 TABLET | Refills: 98 | Status: SHIPPED | OUTPATIENT
Start: 2017-10-06 | End: 2018-02-19

## 2017-10-06 NOTE — TELEPHONE ENCOUNTER
THEIR NEXT CYCLE START NEXT THURSDAY (10/12), PLEASE AUTHORIZE FOR PATIENT SO THEY CAN START ON TIME. THANKS

## 2017-10-09 PROBLEM — Z76.89 HEALTH CARE HOME: Status: ACTIVE | Noted: 2017-10-09

## 2017-10-10 ENCOUNTER — CARE COORDINATION (OUTPATIENT)
Dept: GERIATRIC MEDICINE | Facility: CLINIC | Age: 82
End: 2017-10-10

## 2017-10-11 ENCOUNTER — ASSISTED LIVING VISIT (OUTPATIENT)
Dept: GERIATRICS | Facility: CLINIC | Age: 82
End: 2017-10-11
Payer: COMMERCIAL

## 2017-10-11 VITALS
WEIGHT: 182 LBS | BODY MASS INDEX: 30.32 KG/M2 | SYSTOLIC BLOOD PRESSURE: 142 MMHG | OXYGEN SATURATION: 93 % | HEART RATE: 84 BPM | TEMPERATURE: 98.7 F | DIASTOLIC BLOOD PRESSURE: 79 MMHG | RESPIRATION RATE: 20 BRPM | HEIGHT: 65 IN

## 2017-10-11 DIAGNOSIS — M62.81 GENERALIZED MUSCLE WEAKNESS: ICD-10-CM

## 2017-10-11 DIAGNOSIS — I63.312 CEREBROVASCULAR ACCIDENT (CVA) DUE TO THROMBOSIS OF LEFT MIDDLE CEREBRAL ARTERY (H): Primary | ICD-10-CM

## 2017-10-11 NOTE — PROGRESS NOTES
"  MEDICAL NECESSITY STATEMENT FOR DME    Demographic Information on Deanne Zayas:    Deanne Zayas  Gender: female  : 1933  [address]LEJANDRO  [address] [address]  [place] [address]  [phone] (home)     Medical Record: [id number]  Social Security Number: [id number]  Primary Care Provider: Kori Card  Insurance: Payor: MEDICARE / Plan: MEDICARE / Product Type: Medicare /       Cerebrovascular accident (CVA) due to thrombosis of left middle cerebral artery (H) [I63.312]    HOSPITAL BED:   with railings  Does the patient require positioning of the body in ways no feasible with an ordinary bed due to the medical condition that is expected to last at least 1 month? YES  Does the patient require for the alleviation of pain, positioning of the body in ways not feasible with an ordinary bed? NO  Does the patient require the head of the bed to be elevated more than 30 degrees most of the time due to CHF, chronic pulmonary disease or aspiration? NO  Does the patient require traction that can only be attached to a hospital bed? NO  Does the patient require a bed height different than a fixed height hospital bed to permit transfers to a chair, wheelchair, or standing position? YES  Does the patient require frequent changes in body position and/or have an immediate need for change in body position? YES  VITAL SIGNS:  Vitals: /79  Pulse 84  Temp 98.7  F (37.1  C)  Resp 20  Ht 5' 5\" (1.651 m)  Wt 182 lb (82.6 kg)  SpO2 93%  BMI 30.29 kg/m2  BMI= Body mass index is 30.29 kg/(m^2).       MEDICAL NECESSITY STATEMENT Due to weakness and decreased mobility from stroke as well as progression of Alzheimer's dementia my patient requires positioning not feasible in an ordinary bed. Due to difficulty with transfers from recent CVA she would require a variable height to reduce falls risk and improve safety during transfers from bed to chair. Due to reduced mobility from stroke as well as " progression from Alzheimer's she would benefit from lifetime use of semi-electric bed to allow for frequent position changes to reduce the risk of skin breakdown and improved quality of life.       Cerebrovascular accident (CVA) due to thrombosis of left middle cerebral artery (H) [I63.312]    ELECTRONICALLY SIGNED BY STEFAN CERTIFIED PROVIDER:  YASMINE Tan CNP   NPI: [id number]  [place] [facility]  [address] [place] [address], [address]  [place] [address]

## 2017-10-13 DIAGNOSIS — F41.9 ANXIETY: ICD-10-CM

## 2017-10-13 DIAGNOSIS — I10 BENIGN ESSENTIAL HYPERTENSION: ICD-10-CM

## 2017-10-13 DIAGNOSIS — E03.8 OTHER SPECIFIED HYPOTHYROIDISM: ICD-10-CM

## 2017-10-13 RX ORDER — ESCITALOPRAM OXALATE 10 MG/1
10 TABLET ORAL DAILY
Qty: 31 TABLET | Refills: 98 | Status: SHIPPED | OUTPATIENT
Start: 2017-10-13 | End: 2018-10-19

## 2017-10-13 RX ORDER — LEVOTHYROXINE SODIUM 112 UG/1
112 TABLET ORAL DAILY
Qty: 31 TABLET | Refills: 98 | Status: SHIPPED | OUTPATIENT
Start: 2017-10-13 | End: 2018-10-19

## 2017-10-13 RX ORDER — LISINOPRIL 40 MG/1
40 TABLET ORAL DAILY
Qty: 31 TABLET | Refills: 98 | Status: ON HOLD | OUTPATIENT
Start: 2017-10-13 | End: 2018-03-19

## 2017-10-20 ENCOUNTER — CARE COORDINATION (OUTPATIENT)
Dept: GERIATRIC MEDICINE | Facility: CLINIC | Age: 82
End: 2017-10-20

## 2017-10-25 ENCOUNTER — CARE COORDINATION (OUTPATIENT)
Dept: GERIATRIC MEDICINE | Facility: CLINIC | Age: 82
End: 2017-10-25

## 2017-10-27 ENCOUNTER — CARE COORDINATION (OUTPATIENT)
Dept: GERIATRIC MEDICINE | Facility: CLINIC | Age: 82
End: 2017-10-27

## 2017-10-31 DIAGNOSIS — E53.8 VITAMIN B 12 DEFICIENCY: ICD-10-CM

## 2017-11-03 RX ORDER — CYANOCOBALAMIN 1000 UG/ML
1 INJECTION, SOLUTION INTRAMUSCULAR; SUBCUTANEOUS
Qty: 1 ML | Refills: 98 | Status: SHIPPED | OUTPATIENT
Start: 2017-11-03 | End: 2019-02-25

## 2017-11-09 ENCOUNTER — CARE COORDINATION (OUTPATIENT)
Dept: GERIATRIC MEDICINE | Facility: CLINIC | Age: 82
End: 2017-11-09

## 2017-11-09 NOTE — PROGRESS NOTES
10-18-17   CC received transfer information from ginny Barreto [facility] on client.  She stated in her email she was unable to process the RS tool because she has not received information on the care received at the AL at this time.  She has sent the form for the RN at the AL to complete but has not received this back yet. She will complete as soon as she receives this.    Yennifer Hernandez MA [facility] Care Coordinator   [phone]      10-11-17   CC received information on a new client with [facility] that is at [facility] in [place].  CMS has requested transfer paperwork from previous CM so CC will wait for this to be sent and then reach out to client and/or family.   Yennifer Hernandez MA [facility] Care Coordinator   [phone]

## 2017-11-09 NOTE — PROGRESS NOTES
10-27-17   CC then reached out to [facility] billing department @ [phone] and talked with Taylor.  She stated that client was just opened to billing as of 11-1-17 for payment due of 3,731.00 and this due for the month of December.    She stated that this should be sent out soon to client and CC confirmed that address listed and it was the daughters address in [place].  CC then asked about Novembers payment and Taylor stated that this month is 'waived' because cause client is new to this spenddown.    CC will f/u with daughter with this information when she is back to see if she received statement or not.      CC called member daughter Mayra to f/u after visit and to let her know what CC had found out in regards to her questions.    CC explained that due to client income being so high that she should be receiving a statement from DHS requesting a payment be made directly to them because client income is 3 times the SSI limit.  Daughter said she had several letters and paperwork at her home so she would go thru these and see if there was anything.      CC then said that any specific questions in regards to how this was determined needed to go to the financial worker.  CC said she did reviewed with Betzaida the [facility] care insurance and CC confirmed that member was not paying anything for this and daughter stated that she was not.  CC then said it was just an added benefit to her with the MSHO being primary at this time.  CC discuss the MSHO options vs going straight MA and how this would affect client MA spenddown payment.    Daughter stated that she wanted to continue on the MSHO program at this time.      CC then told daughter that she was not able to see client specifically at her visit due to death in the unit when CC arrived.  Daughter stated understanding of this and CC said she would stop in again when in the area to see client.     CC said she would reach out to contact she had at [facility] billing to see if  statement had been issued and then f/u with her as information was found. Daughter said she was going to out to town for the next week so would f/u with CC if needed after she was back.     Yennifer Hernandez MA [facility] Coordinator   [phone]

## 2017-11-09 NOTE — PROGRESS NOTES
[facility] Transition HRA face to face visit      Home visit for transitional HRA face to face was done on 10-25-17    Member resides in [facility]isted [facility] [address] and lives in congregate setting    Present at assessment: Member was in the memory care unit and have a hard time that day due to death of another braydon just before CC arrived.  Review or transfer paperwork done with RN at that AL.  CC will return another time to interact more with member.      Current Care Plan  Member currently receiving the following services: Assisted Living services, DME, supplies and transportation as needed      Medication Review  Medication reconciliation completed in Epic:If no, please explain client lives in AL and NP does med rec    Medication set-up & administration: RN sets up  weekly.  Assisting Living staff administers medications.  Medication understanding/adherence done by RN at the AL.      Mental/Behavioral Health   Depression Screening not done due to cognitive dx  .     Falls in last 12 months: Yes: per RN at the AL  If yes, was an injury sustained? No    ADL/IADL Dependencies: Bathing, Dressing, Grooming, Eating, Positioning, Transfers, Toileting, Wheelchair, Cleaning, Cooking, Laundry, Shopping, Meal prep, Medication Management, Money Management and Transportation  - client is in the memory care unit, ofe lift is used and is total cares at this time.      PCA Assessment completed at visit: Not applicable   If yes, will process assessment and communicate results to member within 10 days.  Elderly Waiver Eligibility: Yes-will continue on EW.  EW opened in August 2017 so CC will see client again in July 2018 for EW to continue      Care Plan & Recommendations: CC reviewed transfer information and feels the current level of support in place for client is meeting client needs.  RN at AL will reach out to CC if any changes or concerns.      See [place] [facility] assessment and transfer information for  further details.      Follow-Up Plan: Member informed of future contact, plan to f/u with member with a 6 month telephone assessment.  Contact information shared with member and family, encouraged member to call with any questions or concerns at any time.  [place] care continuum providers: Please refer to Health Care Home on the Epic Problem List to view this patient's [facility] Care Plan Summary.    Yennifer AHMADI   [facility] Care Coordinator   [phone]

## 2017-11-09 NOTE — PROGRESS NOTES
10-24-17   CC reached out to [employer] financial worker to review EW spendown and confirm that CC is ongoing CM for client at this time.  Betzaida stated that client income is 3 times the SSI limit so because of this high income she is eligible for EW but with a MA spendown that is paid directly to [facility].    Client MS spendown is 3,731.00 per month.  Betzaida state that bill should come directly from [facility] to client for this payment.      Financial worker then asked if client cares at the AL were more then what her MA spendown would be and CC stated that they were.  Discussion was then had that client could choose to get off of the [facility] plan and then the MA spendown would need to be paid right to the facility.  Client would then be a 'straight MA client' and this might be a barrier for some provider to be paid.  CC agreed with this and said she would discuss with client daughter when she called her back.     Yennifer Hernandez MA [facility] Care Coordinator   [phone]      10-20-17   CC reviewed transfer information from previous CM and then made contact with client daughter Mayra.  Daughter stated that she has a lot of questions in regards to client insurance at this time.  Daughter shared history of client that she has been private pay in the traditional AL up until the time she had a stroke.  She was hospitalized and then went to TCU.  During this stay it was determined that client needed memory care due to the level of cares she needs.  This then was too much for client to privately pay for.  This is why they applied for MA thru [facility].      Mayra stated concerns about 'not having paid anything so far' and that this is very concerning to her because she is used to private pay.  Daughter also asked about client  insurance due to client spouse being lifetime [employer].and if this was going to be able to be continued.  CC said she was unsure of the answer to these questions but she  would research them and get back to her once she found out.      Mayra also asked that all mailings come to her so CC said she would changed this in the [facility] system.  Mayra number is [phone].      CC then said she was planning on being at the facility on 10-25 and wanted to know if daughter wanted to be present when CC went to visit member.  Daughter said she did not need to be there and the member may not interact with CC much.  CC stated understanding of this and said she would get needed information from the RN at the facility.      Yennifer Hernandez MA [facility] Care Coordinator   [phone]

## 2017-11-09 NOTE — PROGRESS NOTES
While CC was at the AL she meet with [facility] AL director to discuss that client had an MA spendown and review how this will look different then the traditional EW spendown in regards to payment to the facility.     Connie was able to show CC letters that she had received from [facility] stating that for August client had a spendown that was due to be paid to the facility and CC said this was prior to member being with [facility].  There were no letters in regards to Sept but then facility did receive statement from [facility] stating that the facility was to NOT collect payment from the member as of 10/17.  CC said this would true to how the MA spendown works and then member should have statement from [facility] requesting the MA amount.      Connie said she would work with client daughter to figure out rent payment and payments per the statement that [facility] had sent them and she would reach out to CC if there were any concerns or questions.   Yennifer Hernandez MA [facility] Care Coordinator   [phone]

## 2017-11-09 NOTE — PROGRESS NOTES
11-9-17   CC completed transition HRA and also RS tool for client and submitted this to CMS to complete.    CC also send the RS tool back to previous CM because this she had not yet receive information from AL at time of visit on 10-25 so CC stated she would gather this into the tool and then she could complete her part and send in authorization as well.  Client is maxed on the authorization for her CM in the RS tool at this time and RN at AL stated understanding of this at the visit on 10-25.  Yennifer Hernandez MA [facility] Care Coordinator   [phone]

## 2017-11-13 ENCOUNTER — CARE COORDINATION (OUTPATIENT)
Dept: GERIATRIC MEDICINE | Facility: CLINIC | Age: 82
End: 2017-11-13

## 2017-11-13 NOTE — PROGRESS NOTES
Mailed copy of CL tool to client, faxed copy to AL facility, uploaded into [employer] and submitted authorization to health plan.    Licha Wolf  Case Management Specialist  [facility]   [phone]

## 2017-11-14 NOTE — PROGRESS NOTES
Order placed with [facility] (p: [phone]; f: [phone]) for pull-ups & tabbed overnights.  Order placed on 11/13/2017. Access updated.  As required, authorization submitted to health plan.    Order placed with [employer] (p: [phone]; f: [phone]) for Boost (1 bottle/day).  Order placed on 11/14/2017. Access updated.  As required, authorization submitted to health plan.    Licha Wolf  Case Management Specialist  [facility]   [phone]

## 2017-11-15 ENCOUNTER — CARE COORDINATION (OUTPATIENT)
Dept: GERIATRIC MEDICINE | Facility: CLINIC | Age: 82
End: 2017-11-15

## 2017-11-15 NOTE — PROGRESS NOTES
11-15-17   CC reached out to talk to Betzaida financial worker at [facility] in regards to MA spenddown bills from [employer]. CC reviewed the AL statements that stated that spenddown was able to be collected for September but as of October spendown would NOT come from client.  Betzaida stated this would be correct since client was on MSHO as of 10-1-17. CC then asked about the month of Nov since CC has contacted [facility] and was told that [facility] would be sending bill for client as of Dec.  Betzaida stated that they may be a month of transition for client so we could not be sure until claims are processed so she would encourage family to wait while this transition is taking place to see how this will process.  CC said she would encourage the family to reach out to her with any further questions.      CC then reached out to [facility] billing per daughter request due to bill that was sent to client for 51.46.  They stated that they had received updated billing information on client since bill was issued.  The bill was paid so they will reimburse member for this amount and this could take 4-12 weeks.    CC confirmed member insurance information and they did have the [employer] MSHO product as insurance.     CC then reached out to client daughter Windy to review information about billing on member.  Daphnie stated that she did receive a bill from DHS for 3,731.00 and this was for January 2018.  CC stated she had been told that a bill would be generated for December so encouraged her to hold on to the funds for client while this process works itself out and to reach out if she had any questions or further concerns.    CC then said that Boost and products were order and to let CC know if there are any concerns or further questions with this.  Daughter said she would   Yennifer Hernandez MA [facility] Care Coordinator   [phone]

## 2017-11-15 NOTE — PROGRESS NOTES
CC reached out to Taylor at [facility] billing to confirm that payment schedule for client.  She stated that invoices are generated on the 4th of every month with the due date the 15th of the following month.  Client was opened to MA spenddown 10-27 so October is waived.  payement was received on 11-13 for November so invoice was sent 11-4 that is due 12-15 and this is for January MA spenddown.      CC then called daughter to confirm this payment cycle and she stated understanding of this and will reach out with any further questions or concerns.   Yennifer Hernandez MA [facility] Care Coordinator   [phone]

## 2017-11-21 ENCOUNTER — CARE COORDINATION (OUTPATIENT)
Dept: GERIATRIC MEDICINE | Facility: CLINIC | Age: 82
End: 2017-11-21

## 2017-11-22 NOTE — PROGRESS NOTES
11-21-17   CC was at the AL and stopped in to see member who lives in the memory care unit in the AL. Member stated she was feeling well and thankful to meet CC today. CC then f/u with RN at AL and asked that if products and Boost do not come to let CC know.   Yennifer Hernandez MA [facility] Coordinator   [phone]

## 2017-12-06 NOTE — PROGRESS NOTES
Deanne Zayas 6/19/1933 Pull?ups & tabbed overnights 11/13/2017 Delivered 12/5/17     Per [facility] delivered. CM notified    Mariya Guzman  Case Management Specialist   [facility]   [phone]

## 2017-12-12 DIAGNOSIS — K59.00 CONSTIPATION, UNSPECIFIED CONSTIPATION TYPE: ICD-10-CM

## 2017-12-12 RX ORDER — AMOXICILLIN 250 MG
1 CAPSULE ORAL DAILY
Qty: 31 TABLET | Status: SHIPPED | OUTPATIENT
Start: 2017-12-12 | End: 2019-01-04

## 2017-12-13 NOTE — PROGRESS NOTES
Per Fernanda Goldstein CM notified    Deanne Zayas 6/19/1933 Boost (1 bottle/day)  shipping today, 12/12 11/14/2017         Mariya Guzman  Case Management Specialist   [facility]   [phone]

## 2017-12-27 DIAGNOSIS — I25.10 CORONARY ARTERY DISEASE INVOLVING NATIVE HEART WITHOUT ANGINA PECTORIS, UNSPECIFIED VESSEL OR LESION TYPE: ICD-10-CM

## 2017-12-27 DIAGNOSIS — M81.0 SENILE OSTEOPOROSIS: ICD-10-CM

## 2017-12-27 RX ORDER — ASPIRIN 81 MG/1
81 TABLET, CHEWABLE ORAL DAILY
Qty: 31 TABLET | Refills: 98 | Status: ON HOLD | OUTPATIENT
Start: 2017-12-27 | End: 2018-03-19

## 2018-01-02 DIAGNOSIS — D50.8 IRON DEFICIENCY ANEMIA SECONDARY TO INADEQUATE DIETARY IRON INTAKE: ICD-10-CM

## 2018-01-02 DIAGNOSIS — M19.90 ARTHRITIS: Primary | ICD-10-CM

## 2018-01-02 RX ORDER — FERROUS SULFATE 325(65) MG
325 TABLET ORAL DAILY
Qty: 31 TABLET | Status: SHIPPED | OUTPATIENT
Start: 2018-01-02 | End: 2019-01-04

## 2018-01-02 RX ORDER — NAPROXEN SODIUM 220 MG
TABLET ORAL
Qty: 60 TABLET | Refills: 98 | Status: SHIPPED | OUTPATIENT
Start: 2018-01-02 | End: 2018-02-19

## 2018-01-10 ENCOUNTER — ASSISTED LIVING VISIT (OUTPATIENT)
Dept: GERIATRICS | Facility: CLINIC | Age: 83
End: 2018-01-10
Payer: COMMERCIAL

## 2018-01-10 VITALS
WEIGHT: 176.3 LBS | SYSTOLIC BLOOD PRESSURE: 155 MMHG | DIASTOLIC BLOOD PRESSURE: 71 MMHG | RESPIRATION RATE: 18 BRPM | OXYGEN SATURATION: 97 % | BODY MASS INDEX: 29.34 KG/M2 | TEMPERATURE: 97.8 F | HEART RATE: 73 BPM

## 2018-01-10 DIAGNOSIS — E53.8 VITAMIN B 12 DEFICIENCY: ICD-10-CM

## 2018-01-10 DIAGNOSIS — Z00.00 ANNUAL PHYSICAL EXAM: ICD-10-CM

## 2018-01-10 DIAGNOSIS — Z86.73 HISTORY OF CVA (CEREBROVASCULAR ACCIDENT): ICD-10-CM

## 2018-01-10 DIAGNOSIS — G30.1 LATE ONSET ALZHEIMER'S DISEASE WITHOUT BEHAVIORAL DISTURBANCE (H): Primary | ICD-10-CM

## 2018-01-10 DIAGNOSIS — M81.0 SENILE OSTEOPOROSIS: ICD-10-CM

## 2018-01-10 DIAGNOSIS — I10 BENIGN ESSENTIAL HYPERTENSION: ICD-10-CM

## 2018-01-10 DIAGNOSIS — F41.9 ANXIETY: ICD-10-CM

## 2018-01-10 DIAGNOSIS — I25.10 CORONARY ARTERY DISEASE INVOLVING NATIVE HEART WITHOUT ANGINA PECTORIS, UNSPECIFIED VESSEL OR LESION TYPE: ICD-10-CM

## 2018-01-10 DIAGNOSIS — K59.00 CONSTIPATION, UNSPECIFIED CONSTIPATION TYPE: ICD-10-CM

## 2018-01-10 DIAGNOSIS — E03.4 HYPOTHYROIDISM DUE TO ACQUIRED ATROPHY OF THYROID: ICD-10-CM

## 2018-01-10 DIAGNOSIS — D50.8 IRON DEFICIENCY ANEMIA SECONDARY TO INADEQUATE DIETARY IRON INTAKE: ICD-10-CM

## 2018-01-10 DIAGNOSIS — F02.80 LATE ONSET ALZHEIMER'S DISEASE WITHOUT BEHAVIORAL DISTURBANCE (H): Primary | ICD-10-CM

## 2018-01-10 NOTE — PROGRESS NOTES
[facility]  Chief Complaint   Patient presents with     Annual Comprehensive Nursing Home       HPI:    Deanne Zayas is a 84 year old  (6/19/1933), who is being seen today for an annual comprehensive visit at [facility].  HPI information obtained from: facility chart records, facility staff, patient report and [facility] chart review.      Today's concerns are:  Late onset Alzheimer's disease without behavioral disturbance  Annual physical exam  She is cooperative and pleasant on exam today, does have some obvious confusion - while in her room, she asked if that was where she lived all the time, did not remember what she had for lunch even though exam took place right after the meal. She is not on any medications for memory loss. Staff has noted some increased irritability at times, but no other behavioral concerns. Weight has been stable, she is eating well.     Benign essential hypertension  Most recent BP mild, elevated, but all previous over the past 2 months have been SBP <140. She is on Norvasc 5mg, lisinopril 40mg. Denies any chest pain or SOB.    Coronary artery disease involving native heart without angina pectoris, unspecified vessel or lesion type  History of CVA (cerebrovascular accident)  Had CVA 7/2017 with right sided weakness. She is primarily WC bound, but can walk short distances with her walker. She is currently on ASA, Plavix and Lipitor. She does c/o of mild right leg pain, which she has had since the stoke - she is on naproxen, and tylenol TID. Staff have not noted any pain cues recently.    Senile osteoporosis  No recent fractures noted, did have two falls in the past 6 months without injury. She is on a vitamin D supplement.     Hypothyroidism due to acquired atrophy of thyroid  Currently on synthroid 112mcg, most recent TSH was 4.4 in 3/2017. No recent weight loss of mood changes, staff report that she is sleeping well.     Vitamin B 12 deficiency  Iron  deficiency anemia secondary to inadequate dietary iron intake  Hgb has been fairly stable 11-12l she is currently on a vitamin B12 supplement and an iron supplement.     Constipation, unspecified constipation type  She is currenty on PRN miralax, PRN suppository, and senna once daily. Staff report no recent concerns. She denies any abdominal pain.     Anxiety  She is currently on lexapro, she is calm and cooperative on exam today. Has been sleeping well. Staff reports some occasional irritability recently, but no other concerns.         ALLERGIES: Donepezil  PROBLEM LIST:  Patient Active Problem List   Diagnosis     Hyperlipidemia LDL goal <130     Hypothyroidism due to acquired atrophy of thyroid     Vitamin B12 deficiency disease     Vitamin D deficiency disease     Essential hypertension     Osteopenia     Advance care planning     Hypertension goal BP (blood pressure) < 140/90     Mixed incontinence     Anxiety     Diaper dermatitis     Late onset Alzheimer's disease without behavioral disturbance     Facial droop     Acute cystitis without hematuria     UTI (urinary tract infection)     Fall     Stroke (H)     Health Care Home     PAST MEDICAL HISTORY:  has no past medical history on file.  PAST SURGICAL HISTORY:  has a past surgical history that includes Cholecystectomy; surgical history of - (2012 ); and Cystoscopy (N/A, 8/29/2014).  FAMILY HISTORY: family history includes Family History Negative in her father and mother; Obesity in her sister; Unknown/Adopted in her mother.  SOCIAL HISTORY:  reports that she has never smoked. She has never used smokeless tobacco. She reports that she does not drink alcohol or use illicit drugs.  IMMUNIZATIONS:  Most Recent Immunizations   Administered Date(s) Administered     Influenza (High Dose) 3 valent vaccine 09/26/2017     Influenza (IIV3) PF 09/19/2013     Influenza Vaccine IM 3yrs+ 4 Valent IIV4 10/08/2014     Pneumo Conj 13-V (2010&after) 09/24/2015     Pneumococcal  "23 valent 08/16/2005     TD (ADULT, 7+) 10/13/2009     TDAP Vaccine (Adacel) 10/16/2012     Above immunizations pulled from [facility]. [facility] and facility records also reconciled. Outstanding information sent to  to update [facility] .  Future immunizations are not needed at this point as all recommended immunizations are up to date.   MEDICATIONS:  Current Outpatient Prescriptions   Medication Sig Dispense Refill     ferrous sulfate (IRON) 325 (65 FE) MG tablet Take 1 tablet (325 mg) by mouth daily 31 tablet PRN     naproxen sodium (ANAPROX) 220 MG tablet Take 1 tablet po qd with food & may take 1 tablet po qd prn with food 60 tablet 98     aspirin 81 MG chewable tablet Take 1 tablet (81 mg) by mouth daily 31 tablet 98     cholecalciferol (VITAMIN D3) 1000 UNIT tablet Take 2 tablets (2,000 Units) by mouth daily 62 tablet 98     senna-docusate (SENNA S) 8.6-50 MG per tablet Take 1 tablet by mouth daily 31 tablet PRN     cyanocobalamin (VITAMIN B12) 1000 MCG/ML injection Inject 1 mL (1,000 mcg) Subcutaneous every 30 days 1 mL 98     syringe/needle, disp, 25G X 1\" 3 ML MISC USE AS DIRECTED WITH B12 INECTION 1 each 98     escitalopram (LEXAPRO) 10 MG tablet Take 1 tablet (10 mg) by mouth daily 31 tablet 98     levothyroxine (SYNTHROID/LEVOTHROID) 112 MCG tablet Take 1 tablet (112 mcg) by mouth daily 31 tablet 98     lisinopril (PRINIVIL/ZESTRIL) 40 MG tablet Take 1 tablet (40 mg) by mouth daily 31 tablet 98     clopidogrel (PLAVIX) 75 MG tablet Take 1 tablet (75 mg) by mouth daily 31 tablet 98     atorvastatin (LIPITOR) 20 MG tablet Take 1 tablet (20 mg) by mouth daily 31 tablet 98     amLODIPine (NORVASC) 5 MG tablet Take 1 tablet (5 mg) by mouth daily 31 tablet 98     bisacodyl (DULCOLAX) 10 MG Suppository Place 10 mg rectally daily as needed for constipation       polyethylene glycol (MIRALAX/GLYCOLAX) powder Take 17 g by mouth daily as needed for constipation       Menthol, Topical Analgesic, " (BIOFREEZE) 4 % GEL Externally apply topically 4 times daily as needed       ACETAMINOPHEN PO Take 1,000 mg by mouth 3 times daily       miconazole (MICATIN) 2 % AERP powder Apply topically 3 times daily as needed       Medications reviewed:  Medications reconciled to facility chart and changes were made to reflect current medications as identified as above med list. Below are the changes that were made:   Medications stopped since last EPIC medication reconciliation:   There are no discontinued medications.    Medications started since last [facility] medication reconciliation:  No orders of the defined types were placed in this encounter.        Case Management:  I have reviewed the Assisted Living care plan, current immunizations and preventive care/cancer screening..Future cancer screening is not clinically indicated secondary to age/goals of care Patient's desire to return to the community is not assessible due to cognitive impairment. Current Level of Care is appropriate.      Advance Directive Discussion:    I reviewed the current advanced directives as reflected in EPIC, the POLST and the facility chart, and verified the congruency of orders. I contacted the first party Leeanna Moran and left a message regarding the plan of Care.  I did not due to cognitive impairment review the advance directives with the resident.     Team Discussion:  I communicated with the appropriate disciplines involved with the Plan of Care:   Nursing      Patient Goal:  Patient's goal is unobtainable secondary to cognitive impairment.    Information reviewed:  Medications, vital signs, orders, and nursing notes.    ROS:  10 point ROS of systems including Constitutional, Eyes, Respiratory, Cardiovascular, Gastroenterology, Genitourinary, Integumentary, Muscularskeletal, Psychiatric were all negative except for pertinent positives noted in my HPI.    Exam:  /71  Pulse 73  Temp 97.8  F (36.6  C)  Resp 18  Wt 176 lb 4.8 oz (80 kg)   SpO2 97%  BMI 29.34 kg/m2  GENERAL APPEARANCE:  Alert, in no distress, cooperative  ENT:  Mouth and posterior oropharynx normal, moist mucous membranes, [place], hearing aids in place  RESP:  respiratory effort and palpation of chest normal, lungs clear to auscultation , no respiratory distress, on RA  CV:  Palpation and auscultation of heart done , regular rate and rhythm, no murmur, rub, or gallop, no edema, +2 pedal pulses  ABDOMEN:  normal bowel sounds, soft, nontender, no hepatosplenomegaly or other masses, no guarding or rebound  M/S:   Gait and station abnormal primarily WC bound, can ambulate short distances with walker, small steps, decreased ROM to RLE, BLE 4/5, BUE 4/5  SKIN:  Inspection of skin and subcutaneous tissue baseline, Palpation of skin and subcutaneous tissue baseline, exam somewhat limited and patient is fully clothed and sitting up in wheelchair  NEURO:   Cranial nerves 2-12 are normal tested and grossly at patient's baseline, Examination of sensation by touch normal, speech clear  PSYCH:  oriented to self, time, insight and judgement impaired, memory impaired , affect and mood normal     Lab/Diagnostic data:     CBC RESULTS:   Recent Labs   Lab Test  10/03/17   0800  07/25/17   0617   WBC  6.0  6.6   RBC  3.54*  4.40   HGB  10.9*  13.7   HCT  33.4*  41.0   MCV  94  93   MCH  30.8  31.1   MCHC  32.6  33.4   RDW  13.1  13.1   PLT  270  231       Last Basic Metabolic Panel:  Recent Labs   Lab Test  10/03/17   0800  08/09/17   0818   NA  139  138   POTASSIUM  3.9  4.1   CHLORIDE  106  104   RUBIN  9.6  10.3*   CO2  26  27   BUN  16  32*   CR  1.16*  1.06*   GLC  144*  140*       Liver Function Studies -   Recent Labs   Lab Test  07/24/17   1425  10/25/12   0830  08/24/11   PROTTOTAL  6.8   --    --   6.9   ALBUMIN  3.8   --    --   4.2   BILITOTAL  0.5   --    --   1.4*   ALKPHOS  69   --    --   55   AST  16  26   < >  25   ALT  18  22   < >  16   BILIDIRECT   --    --    --   0.1    < > = values in  this interval not displayed.       TSH   Date Value Ref Range Status   03/09/2017 4.42 (H) 0.40 - 4.00 mU/L Final   01/26/2017 9.96 (H) 0.40 - 4.00 mU/L Final   ]    Lab Results   Component Value Date    A1C 5.9 08/21/2012         ASSESSMENT/PLAN  (G30.1,  F02.80) Late onset Alzheimer's disease without behavioral disturbance  (primary encounter diagnosis)  (Z00.00) Annual physical exam  Comment: Expect further cognitive and functional declines. Expect weight loss. Appropriate for memory care unit.  Plan: Continue cares per unit, monitor weights, mood and behaviors, adjust as needed.     (I10) Benign essential hypertension  Comment: slightly elevated recently, but has been otherwise fairly well controlled  Plan: Continue medications as above, monitor weekly x2 and update NP - if >150 will consider dose adjustment of medications    (I25.10) Coronary artery disease involving native heart without angina pectoris, unspecified vessel or lesion type  (Z86.73) History of CVA (cerebrovascular accident)  Comment: stable, only minimal right sided residual  Plan: Continue medications as above, monitor and adjust PRN.    (M81.0) Senile osteoporosis  Comment: no recent fractures, but she remains a falls risk  Plan: Continue medications as above, monitor and adjust PRN. Falls precautions per unit    (E03.4) Hypothyroidism due to acquired atrophy of thyroid  Comment: chronic, stable,  Plan: Continue medications as above, TSH in 2 months    (E53.8) Vitamin B 12 deficiency  (D50.8) Iron deficiency anemia secondary to inadequate dietary iron intake  Comment: stable  Plan: Continue medications as above, HGb in 2 months    (K59.00) Constipation, unspecified constipation type  Comment: chronic, stable  Plan: Continue medications as above, monitor and adjust PRN.    (F41.9) Anxiety  Depression screen done: PHQ-2 Given screen score and clinical assessment patient is stable on current plan of care/interventions of lexapro and on going  assessment will continue. Will consider dose reduction in the future.     Based on JNC-8 goals,  patients age of 84 year old, no presence of diabetes or CKD, and goals of care goal BP is <150/90 mm Hg. patients BP is higher than goal and will adjust plan of care by monitor BP weekly x 2 weeks, if remains elevated will adjust medication.      Orders:  1. Check Bp weekly x2 weeks and update provider  2. TSH in 2 months Dx: Hypothyrodism    Electronically signed by:  YASMINE Tan CNP

## 2018-01-30 PROBLEM — Z76.89 HEALTH CARE HOME: Chronic | Status: ACTIVE | Noted: 2017-10-09

## 2018-02-19 ENCOUNTER — ASSISTED LIVING VISIT (OUTPATIENT)
Dept: GERIATRICS | Facility: CLINIC | Age: 83
End: 2018-02-19
Payer: COMMERCIAL

## 2018-02-19 VITALS
HEART RATE: 79 BPM | WEIGHT: 195 LBS | TEMPERATURE: 98.4 F | SYSTOLIC BLOOD PRESSURE: 138 MMHG | BODY MASS INDEX: 32.45 KG/M2 | DIASTOLIC BLOOD PRESSURE: 78 MMHG | OXYGEN SATURATION: 99 % | RESPIRATION RATE: 18 BRPM

## 2018-02-19 DIAGNOSIS — F02.80 LATE ONSET ALZHEIMER'S DISEASE WITHOUT BEHAVIORAL DISTURBANCE (H): ICD-10-CM

## 2018-02-19 DIAGNOSIS — R29.6 RECURRENT FALLS: Primary | ICD-10-CM

## 2018-02-19 DIAGNOSIS — L89.519: ICD-10-CM

## 2018-02-19 DIAGNOSIS — G30.1 LATE ONSET ALZHEIMER'S DISEASE WITHOUT BEHAVIORAL DISTURBANCE (H): ICD-10-CM

## 2018-02-19 NOTE — PROGRESS NOTES
[facility]    Chief Complaint   Patient presents with     RECHECK       HPI:    Deanne Zayas is a 84 year old  (6/19/1933), who is being seen today for an episodic care visit at [facility].  HPI information obtained from: facility chart records, facility staff, patient report and [facility] chart review.    Today's concern is:  Recurrent falls  Late onset Alzheimer's disease without behavioral disturbance  Patient fell over the weekend - staff found her in the bathroom. She had apparently taken self to bathroom and fell when trying to re-dress herself. Vitals and neuro checks stable over the weekend. Staff typically has Deanne stay in the common room to better supervise her, but she had been alone in her room at the time of the fall. She does not remember fall on exam today. Denies any pain except her chronic right leg pain. She is on Lexapro 10mg for anxiety. Staff have noted some increased irritation recently, but no other behavioral concerns. She does forget to call for help and due to stroke last summer does have some right-sided weakness.     Pressure Ulcer  Patient developed pressure area with some skin breakdown on right lateral ankle. Staff reports she always sleeps on her right side and had refuses position change. She now sleeps with pressure reduction boot in place and sore is healing. Denies any pain at site.     ALLERGIES: Donepezil  Past Medical, Surgical, Family and Social History reviewed and [facility] in [facility].    Current Outpatient Prescriptions   Medication Sig Dispense Refill     ferrous sulfate (IRON) 325 (65 FE) MG tablet Take 1 tablet (325 mg) by mouth daily 31 tablet PRN     aspirin 81 MG chewable tablet Take 1 tablet (81 mg) by mouth daily 31 tablet 98     cholecalciferol (VITAMIN D3) 1000 UNIT tablet Take 2 tablets (2,000 Units) by mouth daily 62 tablet 98     senna-docusate (SENNA S) 8.6-50 MG per tablet Take 1 tablet by mouth daily 31 tablet PRN      "cyanocobalamin (VITAMIN B12) 1000 MCG/ML injection Inject 1 mL (1,000 mcg) Subcutaneous every 30 days 1 mL 98     syringe/needle, disp, 25G X 1\" 3 ML MISC USE AS DIRECTED WITH B12 INECTION 1 each 98     escitalopram (LEXAPRO) 10 MG tablet Take 1 tablet (10 mg) by mouth daily 31 tablet 98     levothyroxine (SYNTHROID/LEVOTHROID) 112 MCG tablet Take 1 tablet (112 mcg) by mouth daily 31 tablet 98     lisinopril (PRINIVIL/ZESTRIL) 40 MG tablet Take 1 tablet (40 mg) by mouth daily 31 tablet 98     clopidogrel (PLAVIX) 75 MG tablet Take 1 tablet (75 mg) by mouth daily 31 tablet 98     atorvastatin (LIPITOR) 20 MG tablet Take 1 tablet (20 mg) by mouth daily 31 tablet 98     bisacodyl (DULCOLAX) 10 MG Suppository Place 10 mg rectally daily as needed for constipation       polyethylene glycol (MIRALAX/GLYCOLAX) powder Take 17 g by mouth daily as needed for constipation       Menthol, Topical Analgesic, (BIOFREEZE) 4 % GEL Externally apply topically 4 times daily as needed       ACETAMINOPHEN PO Take 1,000 mg by mouth 3 times daily       miconazole (MICATIN) 2 % AERP powder Apply topically 3 times daily as needed       Medications reviewed:  Medications reconciled to facility chart and changes were made to reflect current medications as identified as above med list. Below are the changes that were made:   Medications stopped since last EPIC medication reconciliation:   There are no discontinued medications.    Medications started since last [facility] medication reconciliation:  No orders of the defined types were placed in this encounter.        REVIEW OF SYSTEMS:  Limited secondary to cognitive impairment but today pt reports chronic right leg pain, denies any joint tenderness or lightheadedness    Physical Exam:  /78  Pulse 79  Temp 98.4  F (36.9  C)  Resp 18  Wt 195 lb (88.5 kg)  SpO2 99%  BMI 32.45 kg/m2  GENERAL APPEARANCE:  Alert, in no distress, cooperative  RESP:  respiratory effort and palpation of chest " normal, lungs clear to auscultation , no respiratory distress, on RA  CV:  Palpation and auscultation of heart done , regular rate and rhythm, no murmur, rub, or gallop, no edema, +2 pedal pulses  ABDOMEN:  normal bowel sounds, soft, nontender, no hepatosplenomegaly or other masses, no guarding or rebound  M/S:   Gait and station abnormal decreased movement to RLE, primarily WC bound, no joint tenderness  SKIN:  Inspection of skin and subcutaneous tissue baseline, Palpation of skin and subcutaneous tissue baseline, ulcer-right, lateral malleolus, size:patel, description:scabbed/dry flakey skin, no erythema or drainage  NEURO:   Cranial nerves 2-12 are normal tested and grossly at patient's baseline, Examination of sensation by touch normal  PSYCH:  oriented to self, place, insight and judgement impaired, memory impaired , affect and mood normal    Recent Labs:     CBC RESULTS:   Recent Labs   Lab Test  10/03/17   0800  07/25/17   0617   WBC  6.0  6.6   RBC  3.54*  4.40   HGB  10.9*  13.7   HCT  33.4*  41.0   MCV  94  93   MCH  30.8  31.1   MCHC  32.6  33.4   RDW  13.1  13.1   PLT  270  231       Last Basic Metabolic Panel:  Recent Labs   Lab Test  10/03/17   0800  08/09/17   0818   NA  139  138   POTASSIUM  3.9  4.1   CHLORIDE  106  104   RUBIN  9.6  10.3*   CO2  26  27   BUN  16  32*   CR  1.16*  1.06*   GLC  144*  140*       Liver Function Studies -   Recent Labs   Lab Test  07/24/17   1425  10/25/12   0830  08/24/11   PROTTOTAL  6.8   --    --   6.9   ALBUMIN  3.8   --    --   4.2   BILITOTAL  0.5   --    --   1.4*   ALKPHOS  69   --    --   55   AST  16  26   < >  25   ALT  18  22   < >  16   BILIDIRECT   --    --    --   0.1    < > = values in this interval not displayed.       TSH   Date Value Ref Range Status   03/09/2017 4.42 (H) 0.40 - 4.00 mU/L Final   01/26/2017 9.96 (H) 0.40 - 4.00 mU/L Final   ]    Lab Results   Component Value Date    A1C 5.9 08/21/2012         Assessment/Plan:  (R29.6) Recurrent falls   (primary encounter diagnosis)  (G30.1,  F02.80) Late onset Alzheimer's disease without behavioral disturbance  Comment: Suspect multi-factorial r/t progression of dementia, decreased mobility r/t CVA, is on lexapro for anxiety, but this appears well controlled recently. She was alone and not supervised and time of fall.   Plan: Staff to add motion sensor to bathroom, continue to supervise/encourage patient to stay in common area. Will continue at current of lexapro for now. Continue cares per [facility]    (L89.519) Decubitus ulcer of right ankle, unspecified ulcer stage  Comment: healing well, no s/s of infection  Plan: continue pressure reduction boots at night, monitor and adjust PRN     Orders:  No new orders        Electronically signed by  YASMINE Tan CNP

## 2018-02-26 ENCOUNTER — CARE COORDINATION (OUTPATIENT)
Dept: GERIATRIC MEDICINE | Facility: CLINIC | Age: 83
End: 2018-02-26

## 2018-02-26 NOTE — PROGRESS NOTES
2-27-18   CC did receive email back from SATYA Reich at the AL.  She stated that member was well at this time and they are continuing to monitor members skin as needed.  She also stated that NP saw member as appropriate and she will reach out to CC as needed.   CC will reach out to member daughter again and leave her VM to call back if needed.   [facility] Six-Month Telephone Assessment    6 month telephone assessment completed.    ER visits: No  Hospitalizations: No  TCU stays: No  Significant health status changes: member now requires heal protectors for skin integrity.  RN at [facility] will reach out to CC if any more DME is needed and NP will continue to follow as well.    Falls/Injuries: Yes: in her room at the AL facility.  NP did see after fall.    ADL/IADL changes: No  Changes in services: No    Caregiver Assessment follow up:  None at this time     Goals: See POC in chart for goal progress documentation.  Updated.     Will see client in 6 months for an annual health risk assessment.   Encouraged client to call CM with any questions or concerns in the meantime.   Yennifer Hernandez MA [facility] Care Coordinator   [phone]      2-26-18   CC called and left VM for member daughter to follow up on 6 month call.  CC then reviewed EPIC and found that member took a fall last week while in her room.  CC then called and left VM for RN at the [facility] to f/u.   Yennifer Hernandez MA [facility] Care Coordinator   [phone]

## 2018-03-08 ENCOUNTER — HOSPITAL LABORATORY (OUTPATIENT)
Facility: OTHER | Age: 83
End: 2018-03-08

## 2018-03-08 LAB — TSH SERPL DL<=0.005 MIU/L-ACNC: 1.66 MU/L (ref 0.4–4)

## 2018-03-16 ENCOUNTER — APPOINTMENT (OUTPATIENT)
Dept: MRI IMAGING | Facility: CLINIC | Age: 83
DRG: 309 | End: 2018-03-16
Attending: EMERGENCY MEDICINE
Payer: COMMERCIAL

## 2018-03-16 ENCOUNTER — MEDICAL CORRESPONDENCE (OUTPATIENT)
Dept: HEALTH INFORMATION MANAGEMENT | Facility: CLINIC | Age: 83
End: 2018-03-16

## 2018-03-16 ENCOUNTER — APPOINTMENT (OUTPATIENT)
Dept: CT IMAGING | Facility: CLINIC | Age: 83
DRG: 309 | End: 2018-03-16
Attending: EMERGENCY MEDICINE
Payer: COMMERCIAL

## 2018-03-16 ENCOUNTER — HOSPITAL ENCOUNTER (INPATIENT)
Facility: CLINIC | Age: 83
LOS: 3 days | Discharge: NURSING, REHAB OR LONG-TERM CARE | DRG: 309 | End: 2018-03-19
Attending: EMERGENCY MEDICINE | Admitting: FAMILY MEDICINE
Payer: COMMERCIAL

## 2018-03-16 DIAGNOSIS — I48.91 ATRIAL FIBRILLATION WITH RAPID VENTRICULAR RESPONSE (H): ICD-10-CM

## 2018-03-16 DIAGNOSIS — N30.00 ACUTE CYSTITIS WITHOUT HEMATURIA: Primary | ICD-10-CM

## 2018-03-16 DIAGNOSIS — I10 BENIGN ESSENTIAL HYPERTENSION: ICD-10-CM

## 2018-03-16 DIAGNOSIS — M62.81 GENERALIZED MUSCLE WEAKNESS: ICD-10-CM

## 2018-03-16 PROBLEM — Z86.73 HISTORY OF CVA (CEREBROVASCULAR ACCIDENT): Status: ACTIVE | Noted: 2018-03-16

## 2018-03-16 PROBLEM — R11.0 NAUSEA: Status: ACTIVE | Noted: 2018-03-16

## 2018-03-16 PROBLEM — R11.2 NON-INTRACTABLE VOMITING WITH NAUSEA: Status: ACTIVE | Noted: 2018-03-16

## 2018-03-16 LAB
ALBUMIN UR-MCNC: NEGATIVE MG/DL
ANION GAP SERPL CALCULATED.3IONS-SCNC: 8 MMOL/L (ref 3–14)
APPEARANCE UR: CLEAR
APTT PPP: <20 SEC (ref 22–37)
BACTERIA #/AREA URNS HPF: ABNORMAL /HPF
BASOPHILS # BLD AUTO: 0 10E9/L (ref 0–0.2)
BASOPHILS NFR BLD AUTO: 0.3 %
BILIRUB UR QL STRIP: NEGATIVE
BUN SERPL-MCNC: 18 MG/DL (ref 7–30)
CALCIUM SERPL-MCNC: 8.5 MG/DL (ref 8.5–10.1)
CHLORIDE SERPL-SCNC: 105 MMOL/L (ref 94–109)
CO2 SERPL-SCNC: 25 MMOL/L (ref 20–32)
COLOR UR AUTO: YELLOW
CREAT SERPL-MCNC: 0.99 MG/DL (ref 0.52–1.04)
DIFFERENTIAL METHOD BLD: ABNORMAL
EOSINOPHIL # BLD AUTO: 0.1 10E9/L (ref 0–0.7)
EOSINOPHIL NFR BLD AUTO: 0.8 %
ERYTHROCYTE [DISTWIDTH] IN BLOOD BY AUTOMATED COUNT: 13.3 % (ref 10–15)
GFR SERPL CREATININE-BSD FRML MDRD: 53 ML/MIN/1.7M2
GLUCOSE SERPL-MCNC: 168 MG/DL (ref 70–99)
GLUCOSE UR STRIP-MCNC: NEGATIVE MG/DL
HCT VFR BLD AUTO: 37.6 % (ref 35–47)
HGB BLD-MCNC: 12.4 G/DL (ref 11.7–15.7)
HGB UR QL STRIP: ABNORMAL
IMM GRANULOCYTES # BLD: 0 10E9/L (ref 0–0.4)
IMM GRANULOCYTES NFR BLD: 0.3 %
INR PPP: 1.05 (ref 0.86–1.14)
KETONES UR STRIP-MCNC: NEGATIVE MG/DL
LEUKOCYTE ESTERASE UR QL STRIP: ABNORMAL
LYMPHOCYTES # BLD AUTO: 0.4 10E9/L (ref 0.8–5.3)
LYMPHOCYTES NFR BLD AUTO: 6 %
MCH RBC QN AUTO: 30.8 PG (ref 26.5–33)
MCHC RBC AUTO-ENTMCNC: 33 G/DL (ref 31.5–36.5)
MCV RBC AUTO: 93 FL (ref 78–100)
MONOCYTES # BLD AUTO: 0.5 10E9/L (ref 0–1.3)
MONOCYTES NFR BLD AUTO: 6.3 %
MUCOUS THREADS #/AREA URNS LPF: PRESENT /LPF
NEUTROPHILS # BLD AUTO: 6.2 10E9/L (ref 1.6–8.3)
NEUTROPHILS NFR BLD AUTO: 86.3 %
NITRATE UR QL: NEGATIVE
PH UR STRIP: 5 PH (ref 5–7)
PLATELET # BLD AUTO: 205 10E9/L (ref 150–450)
POTASSIUM SERPL-SCNC: 4 MMOL/L (ref 3.4–5.3)
RBC # BLD AUTO: 4.03 10E12/L (ref 3.8–5.2)
RBC #/AREA URNS AUTO: 4 /HPF (ref 0–2)
SODIUM SERPL-SCNC: 138 MMOL/L (ref 133–144)
SOURCE: ABNORMAL
SP GR UR STRIP: 1.01 (ref 1–1.03)
TROPONIN I SERPL-MCNC: <0.015 UG/L (ref 0–0.04)
UROBILINOGEN UR STRIP-MCNC: 0 MG/DL (ref 0–2)
WBC # BLD AUTO: 7.2 10E9/L (ref 4–11)
WBC #/AREA URNS AUTO: 37 /HPF (ref 0–5)

## 2018-03-16 PROCEDURE — 96361 HYDRATE IV INFUSION ADD-ON: CPT | Performed by: EMERGENCY MEDICINE

## 2018-03-16 PROCEDURE — 25000128 H RX IP 250 OP 636: Performed by: EMERGENCY MEDICINE

## 2018-03-16 PROCEDURE — 93005 ELECTROCARDIOGRAM TRACING: CPT | Performed by: EMERGENCY MEDICINE

## 2018-03-16 PROCEDURE — 84484 ASSAY OF TROPONIN QUANT: CPT | Performed by: EMERGENCY MEDICINE

## 2018-03-16 PROCEDURE — 87088 URINE BACTERIA CULTURE: CPT | Performed by: EMERGENCY MEDICINE

## 2018-03-16 PROCEDURE — 93010 ELECTROCARDIOGRAM REPORT: CPT | Mod: Z6 | Performed by: EMERGENCY MEDICINE

## 2018-03-16 PROCEDURE — 85610 PROTHROMBIN TIME: CPT | Performed by: EMERGENCY MEDICINE

## 2018-03-16 PROCEDURE — 87186 SC STD MICRODIL/AGAR DIL: CPT | Performed by: EMERGENCY MEDICINE

## 2018-03-16 PROCEDURE — 25000125 ZZHC RX 250: Performed by: EMERGENCY MEDICINE

## 2018-03-16 PROCEDURE — A9585 GADOBUTROL INJECTION: HCPCS | Performed by: EMERGENCY MEDICINE

## 2018-03-16 PROCEDURE — 81003 URINALYSIS AUTO W/O SCOPE: CPT | Performed by: EMERGENCY MEDICINE

## 2018-03-16 PROCEDURE — 93005 ELECTROCARDIOGRAM TRACING: CPT | Mod: 76 | Performed by: EMERGENCY MEDICINE

## 2018-03-16 PROCEDURE — 96374 THER/PROPH/DIAG INJ IV PUSH: CPT | Performed by: EMERGENCY MEDICINE

## 2018-03-16 PROCEDURE — 99223 1ST HOSP IP/OBS HIGH 75: CPT | Mod: AI | Performed by: PHYSICIAN ASSISTANT

## 2018-03-16 PROCEDURE — 87086 URINE CULTURE/COLONY COUNT: CPT | Performed by: EMERGENCY MEDICINE

## 2018-03-16 PROCEDURE — 25000132 ZZH RX MED GY IP 250 OP 250 PS 637: Performed by: EMERGENCY MEDICINE

## 2018-03-16 PROCEDURE — 96375 TX/PRO/DX INJ NEW DRUG ADDON: CPT | Performed by: EMERGENCY MEDICINE

## 2018-03-16 PROCEDURE — 70553 MRI BRAIN STEM W/O & W/DYE: CPT

## 2018-03-16 PROCEDURE — 70450 CT HEAD/BRAIN W/O DYE: CPT

## 2018-03-16 PROCEDURE — 85730 THROMBOPLASTIN TIME PARTIAL: CPT | Performed by: EMERGENCY MEDICINE

## 2018-03-16 PROCEDURE — 85025 COMPLETE CBC W/AUTO DIFF WBC: CPT | Performed by: EMERGENCY MEDICINE

## 2018-03-16 PROCEDURE — 12000007 ZZH R&B INTERMEDIATE

## 2018-03-16 PROCEDURE — 80048 BASIC METABOLIC PNL TOTAL CA: CPT | Performed by: EMERGENCY MEDICINE

## 2018-03-16 PROCEDURE — 25000128 H RX IP 250 OP 636

## 2018-03-16 PROCEDURE — 70496 CT ANGIOGRAPHY HEAD: CPT

## 2018-03-16 PROCEDURE — 99285 EMERGENCY DEPT VISIT HI MDM: CPT | Mod: 25 | Performed by: EMERGENCY MEDICINE

## 2018-03-16 PROCEDURE — 93010 ELECTROCARDIOGRAM REPORT: CPT | Mod: 76 | Performed by: EMERGENCY MEDICINE

## 2018-03-16 RX ORDER — DILTIAZEM HCL 60 MG
60 TABLET ORAL EVERY 6 HOURS SCHEDULED
Status: DISCONTINUED | OUTPATIENT
Start: 2018-03-16 | End: 2018-03-16

## 2018-03-16 RX ORDER — GADOBUTROL 604.72 MG/ML
8 INJECTION INTRAVENOUS ONCE
Status: COMPLETED | OUTPATIENT
Start: 2018-03-16 | End: 2018-03-16

## 2018-03-16 RX ORDER — DILTIAZEM HCL 60 MG
60 TABLET ORAL EVERY 6 HOURS SCHEDULED
Status: DISPENSED | OUTPATIENT
Start: 2018-03-16 | End: 2018-03-18

## 2018-03-16 RX ORDER — ONDANSETRON 2 MG/ML
INJECTION INTRAMUSCULAR; INTRAVENOUS
Status: COMPLETED
Start: 2018-03-16 | End: 2018-03-16

## 2018-03-16 RX ORDER — DILTIAZEM HYDROCHLORIDE 5 MG/ML
15 INJECTION INTRAVENOUS ONCE
Status: COMPLETED | OUTPATIENT
Start: 2018-03-16 | End: 2018-03-16

## 2018-03-16 RX ORDER — IOPAMIDOL 755 MG/ML
70 INJECTION, SOLUTION INTRAVASCULAR ONCE
Status: COMPLETED | OUTPATIENT
Start: 2018-03-16 | End: 2018-03-16

## 2018-03-16 RX ORDER — NALOXONE HYDROCHLORIDE 0.4 MG/ML
.1-.4 INJECTION, SOLUTION INTRAMUSCULAR; INTRAVENOUS; SUBCUTANEOUS
Status: DISCONTINUED | OUTPATIENT
Start: 2018-03-16 | End: 2018-03-19 | Stop reason: HOSPADM

## 2018-03-16 RX ADMIN — ONDANSETRON 4 MG: 2 INJECTION INTRAMUSCULAR; INTRAVENOUS at 13:55

## 2018-03-16 RX ADMIN — IOPAMIDOL 70 ML: 755 INJECTION, SOLUTION INTRAVENOUS at 12:42

## 2018-03-16 RX ADMIN — SODIUM CHLORIDE 100 ML: 9 INJECTION, SOLUTION INTRAVENOUS at 12:42

## 2018-03-16 RX ADMIN — DILTIAZEM HYDROCHLORIDE 15 MG: 5 INJECTION INTRAVENOUS at 14:45

## 2018-03-16 RX ADMIN — SODIUM CHLORIDE 500 ML: 9 INJECTION, SOLUTION INTRAVENOUS at 14:48

## 2018-03-16 RX ADMIN — DILTIAZEM HYDROCHLORIDE 60 MG: 60 TABLET, FILM COATED ORAL at 15:56

## 2018-03-16 RX ADMIN — GADOBUTROL 8 ML: 604.72 INJECTION INTRAVENOUS at 20:15

## 2018-03-16 NOTE — PROGRESS NOTES
Pt transferred from [place] via EMT, confusion and (R) sided weakness.   Pt is alert w/mild confusion, follows commands.   Tachycardic

## 2018-03-16 NOTE — IP AVS SNAPSHOT
[facility]    [address] [address]    Phone:  [phone]    Fax:  [phone]                                       After Visit Summary   3/16/2018    Deanne Zayas    MRN: [id number]           After Visit Summary Signature Page     I have received my discharge instructions, and my questions have been answered. I have discussed any challenges I see with this plan with the nurse or doctor.    ..........................................................................................................................................  Patient/Patient Representative Signature      ..........................................................................................................................................  Patient Representative Print Name and Relationship to Patient    ..................................................               ................................................  Date                                            Time    ..........................................................................................................................................  Reviewed by Signature/Title    ...................................................              ..............................................  Date                                                            Time

## 2018-03-16 NOTE — ED NOTES
Pt wretching and vomiting. Also heart rate changed from nsr to a-fib up to 150's. The patient is tremulous and uncomfortable. The patient's daughter shows concern.

## 2018-03-16 NOTE — IP AVS SNAPSHOT
MRN:[id number]                      After Visit Summary   3/16/2018    Deanne Zayas    MRN: [id number]           Thank you!     Thank you for choosing [facility] for your care. Our goal is always to provide you with excellent care. Hearing back from our patients is one way we can continue to improve our services. Please take a few minutes to complete the written survey that you may receive in the mail after you visit with us. Thank you!        Patient Information     Date Of Birth          6/19/1933        Designated Caregiver       Most Recent Value    Caregiver    Will someone help with your care after discharge? no [facility resident]      About your hospital stay     You were admitted on:  March 16, 2018 You last received care in the:  [facility]    You were discharged on:  March 19, 2018       Who to Call     For medical emergencies, please call 911.  For non-urgent questions about your medical care, please call your primary care provider or clinic, [phone]          Attending Provider     Provider Specialty    Gutierrez Prabhakar MD Emergency Medicine    [facility]    Cristofer Cristobal MD [facility]    Jared Osman MD Internal Medicine       Primary Care Provider Office Phone # Fax #    YASMINE Powell [phone]      After Care Instructions     Activity - Up with nursing assistance           Advance Diet as Tolerated       Follow this diet upon discharge: Orders Placed This Encounter      Low Saturated Fat Na <2400 mg            Fall precautions           General info for SNF       Length of Stay Estimate: Short Term Care: Estimated # of Days <30  Condition at Discharge: Stable  Level of care:skilled   Rehabilitation Potential: Fair  Admission H&P remains valid and up-to-date: Yes  Recent Chemotherapy: N/A  Use Nursing Home Standing Orders: Yes            Mantoux instructions       Give two-step  "Mantoux (PPD) Per Facility Policy Yes                  Follow-up Appointments     Follow Up and recommended labs and tests       Follow up with primary care provider in 2 weeks.                  Additional Services     Occupational Therapy Adult Consult       Evaluate and treat as clinically indicated.    Reason:  weakness            Physical Therapy Adult Consult       Evaluate and treat as clinically indicated.    Reason:  weakness                  Pending Results     Date and Time Order Name Status Description    3/17/2018 0752 EKG 12-lead, tracing only In process             Statement of Approval     Ordered          18 1059  I have reviewed and agree with all the recommendations and orders detailed in this document.  EFFECTIVE NOW     Approved and electronically signed by:  Jared Osman MD             Admission Information     Date & Time Provider Department Dept. Phone    3/16/2018 Jared Osman MD [facility] [place] [facility] Surgical [phone]      Your Vitals Were     Blood Pressure Pulse Temperature Respirations Height Weight    147/69 63 96.8  F (36  C) (Oral) 16 1.676 m (5' 6\") 81.5 kg (179 lb 10.8 oz)    Pulse Oximetry BMI (Body Mass Index)                95% 29 kg/m2          [employer] Information     [employer] lets you send messages to your doctor, view your test results, renew your prescriptions, schedule appointments and more. To sign up, go to [email or web address] . Click on \"Log in\" on the left side of the screen, which will take you to the Welcome page. Then click on \"Sign up Now\" on the right side of the page.     You will be asked to enter the access code listed below, as well as some personal information. Please follow the directions to create your username and password.     Your access code is: [email or web address]  Expires: 2018  8:05 PM     Your access code will  in 90 days. If you need help or a new code, please call your [facility] or [phone].        Care EveryWhere ID     " This is your Care EveryWhere ID. This could be used by other organizations to access your [facility] medical records  [id number]        Equal Access to Services     ROBIN NEVAREZ : Hadii kirill Ly, wapbalexia francoisnehaha, soo joleenselina junior, julian valenciain hayaajade fowlercristy montero lagene [facility] [phone].    ATENCIÓN: Si habla español, tiene a lopez disposición servicios gratuitos de asistencia lingüística. Llame al [phone].    We comply with applicable federal civil rights laws and [place] laws. We do not discriminate on the basis of race, color, national origin, age, disability, sex, sexual orientation, or gender identity.               Review of your medicines      START taking        Dose / Directions    ciprofloxacin 250 MG tablet   Commonly known as:  CIPRO   Indication:  Urinary Tract Infection   Used for:  Acute cystitis without hematuria        Dose:  250 mg   Take 1 tablet (250 mg) by mouth 2 times daily   Quantity:  8 tablet   Refills:  0       diltiazem 240 MG 24 hr capsule        Dose:  240 mg   Start taking on:  3/20/2018   Take 1 capsule (240 mg) by mouth daily   Quantity:  30 capsule   Refills:  3         CONTINUE these medicines which may have CHANGED, or have new prescriptions. If we are uncertain of the size of tablets/capsules you have at home, strength may be listed as something that might have changed.        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)        Dose:  20 mg   Take 1 tablet (20 mg) by mouth daily   Quantity:  31 tablet   Refills:  98       BIOFREEZE 4 % Gel   Generic drug:  Menthol (Topical Analgesic)        Externally apply topically 4 times daily as needed   Refills:  0       bisacodyl 10 MG Suppository   Commonly known as:  DULCOLAX        Dose:  10 mg   Place 10 mg rectally daily as needed for constipation   Refills:  0       cholecalciferol 1000 UNIT tablet   Commonly known as:  vitamin D3   Used for:  Senile osteoporosis        Dose:  2000 Units   Take 2 tablets (2,000 Units) by mouth daily   Quantity:  62 tablet   Refills:  98       clopidogrel 75 MG tablet   Commonly known as:  PLAVIX   Used for:  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)        Dose:  75 mg   Take 1 tablet (75 mg) by mouth daily   Quantity:  31 tablet   Refills:  98       cyanocobalamin 1000 MCG/ML injection   Commonly known as:  VITAMIN B12   Used for:  Vitamin B 12 deficiency        Dose:  1 mL   Inject 1 mL (1,000 mcg) Subcutaneous every 30 days   Quantity:  1 mL   Refills:  98       escitalopram 10 MG tablet   Commonly known as:  LEXAPRO   Used for:  Anxiety        Dose:  10 mg   Take 1 tablet (10 mg) by mouth daily   Quantity:  31 tablet   Refills:  98       ferrous sulfate 325 (65 FE) MG tablet   Commonly known as:  IRON   Used for:  Iron deficiency anemia secondary to inadequate dietary iron intake        Dose:  325 mg   Take 1 tablet (325 mg) by mouth daily   Quantity:  31 tablet   Refills:  PRN       levothyroxine 112 MCG tablet   Commonly known as:  SYNTHROID/LEVOTHROID   Used for:  Other specified hypothyroidism        Dose:  112 mcg   Take 1 tablet (112 mcg) by mouth daily   Quantity:  31 tablet   Refills:  98       miconazole 2 % Aerp powder   Commonly known as:  MICATIN        Apply topically 3 times daily as needed   Refills:  0       polyethylene glycol powder   Commonly known  "as:  MIRALAX/GLYCOLAX        Dose:  17 g   Take 17 g by mouth daily as needed for constipation   Refills:  0       senna-docusate 8.6-50 MG per tablet   Commonly known as:  SENNA S   Used for:  Constipation, unspecified constipation type        Dose:  1 tablet   Take 1 tablet by mouth daily   Quantity:  31 tablet   Refills:  PRN       syringe/needle (disp) 25G X 1\" 3 ML Misc   Used for:  Vitamin B 12 deficiency        USE AS DIRECTED WITH B12 INECTION   Quantity:  1 each   Refills:  98         STOP taking     AMLODIPINE BESYLATE PO           aspirin 81 MG chewable tablet                Where to get your medicines      Some of these will need a paper prescription and others can be bought over the counter. Ask your nurse if you have questions.     Bring a paper prescription for each of these medications     ciprofloxacin 250 MG tablet    diltiazem 240 MG 24 hr capsule    lisinopril 5 MG tablet                Protect others around you: Learn how to safely use, store and throw away your medicines at www.disposemymeds.org.        ANTIBIOTIC INSTRUCTION     You've Been Prescribed an Antibiotic - Now What?  Your healthcare team thinks that you or your loved one might have an infection. Some infections can be treated with antibiotics, which are powerful, life-saving drugs. Like all medications, antibiotics have side effects and should only be used when necessary. There are some important things you should know about your antibiotic treatment.      Your healthcare team may run tests before you start taking an antibiotic.    Your team may take samples (e.g., from your blood, urine or other areas) to run tests to look for bacteria. These test can be important to determine if you need an antibiotic at all and, if you do, which antibiotic will work best.      Within a few days, your healthcare team might change or even stop your antibiotic.    Your team may start you on an antibiotic while they are working to find out what is " making you sick.    Your team might change your antibiotic because test results show that a different antibiotic would be better to treat your infection.    In some cases, once your team has more information, they learn that you do not need an antibiotic at all. They may find out that you don't have an infection, or that the antibiotic you're taking won't work against your infection. For example, an infection caused by a virus can't be treated with antibiotics. Staying on an antibiotic when you don't need it is more likely to be harmful than helpful.      You may experience side effects from your antibiotic.    Like all medications, antibiotics have side effects. Some of these can be serious.    Let you healthcare team know if you have any known allergies when you are admitted to the hospital.    One significant side effect of nearly all antibiotics is the risk of severe and sometimes deadly diarrhea caused by Clostridium difficile (C. Difficile). This occurs when a person takes antibiotics because some good germs are destroyed. Antibiotic use allows C. diificile to take over, putting patients at high risk for this serious infection.    As a patient or caregiver, it is important to understand your or your loved one's antibiotic treatment. It is especially important for caregivers to speak up when patients can't speak for themselves. Here are some important questions to ask your healthcare team.    What infection is this antibiotic treating and how do you know I have that infection?    What side effects might occur from this antibiotic?    How long will I need to take this antibiotic?    Is it safe to take this antibiotic with other medications or supplements (e.g., vitamins) that I am taking?     Are there any special directions I need to know about taking this antibiotic? For example, should I take it with food?    How will I be monitored to know whether my infection is responding to the antibiotic?    What tests  may help to make sure the right antibiotic is prescribed for me?      Information provided by:  www.cdc.gov/getsmart  U.S. Department of Health and Human Services  Centers for disease Control and Prevention  National Center for Emerging and Zoonotic Infectious Diseases  Division of Healthcare Quality Promotion             Medication List: This is a list of all your medications and when to take them. Check marks below indicate your daily home schedule. Keep this list as a reference.      Medications           Morning Afternoon Evening Bedtime As Needed    ACETAMINOPHEN PO   Take 1,000 mg by mouth 3 times daily                                atorvastatin 20 MG tablet   Commonly known as:  LIPITOR   Take 1 tablet (20 mg) by mouth daily   Last time this was given:  20 mg on 3/19/2018  9:38 AM                                BIOFREEZE 4 % Gel   Externally apply topically 4 times daily as needed   Generic drug:  Menthol (Topical Analgesic)                                bisacodyl 10 MG Suppository   Commonly known as:  DULCOLAX   Place 10 mg rectally daily as needed for constipation                                cholecalciferol 1000 UNIT tablet   Commonly known as:  vitamin D3   Take 2 tablets (2,000 Units) by mouth daily   Last time this was given:  2,000 Units on 3/19/2018  9:38 AM                                ciprofloxacin 250 MG tablet   Commonly known as:  CIPRO   Take 1 tablet (250 mg) by mouth 2 times daily   Last time this was given:  250 mg on 3/19/2018  9:38 AM                                clopidogrel 75 MG tablet   Commonly known as:  PLAVIX   Take 1 tablet (75 mg) by mouth daily   Last time this was given:  75 mg on 3/19/2018  9:38 AM                                cyanocobalamin 1000 MCG/ML injection   Commonly known as:  VITAMIN B12   Inject 1 mL (1,000 mcg) Subcutaneous every 30 days                                diltiazem 240 MG 24 hr capsule   Take 1 capsule (240 mg) by mouth daily   Start taking on:  " 3/20/2018   Last time this was given:  240 mg on 3/19/2018  9:38 AM                                escitalopram 10 MG tablet   Commonly known as:  LEXAPRO   Take 1 tablet (10 mg) by mouth daily   Last time this was given:  10 mg on 3/19/2018  9:38 AM                                ferrous sulfate 325 (65 FE) MG tablet   Commonly known as:  IRON   Take 1 tablet (325 mg) by mouth daily   Last time this was given:  325 mg on 3/19/2018  9:38 AM                                levothyroxine 112 MCG tablet   Commonly known as:  SYNTHROID/LEVOTHROID   Take 1 tablet (112 mcg) by mouth daily   Last time this was given:  112 mcg on 3/19/2018  6:56 AM                                lisinopril 5 MG tablet   Commonly known as:  PRINIVIL/ZESTRIL   Take 1 tablet (5 mg) by mouth daily                                miconazole 2 % Aerp powder   Commonly known as:  MICATIN   Apply topically 3 times daily as needed                                polyethylene glycol powder   Commonly known as:  MIRALAX/GLYCOLAX   Take 17 g by mouth daily as needed for constipation                                senna-docusate 8.6-50 MG per tablet   Commonly known as:  SENNA S   Take 1 tablet by mouth daily   Last time this was given:  1 tablet on 3/17/2018  8:40 AM                                syringe/needle (disp) 25G X 1\" 3 ML Misc   USE AS DIRECTED WITH B12 INECTION                                  "

## 2018-03-16 NOTE — ED PROVIDER NOTES
History     Chief Complaint   Patient presents with     One-sided Weakness     R sided weakness     HPI  Deanne Zayas is a 84 year old female who arrives by ambulance from local memory care unit.  She typically is able to move and transfer with something to hold onto.  Today she is unable to stand up and bear weight on her own.  This was noted during her morning nursing check.  She had one episode of vomiting this morning.  She denies headache, visual change, chest pain, abdominal pain.  She is a poor historian secondary to her dementia.  There is no report of recent trauma or febrile illness.  She takes 81 mg aspirin , 75 mg clopidogrel daily.  History of stroke 2017.    Problem List:    Patient Active Problem List    Diagnosis Date Noted     Atrial fibrillation with rapid ventricular response (H) 2018     Priority: Medium     History of CVA (cerebrovascular accident) 2018     Priority: Medium     Right hemiparesis (H), secondary to previous CVA 2018     Priority: Medium     Non-intractable vomiting with nausea 2018     Priority: Medium     Health Care Home 10/09/2017     Priority: Medium     [facility]   Yennifer Hernandez MA, LS  [phone]    [facility] CARE PLAN SUMMARY    Client Name:  Deanne Zayas    Address:  C/O LEEANNA ALLEN  [address] [place] [address] Phone: [phone] (home)    :  1933 Date of Assessment:  10-25-17 transitional HRA   17 [place] Choices assessment     Health Plan:  [facility]  Health Plan Number: [id number] Medical Assistance Number: [id number]  Financial Worker:  Betzaida Barron   Case #:     [facility] :  [facility] Phone:  [phone]   Fax:  [phone]   FVP Enrollment Date: 10/1/17 Case Mix:  J  Rate Cell:  B  Waiver Type:  EW opened 17   Primary Emergency Contact: Leeanna Allen  Address: [address] [place] [address]  Home Phone:  "[phone]  Mobile Phone: [phone]  Relation: Daughter    Secondary Emergency Contact: Gutierrez Moran  Address: [id number] [place] [id number]  Home Phone: [phone]  Mobile Phone: [phone]  Relation: None Language:  English  :  No   Health Care Agent/POA:   Advanced Directives/Living Will:     Primary Care Clinic/Phone/Fax:  [facility]/(p) [phone], (f) [phone] Primary Dx:  Alzheimer's F02.08  Secondary Dx:  Stroke I63.9   Primary Physician:  Kori Card   Height:  5' 5\"  Weight:  186 lbs   Specialty Physician:    Audiologist:     Eye Care Provider:   Dental Care Provider:    [facility]: [facility] [phone] or [phone]   Other:        [facility] CURRENT SERVICES SUMMARY  Equipment owned/DME history: Hospital bed - [facility]     SERVICE TYPE/PROVIDER NAME/PHONE AUTH DATE FREQUENCY Units OR $ Amt DESCRIPTION   Medical Transportation: [facility] [phone]  Fax:  10-1-17 thru 9-30-18  Review annually/PRN   as needed     Assisted Living: [facility] [phone] 24 hr Customized Living  Fax:  10-1-17 thru 9-30-18  Review annually/PRN daily See RS/AL Tool    DME: [facility] [phone]  Fax:                 [employer]   [phone]        [facility]  [phone]   10-1-17 thru 9-30-18  Review annually/PRN            12-1-17  thru 9-30-18          Rented 10-24-17 started     monthly Pull ups - size L/XL - 8    Tabbed Overnight briefs - 2 per night      Boost - one bottle daily   Chocolate                       Stroke (H) 07/26/2017     Priority: Medium     Facial droop 07/24/2017     Priority: Medium     Acute cystitis without hematuria 07/24/2017     Priority: Medium     UTI (urinary tract infection) 07/24/2017     Priority: Medium     Fall 07/24/2017     Priority: Medium     Late onset Alzheimer's disease without behavioral disturbance 12/10/2015     " Priority: Medium     Diaper dermatitis 10/08/2015     Priority: Medium     Anxiety 10/09/2014     Priority: Medium     Mixed incontinence 09/25/2014     Priority: Medium     Hypertension goal BP (blood pressure) < 140/90 08/13/2013     Priority: Medium     Advance care planning 10/16/2012     Priority: Medium     Advance Care Planning 9/14/2016: Receipt of ACP document:  Received: POLST which was signed and dated by provider on 7/7/16.  Document previously scanned on 7/21/16.  Order reviewed and found to be valid.  Code Status reflects choices in most recent ACP document.  Also received POLSTs dated 10/10/13 and 9/27/13.  Confirmed/documented designated decision maker(s).  Added by Opal Chanel    Advance Care Planning Liaison  Advance Care Planning 9/14/2016: Receipt of ACP document:  Received: Health Care Directive which was witnessed or notarized on 12/9/13.  Document previously scanned on 12/16/13.  Validation form completed and sent to be scanned.  Code Status reflects choices in most recent ACP document.  Confirmed/documented designated decision maker(s).  Added by Opal Chanel   Advance Care Planning Liaison  Advance Care Planning 10/16/2012: Discussed advance care planning with patient; information given to patient to review.                   Osteopenia 09/28/2012     Priority: Medium     Essential hypertension 08/24/2012     Priority: Medium     Problem list name updated by automated process. Provider to review       Vitamin D deficiency disease 07/19/2012     Priority: Medium     Hyperlipidemia LDL goal <130 07/17/2012     Priority: Medium     Hypothyroidism due to acquired atrophy of thyroid 07/17/2012     Priority: Medium     Vitamin B12 deficiency disease 07/17/2012     Priority: Medium        Past Medical History:    Past Medical History:   Diagnosis Date     Alzheimer's dementia without behavioral disturbance      Depression      Dyslipidemia      Essential hypertension      History of  "CVA (cerebrovascular accident)      Hypothyroidism      Right hemiparesis (H), secondary to previous CVA        Past Surgical History:    Past Surgical History:   Procedure Laterality Date     CHOLECYSTECTOMY       CYSTOSCOPY N/A 2014    Procedure: CYSTOSCOPY;  Surgeon: ANNAMARIE Kern MD;  Location: [facility] OR     SURGICAL HISTORY OF -        cataract surgery bilat.        Family History:    Family History   Problem Relation Age of Onset     Family History Negative Mother      childbirth     Unknown/Adopted Mother       during child birth at a young age     Family History Negative Father      fell off roof broke neck     Obesity Sister        Social History:  Marital Status:   [5]  Social History   Substance Use Topics     Smoking status: Never Smoker     Smokeless tobacco: Never Used     Alcohol use No        Medications:      No current outpatient prescriptions on file.      Review of Systems  All other systems reviewed and are negative.    Physical Exam   BP: 135/78  Pulse: 105  Heart Rate: 109  Temp: 97.8  F (36.6  C)  Resp: 19  Height: 167.6 cm (5' 6\")  Weight: 78.5 kg (173 lb 1 oz)  SpO2: 94 %      Physical Exam  Nontoxic-appearing no respiratory distress alert and oriented to self    Head atraumatic normocephalic    Cranial nerves; vision baseline fields intact, PERRL, EOMI, facial sensation intact to light touch, facial muscle tone intact and symmetrical, hearing grossly intact,swallowing without difficulty, voice baseline and normal, SCM  strength intact, tongue protrudes midline.  Palatal elevation symmetric    TM's unremarkable, EACs clear, oropharynx moist without lesions or erythema.    Neck supple full active painless range of motion no posterior midline tenderness.    No cervical adenopathy    Spine nontender to palpation    Pelvis stable nontender    Lungs clear to auscultation no rales rhonchi or wheezes    Heart regular no murmur S3 or rub    Abdomen soft nontender bowel sounds " positive no masses or HSM    Strength and sensation intact throughout the extremities, skin clear from rash or lesion.      ED Course     ED Course     Procedures  EKG time 1142, symptoms generalized weakness, sinus tachycardia rate 110, left bundle branch block, no acute ST or T-wave changes appreciated by Dr. Gutierrez Prabhakar  Repeat EKG with rhythm change, atrial fibrillation rate 141, nonspecific ST changes, left bundle branch block, read by Dr. Gutierrez Prabhakar             Critical Care time:  none     Results for orders placed or performed during the hospital encounter of 03/16/18   CT Head w/o Contrast    Narrative    CT SCAN OF THE HEAD WITHOUT CONTRAST   3/16/2018 12:07 PM     HISTORY: Possible right-sided weakness.     TECHNIQUE:  Axial images of the head and coronal reformations without  IV contrast material. Radiation dose for this scan was reduced using  automated exposure control, adjustment of the mA and/or kV according  to patient size, or iterative reconstruction technique.    COMPARISON: MRI 7/26/2017    FINDINGS:  There is generalized atrophy of the brain.  There is low  attenuation in the white matter of the cerebral hemispheres consistent  with sequelae of small vessel ischemic disease. Old lacunar infarct in  the left paramedian russell is noted. There is no evidence of  intracranial hemorrhage, mass, acute infarct or anomaly.     The visualized portions of the sinuses and mastoids appear normal.  There is no evidence of trauma.      Impression    IMPRESSION:   1. No acute abnormality.  2. Old infarct in the left paramedian russell.  3.  Atrophy of the brain.  White matter changes consistent with  sequelae of small vessel ischemic disease.      JEFFERSON ROWLEY MD   CT Head Neck Angio w/o & w Contrast    Narrative    CT ANGIOGRAM OF THE HEAD AND NECK WITHOUT AND WITH CONTRAST  3/16/2018  1:13 PM     HISTORY: Possible right-sided weakness. Previous left pontine infarct.    TECHNIQUE:  Precontrast localizing  scans were followed by CT  angiography with an injection of 70 mL Isovue 370 IV with scans  through the head and neck.  Images were transferred to a separate 3-D  workstation where multiplanar reformations and 3-D images were  created.  Estimates of carotid stenoses are made relative to the  distal internal carotid artery diameters except as noted. Radiation  dose for this scan was reduced using automated exposure control,  adjustment of the mA and/or kV according to patient size, or iterative  reconstruction technique.    COMPARISON: CT scan today. MRI exam 7/24/2017    CT HEAD FINDINGS:  No contrast enhancing lesions. There is generalized  atrophy of the brain.  White matter changes are present in the  cerebral hemispheres that are consistent with small vessel ischemic  disease in this age patient. Old lacunar infarct in left russell.  Cerebral blood flow is grossly normal.    CT ANGIOGRAM HEAD FINDINGS: There are mild to moderate stenoses in the  right middle cerebral artery M1 segment especially distally consistent  with atherosclerosis. Multiple mild to moderate stenoses are seen in  the anterior and posterior cerebral arteries as well. No arterial  occlusion is visible. Venous circulation is unremarkable.     CT ANGIOGRAM NECK FINDINGS:   Right carotid artery: Mild calcified plaque in the bifurcation.  No  significant stenosis.      Left carotid artery: Mild calcified plaque in the bifurcation.  Tortuous internal carotid.  No significant stenosis.      Vertebral arteries:  Tortuous vessels.  Moderate stenosis origin of  each of the vertebral arteries.      Other findings: None.      Impression    IMPRESSION:   1. No evidence of arterial occlusion.  2. Intracranial atherosclerotic stenoses especially in the right  middle cerebral artery M1 segment and in the anterior and posterior  cerebral arteries. No significant change.  3. Mild calcified atherosclerotic plaque in the carotid bifurcations  but no significant  stenoses, unchanged.  4. Moderate stenoses of the origins of the vertebral arteries,  unchanged.      JEFFERSON ROWLEY MD   MR Brain w/o & w Contrast    Narrative    MRI BRAIN WITHOUT AND WITH CONTRAST  3/16/2018 8:15 PM    HISTORY: Possible stroke, right-sided weakness.    TECHNIQUE:  Multiplanar, multisequence MRI of the brain without and  with 8mL Gadavist.    COMPARISON: 7/26/2017    FINDINGS: Diffusion-weighted images are normal. There is no evidence  for acute infarct or intracranial hemorrhage. There is an old infarct  in the left paramedian russell. There is a focus of old hemosiderin in  the right subinsular cortical region consistent with an old  microbleed. This was present on the previous exam and is unchanged.  Mild to moderate cerebral atrophy is present. There are patchy  supratentorial white matter changes in both hemispheres without  enhancement or mass effect. Postcontrast images do not show any  abnormal areas of enhancement or any focal mass lesions.      Impression    IMPRESSION:  1. No evidence for acute infarct, acute hemorrhage, or any focal mass  lesions.  2. Old left cerebellar infarct.  3. Old microbleed in right subinsular cortex.  4. Cerebral atrophy with chronic white matter changes.    KIARRA RAMIREZ MD   Basic metabolic panel   Result Value Ref Range    Sodium 138 133 - 144 mmol/L    Potassium 4.0 3.4 - 5.3 mmol/L    Chloride 105 94 - 109 mmol/L    Carbon Dioxide 25 20 - 32 mmol/L    Anion Gap 8 3 - 14 mmol/L    Glucose 168 (H) 70 - 99 mg/dL    Urea Nitrogen 18 7 - 30 mg/dL    Creatinine 0.99 0.52 - 1.04 mg/dL    GFR Estimate 53 (L) >60 mL/min/1.7m2    GFR Estimate If Black 64 >60 mL/min/1.7m2    Calcium 8.5 8.5 - 10.1 mg/dL   INR   Result Value Ref Range    INR 1.05 0.86 - 1.14   Partial thromboplastin time   Result Value Ref Range    PTT <20 (L) 22 - 37 sec   Troponin I   Result Value Ref Range    Troponin I ES <0.015 0.000 - 0.045 ug/L   UA reflex to Microscopic   Result Value Ref Range     Color Urine Yellow     Appearance Urine Clear     Glucose Urine Negative NEG^Negative mg/dL    Bilirubin Urine Negative NEG^Negative    Ketones Urine Negative NEG^Negative mg/dL    Specific Gravity Urine 1.015 1.003 - 1.035    Blood Urine Small (A) NEG^Negative    pH Urine 5.0 5.0 - 7.0 pH    Protein Albumin Urine Negative NEG^Negative mg/dL    Urobilinogen mg/dL 0.0 0.0 - 2.0 mg/dL    Nitrite Urine Negative NEG^Negative    Leukocyte Esterase Urine Small (A) NEG^Negative    Source Midstream Urine     RBC Urine 4 (H) 0 - 2 /HPF    WBC Urine 37 (H) 0 - 5 /HPF    Bacteria Urine Few (A) NEG^Negative /HPF    Mucous Urine Present (A) NEG^Negative /LPF   CBC with platelets differential   Result Value Ref Range    WBC 7.2 4.0 - 11.0 10e9/L    RBC Count 4.03 3.8 - 5.2 10e12/L    Hemoglobin 12.4 11.7 - 15.7 g/dL    Hematocrit 37.6 35.0 - 47.0 %    MCV 93 78 - 100 fl    MCH 30.8 26.5 - 33.0 pg    MCHC 33.0 31.5 - 36.5 g/dL    RDW 13.3 10.0 - 15.0 %    Platelet Count 205 150 - 450 10e9/L    Diff Method Automated Method     % Neutrophils 86.3 %    % Lymphocytes 6.0 %    % Monocytes 6.3 %    % Eosinophils 0.8 %    % Basophils 0.3 %    % Immature Granulocytes 0.3 %    Absolute Neutrophil 6.2 1.6 - 8.3 10e9/L    Absolute Lymphocytes 0.4 (L) 0.8 - 5.3 10e9/L    Absolute Monocytes 0.5 0.0 - 1.3 10e9/L    Absolute Eosinophils 0.1 0.0 - 0.7 10e9/L    Absolute Basophils 0.0 0.0 - 0.2 10e9/L    Abs Immature Granulocytes 0.0 0 - 0.4 10e9/L   Urine Culture Aerobic Bacterial   Result Value Ref Range    Specimen Description Midstream Urine     Special Requests Specimen received in preservative     Culture Micro PENDING                  Results for orders placed or performed during the hospital encounter of 03/16/18 (from the past 24 hour(s))   Basic metabolic panel   Result Value Ref Range    Sodium 138 133 - 144 mmol/L    Potassium 4.0 3.4 - 5.3 mmol/L    Chloride 105 94 - 109 mmol/L    Carbon Dioxide 25 20 - 32 mmol/L    Anion Gap 8 3 -  14 mmol/L    Glucose 168 (H) 70 - 99 mg/dL    Urea Nitrogen 18 7 - 30 mg/dL    Creatinine 0.99 0.52 - 1.04 mg/dL    GFR Estimate 53 (L) >60 mL/min/1.7m2    GFR Estimate If Black 64 >60 mL/min/1.7m2    Calcium 8.5 8.5 - 10.1 mg/dL   INR   Result Value Ref Range    INR 1.05 0.86 - 1.14   Partial thromboplastin time   Result Value Ref Range    PTT <20 (L) 22 - 37 sec   Troponin I   Result Value Ref Range    Troponin I ES <0.015 0.000 - 0.045 ug/L   CT Head w/o Contrast    Narrative    CT SCAN OF THE HEAD WITHOUT CONTRAST   3/16/2018 12:07 PM     HISTORY: Possible right-sided weakness.     TECHNIQUE:  Axial images of the head and coronal reformations without  IV contrast material. Radiation dose for this scan was reduced using  automated exposure control, adjustment of the mA and/or kV according  to patient size, or iterative reconstruction technique.    COMPARISON: MRI 7/26/2017    FINDINGS:  There is generalized atrophy of the brain.  There is low  attenuation in the white matter of the cerebral hemispheres consistent  with sequelae of small vessel ischemic disease. Old lacunar infarct in  the left paramedian russell is noted. There is no evidence of  intracranial hemorrhage, mass, acute infarct or anomaly.     The visualized portions of the sinuses and mastoids appear normal.  There is no evidence of trauma.      Impression    IMPRESSION:   1. No acute abnormality.  2. Old infarct in the left paramedian russell.  3.  Atrophy of the brain.  White matter changes consistent with  sequelae of small vessel ischemic disease.      JEFFERSON ROWLEY MD   CBC with platelets differential   Result Value Ref Range    WBC 7.2 4.0 - 11.0 10e9/L    RBC Count 4.03 3.8 - 5.2 10e12/L    Hemoglobin 12.4 11.7 - 15.7 g/dL    Hematocrit 37.6 35.0 - 47.0 %    MCV 93 78 - 100 fl    MCH 30.8 26.5 - 33.0 pg    MCHC 33.0 31.5 - 36.5 g/dL    RDW 13.3 10.0 - 15.0 %    Platelet Count 205 150 - 450 10e9/L    Diff Method Automated Method     % Neutrophils  86.3 %    % Lymphocytes 6.0 %    % Monocytes 6.3 %    % Eosinophils 0.8 %    % Basophils 0.3 %    % Immature Granulocytes 0.3 %    Absolute Neutrophil 6.2 1.6 - 8.3 10e9/L    Absolute Lymphocytes 0.4 (L) 0.8 - 5.3 10e9/L    Absolute Monocytes 0.5 0.0 - 1.3 10e9/L    Absolute Eosinophils 0.1 0.0 - 0.7 10e9/L    Absolute Basophils 0.0 0.0 - 0.2 10e9/L    Abs Immature Granulocytes 0.0 0 - 0.4 10e9/L   CT Head Neck Angio w/o & w Contrast    Narrative    CT ANGIOGRAM OF THE HEAD AND NECK WITHOUT AND WITH CONTRAST  3/16/2018  1:13 PM     HISTORY: Possible right-sided weakness. Previous left pontine infarct.    TECHNIQUE:  Precontrast localizing scans were followed by CT  angiography with an injection of 70 mL Isovue 370 IV with scans  through the head and neck.  Images were transferred to a separate 3-D  workstation where multiplanar reformations and 3-D images were  created.  Estimates of carotid stenoses are made relative to the  distal internal carotid artery diameters except as noted. Radiation  dose for this scan was reduced using automated exposure control,  adjustment of the mA and/or kV according to patient size, or iterative  reconstruction technique.    COMPARISON: CT scan today. MRI exam 7/24/2017    CT HEAD FINDINGS:  No contrast enhancing lesions. There is generalized  atrophy of the brain.  White matter changes are present in the  cerebral hemispheres that are consistent with small vessel ischemic  disease in this age patient. Old lacunar infarct in left russell.  Cerebral blood flow is grossly normal.    CT ANGIOGRAM HEAD FINDINGS: There are mild to moderate stenoses in the  right middle cerebral artery M1 segment especially distally consistent  with atherosclerosis. Multiple mild to moderate stenoses are seen in  the anterior and posterior cerebral arteries as well. No arterial  occlusion is visible. Venous circulation is unremarkable.     CT ANGIOGRAM NECK FINDINGS:   Right carotid artery: Mild calcified  plaque in the bifurcation.  No  significant stenosis.      Left carotid artery: Mild calcified plaque in the bifurcation.  Tortuous internal carotid.  No significant stenosis.      Vertebral arteries:  Tortuous vessels.  Moderate stenosis origin of  each of the vertebral arteries.      Other findings: None.      Impression    IMPRESSION:   1. No evidence of arterial occlusion.  2. Intracranial atherosclerotic stenoses especially in the right  middle cerebral artery M1 segment and in the anterior and posterior  cerebral arteries. No significant change.  3. Mild calcified atherosclerotic plaque in the carotid bifurcations  but no significant stenoses, unchanged.  4. Moderate stenoses of the origins of the vertebral arteries,  unchanged.      JEFFERSON ROWLEY MD   UA reflex to Microscopic   Result Value Ref Range    Color Urine Yellow     Appearance Urine Clear     Glucose Urine Negative NEG^Negative mg/dL    Bilirubin Urine Negative NEG^Negative    Ketones Urine Negative NEG^Negative mg/dL    Specific Gravity Urine 1.015 1.003 - 1.035    Blood Urine Small (A) NEG^Negative    pH Urine 5.0 5.0 - 7.0 pH    Protein Albumin Urine Negative NEG^Negative mg/dL    Urobilinogen mg/dL 0.0 0.0 - 2.0 mg/dL    Nitrite Urine Negative NEG^Negative    Leukocyte Esterase Urine Small (A) NEG^Negative    Source Midstream Urine     RBC Urine 4 (H) 0 - 2 /HPF    WBC Urine 37 (H) 0 - 5 /HPF    Bacteria Urine Few (A) NEG^Negative /HPF    Mucous Urine Present (A) NEG^Negative /LPF   Urine Culture Aerobic Bacterial   Result Value Ref Range    Specimen Description Midstream Urine     Special Requests Specimen received in preservative     Culture Micro PENDING    MR Brain w/o & w Contrast    Narrative    MRI BRAIN WITHOUT AND WITH CONTRAST  3/16/2018 8:15 PM    HISTORY: Possible stroke, right-sided weakness.    TECHNIQUE:  Multiplanar, multisequence MRI of the brain without and  with 8mL Gadavist.    COMPARISON: 7/26/2017    FINDINGS:  Diffusion-weighted images are normal. There is no evidence  for acute infarct or intracranial hemorrhage. There is an old infarct  in the left paramedian russell. There is a focus of old hemosiderin in  the right subinsular cortical region consistent with an old  microbleed. This was present on the previous exam and is unchanged.  Mild to moderate cerebral atrophy is present. There are patchy  supratentorial white matter changes in both hemispheres without  enhancement or mass effect. Postcontrast images do not show any  abnormal areas of enhancement or any focal mass lesions.      Impression    IMPRESSION:  1. No evidence for acute infarct, acute hemorrhage, or any focal mass  lesions.  2. Old left cerebellar infarct.  3. Old microbleed in right subinsular cortex.  4. Cerebral atrophy with chronic white matter changes.    KIARRA RAMIREZ MD       Medications   naloxone (NARCAN) injection 0.1-0.4 mg (not administered)   diltiazem (CARDIZEM) tablet 60 mg (60 mg Oral Given 3/17/18 0611)   aspirin chewable tablet 81 mg (not administered)   clopidogrel (PLAVIX) tablet 75 mg (not administered)   escitalopram (LEXAPRO) tablet 10 mg (not administered)   levothyroxine (SYNTHROID/LEVOTHROID) tablet 112 mcg (112 mcg Oral Given 3/17/18 0611)   medication instruction (not administered)   sodium chloride 0.9% infusion ( Intravenous Rate/Dose Verify 3/17/18 0600)   senna-docusate (SENOKOT-S;PERICOLACE) 8.6-50 MG per tablet 1 tablet (not administered)     Or   senna-docusate (SENOKOT-S;PERICOLACE) 8.6-50 MG per tablet 2 tablet (not administered)   metoclopramide (REGLAN) tablet 5 mg (not administered)     Or   metoclopramide (REGLAN) injection 5 mg (not administered)   prochlorperazine (COMPAZINE) injection 5 mg (not administered)     Or   prochlorperazine (COMPAZINE) tablet 5 mg (not administered)     Or   prochlorperazine (COMPAZINE) Suppository 12.5 mg (not administered)   ondansetron (ZOFRAN-ODT) ODT tab 4 mg (not administered)      Or   ondansetron (ZOFRAN) injection 4 mg (not administered)   0.9% sodium chloride BOLUS (0 mLs Intravenous Stopped 3/16/18 1523)   iopamidol (ISOVUE-370) solution 70 mL (70 mLs Intravenous Given 3/16/18 1242)   sodium chloride 0.9 % bag 500mL for CT scan flush use (100 mLs As instructed Given 3/16/18 1242)   ondansetron (ZOFRAN) 2 MG/ML injection (4 mg Intravenous Push Given 3/16/18 1355)   diltiazem (CARDIZEM) injection 15 mg (15 mg Intravenous Given 3/16/18 1445)   gadobutrol (GADAVIST) injection 8 mL (8 mLs Intravenous Given 3/16/18 2015)   sodium chloride (PF) 0.9% PF flush 10 mL (10 mLs Intracatheter Given 3/16/18 2015)       Assessments & Plan (with Medical Decision Making)  84-year-old female presents with generalized weakness and vomiting.  Question right sided weakness.  No evidence for sepsis, no evidence for MI/acute coronary syndrome, no significant metabolic derangement, CT/CT angiogram head and neck unremarkable for acute finding, subsequent MRI brain no evidence for infarct or other acute change.  Urinalysis significant for 37 white cells few bacteria, culture pending.  Denies urinary symptoms.  CBC and chemistries normal.  Atrial fibrillation with RVR onset in ED, treated with 15 mg Cardizem IV, 60 mg immediate release Cardizem oral.  Rate controlled .  Blood pressure initially hypertensive, came down to 110s systolic.  Etiology generalized weakness undetermined, may be gastrointestinal virus, patient will be admitted for ongoing neurologic checks, telemetry, rate control, consideration for anticoagulation although currently on Plavix and aspirin secondary to stroke.  Reviewed with Kirsty MORALES who accepts patient for admission for .     I have reviewed the nursing notes.    I have reviewed the findings, diagnosis, plan and need for follow up with the patient.       Current Discharge Medication List          Final diagnoses:   Generalized muscle weakness   Atrial  fibrillation with rapid ventricular response (H)       3/16/2018   [facility] EMERGENCY DEPARTMENT     Gutierrez Prabhakar MD  03/17/18 0608

## 2018-03-16 NOTE — IP AVS SNAPSHOT
"` `     [facility] SURGICAL: [phone]                 INTERAGENCY TRANSFER FORM - NOTES (H&P, Discharge Summary, Consults, Procedures, Therapies)   3/16/2018                    Hospital Admission Date: 3/16/2018  BOBBY ZAYAS   : 1933  Sex: Female        Patient PCP Information     Provider PCP Type    YASMINE Tan CNP General         History & Physicals      H&P by Jaspal Mckay MD at 3/16/2018 11:51 PM     Author:  Jaspal Mckay MD Service:  Hospitalist Author Type:  Physician    Filed:  3/17/2018  7:37 AM Date of Service:  3/16/2018 11:51 PM Creation Time:  3/16/2018 11:51 PM    Status:  Addendum :  Jaspal Mckay MD (Physician)         [facility]    History and Physical  Hospital Medicine       Date of Admission:  3/16/2018  Date of Service: 3/16/2018     CC: Worsening right sided weakness, vomiting[KW1.1]    Assessment & Plan[KW1.2]   Bobby Zayas is a 84 year old female with a past medical history significant for Alzheimer's dementia, previous CVA with residual right sided hemiparesis, hypertension, dyslipidemia, hypothyroidism, and anxiety who presents on 3/16/2018 with new onset atrial fibrillation with RVR, worsening right sided weakness, and vomiting.      A[KW1.1]trial fibrillation with rapid ventricular response (H)[KW1.2]  Very remote past history of arrhythmia \"over 40 years ago requiring shocks\" per family, but no known arrhythmia since that time. Presented with atrial fibrillation with RVR today, unknown onset. Occurs in setting of recent vomiting. Patient received 15 mg IV diltiazem and an additional 60 mg oral diltiazem while in the emergency department with improvement in rate. CHADS-VASC = 5, not on anticoagulation other than aspirin and Plavix prior to admission.  - Monitor on telemetry  - Continue diltiazem 60 mg with holding parameters  - Continue prior to admission aspirin and Plavix. May " require additional anticoagulation but is a fall risk, defer to day team.[KW1.1]  - TSH pending[KW1.3]    Worsening right sided weakness / Previous CVA with persisting right hemiparesis (H)  Presented with worsening right sided weakness, which per family sometimes occurs when patient is stressed or sick. Concern for possible new stroke, although MRI brain, CT head, and CTA angio head & neck are without evidence for any acute intracranial or vascular abnormality (although old stroke findings are noted). Worsening weakness is likely due to unmasking by current stressors.  - Continue prior to admission aspirin and Plavix  - Possible anticoagulation initiation as above, defer to day team[KW1.1]  - Neuro checks q2h[KW1.4]      Non-intractable vomiting with nausea  Vomited 4 times on day of admission. Lives at [place], reportedly there is a gastroenteritis illness going around there. Possibly related to her atrial fibrillation and RVR as vomiting subsided after rate better controlled.  - Antiemetics prn  - Contact precautions  - Enteric panel pending    Essential hypertension  Previously diagnosed. Pressures initially elevated in the emergency department, now improved and are borderline soft. Taking amlodipine and lisinopril prior to admission, hold for now due to new introduction of diltiazem.    Hyperlipidemia LDL goal <130  Previously diagnosed. Taking Lipitor prior to admission, currently holding.    Hypothyroidism due to acquired atrophy of thyroid  Previously diagnosed. Taking levothyroxine prior to admission, continue.    Anxiety  Stable. Taking Lexapro prior to admission, continue.    Late onset Alzheimer's disease without behavioral disturbance  Per family, patient seems more confused and lethargic than baseline. Not currently on any medications for dementia.      Fluids:[KW1.1] IV NS @ 75[KW1.5]  Electrolytes: Monitor  Nutrition:[KW1.1] Low fat diet[KW1.5] as tolerated    DVT Prophylaxis: Pneumatic  Compression Devices  Code Status: DNR / DNI - patient has healthcare directive. Discussed directly with patient's family.    Lines: Peripheral  Tan catheter: Not indicated    Disposition: Anticipate discharge in 2+ day(s). Appropriate for inpatient care.    Emeli Torres PA-C  [facility]  Pager: [phone][KW1.1]          Primary Care Physician[KW1.2]   DeniceKori cabrera [phone]    History is obtained from the patient (minimal input), patient's daughter, and review of old records via the EMR.[KW1.1]    Past Medical History      Past Medical History:   Diagnosis Date     Alzheimer's dementia without behavioral disturbance      Depression      Dyslipidemia      Essential hypertension      History of CVA (cerebrovascular accident)      Hypothyroidism      Right hemiparesis (H), secondary to previous CVA        Past Surgical History     Past Surgical History:   Procedure Laterality Date     CHOLECYSTECTOMY       CYSTOSCOPY N/A 8/29/2014    Procedure: CYSTOSCOPY;  Surgeon: ANNAMARIE Kern MD;  Location: [facility] OR     SURGICAL HISTORY OF -   2012     cataract surgery bilat.         History of Present Illness[KW1.2]   Deanne Zayas is a 84 year old female with the above past medical history now presents on 3/16/2018 with worsening right sided weakness and vomiting. Patient is a difficult historian so most information was provided by her daughter. Patient has right sided weakness at baseline secondary to a stroke in June 2017. She lives at [place], and staff today noticed her right sided weakness was worse today compared to baseline. Family states this sometimes happens with illness or stressors. There apparently is a GI illness going around [place], and today patient vomited at least 4 or 5 times, all non-bloody bile-like emesis. Because of the worsening weakness and the vomiting, patient was brought to the emergency department. Patient is unable to describe much else,  other than stating that she has some right leg pain and a dry cough that started today.    While in the emergency department, patient was found to be in atrial fibrillation with RVR with a pulse of 145. Patient does not have a known arrhythmia, other than daughter stating that patient needed to be shocked about 40+ years ago for her heart rhythm, but no known problems since then.[KW1.1]      Prior to Admission Medications   Prior to Admission Medications   Prescriptions Last Dose Informant Patient Reported? Taking?   ACETAMINOPHEN PO Unknown at Unknown time care home Yes No   Sig: Take 1,000 mg by mouth 3 times daily   AMLODIPINE BESYLATE PO 3/15/2018 at [facility] [place] Yes Yes   Sig: Take 5 mg by mouth daily   Menthol, Topical Analgesic, (BIOFREEZE) 4 % GEL Unknown at Unknown time care home Yes No   Sig: Externally apply topically 4 times daily as needed   aspirin 81 MG chewable tablet 3/16/2018 at 0900 care home No Yes   Sig: Take 1 tablet (81 mg) by mouth daily   atorvastatin (LIPITOR) 20 MG tablet 3/15/2018 at 1900 care home No Yes   Sig: Take 1 tablet (20 mg) by mouth daily   bisacodyl (DULCOLAX) 10 MG Suppository Unknown at Unknown time care home Yes No   Sig: Place 10 mg rectally daily as needed for constipation   cholecalciferol (VITAMIN D3) 1000 UNIT tablet 3/16/2018 at 0900 care home No Yes   Sig: Take 2 tablets (2,000 Units) by mouth daily   clopidogrel (PLAVIX) 75 MG tablet 3/15/2018 at 1900 care home No Yes   Sig: Take 1 tablet (75 mg) by mouth daily   cyanocobalamin (VITAMIN B12) 1000 MCG/ML injection Past Month at Unknown time care home No Yes   Sig: Inject 1 mL (1,000 mcg) Subcutaneous every 30 days   escitalopram (LEXAPRO) 10 MG tablet 3/16/2018 at 0900 care home No Yes   Sig: Take 1 tablet (10 mg) by mouth daily   ferrous sulfate (IRON) 325 (65 FE) MG tablet 3/16/2018 at 0900 care home No Yes   Sig: Take 1 tablet (325 mg) by mouth daily   levothyroxine  "(SYNTHROID/LEVOTHROID) 112 MCG tablet 3/16/2018 at 0900 [facility] No Yes   Sig: Take 1 tablet (112 mcg) by mouth daily   lisinopril (PRINIVIL/ZESTRIL) 40 MG tablet 3/16/2018 at 0900 [facility] No Yes   Sig: Take 1 tablet (40 mg) by mouth daily   miconazole (MICATIN) 2 % AERP powder Unknown at Unknown time [facility] Yes No   Sig: Apply topically 3 times daily as needed   polyethylene glycol (MIRALAX/GLYCOLAX) powder Unknown at Unknown time [facility] Yes No   Sig: Take 17 g by mouth daily as needed for constipation   senna-docusate (SENNA S) 8.6-50 MG per tablet 3/15/2018 at 1900 [facility] No Yes   Sig: Take 1 tablet by mouth daily   syringe/needle, disp, 25G X 1\" 3 ML MISC Past Month at Unknown time [facility] No Yes   Sig: USE AS DIRECTED WITH B12 INECTION      Facility-Administered Medications: None     Allergies   Allergies   Allergen Reactions     Donepezil Other (See Comments)     At 10 mg, tremulousness and decreased appetite       Family History    Family History   Problem Relation Age of Onset     Family History Negative Mother      childbirth     Unknown/Adopted Mother       during child birth at a young age     Family History Negative Father      fell off roof broke neck     Obesity Sister        Social History   Social History     Social History     Marital status:      Spouse name: N/A     Number of children: N/A     Years of education: N/A     Occupational History     Not on file.     Social History Main Topics     Smoking status: Never Smoker     Smokeless tobacco: Never Used     Alcohol use No     Drug use: No     Sexual activity: Not on file     Other Topics Concern     Not on file     Social History Narrative       Review of Systems[KW1.2]     A complete 10 point review of systems was difficult to obtain due to patient's dementia. Information was provided by family members to the best of their knowledge. It was negative except for items noted in the " "HPI/subjective.[KW1.1]    Physical Exam   /51 (BP Location: Right arm)  Pulse 91  Temp 97.2  F (36.2  C) (Oral)  Resp 16  Ht 1.676 m (5' 6\")  Wt 78.5 kg (173 lb 1 oz)  SpO2 95%  BMI 27.93 kg/m2[KW1.2]     Weight:[KW1.1] 173 lbs .98 oz Body mass index is 27.93 kg/(m^2).[KW1.2]     Constitutional: Alert, no apparent distress, appears nontoxic. Oriented to self, cooperative but has difficulty consistently following commands. Mostly answering yes or no questions.    Eyes: Eyes are clear, pupils are reactive. No scleral icterus. Did not follow commands to assess extroccular movements.    HEENT: Oropharynx is clear and moist, no lesions. Normocephalic, no evidence of cranial trauma.        Cardiovascular: Regular rhythm and rate, normal S1 and S2. No murmur, rubs, or gallops. Peripheral pulses in tact bilaterally. No lower extremity edema.     Respiratory: Lung sounds are clear to auscultation bilaterally without wheezes, rhonchi, or crackles.    GI: Soft, non-distended. Non-tender, no rebound or guarding. No hepatosplenomegaly or masses appreciated. Normal bowel sounds.     Musculoskeletal: Without obvious deformity other than right toe contracture. Normal muscle bulk and tone.     Skin: Warm and dry, no rashes or ecchymoses. No mottling of skin.      Neurologic: Patient moves all extremities slowly. Patient has difficulty following commands for cranial nerves, but are grossly intact.  strength diminished on right compared to left. Gross strength diminished on right. Sensation equal bilaterally.    Genitourinary: Deferred[KW1.1]    Data[KW1.2]   Data reviewed today:[KW1.1]     Recent Labs  Lab 03/16/18  1213 03/16/18  1145   WBC 7.2  --    HGB 12.4  --    MCV 93  --      --    INR  --  1.05   NA  --  138   POTASSIUM  --  4.0   CHLORIDE  --  105   CO2  --  25   BUN  --  18   CR  --  0.99   ANIONGAP  --  8   RUBIN  --  8.5   GLC  --  168*   TROPI  --  <0.015       Recent Results (from the past 24 " hour(s))   CT Head w/o Contrast    Narrative    CT SCAN OF THE HEAD WITHOUT CONTRAST   3/16/2018 12:07 PM     HISTORY: Possible right-sided weakness.     TECHNIQUE:  Axial images of the head and coronal reformations without  IV contrast material. Radiation dose for this scan was reduced using  automated exposure control, adjustment of the mA and/or kV according  to patient size, or iterative reconstruction technique.    COMPARISON: MRI 7/26/2017    FINDINGS:  There is generalized atrophy of the brain.  There is low  attenuation in the white matter of the cerebral hemispheres consistent  with sequelae of small vessel ischemic disease. Old lacunar infarct in  the left paramedian russell is noted. There is no evidence of  intracranial hemorrhage, mass, acute infarct or anomaly.     The visualized portions of the sinuses and mastoids appear normal.  There is no evidence of trauma.      Impression    IMPRESSION:   1. No acute abnormality.  2. Old infarct in the left paramedian russell.  3.  Atrophy of the brain.  White matter changes consistent with  sequelae of small vessel ischemic disease.      JEFFERSON ROWLEY MD   CT Head Neck Angio w/o & w Contrast    Narrative    CT ANGIOGRAM OF THE HEAD AND NECK WITHOUT AND WITH CONTRAST  3/16/2018  1:13 PM     HISTORY: Possible right-sided weakness. Previous left pontine infarct.    TECHNIQUE:  Precontrast localizing scans were followed by CT  angiography with an injection of 70 mL Isovue 370 IV with scans  through the head and neck.  Images were transferred to a separate 3-D  workstation where multiplanar reformations and 3-D images were  created.  Estimates of carotid stenoses are made relative to the  distal internal carotid artery diameters except as noted. Radiation  dose for this scan was reduced using automated exposure control,  adjustment of the mA and/or kV according to patient size, or iterative  reconstruction technique.    COMPARISON: CT scan today. MRI exam 7/24/2017    CT  HEAD FINDINGS:  No contrast enhancing lesions. There is generalized  atrophy of the brain.  White matter changes are present in the  cerebral hemispheres that are consistent with small vessel ischemic  disease in this age patient. Old lacunar infarct in left russell.  Cerebral blood flow is grossly normal.    CT ANGIOGRAM HEAD FINDINGS: There are mild to moderate stenoses in the  right middle cerebral artery M1 segment especially distally consistent  with atherosclerosis. Multiple mild to moderate stenoses are seen in  the anterior and posterior cerebral arteries as well. No arterial  occlusion is visible. Venous circulation is unremarkable.     CT ANGIOGRAM NECK FINDINGS:   Right carotid artery: Mild calcified plaque in the bifurcation.  No  significant stenosis.      Left carotid artery: Mild calcified plaque in the bifurcation.  Tortuous internal carotid.  No significant stenosis.      Vertebral arteries:  Tortuous vessels.  Moderate stenosis origin of  each of the vertebral arteries.      Other findings: None.      Impression    IMPRESSION:   1. No evidence of arterial occlusion.  2. Intracranial atherosclerotic stenoses especially in the right  middle cerebral artery M1 segment and in the anterior and posterior  cerebral arteries. No significant change.  3. Mild calcified atherosclerotic plaque in the carotid bifurcations  but no significant stenoses, unchanged.  4. Moderate stenoses of the origins of the vertebral arteries,  unchanged.      JEFFERSON ROWLEY MD   MR Brain w/o & w Contrast    Narrative    MRI BRAIN WITHOUT AND WITH CONTRAST  3/16/2018 8:15 PM    HISTORY: Possible stroke, right-sided weakness.    TECHNIQUE:  Multiplanar, multisequence MRI of the brain without and  with 8mL Gadavist.    COMPARISON: 7/26/2017    FINDINGS: Diffusion-weighted images are normal. There is no evidence  for acute infarct or intracranial hemorrhage. There is an old infarct  in the left paramedian russell. There is a focus of old  hemosiderin in  the right subinsular cortical region consistent with an old  microbleed. This was present on the previous exam and is unchanged.  Mild to moderate cerebral atrophy is present. There are patchy  supratentorial white matter changes in both hemispheres without  enhancement or mass effect. Postcontrast images do not show any  abnormal areas of enhancement or any focal mass lesions.      Impression    IMPRESSION:  1. No evidence for acute infarct, acute hemorrhage, or any focal mass  lesions.  2. Old left cerebellar infarct.  3. Old microbleed in right subinsular cortex.  4. Cerebral atrophy with chronic white matter changes.    KIARRA RAMIREZ MD[KW1.2]       I personally reviewed the head CT image(s) showing no acute abnormality..    Emeli Torres PA-C  [facility]  Pager: [phone][KW1.1]          Revision History        User Key Date/Time User Provider Type Action    > [N/A] 3/17/2018  7:37 AM Jaspal Mckay MD Physician Addend     KW1.3 3/17/2018 12:36 AM Emeli Torres PA-C Physician Assistant - C Sign     KW1.5 3/17/2018 12:35 AM Emeli Torres PA-C Physician Assistant - C Sign     KW1.4 3/17/2018 12:30 AM Emeli Torres PA-C Physician Assistant - C Sign     KW1.2 3/17/2018 12:28 AM Emeli Torres PA-C Physician Assistant - C Sign     KW1.1 3/16/2018 11:51 PM Emeli Torres PA-C Physician Assistant - C                      Discharge Summaries      Discharge Summaries by Jared Osman MD at 3/19/2018 11:16 AM     Author:  Jared Osman MD Service:  Hospitalist Author Type:  Physician    Filed:  3/19/2018 11:16 AM Date of Service:  3/19/2018 11:16 AM Creation Time:  3/19/2018 11:00 AM    Status:  Signed :  Jared Osman MD (Physician)         [place] Hospitalist Discharge Summary    Deanne Zayas MRN# [id number]   Age: 84 year old YOB: 1933     Date of Admission:  3/16/2018  Date of  Discharge::  3/19/2018  Admitting Physician:  Jaspal Mckay MD  Discharge Physician:  Jared Osman MD  Primary Physician: Kori Card  Transferring Facility: N/A     Home clinic: [address] [place] [id number]          Admission Diagnoses:[SK1.1]   Generalized muscle weakness [M62.81]  Atrial fibrillation with rapid ventricular response (H) [I48.91][SK1.2]          Discharge Diagnosis:   Principle diagnosis: Atrial fibrillation with rapid ventricular response   Secondary diagnoses:[SK1.1]  Patient Active Problem List   Diagnosis     Hyperlipidemia LDL goal <130     Hypothyroidism due to acquired atrophy of thyroid     Vitamin B12 deficiency disease     Vitamin D deficiency disease     Essential hypertension     Osteopenia     Advance care planning     Hypertension goal BP (blood pressure) < 140/90     Mixed incontinence     Anxiety     Diaper dermatitis     Late onset Alzheimer's disease without behavioral disturbance     Facial droop     Acute cystitis without hematuria     UTI (urinary tract infection)     Fall     Stroke (H)     Health Care Home     Atrial fibrillation with rapid ventricular response (H)     History of CVA (cerebrovascular accident)     Right hemiparesis (H), secondary to previous CVA     Non-intractable vomiting with nausea[SK1.2]          Brief History of Presenting Illness:   As per admit hx  Deanne Zayas is a 84 year old female with the above past medical history now presents on 3/16/2018 with worsening right sided weakness and vomiting. Patient is a difficult historian so most information was provided by her daughter. Patient has right sided weakness at baseline secondary to a stroke in June 2017. She lives at [place], and staff today noticed her right sided weakness was worse today compared to baseline. Family states this sometimes happens with illness or stressors. There apparently is a GI illness going around [place], and today patient  vomited at least 4 or 5 times, all non-bloody bile-like emesis. Because of the worsening weakness and the vomiting, patient was brought to the emergency department. Patient is unable to describe much else, other than stating that she has some right leg pain and a dry cough that started today.       No results found for this or any previous visit (from the past 24 hour(s)).         Hospital Course:   AFIB RVR  Has had a remote h/o arrythmia requiring shocks years ago. Now presented in afib RVR at 140. Converted to SR next day. Was givin iv dilt and started on po in hospital. Doing well. HR stable 60-70s. Family not interested in anticoagulation. Sounds irregular today on exam--may have converted back to afib--stable HR though.  -continue dilt/ plavix    H/O CVA  Stop aspirin and continue plavix. This was discussed with neuro stroke.    N/V/D DUE TO ROTAVIRUS  Improved. Tolerating po    UTI  Due to klebsiella.  -continue cipro total 7 days    HTN  D/c amlodipine, change lisinopril to 5mg/day and continue dilt 240mg/day.    HLD  Continue meds    HYPOT4  Continue meds    ANXIETY  Continue meds                Procedures:   No procedures performed during this admission         Allergies:      Allergies   Allergen Reactions     Donepezil Other (See Comments)     At 10 mg, tremulousness and decreased appetite             Medications Prior to Admission:[SK1.1]     Prescriptions Prior to Admission   Medication Sig Dispense Refill Last Dose     ferrous sulfate (IRON) 325 (65 FE) MG tablet Take 1 tablet (325 mg) by mouth daily 31 tablet PRN 3/16/2018 at 0900     cholecalciferol (VITAMIN D3) 1000 UNIT tablet Take 2 tablets (2,000 Units) by mouth daily 62 tablet 98 3/16/2018 at 0900     senna-docusate (SENNA S) 8.6-50 MG per tablet Take 1 tablet by mouth daily 31 tablet PRN 3/15/2018 at 1900     cyanocobalamin (VITAMIN B12) 1000 MCG/ML injection Inject 1 mL (1,000 mcg) Subcutaneous every 30 days 1 mL 98 Past Month at Unknown time  "    syringe/needle, disp, 25G X 1\" 3 ML MISC USE AS DIRECTED WITH B12 INECTION 1 each 98 Past Month at Unknown time     escitalopram (LEXAPRO) 10 MG tablet Take 1 tablet (10 mg) by mouth daily 31 tablet 98 3/16/2018 at 0900     levothyroxine (SYNTHROID/LEVOTHROID) 112 MCG tablet Take 1 tablet (112 mcg) by mouth daily 31 tablet 98 3/16/2018 at 0900     clopidogrel (PLAVIX) 75 MG tablet Take 1 tablet (75 mg) by mouth daily 31 tablet 98 3/15/2018 at 1900     atorvastatin (LIPITOR) 20 MG tablet Take 1 tablet (20 mg) by mouth daily 31 tablet 98 3/15/2018 at 1900     [DISCONTINUED] AMLODIPINE BESYLATE PO Take 5 mg by mouth daily   3/15/2018 at 1900     [DISCONTINUED] aspirin 81 MG chewable tablet Take 1 tablet (81 mg) by mouth daily 31 tablet 98 3/16/2018 at 0900     [DISCONTINUED] lisinopril (PRINIVIL/ZESTRIL) 40 MG tablet Take 1 tablet (40 mg) by mouth daily 31 tablet 98 3/16/2018 at 0900     bisacodyl (DULCOLAX) 10 MG Suppository Place 10 mg rectally daily as needed for constipation   Unknown at Unknown time     polyethylene glycol (MIRALAX/GLYCOLAX) powder Take 17 g by mouth daily as needed for constipation   Unknown at Unknown time     Menthol, Topical Analgesic, (BIOFREEZE) 4 % GEL Externally apply topically 4 times daily as needed   Unknown at Unknown time     ACETAMINOPHEN PO Take 1,000 mg by mouth 3 times daily   Unknown at Unknown time     miconazole (MICATIN) 2 % AERP powder Apply topically 3 times daily as needed   Unknown at Unknown time[SK1.3]             Discharge Medications:[SK1.1]     Current Discharge Medication List      START taking these medications    Details   diltiazem 240 MG 24 hr capsule Take 1 capsule (240 mg) by mouth daily    Associated Diagnoses: Atrial fibrillation with rapid ventricular response (H)      ciprofloxacin (CIPRO) 250 MG tablet Take 1 tablet (250 mg) by mouth 2 times daily for 4 days  Qty: 8 tablet, Refills: 0    Associated Diagnoses: Acute cystitis without hematuria       " "  CONTINUE these medications which have CHANGED    Details   lisinopril (PRINIVIL/ZESTRIL) 5 MG tablet Take 1 tablet (5 mg) by mouth daily  Qty: 90 tablet, Refills: 3    Associated Diagnoses: Benign essential hypertension         CONTINUE these medications which have NOT CHANGED    Details   ferrous sulfate (IRON) 325 (65 FE) MG tablet Take 1 tablet (325 mg) by mouth daily  Qty: 31 tablet, Refills: PRN    Associated Diagnoses: Iron deficiency anemia secondary to inadequate dietary iron intake      cholecalciferol (VITAMIN D3) 1000 UNIT tablet Take 2 tablets (2,000 Units) by mouth daily  Qty: 62 tablet, Refills: 98    Associated Diagnoses: Senile osteoporosis      senna-docusate (SENNA S) 8.6-50 MG per tablet Take 1 tablet by mouth daily  Qty: 31 tablet, Refills: PRN    Associated Diagnoses: Constipation, unspecified constipation type      cyanocobalamin (VITAMIN B12) 1000 MCG/ML injection Inject 1 mL (1,000 mcg) Subcutaneous every 30 days  Qty: 1 mL, Refills: 98    Associated Diagnoses: Vitamin B 12 deficiency      syringe/needle, disp, 25G X 1\" 3 ML MISC USE AS DIRECTED WITH B12 INECTION  Qty: 1 each, Refills: 98    Associated Diagnoses: Vitamin B 12 deficiency      escitalopram (LEXAPRO) 10 MG tablet Take 1 tablet (10 mg) by mouth daily  Qty: 31 tablet, Refills: 98    Associated Diagnoses: Anxiety      levothyroxine (SYNTHROID/LEVOTHROID) 112 MCG tablet Take 1 tablet (112 mcg) by mouth daily  Qty: 31 tablet, Refills: 98    Associated Diagnoses: Other specified hypothyroidism      clopidogrel (PLAVIX) 75 MG tablet Take 1 tablet (75 mg) by mouth daily  Qty: 31 tablet, Refills: 98    Associated Diagnoses: 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)      atorvastatin (LIPITOR) 20 MG tablet Take 1 tablet (20 mg) by mouth daily  Qty: 31 tablet, Refills: 98    Associated Diagnoses: Cerebrovascular accident (CVA) due to thrombosis of left middle cerebral artery (H)      bisacodyl " "(DULCOLAX) 10 MG Suppository Place 10 mg rectally daily as needed for constipation      polyethylene glycol (MIRALAX/GLYCOLAX) powder Take 17 g by mouth daily as needed for constipation      Menthol, Topical Analgesic, (BIOFREEZE) 4 % GEL Externally apply topically 4 times daily as needed      ACETAMINOPHEN PO Take 1,000 mg by mouth 3 times daily      miconazole (MICATIN) 2 % AERP powder Apply topically 3 times daily as needed         STOP taking these medications       AMLODIPINE BESYLATE PO Comments:   Reason for Stopping:         aspirin 81 MG chewable tablet Comments:   Reason for Stopping:[SK1.3]                     Consultations:   No consultations were requested during this admission            Discharge Exam:   Blood pressure 147/69, pulse 63, temperature 96.8  F (36  C), temperature source Oral, resp. rate 16, height 1.676 m (5' 6\"), weight 81.5 kg (179 lb 10.8 oz), SpO2 95 %.  GENERAL APPEARANCE: healthy, alert and no distress  EYES: conjunctiva clear, eyes grossly normal  HENT: external ears and nose normal   NECK: supple, no masses or adenopathy  RESP: lungs clear to auscultation - no rales, rhonchi or wheezes  CV: irregular rate and rhythm, normal S1 S2, no S3 or S4 and no murmur, click or rub   ABDOMEN: soft, nontender, no HSM or masses and bowel sounds normal  MS: no clubbing, cyanosis; trace edema  SKIN: clear without significant rashes or lesions  NEURO: Normal strength and tone, sensory exam grossly normal, mentation intact and speech normal    Unresulted Labs Ordered in the Past 30 Days of this Admission     No orders found from 1/15/2018 to 3/17/2018.          No results found for this or any previous visit (from the past 24 hour(s)).         Pending Tests at Discharge:   None         Discharge Instructions and Follow-Up:   Discharge diet: Regular   Discharge activity: Activity as tolerated   Discharge follow-up: Follow up with primary care provider in 2 weeks           Discharge Disposition: "   Discharged to short-term care facility      Attestation:  I have reviewed today's vital signs, notes, medications, labs and imaging.    Time Spent on this Encounter   I, Jared Osman, personally saw the patient today and spent greater than 30 minutes discharging this patient.    Jared Osman MD[SK1.1]          Revision History        User Key Date/Time User Provider Type Action    > SK1.3 3/19/2018 11:16 AM Jared Osman MD Physician Sign     SK1.2 3/19/2018 11:02 AM Jared Osman MD Physician      SK1.1 3/19/2018 11:00 AM Jared Osman MD Physician                      Consult Notes      Consults by Marjan Caballero RN at 3/19/2018 11:17 AM     Author:  Marjan Caballero RN Service:  (none) Author Type:      Filed:  3/19/2018 11:17 AM Date of Service:  3/19/2018 11:17 AM Creation Time:  3/19/2018 11:04 AM    Status:  Signed :  Marjan Caballero RN ()     Consult Orders:    1. Care Coordinator IP Consult [[id number]] ordered by Emeli Torres PA-C at 03/17/18 0034                Care Transition Initial Assessment - RN  [facility] For Consult: [facility] (see comments) [facility]   Met with: Patient and daughter.    DATA   Principal Problem:    Atrial fibrillation with rapid ventricular response (H)  Active Problems:    Hyperlipidemia LDL goal <130    Hypothyroidism due to acquired atrophy of thyroid    Essential hypertension    Anxiety    Late onset Alzheimer's disease without behavioral disturbance    History of CVA (cerebrovascular accident)    Right hemiparesis (H), secondary to previous CVA    Non-intractable vomiting with nausea       Primary Care Clinic Name: [facility]  Primary Care MD Name: Debbie Card  Contact information and PCP information verified: Yes    ASSESSMENT  Cognitive Status: awake, alert and confused.       Resources List: Assisted Living     Lives With: alone  Living Arrangements: assisted living     Description of Support System: Supportive, Involved    Who is your support system?: Children, Facility resident(s)/Staff   Support Assessment: Adequate family and caregiver support   Insurance Concerns: No Insurance issues identified      This writer met with pt and daughter, Mayra, introduced self and role.  The patient lives at [facility] (phone: [phone] Fax: [phone]).  She receives total care and is assist of two for transfers.  FP CM is Yenniferdannie Golddipak SOTO [phone].  Updated Yennifer regarding discharge plan.  [facility] Financial Worker is Betzaida.      PLAN    Home today - Return to [facility]      Discharge Planner   Discharge Plans in progress: intermediate  Barriers to discharge plan: None  Follow up plan: Follow up with PCP       Entered by: Marjan Caballero 03/19/2018 11:04 AM         Marjan Caballero RN, Care Coordinator [phone][LS1.1]       Revision History        User Key Date/Time User Provider Type Action    > LS1.1 3/19/2018 11:17 AM Marjan Caballero RN Case Manager Sign                     Progress Notes - Physician (Notes from 03/16/18 through 03/19/18)      Progress Notes by Kizzy Burris RN at 3/19/2018 11:11 AM     Author:  Kizzy Burris RN Service:  (none) Author Type:  Registered Nurse    Filed:  3/19/2018 11:15 AM Encounter Date:  3/19/2018 Status:  Signed    :  Kizzy Burris RN (Registered Nurse)           3/19/18 CM is covering for Yennifer SOTO and noticed this member had been admitted late Friday night to the hospital with weakness. CM contacted Ingrid Soto at [facility] for an update. Ingrid SOTO states Love will be discharging back to her assisted living, [facility], some time today.  CM will pass on this information to Yennifer SOTO.  Kizzy Burris RN BA PHN  [facility] Case Management  [phone][SZ1.1]       Revision History        User Key Date/Time User Provider Type Action    > SZ1.1 3/19/2018 11:15 AM Kizzy Burris, RN Registered Nurse Sign           "  ED Notes signed by Faizan Non-Provider at 3/19/2018 10:24 AM      Author:  Faizan Non-Provider Service:  (none) Author Type:  (none)    Filed:  3/19/2018 10:24 AM Date of Service:  3/18/2018 10:44 PM Creation Time:  3/19/2018 10:24 AM    Status:  Signed :  Faizan Non-Provider     Scan on 3/19/2018 10:24 AM by Faizan Non-Provider : [facility] HANDOFF 1          Revision History        User Key Date/Time User Provider Type Action    > [N/A] 3/19/2018 10:24 AM Scan, Non-Provider (none) Sign            Progress Notes by Cristofer Cristobal MD at 3/18/2018  2:06 PM     Author:  Cristofer Cristobal MD Service:  Hospitalist Author Type:  Physician    Filed:  3/18/2018  3:15 PM Date of Service:  3/18/2018  2:06 PM Creation Time:  3/18/2018  2:06 PM    Status:  Addendum :  Cristofer Cristobal MD (Physician)         [facility]           Assessment and Plan:     Deanne Zayas is a 84 year old female with a past medical history significant for Alzheimer's dementia, previous CVA with residual right sided hemiparesis, hypertension, dyslipidemia, hypothyroidism, and anxiety who presents on 3/16/2018 with new onset atrial fibrillation with RVR, worsening right sided weakness, and vomiting.          Questionable Atrial flutter[KK1.1] vs other SVT[KK1.2] with rapid ventricular response (H) -   3/16/18 -- Very remote past history of arrhythmia \"over 40 years ago requiring shocks\" per family, but no known arrhythmia since that time.  Occurs in setting of recent vomiting. Patient received 15 mg IV diltiazem while in the emergency department with improvement in rate.   - Continue diltiazem 60 mg  3/17/2018 -- TSH normal.  On review of ECG from admission, rate 140 but rhythm was regular - does not look like atrial fibrillation.  Could be atrial flutter vs sinus tach due to dehydration/illness vs other SVT - rapid resolution and rate less consistent with sinus tach.  " Has remained normal sinus rhythm on tele since.  Continue diltiazem, plan to transition to 240 XL tomorrow.   3/18/2018 --[KK1.1] doing well on diltiazem XL - continue this on discharge - discussed in detail with patient and daughter - they would not want anticoagulation for atrial fibrillation despite stroke risk even if present, do not think we'd see anything that would be of benefit on holter - would like to just try continuing the diltiazem which I agree with.[KK1.2]       Acutely worsened right sided weakness / Previous CVA with persisting right hemiparesis (H)[KK1.1] without new stroke, no back to baseline[KK1.2]   3/16/18 -- Presented with worsening right sided weakness, which per family sometimes occurs when patient is stressed or sick.  MRI brain, CT head, and CTA angio head & neck are without evidence for any acute intracranial or vascular abnormality   3/17/2018 --  Appears back to near baseline today   3/18/2018 -- discussed anticoagulation with stroke neuro yesterday as it appears the patient has never seen neurology for her stroke last summer and has been left on dual antiplatelet therapy ever since that time - agree with their recommendations to stop the aspirin as she is more than 3 months out from her stroke and just continue the plavix.         Non-intractable vomiting with nausea[KK1.1] and diarrhea[KK1.2] - due to rotavirus  3/16/18 -- Vomited 4 times on day of admission. Lives at [facility], reportedly there is a gastroenteritis illness going around there.   3/18/2018[KK1.1] -- improving[KK1.2]        UTI[KK1.1] due to klebsiella[KK1.3]   3/17/2018 -- UA positive but no clear symptoms - started on oral cipro[KK1.1]  3/18/2018 -- urine culture positive, susceptible to cipro[KK1.3]     Hypokalemia  3/18/2018 -- due to diarrhea, poor intake, IVF - started on replacement.      Essential hypertension  3/16/18 -- Previously diagnosed. Pressures initially elevated in the emergency department, now  improved and are borderline soft. Taking amlodipine and lisinopril prior to admission, hold for now due to new introduction of diltiazem.  3/17/2018 -- blood pressure 110's this AM, continue just diltiazem for now, follow.    3/18/2018 -- blood pressure doing reasonably well with this.      Hyperlipidemia LDL goal <130  3/16/18 --Taking Lipitor   3/18/2018 -- continued       Hypothyroidism due to acquired atrophy of thyroid  Previously diagnosed. Taking levothyroxine prior to admission, continue.      Anxiety  Stable. Taking Lexapro prior to admission, continue.      Late onset Alzheimer's disease without behavioral disturbance  3/16/18 -- Per family, patient seems more confused and lethargic than baseline. Not currently on any medications for dementia.  3/18/2018 --[KK1.1] appears back to baseline today.[KK1.2]         Vitamin B12 deficiency  3/17/2018 -- on monthly shots per neurology for borderline low level  3/18/2018 -- B12 level normal - continue current plan and follow-up with neurology.      Fluids: IV NS @ 75      DVT Prophylaxis: Pneumatic Compression Devices     Code Status: DNR / DNI - patient has healthcare directive. Discussed directly with patient's family.      Lines: Peripheral IV     Tan catheter: Not indicated    Disposition[KK1.1]  Improving - hope for discharge back to memory care tomorrow if taking adequate orals -  they have a lift and can handle current cares.[KK1.2]             Interval History:[KK1.1]   Improving.  No fever or chills.   Per daughter and patient right sided symptoms are aback to normal.  Patient still generally weak, but no focal changes.  Speech and face at baseline.  Having diarrhea, but improving.  No pain today.  No other concerns. Oral intake starting to improve.[KK1.2]             Review of Systems:[KK1.1]    ROS: 10 point ROS neg other than the symptoms noted above in the HPI.[KK1.2]             Medications:   Current active medications and PTA medications reviewed,  see medication list for details.            Physical Exam:   Vitals were reviewed  Patient Vitals for the past 24 hrs:   BP Temp Temp src Pulse Heart Rate Resp SpO2 Weight   18 1116 133/61 97  F (36.1  C) Oral 60 - 16 100 % -   18 0741 131/63 98.4  F (36.9  C) Oral 68 - 18 97 % -   18 0341 - - - - - - - 81.5 kg (179 lb 10.8 oz)   18 0010 152/73 98.3  F (36.8  C) Oral 82 - 18 98 % -   18 146/68 - - - 86 18 98 % -   18 1827 148/57 99.1  F (37.3  C) - - 98 18 99 % -   18 1525 127/63 98.6  F (37  C) Oral 96 - 16 98 % -       Temperatures:  Current - Temp: 97  F (36.1  C); Max - Temp  Av.3  F (36.8  C)  Min: 97  F (36.1  C)  Max: 99.1  F (37.3  C)  Respiration range: Resp  Av.3  Min: 16  Max: 18  Pulse range: Pulse  Av.5  Min: 60  Max: 96  Blood pressure range: Systolic (24hrs), Av , Min:127 , Max:152   ; Diastolic (24hrs), Av, Min:57, Max:73    Pulse oximetry range: SpO2  Av.3 %  Min: 97 %  Max: 100 %  I/O last 3 completed shifts:  In: 2412 [P.O.:730; I.V.:1682]  Out: -     Intake/Output Summary (Last 24 hours) at 18 1406  Last data filed at 18 1200   Gross per 24 hour   Intake             1472 ml   Output                0 ml   Net             1472 ml[KK1.1]     EXAM:  General: awake and alert, NAD, oriented x 2  Head: normocephalic  Neck: unremarkable, no lymphadenopathy   HEENT: oropharynx pink and moist    Heart: Regular rate and rhythm, no murmurs, rubs, or gallops  Lungs: clear to auscultation bilaterally with good air movement throughout  Abdomen: soft, non-tender, no masses or organomegaly  Extremities: no edema in lower extremities, right sided weakness baseline per family and patient   Skin unremarkable.[KK1.2]               Data:[KK1.1]     Results for orders placed or performed during the hospital encounter of 18 (from the past 24 hour(s))   Lipid panel   Result Value Ref Range    Cholesterol 95 <200 mg/dL     Triglycerides 114 <150 mg/dL    HDL Cholesterol 38 (L) >49 mg/dL    LDL Cholesterol Calculated 34 <100 mg/dL    Non HDL Cholesterol 57 <130 mg/dL   Vitamin B12   Result Value Ref Range    Vitamin B12 421 193 - 986 pg/mL   CBC with platelets   Result Value Ref Range    WBC 4.0 4.0 - 11.0 10e9/L    RBC Count 3.47 (L) 3.8 - 5.2 10e12/L    Hemoglobin 10.6 (L) 11.7 - 15.7 g/dL    Hematocrit 32.7 (L) 35.0 - 47.0 %    MCV 94 78 - 100 fl    MCH 30.5 26.5 - 33.0 pg    MCHC 32.4 31.5 - 36.5 g/dL    RDW 14.0 10.0 - 15.0 %    Platelet Count 162 150 - 450 10e9/L   Basic metabolic panel   Result Value Ref Range    Sodium 143 133 - 144 mmol/L    Potassium 3.1 (L) 3.4 - 5.3 mmol/L    Chloride 112 (H) 94 - 109 mmol/L    Carbon Dioxide 24 20 - 32 mmol/L    Anion Gap 7 3 - 14 mmol/L    Glucose 106 (H) 70 - 99 mg/dL    Urea Nitrogen 16 7 - 30 mg/dL    Creatinine 0.88 0.52 - 1.04 mg/dL    GFR Estimate 61 >60 mL/min/1.7m2    GFR Estimate If Black 74 >60 mL/min/1.7m2    Calcium 7.7 (L) 8.5 - 10.1 mg/dL[KK1.4]           Attestation:[KK1.1]  I have reviewed today's vital signs, notes, medications, labs and imaging.  Amount of time performed on this daily note: 30 minutes.[KK1.2]     Cristofer Cristobal MD, MD[KK1.1]                          Revision History        User Key Date/Time User Provider Type Action    > KK1.3 3/18/2018  3:15 PM Cristofer Cristobal MD Physician Addend     KK1.4 3/18/2018  3:14 PM Cristofer Cristobal MD Physician Sign     KK1.2 3/18/2018  2:44 PM Cristofer Cristobal MD Physician      KK1.1 3/18/2018  2:06 PM Cristofer Cristobal MD Physician             Progress Notes by Chelly Meza RN at 3/18/2018  2:22 PM     Author:  Meza, Chelly, RN Service:  Nursing Author Type:  Registered Nurse    Filed:  3/18/2018  2:24 PM Date of Service:  3/18/2018  2:22 PM Creation Time:  3/18/2018  2:22 PM    Status:  Signed :  Chelly Meza RN (Registered Nurse)         Call placed to [facility]. Had discussion regarding changes in  patient mobility as she is requiring a lift for transfers currently. [facility] would be able to accommodate the change in her mobility at discharge. Tentative dc tomorrow. Will have CT follow up tomorrow for plan finalization.[RP1.1]     Revision History        User Key Date/Time User Provider Type Action    > RP1.1 3/18/2018  2:24 PM Chelly Meza RN Registered Nurse Sign            Progress Notes by Antonia Duenas RN at 3/17/2018  6:53 PM     Author:  Antonia Duenas RN Service:  (none) Author Type:  Registered Nurse    Filed:  3/17/2018  6:55 PM Date of Service:  3/17/2018  6:53 PM Creation Time:  3/17/2018  6:53 PM    Status:  Signed :  Antonia Duenas RN (Registered Nurse)         Diltiazem dose given at 1836. Pt had large emesis 10 min after dose. Assist with cleaning patient. Denies abdominal pain with palpation. Cont to monitor. Encourage only sips of clear liquids at this time.[TL1.1]     Revision History        User Key Date/Time User Provider Type Action    > TL1.1 3/17/2018  6:55 PM Antonia Duenas RN Registered Nurse Sign            Progress Notes by Antonia Duenas RN at 3/17/2018  5:47 PM     Author:  Antonia Duenas RN Service:  (none) Author Type:  Registered Nurse    Filed:  3/17/2018  5:48 PM Date of Service:  3/17/2018  5:47 PM Creation Time:  3/17/2018  5:47 PM    Status:  Signed :  Antonia Duenas RN (Registered Nurse)         Pt drinking coffee, sips water at 1600. Dinner tray brought to pt, did not eat it, had large emesis. Zofran given.  Assist clean patient, ceiling lift to bed.[TL1.1]     Revision History        User Key Date/Time User Provider Type Action    > TL1.1 3/17/2018  5:48 PM Antonia Duenas RN Registered Nurse Sign            Progress Notes by Mandy Lozano PT at 3/17/2018  2:30 PM     Author:  Mandy Lozano PT Service:  Physical Medicine and Rehabilitation Author Type:  Physical Therapist    Filed:  3/17/2018  2:31 PM Date of Service:   3/17/2018  2:30 PM Creation Time:  3/17/2018  2:30 PM    Status:  Signed :  Mandy Lozano, PT (Physical Therapist)          03/17/18 0900   Quick Adds   Type of Visit Initial PT Evaluation       Present (No  but dtr and son in law provided hx and concer)   Living Environment   Lives With facility resident   Living Arrangements extended care facility  (Memory care)   Home Accessibility no concerns   Self-Care   Dominant Hand right   Usual Activity Tolerance poor   Current Activity Tolerance poor   Regular Exercise no   Functional Level Prior   Ambulation 3-->assistive equipment and person   Transferring 3-->assistive equipment and person   Toileting 3-->assistive equipment and person   Bathing 3-->assistive equipment and person   Dressing 3-->assistive equipment and person   Eating 0-->independent   Communication 2-->difficulty understanding (not related to language barrier)   Swallowing 0-->swallows foods/liquids without difficulty   Cognition 2 - difficulty with organizing thoughts   Fall history within last six months no   Which of the above functional risks had a recent onset or change? dressing;bathing;toileting;transferring;ambulation   Prior Functional Level Comment Varied report from family and memory care facility  Deanne demonstrated no skilled abilities and family present requested return to Memory care with previous assistance and level of cares     General Information   Onset of Illness/Injury or Date of Surgery - Date 03/16/18   Referring Physician Dr Cristobal   Patient/Family Goals Statement Previous level of assistance and memory care   Pertinent History of Current Problem (include personal factors and/or comorbidities that impact the POC) Medical concerns   Precautions/Limitations fall precautions   Weight-Bearing Status - LUE full weight-bearing   Weight-Bearing Status - RUE full weight-bearing   Weight-Bearing Status - LLE full weight-bearing   Weight-Bearing  Status - RLE full weight-bearing   Cognitive Status Examination   Orientation not oriented to person, place or time   Level of Consciousness confused   Follows Commands and Answers Questions unable to follow multi-step instructions;unable to answer questions   Personal Safety and Judgment impaired;at risk behaviors demonstrated   Memory impaired   Cognitive Comment RN present when PT worked with Bird and was inagreemnt that bird is unable to follow commands and participate with moiblity without max assistance   Pain Assessment   Patient Currently in Pain (Hips)   Posture    Posture Comments Pushes and leans backwards   Range of Motion (ROM)   ROM Comment Functionalf for all ADL and MRADLs    Strength   Strength Comments Strong with MMT.  Poor motor planning and memory to assist with skilled assistance   Bed Mobility   Bed Mobility Comments requires moderate to max assistance and continued re direction  Unable to sit unsupervised   Transfer Skills   Transfer Comments Stand pivot max assist of two   Gait   Gait Comments Unable to get erect and did not appear to understand motor skills to walk and  her feet   Balance   Balance Comments Leans backwards and right side if not supported   Clinical Impression   Criteria for Skilled Therapeutic Intervention yes, treatment indicated;no significant expected improvement in functional status;patient/family refuse skilled intervention at this time   PT Diagnosis Weakness and MAS   Influenced by the following impairments Increased assistance and slef cares   Functional limitations due to impairments Possible medical and UTI   Clinical Presentation Unstable/Unpredictable   Clinical Presentation Rationale Memory unit, not recieving skilled intervention, staff assist 1-2 moderate with all mobiity and ADLs and MRADLs   Clinical Decision Making (Complexity) Moderate complexity   Therapy Frequency` daily   Risk & Benefits of therapy have been explained Yes   Patient, Family &  "other staff in agreement with plan of care Yes   Clinical Impression Comments Family with good understanding and in agreement tory but goal is to return to Memory care unit   [facility] AM-PAC  \"6 Clicks\" V.2 Basic Mobility Inpatient Short Form   1. Turning from your back to your side while in a flat bed without using bedrails? 3 - A Little   2. Moving from lying on your back to sitting on the side of a flat bed without using bedrails? 2 - A Lot   3. Moving to and from a bed to a chair (including a wheelchair)? 2 - A Lot   4. Standing up from a chair using your arms (e.g., wheelchair, or bedside chair)? 2 - A Lot   5. To walk in hospital room? 2 - A Lot   6. Climbing 3-5 steps with a railing? 2 - A Lot   Basic Mobility Raw Score (Score out of 24.Lower scores equate to lower levels of function) 13   Total Evaluation Time   Total Evaluation Time (Minutes) 30[KB1.1]        Revision History        User Key Date/Time User Provider Type Action    > KB1.1 3/17/2018  2:31 PM Mandy Lozano PT Physical Therapist Sign            Progress Notes by Cristofer Cristobal MD at 3/17/2018  7:37 AM     Author:  Cristofer Cristobal MD Service:  Hospitalist Author Type:  Physician    Filed:  3/17/2018  8:27 AM Date of Service:  3/17/2018  7:37 AM Creation Time:  3/17/2018  7:37 AM    Status:  Signed :  Cristofer Cristobal MD (Physician)         [facility] Hospitalist Progress Note           Assessment and Plan:     Deanne Zayas is a 84 year old female with a past medical history significant for Alzheimer's dementia, previous CVA with residual right sided hemiparesis, hypertension, dyslipidemia, hypothyroidism, and anxiety who presents on 3/16/2018 with new onset atrial fibrillation with RVR, worsening right sided weakness, and vomiting.      [KK1.1]  Questionable[KK1.2] Atrial[KK1.1] flutter[KK1.2] with rapid ventricular response (H)[KK1.1] -[KK1.2]   3/16/18 -- Very remote past history of arrhythmia \"over 40 years ago " "requiring shocks\" per family, but no known arrhythmia since that time. Presented with atrial fibrillation with RVR today, unknown onset. Occurs in setting of recent vomiting. Patient received 15 mg IV diltiazem and an additional 60 mg oral diltiazem while in the emergency department with improvement in rate. CHADS-VASC = 5, not on anticoagulation other than aspirin and Plavix prior to admission.  - Continue diltiazem 60 mg with holding parameters  - Continue prior to admission aspirin and Plavix. May require additional anticoagulation but is a fall risk  3/17/2018 --[KK1.1] TSH normal.  On review of ECG from admission, rate 140 but rhythm was regular - does not look like atrial fibrillation.  Could be atrial flutter vs sinus tach due to dehydration/illness vs other SVT - rapid resolution and rate less consistent with sinus tach.  Has remained normal sinus rhythm on tele since.  Continue tele for now - will discuss with stroke neuro as below, but not clear-cut that she needs anticoagulation.  Continue diltiazem, plan to transition to 240 XL tomorrow.  May benefit from outpatient holter.[KK1.2]       Worsening right sided weakness / Previous CVA with persisting right hemiparesis (H)  3/16/18 -- Presented with worsening right sided weakness, which per family sometimes occurs when patient is stressed or sick. Concern for possible new stroke, although MRI brain, CT head, and CTA angio head & neck are without evidence for any acute intracranial or vascular abnormality (although old stroke findings are noted). Worsening weakness is likely due to unmasking by current stressors.  - Continue prior to admission aspirin and Plavix  3/17/2018 --[KK1.1]  Appears at baseline today - nothing on imagine for acute stroke.  Will discuss anticoagulation with stroke neuro today.[KK1.2]       Non-intractable vomiting with nausea[KK1.1] - suspect gastroenteritis[KK1.2]  3/16/18 -- Vomited 4 times on day of admission. Lives at [facility], " reportedly there is a gastroenteritis illness going around there. Possibly related to her atrial fibrillation and RVR as vomiting subsided after rate better controlled.  - Antiemetics prn  3/17/2018 --[KK1.1] just mild nausea overnight, continue contact precautions, send enteric panel if has bowel movement but hasn't yet.[KK1.2]      UTI  3/17/2018 -- UA positive[KK1.1] but no clear symptoms -[KK1.2] started on oral cipro, urine culture pending.       Essential hypertension  3/16/18 -- Previously diagnosed. Pressures initially elevated in the emergency department, now improved and are borderline soft. Taking amlodipine and lisinopril prior to admission, hold for now due to new introduction of diltiazem.  3/17/2018 -- blood pressure 110's this AM, continue just diltiazem for now, follow.       Hyperlipidemia LDL goal <130  3/16/18 --Taking Lipitor   3/17/2018 -- continued      Hypothyroidism due to acquired atrophy of thyroid  Previously diagnosed. Taking levothyroxine prior to admission, continue.     Anxiety  Stable. Taking Lexapro prior to admission, continue.     Late onset Alzheimer's disease without behavioral disturbance  Per family, patient seems more confused and lethargic than baseline. Not currently on any medications for dementia.     Vitamin B12 deficiency  3/17/2018 -- on monthly shots per neurology for borderline low level - check B12 with next draw.       Fluids: IV NS @ 75     DVT Prophylaxis: Pneumatic Compression Devices    Code Status: DNR / DNI - patient has healthcare directive. Discussed directly with patient's family.     Lines: Peripheral[KK1.1] IV[KK1.2]    Tan catheter: Not indicated    Disposition[KK1.1]  Anticipate at least 1-2 more days inpatient.[KK1.2]              Interval History:[KK1.1]   No new concerns this AM.  No fever or chills.  Had some nausea overnight but none currently - unclear how reliable this is regardless as patient unable to reliably report any history or prior  "symptoms, does reliably report current symptoms and respond to questions appropriately, just can't recall any history.     Oriented to season and says \"it's too cold to be anything but winter\", but not sure of month or date and did not know where she is or where she lives currently.  Cannot provide any report of what occurred yesterday.[KK1.2]             Review of Systems:[KK1.1]    ROS: 10 point ROS neg other than the symptoms noted above in the HPI.[KK1.2]             Medications:   Current active medications and PTA medications reviewed, see medication list for details.            Physical Exam:   Vitals were reviewed  Patient Vitals for the past 24 hrs:   BP Temp Temp src Pulse Heart Rate Resp SpO2 Height Weight   03/17/18 0607 - - - - - - - - 79.2 kg (174 lb 9.7 oz)   03/16/18 2352 111/51 97.2  F (36.2  C) Oral 91 - 16 95 % - -   03/16/18 2338 - - - - 79 - - - -   03/16/18 2125 110/53 99.5  F (37.5  C) Oral 84 - 16 95 % 1.676 m (5' 6\") 78.5 kg (173 lb 1 oz)   03/16/18 2122 - - - - 79 - - - -   03/16/18 1900 108/59 - - - - - - - -   03/16/18 1840 104/60 - - - 101 13 95 % - -   03/16/18 1820 123/88 - - - 113 10 96 % - -   03/16/18 1800 107/62 - - - 107 17 96 % - -   03/16/18 1740 116/68 - - - - - - - -   03/16/18 1738 - - - - 105 11 96 % - -   03/16/18 1735 - - - - 106 21 95 % - -   03/16/18 1700 115/67 - - - 110 11 95 % - -   03/16/18 1640 116/70 - - - 108 15 91 % - -   03/16/18 1620 121/73 - - - - - - - -   03/16/18 1600 135/72 - - - 111 18 95 % - -   03/16/18 1540 144/77 - - - 118 22 95 % - -   03/16/18 1520 142/74 - - - 112 22 94 % - -   03/16/18 1515 - - - - 113 20 95 % - -   03/16/18 1500 148/80 - - - 117 24 (!) 89 % - -   03/16/18 1445 - - - - 135 25 91 % - -   03/16/18 1440 (!) 154/99 - - - 138 28 92 % - -   03/16/18 1435 143/87 - - - 140 25 92 % - -   03/16/18 1420 (!) 146/108 - - - 138 23 - - -   03/16/18 1407 (!) 195/111 97.5  F (36.4  C) Axillary - 154 10 - - -   03/16/18 1401 - - - - 154 11 - - - "   18 1400 (!) 193/127 - - - 149 8 - - -   18 1340 (!) 148/111 - - - 121 10 (!) 53 % - -   18 1339 - - - - 116 20 (!) 72 % - -   18 1300 152/79 - - - 102 13 95 % - -   18 1237 - - - - - - 97 % - -   18 1232 - - - - - - 96 % - -   18 1230 - - - - - - 96 % - -   18 1212 - - - - - - 98 % - -   18 1211 140/74 - - - - - - - -   18 1145 140/76 - - - 108 21 94 % - -   18 1130 143/80 - - - 109 22 95 % - -   18 1122 135/78 97.8  F (36.6  C) Axillary 105 - 19 94 % - -       Temperatures:  Current - Temp: 97.2  F (36.2  C); Max - Temp  Av  F (36.7  C)  Min: 97.2  F (36.2  C)  Max: 99.5  F (37.5  C)  Respiration range: Resp  Av.4  Min: 8  Max: 28  Pulse range: Pulse  Av.3  Min: 84  Max: 105  Blood pressure range: Systolic (24hrs), Av , Min:104 , Max:195   ; Diastolic (24hrs), Av, Min:51, Max:127    Pulse oximetry range: SpO2  Av.1 %  Min: 53 %  Max: 98 %  I/O last 3 completed shifts:  In: 382 [I.V.:382]  Out: -     Intake/Output Summary (Last 24 hours) at 18 0737  Last data filed at 18 0600   Gross per 24 hour   Intake              382 ml   Output                0 ml   Net              382 ml[KK1.1]     EXAM:   GENERAL APPEARANCE ADULT: Alert, no acute distress, oriented x 1 and to season  EYES: PERRL, EOM normal, conjunctiva and lids normal, EOM normal   HENT: oral mucous membranes moist, head atraumatic and normocephalic   NECK: No adenopathy,masses or thyromegaly, supple  CV: regular rate and rhythm no murmur  Pulm: clear to auscultation bilaterally  Abdomen: soft, non-tender, no masses, no masses, normal bowel sounds.  MS: extremities normal, no peripheral edema  SKIN: no suspicious lesions or rashes  NEURO: Alert, oriented, speech and mentation normal, Cranial nerves 2-12 are normal., Strength difficult to assess as patient does comlpy with requests/commands but with minimal effort - mildly weak diffusely,  perhaps more so right upper extremity although this isn't very noticeable, more noticeable in right lower extremity, sounds like this is baseline. Sensation to light touch intact throughout upper and lower extremities bilaterally.     PSYCH: mentation appears normal, affect and mood normal[KK1.2]             Data:[KK1.1]     Results for orders placed or performed during the hospital encounter of 03/16/18 (from the past 24 hour(s))   Basic metabolic panel   Result Value Ref Range    Sodium 138 133 - 144 mmol/L    Potassium 4.0 3.4 - 5.3 mmol/L    Chloride 105 94 - 109 mmol/L    Carbon Dioxide 25 20 - 32 mmol/L    Anion Gap 8 3 - 14 mmol/L    Glucose 168 (H) 70 - 99 mg/dL    Urea Nitrogen 18 7 - 30 mg/dL    Creatinine 0.99 0.52 - 1.04 mg/dL    GFR Estimate 53 (L) >60 mL/min/1.7m2    GFR Estimate If Black 64 >60 mL/min/1.7m2    Calcium 8.5 8.5 - 10.1 mg/dL   INR   Result Value Ref Range    INR 1.05 0.86 - 1.14   Partial thromboplastin time   Result Value Ref Range    PTT <20 (L) 22 - 37 sec   Troponin I   Result Value Ref Range    Troponin I ES <0.015 0.000 - 0.045 ug/L   CT Head w/o Contrast    Narrative    CT SCAN OF THE HEAD WITHOUT CONTRAST   3/16/2018 12:07 PM     HISTORY: Possible right-sided weakness.     TECHNIQUE:  Axial images of the head and coronal reformations without  IV contrast material. Radiation dose for this scan was reduced using  automated exposure control, adjustment of the mA and/or kV according  to patient size, or iterative reconstruction technique.    COMPARISON: MRI 7/26/2017    FINDINGS:  There is generalized atrophy of the brain.  There is low  attenuation in the white matter of the cerebral hemispheres consistent  with sequelae of small vessel ischemic disease. Old lacunar infarct in  the left paramedian russell is noted. There is no evidence of  intracranial hemorrhage, mass, acute infarct or anomaly.     The visualized portions of the sinuses and mastoids appear normal.  There is no  evidence of trauma.      Impression    IMPRESSION:   1. No acute abnormality.  2. Old infarct in the left paramedian russell.  3.  Atrophy of the brain.  White matter changes consistent with  sequelae of small vessel ischemic disease.      JEFFERSON ROWLEY MD   CBC with platelets differential   Result Value Ref Range    WBC 7.2 4.0 - 11.0 10e9/L    RBC Count 4.03 3.8 - 5.2 10e12/L    Hemoglobin 12.4 11.7 - 15.7 g/dL    Hematocrit 37.6 35.0 - 47.0 %    MCV 93 78 - 100 fl    MCH 30.8 26.5 - 33.0 pg    MCHC 33.0 31.5 - 36.5 g/dL    RDW 13.3 10.0 - 15.0 %    Platelet Count 205 150 - 450 10e9/L    Diff Method Automated Method     % Neutrophils 86.3 %    % Lymphocytes 6.0 %    % Monocytes 6.3 %    % Eosinophils 0.8 %    % Basophils 0.3 %    % Immature Granulocytes 0.3 %    Absolute Neutrophil 6.2 1.6 - 8.3 10e9/L    Absolute Lymphocytes 0.4 (L) 0.8 - 5.3 10e9/L    Absolute Monocytes 0.5 0.0 - 1.3 10e9/L    Absolute Eosinophils 0.1 0.0 - 0.7 10e9/L    Absolute Basophils 0.0 0.0 - 0.2 10e9/L    Abs Immature Granulocytes 0.0 0 - 0.4 10e9/L   CT Head Neck Angio w/o & w Contrast    Narrative    CT ANGIOGRAM OF THE HEAD AND NECK WITHOUT AND WITH CONTRAST  3/16/2018  1:13 PM     HISTORY: Possible right-sided weakness. Previous left pontine infarct.    TECHNIQUE:  Precontrast localizing scans were followed by CT  angiography with an injection of 70 mL Isovue 370 IV with scans  through the head and neck.  Images were transferred to a separate 3-D  workstation where multiplanar reformations and 3-D images were  created.  Estimates of carotid stenoses are made relative to the  distal internal carotid artery diameters except as noted. Radiation  dose for this scan was reduced using automated exposure control,  adjustment of the mA and/or kV according to patient size, or iterative  reconstruction technique.    COMPARISON: CT scan today. MRI exam 7/24/2017    CT HEAD FINDINGS:  No contrast enhancing lesions. There is generalized  atrophy of  the brain.  White matter changes are present in the  cerebral hemispheres that are consistent with small vessel ischemic  disease in this age patient. Old lacunar infarct in left russell.  Cerebral blood flow is grossly normal.    CT ANGIOGRAM HEAD FINDINGS: There are mild to moderate stenoses in the  right middle cerebral artery M1 segment especially distally consistent  with atherosclerosis. Multiple mild to moderate stenoses are seen in  the anterior and posterior cerebral arteries as well. No arterial  occlusion is visible. Venous circulation is unremarkable.     CT ANGIOGRAM NECK FINDINGS:   Right carotid artery: Mild calcified plaque in the bifurcation.  No  significant stenosis.      Left carotid artery: Mild calcified plaque in the bifurcation.  Tortuous internal carotid.  No significant stenosis.      Vertebral arteries:  Tortuous vessels.  Moderate stenosis origin of  each of the vertebral arteries.      Other findings: None.      Impression    IMPRESSION:   1. No evidence of arterial occlusion.  2. Intracranial atherosclerotic stenoses especially in the right  middle cerebral artery M1 segment and in the anterior and posterior  cerebral arteries. No significant change.  3. Mild calcified atherosclerotic plaque in the carotid bifurcations  but no significant stenoses, unchanged.  4. Moderate stenoses of the origins of the vertebral arteries,  unchanged.      JEFFERSON ROWLEY MD   UA reflex to Microscopic   Result Value Ref Range    Color Urine Yellow     Appearance Urine Clear     Glucose Urine Negative NEG^Negative mg/dL    Bilirubin Urine Negative NEG^Negative    Ketones Urine Negative NEG^Negative mg/dL    Specific Gravity Urine 1.015 1.003 - 1.035    Blood Urine Small (A) NEG^Negative    pH Urine 5.0 5.0 - 7.0 pH    Protein Albumin Urine Negative NEG^Negative mg/dL    Urobilinogen mg/dL 0.0 0.0 - 2.0 mg/dL    Nitrite Urine Negative NEG^Negative    Leukocyte Esterase Urine Small (A) NEG^Negative    Source  Midstream Urine     RBC Urine 4 (H) 0 - 2 /HPF    WBC Urine 37 (H) 0 - 5 /HPF    Bacteria Urine Few (A) NEG^Negative /HPF    Mucous Urine Present (A) NEG^Negative /LPF   Urine Culture Aerobic Bacterial   Result Value Ref Range    Specimen Description Midstream Urine     Special Requests Specimen received in preservative     Culture Micro PENDING    MR Brain w/o & w Contrast    Narrative    MRI BRAIN WITHOUT AND WITH CONTRAST  3/16/2018 8:15 PM    HISTORY: Possible stroke, right-sided weakness.    TECHNIQUE:  Multiplanar, multisequence MRI of the brain without and  with 8mL Gadavist.    COMPARISON: 7/26/2017    FINDINGS: Diffusion-weighted images are normal. There is no evidence  for acute infarct or intracranial hemorrhage. There is an old infarct  in the left paramedian russell. There is a focus of old hemosiderin in  the right subinsular cortical region consistent with an old  microbleed. This was present on the previous exam and is unchanged.  Mild to moderate cerebral atrophy is present. There are patchy  supratentorial white matter changes in both hemispheres without  enhancement or mass effect. Postcontrast images do not show any  abnormal areas of enhancement or any focal mass lesions.      Impression    IMPRESSION:  1. No evidence for acute infarct, acute hemorrhage, or any focal mass  lesions.  2. Old left cerebellar infarct.  3. Old microbleed in right subinsular cortex.  4. Cerebral atrophy with chronic white matter changes.    KIARRA RAMIREZ MD   TSH   Result Value Ref Range    TSH 0.74 0.40 - 4.00 mU/L   Hemoglobin A1c   Result Value Ref Range    Hemoglobin A1C 6.3 (H) 4.3 - 6.0 %   CBC with platelets   Result Value Ref Range    WBC 5.6 4.0 - 11.0 10e9/L    RBC Count 3.71 (L) 3.8 - 5.2 10e12/L    Hemoglobin 11.4 (L) 11.7 - 15.7 g/dL    Hematocrit 34.9 (L) 35.0 - 47.0 %    MCV 94 78 - 100 fl    MCH 30.7 26.5 - 33.0 pg    MCHC 32.7 31.5 - 36.5 g/dL    RDW 13.9 10.0 - 15.0 %    Platelet Count 187 150 - 450  10e9/L[KK1.3]     All cardiac studies reviewed by me.[KK1.2]      Attestation:[KK1.1]  I have reviewed today's vital signs, notes, medications, labs and imaging.  Amount of time performed on this daily note: 40 minutes.[KK1.2]     Cristofer Cristobal MD, MD[KK1.1]                          Revision History        User Key Date/Time User Provider Type Action    > KK1.3 3/17/2018  8:27 AM Cristofer Cristobal MD Physician Sign     KK1.2 3/17/2018  8:13 AM Cristofer Cristobal MD Physician      KK1.1 3/17/2018  7:37 AM Cristofer Cristobal MD Physician             ED Provider Notes by Gutierrez Prabhakar MD at 3/16/2018 11:18 AM     Author:  Gutierrez Prabhakar MD Service:  Emergency Medicine Author Type:  Physician    Filed:  3/17/2018  6:36 AM Date of Service:  3/16/2018 11:18 AM Creation Time:  3/16/2018 11:32 AM    Status:  Signed :  Gutierrez Prabhakar MD (Physician)           History[SJ1.1]     Chief Complaint   Patient presents with     One-sided Weakness     R sided weakness[SJ1.2]     HPI  Deanne Zayas is a 84 year old female who arrives by ambulance from local memory care unit.  She typically is able to move and transfer with something to hold onto.  Today she is unable to stand up and bear weight on her own.  This was noted during her morning nursing check.  She had one episode of vomiting this morning.  She denies headache, visual change, chest pain, abdominal pain.  She is a poor historian secondary to her dementia.  There is no report of recent trauma or febrile illness.  She takes 81 mg aspirin , 75 mg clopidogrel daily.  History of stroke July 2017.    Problem List:[SJ1.1]    Patient Active Problem List    Diagnosis Date Noted     Atrial fibrillation with rapid ventricular response (H) 03/16/2018     Priority: Medium     History of CVA (cerebrovascular accident) 03/16/2018     Priority: Medium     Right hemiparesis (H), secondary to previous CVA 03/16/2018     Priority: Medium     Non-intractable vomiting with  "nausea 2018     Priority: Medium     Health Care Home 10/09/2017     Priority: Medium     [facility]   Yennifer Hernandez MA, LSW  [phone]    [facility] CARE PLAN SUMMARY    Client Name:  Deanne Zayas    Address:  [address]  [address] [address] Phone: [phone] (home)    :  1933 Date of Assessment:  10-25-17 transitional HRA   17 [place] Choices assessment     Health Plan:  [facility]  Health Plan Number: [id number] Medical Assistance Number: [id number]  Financial Worker:  Betzaida Barron   Case #:     FVP :  Yennifer Hernandez MA, LSW CM Phone:  [phone]  CM Fax:  [phone]   FVP Enrollment Date: 10/1/17 Case Mix:  J  Rate Cell:  B  Waiver Type:  EW opened 17   Primary Emergency Contact: Leeanna Allen  Address: [id number] [place] [address]  Home Phone: [phone]  Mobile Phone: [phone]  Relation: Daughter    Secondary Emergency Contact: Gutierrez Allen  Address: [address] [place] [address]  Home Phone: [phone]  Mobile Phone: [phone]  Relation: None Language:  English  :  No   Health Care Agent/POA:   Advanced Directives/Living Will:     Primary Care Clinic/Phone/Fax:  [facility]/(p) [phone], (f) [phone] Primary Dx:  Alzheimer's F02.08  Secondary Dx:  Stroke I63.9   Primary Physician:  Kori Card   Height:  5' 5\"  Weight:  186 lbs   Specialty Physician:    Audiologist:     Eye Care Provider:   Dental Care Provider:    [facility]: [facility] [phone] or [phone]   Other:        [facility] CURRENT SERVICES SUMMARY  Equipment owned/DME history: Hospital bed - [facility]     SERVICE TYPE/PROVIDER NAME/PHONE AUTH DATE FREQUENCY Units OR $ Amt DESCRIPTION   Medical Transportation: [facility] [phone]  Fax:  10-1-17 thru 18  Review annually/PRN   as needed     Assisted Living: " [facility] ([facility]isted [facility]) [phone] 24 hr [facility]  Fax:  10-1-17 thru 9-30-18  Review annually/PRN daily See RS/AL Tool    DME: [facility] [phone]  Fax:                 [employer]   [phone]        [facility]  [phone]   10-1-17 thru 9-30-18  Review annually/PRN            12-1-17  thru 9-30-18          Rented 10-24-17 started     monthly Pull ups - size L/XL - 8    Tabbed Overnight briefs - 2 per night      Boost - one bottle daily   Chocolate                       Stroke (H) 07/26/2017     Priority: Medium     Facial droop 07/24/2017     Priority: Medium     Acute cystitis without hematuria 07/24/2017     Priority: Medium     UTI (urinary tract infection) 07/24/2017     Priority: Medium     Fall 07/24/2017     Priority: Medium     Late onset Alzheimer's disease without behavioral disturbance 12/10/2015     Priority: Medium     Diaper dermatitis 10/08/2015     Priority: Medium     Anxiety 10/09/2014     Priority: Medium     Mixed incontinence 09/25/2014     Priority: Medium     Hypertension goal BP (blood pressure) < 140/90 08/13/2013     Priority: Medium     Advance care planning 10/16/2012     Priority: Medium     Advance Care Planning 9/14/2016: Receipt of ACP document:  Received: POLST which was signed and dated by provider on 7/7/16.  Document previously scanned on 7/21/16.  Order reviewed and found to be valid.  Code Status reflects choices in most recent ACP document.  Also received POLSTs dated 10/10/13 and 9/27/13.  Confirmed/documented designated decision maker(s).  Added by Opal Chanel    Advance Care Planning Liaison  Advance Care Planning 9/14/2016: Receipt of ACP document:  Received: Health Care Directive which was witnessed or notarized on 12/9/13.  Document previously scanned on 12/16/13.  Validation form completed and sent to be scanned.  Code Status reflects choices in most recent ACP document.  Confirmed/documented designated  decision maker(s).  Added by Opal Chanel   Advance Care Planning Liaison  Advance Care Planning 10/16/2012: Discussed advance care planning with patient; information given to patient to review.                   Osteopenia 2012     Priority: Medium     Essential hypertension 2012     Priority: Medium     Problem list name updated by automated process. Provider to review       Vitamin D deficiency disease 2012     Priority: Medium     Hyperlipidemia LDL goal <130 2012     Priority: Medium     Hypothyroidism due to acquired atrophy of thyroid 2012     Priority: Medium     Vitamin B12 deficiency disease 2012     Priority: Medium[SJ1.2]        Past Medical History:[SJ1.1]    Past Medical History:   Diagnosis Date     Alzheimer's dementia without behavioral disturbance      Depression      Dyslipidemia      Essential hypertension      History of CVA (cerebrovascular accident)      Hypothyroidism      Right hemiparesis (H), secondary to previous CVA[SJ1.2]        Past Surgical History:[SJ1.1]    Past Surgical History:   Procedure Laterality Date     CHOLECYSTECTOMY       CYSTOSCOPY N/A 2014    Procedure: CYSTOSCOPY;  Surgeon: ANNAMARIE Kern MD;  Location: [facility] OR     SURGICAL HISTORY OF -        cataract surgery bilat.[SJ1.2]        Family History:[SJ1.1]    Family History   Problem Relation Age of Onset     Family History Negative Mother      childbirth     Unknown/Adopted Mother       during child birth at a young age     Family History Negative Father      fell off roof broke neck     Obesity Sister[SJ1.2]        Social History:  Marital Status:   [5][SJ1.1]  Social History   Substance Use Topics     Smoking status: Never Smoker     Smokeless tobacco: Never Used     Alcohol use No[SJ1.2]        Medications:[SJ1.1]      No current outpatient prescriptions on file.[SJ1.2]      Review of Systems[SJ1.1]  All other systems reviewed and are  "negative.[SJ1.3]    Physical Exam[SJ1.1]   BP: 135/78  Pulse: 105  Heart Rate: 109  Temp: 97.8  F (36.6  C)  Resp: 19  Height: 167.6 cm (5' 6\")  Weight: 78.5 kg (173 lb 1 oz)  SpO2: 94 %[SJ1.2]      Physical Exam[SJ1.1]  Nontoxic-appearing no respiratory distress alert and oriented to self    Head atraumatic normocephalic    Cranial nerves; vision baseline fields intact, PERRL, EOMI, facial sensation intact to light touch, facial muscle tone intact and symmetrical, hearing grossly intact,swallowing without difficulty, voice baseline and normal, SCM  strength intact, tongue protrudes midline.  Palatal elevation symmetric    TM's unremarkable, EACs clear, oropharynx moist without lesions or erythema.    Neck supple full active painless range of motion no posterior midline tenderness.    No cervical adenopathy    Spine nontender to palpation    Pelvis stable nontender    Lungs clear to auscultation no rales rhonchi or wheezes    Heart regular no murmur S3 or rub    Abdomen soft nontender bowel sounds positive no masses or HSM    Strength and sensation intact throughout the extremities, skin clear from rash or lesion.[SJ1.3]      ED Course[SJ1.1]     ED Course[SJ1.2]     Procedures[SJ1.1]  EKG time 1142, symptoms generalized weakness, sinus tachycardia rate 110, left bundle branch block, no acute ST or T-wave changes appreciated by Dr. Gutierrez Prabhakar  Repeat EKG with rhythm change, atrial fibrillation rate 141, nonspecific ST changes, left bundle branch block, read by Dr. Gutierrez Prabhakar[SJ1.3]             Critical Care time:[SJ1.1]  none[SJ1.3]     Results for orders placed or performed during the hospital encounter of 03/16/18   CT Head w/o Contrast    Narrative    CT SCAN OF THE HEAD WITHOUT CONTRAST   3/16/2018 12:07 PM     HISTORY: Possible right-sided weakness.     TECHNIQUE:  Axial images of the head and coronal reformations without  IV contrast material. Radiation dose for this scan was reduced using  automated " exposure control, adjustment of the mA and/or kV according  to patient size, or iterative reconstruction technique.    COMPARISON: MRI 7/26/2017    FINDINGS:  There is generalized atrophy of the brain.  There is low  attenuation in the white matter of the cerebral hemispheres consistent  with sequelae of small vessel ischemic disease. Old lacunar infarct in  the left paramedian russell is noted. There is no evidence of  intracranial hemorrhage, mass, acute infarct or anomaly.     The visualized portions of the sinuses and mastoids appear normal.  There is no evidence of trauma.      Impression    IMPRESSION:   1. No acute abnormality.  2. Old infarct in the left paramedian russell.  3.  Atrophy of the brain.  White matter changes consistent with  sequelae of small vessel ischemic disease.      JEFFERSON ROWLEY MD   CT Head Neck Angio w/o & w Contrast    Narrative    CT ANGIOGRAM OF THE HEAD AND NECK WITHOUT AND WITH CONTRAST  3/16/2018  1:13 PM     HISTORY: Possible right-sided weakness. Previous left pontine infarct.    TECHNIQUE:  Precontrast localizing scans were followed by CT  angiography with an injection of 70 mL Isovue 370 IV with scans  through the head and neck.  Images were transferred to a separate 3-D  workstation where multiplanar reformations and 3-D images were  created.  Estimates of carotid stenoses are made relative to the  distal internal carotid artery diameters except as noted. Radiation  dose for this scan was reduced using automated exposure control,  adjustment of the mA and/or kV according to patient size, or iterative  reconstruction technique.    COMPARISON: CT scan today. MRI exam 7/24/2017    CT HEAD FINDINGS:  No contrast enhancing lesions. There is generalized  atrophy of the brain.  White matter changes are present in the  cerebral hemispheres that are consistent with small vessel ischemic  disease in this age patient. Old lacunar infarct in left russell.  Cerebral blood flow is grossly  normal.    CT ANGIOGRAM HEAD FINDINGS: There are mild to moderate stenoses in the  right middle cerebral artery M1 segment especially distally consistent  with atherosclerosis. Multiple mild to moderate stenoses are seen in  the anterior and posterior cerebral arteries as well. No arterial  occlusion is visible. Venous circulation is unremarkable.     CT ANGIOGRAM NECK FINDINGS:   Right carotid artery: Mild calcified plaque in the bifurcation.  No  significant stenosis.      Left carotid artery: Mild calcified plaque in the bifurcation.  Tortuous internal carotid.  No significant stenosis.      Vertebral arteries:  Tortuous vessels.  Moderate stenosis origin of  each of the vertebral arteries.      Other findings: None.      Impression    IMPRESSION:   1. No evidence of arterial occlusion.  2. Intracranial atherosclerotic stenoses especially in the right  middle cerebral artery M1 segment and in the anterior and posterior  cerebral arteries. No significant change.  3. Mild calcified atherosclerotic plaque in the carotid bifurcations  but no significant stenoses, unchanged.  4. Moderate stenoses of the origins of the vertebral arteries,  unchanged.      JEFFERSON ROWLEY MD   MR Brain w/o & w Contrast    Narrative    MRI BRAIN WITHOUT AND WITH CONTRAST  3/16/2018 8:15 PM    HISTORY: Possible stroke, right-sided weakness.    TECHNIQUE:  Multiplanar, multisequence MRI of the brain without and  with 8mL Gadavist.    COMPARISON: 7/26/2017    FINDINGS: Diffusion-weighted images are normal. There is no evidence  for acute infarct or intracranial hemorrhage. There is an old infarct  in the left paramedian russell. There is a focus of old hemosiderin in  the right subinsular cortical region consistent with an old  microbleed. This was present on the previous exam and is unchanged.  Mild to moderate cerebral atrophy is present. There are patchy  supratentorial white matter changes in both hemispheres without  enhancement or mass  effect. Postcontrast images do not show any  abnormal areas of enhancement or any focal mass lesions.      Impression    IMPRESSION:  1. No evidence for acute infarct, acute hemorrhage, or any focal mass  lesions.  2. Old left cerebellar infarct.  3. Old microbleed in right subinsular cortex.  4. Cerebral atrophy with chronic white matter changes.    KIARRA RAMIREZ MD   Basic metabolic panel   Result Value Ref Range    Sodium 138 133 - 144 mmol/L    Potassium 4.0 3.4 - 5.3 mmol/L    Chloride 105 94 - 109 mmol/L    Carbon Dioxide 25 20 - 32 mmol/L    Anion Gap 8 3 - 14 mmol/L    Glucose 168 (H) 70 - 99 mg/dL    Urea Nitrogen 18 7 - 30 mg/dL    Creatinine 0.99 0.52 - 1.04 mg/dL    GFR Estimate 53 (L) >60 mL/min/1.7m2    GFR Estimate If Black 64 >60 mL/min/1.7m2    Calcium 8.5 8.5 - 10.1 mg/dL   INR   Result Value Ref Range    INR 1.05 0.86 - 1.14   Partial thromboplastin time   Result Value Ref Range    PTT <20 (L) 22 - 37 sec   Troponin I   Result Value Ref Range    Troponin I ES <0.015 0.000 - 0.045 ug/L   UA reflex to Microscopic   Result Value Ref Range    Color Urine Yellow     Appearance Urine Clear     Glucose Urine Negative NEG^Negative mg/dL    Bilirubin Urine Negative NEG^Negative    Ketones Urine Negative NEG^Negative mg/dL    Specific Gravity Urine 1.015 1.003 - 1.035    Blood Urine Small (A) NEG^Negative    pH Urine 5.0 5.0 - 7.0 pH    Protein Albumin Urine Negative NEG^Negative mg/dL    Urobilinogen mg/dL 0.0 0.0 - 2.0 mg/dL    Nitrite Urine Negative NEG^Negative    Leukocyte Esterase Urine Small (A) NEG^Negative    Source Midstream Urine     RBC Urine 4 (H) 0 - 2 /HPF    WBC Urine 37 (H) 0 - 5 /HPF    Bacteria Urine Few (A) NEG^Negative /HPF    Mucous Urine Present (A) NEG^Negative /LPF   CBC with platelets differential   Result Value Ref Range    WBC 7.2 4.0 - 11.0 10e9/L    RBC Count 4.03 3.8 - 5.2 10e12/L    Hemoglobin 12.4 11.7 - 15.7 g/dL    Hematocrit 37.6 35.0 - 47.0 %    MCV 93 78 - 100 fl    MCH  30.8 26.5 - 33.0 pg    MCHC 33.0 31.5 - 36.5 g/dL    RDW 13.3 10.0 - 15.0 %    Platelet Count 205 150 - 450 10e9/L    Diff Method Automated Method     % Neutrophils 86.3 %    % Lymphocytes 6.0 %    % Monocytes 6.3 %    % Eosinophils 0.8 %    % Basophils 0.3 %    % Immature Granulocytes 0.3 %    Absolute Neutrophil 6.2 1.6 - 8.3 10e9/L    Absolute Lymphocytes 0.4 (L) 0.8 - 5.3 10e9/L    Absolute Monocytes 0.5 0.0 - 1.3 10e9/L    Absolute Eosinophils 0.1 0.0 - 0.7 10e9/L    Absolute Basophils 0.0 0.0 - 0.2 10e9/L    Abs Immature Granulocytes 0.0 0 - 0.4 10e9/L   Urine Culture Aerobic Bacterial   Result Value Ref Range    Specimen Description Midstream Urine     Special Requests Specimen received in preservative     Culture Micro PENDING                  Results for orders placed or performed during the hospital encounter of 03/16/18 (from the past 24 hour(s))   Basic metabolic panel   Result Value Ref Range    Sodium 138 133 - 144 mmol/L    Potassium 4.0 3.4 - 5.3 mmol/L    Chloride 105 94 - 109 mmol/L    Carbon Dioxide 25 20 - 32 mmol/L    Anion Gap 8 3 - 14 mmol/L    Glucose 168 (H) 70 - 99 mg/dL    Urea Nitrogen 18 7 - 30 mg/dL    Creatinine 0.99 0.52 - 1.04 mg/dL    GFR Estimate 53 (L) >60 mL/min/1.7m2    GFR Estimate If Black 64 >60 mL/min/1.7m2    Calcium 8.5 8.5 - 10.1 mg/dL   INR   Result Value Ref Range    INR 1.05 0.86 - 1.14   Partial thromboplastin time   Result Value Ref Range    PTT <20 (L) 22 - 37 sec   Troponin I   Result Value Ref Range    Troponin I ES <0.015 0.000 - 0.045 ug/L   CT Head w/o Contrast    Narrative    CT SCAN OF THE HEAD WITHOUT CONTRAST   3/16/2018 12:07 PM     HISTORY: Possible right-sided weakness.     TECHNIQUE:  Axial images of the head and coronal reformations without  IV contrast material. Radiation dose for this scan was reduced using  automated exposure control, adjustment of the mA and/or kV according  to patient size, or iterative reconstruction technique.    COMPARISON: MRI  7/26/2017    FINDINGS:  There is generalized atrophy of the brain.  There is low  attenuation in the white matter of the cerebral hemispheres consistent  with sequelae of small vessel ischemic disease. Old lacunar infarct in  the left paramedian russell is noted. There is no evidence of  intracranial hemorrhage, mass, acute infarct or anomaly.     The visualized portions of the sinuses and mastoids appear normal.  There is no evidence of trauma.      Impression    IMPRESSION:   1. No acute abnormality.  2. Old infarct in the left paramedian russell.  3.  Atrophy of the brain.  White matter changes consistent with  sequelae of small vessel ischemic disease.      JEFFERSON ROWLEY MD   CBC with platelets differential   Result Value Ref Range    WBC 7.2 4.0 - 11.0 10e9/L    RBC Count 4.03 3.8 - 5.2 10e12/L    Hemoglobin 12.4 11.7 - 15.7 g/dL    Hematocrit 37.6 35.0 - 47.0 %    MCV 93 78 - 100 fl    MCH 30.8 26.5 - 33.0 pg    MCHC 33.0 31.5 - 36.5 g/dL    RDW 13.3 10.0 - 15.0 %    Platelet Count 205 150 - 450 10e9/L    Diff Method Automated Method     % Neutrophils 86.3 %    % Lymphocytes 6.0 %    % Monocytes 6.3 %    % Eosinophils 0.8 %    % Basophils 0.3 %    % Immature Granulocytes 0.3 %    Absolute Neutrophil 6.2 1.6 - 8.3 10e9/L    Absolute Lymphocytes 0.4 (L) 0.8 - 5.3 10e9/L    Absolute Monocytes 0.5 0.0 - 1.3 10e9/L    Absolute Eosinophils 0.1 0.0 - 0.7 10e9/L    Absolute Basophils 0.0 0.0 - 0.2 10e9/L    Abs Immature Granulocytes 0.0 0 - 0.4 10e9/L   CT Head Neck Angio w/o & w Contrast    Narrative    CT ANGIOGRAM OF THE HEAD AND NECK WITHOUT AND WITH CONTRAST  3/16/2018  1:13 PM     HISTORY: Possible right-sided weakness. Previous left pontine infarct.    TECHNIQUE:  Precontrast localizing scans were followed by CT  angiography with an injection of 70 mL Isovue 370 IV with scans  through the head and neck.  Images were transferred to a separate 3-D  workstation where multiplanar reformations and 3-D images  were  created.  Estimates of carotid stenoses are made relative to the  distal internal carotid artery diameters except as noted. Radiation  dose for this scan was reduced using automated exposure control,  adjustment of the mA and/or kV according to patient size, or iterative  reconstruction technique.    COMPARISON: CT scan today. MRI exam 7/24/2017    CT HEAD FINDINGS:  No contrast enhancing lesions. There is generalized  atrophy of the brain.  White matter changes are present in the  cerebral hemispheres that are consistent with small vessel ischemic  disease in this age patient. Old lacunar infarct in left russell.  Cerebral blood flow is grossly normal.    CT ANGIOGRAM HEAD FINDINGS: There are mild to moderate stenoses in the  right middle cerebral artery M1 segment especially distally consistent  with atherosclerosis. Multiple mild to moderate stenoses are seen in  the anterior and posterior cerebral arteries as well. No arterial  occlusion is visible. Venous circulation is unremarkable.     CT ANGIOGRAM NECK FINDINGS:   Right carotid artery: Mild calcified plaque in the bifurcation.  No  significant stenosis.      Left carotid artery: Mild calcified plaque in the bifurcation.  Tortuous internal carotid.  No significant stenosis.      Vertebral arteries:  Tortuous vessels.  Moderate stenosis origin of  each of the vertebral arteries.      Other findings: None.      Impression    IMPRESSION:   1. No evidence of arterial occlusion.  2. Intracranial atherosclerotic stenoses especially in the right  middle cerebral artery M1 segment and in the anterior and posterior  cerebral arteries. No significant change.  3. Mild calcified atherosclerotic plaque in the carotid bifurcations  but no significant stenoses, unchanged.  4. Moderate stenoses of the origins of the vertebral arteries,  unchanged.      JEFFERSON ROWLEY MD   UA reflex to Microscopic   Result Value Ref Range    Color Urine Yellow     Appearance Urine Clear      Glucose Urine Negative NEG^Negative mg/dL    Bilirubin Urine Negative NEG^Negative    Ketones Urine Negative NEG^Negative mg/dL    Specific Gravity Urine 1.015 1.003 - 1.035    Blood Urine Small (A) NEG^Negative    pH Urine 5.0 5.0 - 7.0 pH    Protein Albumin Urine Negative NEG^Negative mg/dL    Urobilinogen mg/dL 0.0 0.0 - 2.0 mg/dL    Nitrite Urine Negative NEG^Negative    Leukocyte Esterase Urine Small (A) NEG^Negative    Source Midstream Urine     RBC Urine 4 (H) 0 - 2 /HPF    WBC Urine 37 (H) 0 - 5 /HPF    Bacteria Urine Few (A) NEG^Negative /HPF    Mucous Urine Present (A) NEG^Negative /LPF   Urine Culture Aerobic Bacterial   Result Value Ref Range    Specimen Description Midstream Urine     Special Requests Specimen received in preservative     Culture Micro PENDING    MR Brain w/o & w Contrast    Narrative    MRI BRAIN WITHOUT AND WITH CONTRAST  3/16/2018 8:15 PM    HISTORY: Possible stroke, right-sided weakness.    TECHNIQUE:  Multiplanar, multisequence MRI of the brain without and  with 8mL Gadavist.    COMPARISON: 7/26/2017    FINDINGS: Diffusion-weighted images are normal. There is no evidence  for acute infarct or intracranial hemorrhage. There is an old infarct  in the left paramedian russell. There is a focus of old hemosiderin in  the right subinsular cortical region consistent with an old  microbleed. This was present on the previous exam and is unchanged.  Mild to moderate cerebral atrophy is present. There are patchy  supratentorial white matter changes in both hemispheres without  enhancement or mass effect. Postcontrast images do not show any  abnormal areas of enhancement or any focal mass lesions.      Impression    IMPRESSION:  1. No evidence for acute infarct, acute hemorrhage, or any focal mass  lesions.  2. Old left cerebellar infarct.  3. Old microbleed in right subinsular cortex.  4. Cerebral atrophy with chronic white matter changes.    KIARRA RAMIREZ MD       Medications   naloxone  (NARCAN) injection 0.1-0.4 mg (not administered)   diltiazem (CARDIZEM) tablet 60 mg (60 mg Oral Given 3/17/18 0611)   aspirin chewable tablet 81 mg (not administered)   clopidogrel (PLAVIX) tablet 75 mg (not administered)   escitalopram (LEXAPRO) tablet 10 mg (not administered)   levothyroxine (SYNTHROID/LEVOTHROID) tablet 112 mcg (112 mcg Oral Given 3/17/18 0611)   medication instruction (not administered)   sodium chloride 0.9% infusion ( Intravenous Rate/Dose Verify 3/17/18 0600)   senna-docusate (SENOKOT-S;PERICOLACE) 8.6-50 MG per tablet 1 tablet (not administered)     Or   senna-docusate (SENOKOT-S;PERICOLACE) 8.6-50 MG per tablet 2 tablet (not administered)   metoclopramide (REGLAN) tablet 5 mg (not administered)     Or   metoclopramide (REGLAN) injection 5 mg (not administered)   prochlorperazine (COMPAZINE) injection 5 mg (not administered)     Or   prochlorperazine (COMPAZINE) tablet 5 mg (not administered)     Or   prochlorperazine (COMPAZINE) Suppository 12.5 mg (not administered)   ondansetron (ZOFRAN-ODT) ODT tab 4 mg (not administered)     Or   ondansetron (ZOFRAN) injection 4 mg (not administered)   0.9% sodium chloride BOLUS (0 mLs Intravenous Stopped 3/16/18 1523)   iopamidol (ISOVUE-370) solution 70 mL (70 mLs Intravenous Given 3/16/18 1242)   sodium chloride 0.9 % bag 500mL for CT scan flush use (100 mLs As instructed Given 3/16/18 1242)   ondansetron (ZOFRAN) 2 MG/ML injection (4 mg Intravenous Push Given 3/16/18 1355)   diltiazem (CARDIZEM) injection 15 mg (15 mg Intravenous Given 3/16/18 1445)   gadobutrol (GADAVIST) injection 8 mL (8 mLs Intravenous Given 3/16/18 2015)   sodium chloride (PF) 0.9% PF flush 10 mL (10 mLs Intracatheter Given 3/16/18 2015)[SJ1.2]       Assessments & Plan (with Medical Decision Making)[SJ1.1]  84-year-old female presents with generalized weakness and vomiting.  Question right sided weakness.  No evidence for sepsis, no evidence for MI/acute coronary syndrome, no  significant metabolic derangement, CT/CT angiogram head and neck unremarkable for acute finding, subsequent MRI brain no evidence for infarct or other acute change.  Urinalysis significant for 37 white cells few bacteria, culture pending.  Denies urinary symptoms.  CBC and chemistries normal.  Atrial fibrillation with RVR onset in ED, treated with 15 mg Cardizem IV, 60 mg immediate release Cardizem oral.  Rate controlled .  Blood pressure initially hypertensive, came down to 110s systolic.  Etiology generalized weakness undetermined, may be gastrointestinal virus, patient will be admitted for ongoing neurologic checks, telemetry, rate control, consideration for anticoagulation although currently on Plavix and aspirin secondary to stroke.  Reviewed with Kirsty MORALES who accepts patient for admission for .[SJ1.3]     I have reviewed the nursing notes.    I have reviewed the findings, diagnosis, plan and need for follow up with the patient.[SJ1.1]       Current Discharge Medication List          Final diagnoses:   Generalized muscle weakness   Atrial fibrillation with rapid ventricular response (H)[SJ1.2]       3/16/2018   [facility] EMERGENCY DEPARTMENT[SJ1.1]     Gutierrez Prabhakar MD  03/17/18 0636  [SJ1.4]     Revision History        User Key Date/Time User Provider Type Action    > SJ1.4 3/17/2018  6:36 AM Gutierrez Prabhakar MD Physician Sign     SJ1.2 3/17/2018  6:33 AM Gutierrez Prabhakar MD Physician      SJ1.3 3/17/2018  6:09 AM Gutierrez Prabhakar MD Physician      SJ1.1 3/16/2018 11:32 AM Gutierrez Prabhakar MD Physician             Progress Notes by Katie Moya RN at 3/17/2018  1:04 AM     Author:  Katie Moya RN Service:  (none) Author Type:  Registered Nurse    Filed:  3/17/2018  1:04 AM Date of Service:  3/17/2018  1:04 AM Creation Time:  3/17/2018  1:04 AM    Status:  Signed :  Katie Moya RN (Registered Nurse)         Secondary skin assessment done with admitting RN  (SATYA Dhillon), agree with assessment and documented findings.[SR1.1]       Revision History        User Key Date/Time User Provider Type Action    > SR1.1 3/17/2018  1:04 AM Katie Moya RN Registered Nurse Sign            ED Notes by Sury Terrazas RN at 3/16/2018  7:30 PM     Author:  Sury Terrazas RN Service:  (none) Author Type:  Registered Nurse    Filed:  3/16/2018  7:48 PM Date of Service:  3/16/2018  7:30 PM Creation Time:  3/16/2018  7:48 PM    Status:  Signed :  Sury Terrazas RN (Registered Nurse)         To MRI via stretcher.[TC1.1]     Revision History        User Key Date/Time User Provider Type Action    > TC1.1 3/16/2018  7:48 PM Sury Terrazas RN Registered Nurse Sign            ED Notes by Sury Terrazas RN at 3/16/2018  1:30 PM     Author:  Sury Terrazas RN Service:  (none) Author Type:  Registered Nurse    Filed:  3/16/2018  4:04 PM Date of Service:  3/16/2018  1:30 PM Creation Time:  3/16/2018  4:04 PM    Status:  Signed :  Sury Terrazas RN (Registered Nurse)         Pt wretching and vomiting. Also heart rate changed from nsr to a-fib up to 150's. The patient is tremulous and uncomfortable. The patient's daughter shows concern.[TC1.1]      Revision History        User Key Date/Time User Provider Type Action    > TC1.1 3/16/2018  4:04 PM Sury Terrazas RN Registered Nurse Sign            ED Notes by Sury Terrazas RN at 3/16/2018  2:50 PM     Author:  Sury Terrazas RN Service:  (none) Author Type:  Registered Nurse    Filed:  3/16/2018  3:30 PM Date of Service:  3/16/2018  2:50 PM Creation Time:  3/16/2018  3:30 PM    Status:  Signed :  Sury Terrazas RN (Registered Nurse)         Pt cleansed of incontinent urine and a large black stool. The patient states she feels much better and new brief applied.[TC1.1]      Revision History        User Key Date/Time User Provider Type Action    > TC1.1 3/16/2018  3:30 PM Sury Terrazas RN Registered  Nurse Sign            ED Notes by Samreen Curran RN at 3/16/2018  3:10 PM     Author:  Samreen Curran RN Service:  (none) Author Type:  Registered Nurse    Filed:  3/16/2018  3:24 PM Date of Service:  3/16/2018  3:10 PM Creation Time:  3/16/2018  3:10 PM    Status:  Addendum :  Samreen Curran RN (Registered Nurse)         Order for straight cath yields 100 mL, urine sample obtained.[BM1.1]     Revision History        User Key Date/Time User Provider Type Action    > [N/A] 3/16/2018  3:24 PM Samreen Curran RN Registered Nurse Addend     BM1.1 3/16/2018  3:10 PM Samreen Curran RN Registered Nurse Sign            ED Notes by Samreen Curran RN at 3/16/2018  2:24 PM     Author:  Samreen Curran RN Service:  (none) Author Type:  Registered Nurse    Filed:  3/16/2018  2:25 PM Date of Service:  3/16/2018  2:24 PM Creation Time:  3/16/2018  2:25 PM    Status:  Signed :  Samreen Curran RN (Registered Nurse)         Dr. Prabhakar notified of tachycardia 140's- 150's and /127, EKG ordered[BM1.1]     Revision History        User Key Date/Time User Provider Type Action    > BM1.1 3/16/2018  2:25 PM Samreen Curran RN Registered Nurse Sign            ED Notes by Sury Terrazas RN at 3/16/2018 12:04 PM     Author:  Sury Terrazas RN Service:  (none) Author Type:  Registered Nurse    Filed:  3/16/2018 12:04 PM Date of Service:  3/16/2018 12:04 PM Creation Time:  3/16/2018 12:04 PM    Status:  Signed :  Sury Terrazas RN (Registered Nurse)         Back from CT[TC1.1]     Revision History        User Key Date/Time User Provider Type Action    > TC1.1 3/16/2018 12:04 PM Sury Terrazas RN Registered Nurse Sign            ED Notes by Samreen Curran RN at 3/16/2018 11:44 AM     Author:  Samreen Curran RN Service:  (none) Author Type:  Registered Nurse    Filed:  3/16/2018 11:44 AM Date of Service:  3/16/2018 11:44 AM Creation Time:  3/16/2018 11:44 AM    Status:  Signed :  Samreen Curran,  RN (Registered Nurse)         Labs drawn with IV insertion and sent[BM1.1]     Revision History        User Key Date/Time User Provider Type Action    > BM1.1 3/16/2018 11:44 AM Samreen Curran RN Registered Nurse Sign            Progress Notes by Sury Terrazas RN at 3/16/2018 11:36 AM     Author:  Sury Terrazas RN Service:  (none) Author Type:  Registered Nurse    Filed:  3/16/2018 11:38 AM Date of Service:  3/16/2018 11:36 AM Creation Time:  3/16/2018 11:36 AM    Status:  Signed :  Sury Terrazas RN (Registered Nurse)         Pt transferred from [place] via EMT, confusion and (R) sided weakness.   Pt is alert w/mild confusion, follows commands.   Tachycardic[TC1.1]      Revision History        User Key Date/Time User Provider Type Action    > TC1.1 3/16/2018 11:38 AM Sury Terrazas RN Registered Nurse Sign            ED Notes by Kandace Alfaro RN at 3/16/2018 11:18 AM     Author:  Kandace Alfaro RN Service:  (none) Author Type:  Registered Nurse    Filed:  3/16/2018 11:18 AM Date of Service:  3/16/2018 11:18 AM Creation Time:  3/16/2018 11:18 AM    Status:  Signed :  Kandace Alfaro RN (Registered Nurse)         Bed: ED21  Expected date:   Expected time:   Means of arrival:   Comments:  ambulance     Revision History        User Key Date/Time User Provider Type Action    > KS1.1 3/16/2018 11:18 AM Kandace Alfaro RN Registered Nurse Sign                  Procedure Notes     No notes of this type exist for this encounter.         Progress Notes - Therapies (Notes from 03/16/18 through 03/19/18)      Progress Notes by Mandy Lozano PT at 3/17/2018  2:30 PM     Author:  Mandy Lozano PT Service:  Physical Medicine and Rehabilitation Author Type:  Physical Therapist    Filed:  3/17/2018  2:31 PM Date of Service:  3/17/2018  2:30 PM Creation Time:  3/17/2018  2:30 PM    Status:  Signed :  Mandy Lozano PT (Physical Therapist)          03/17/18 0900   Quick Adds   Type of Visit  Initial PT Evaluation       Present (No  but dtr and son in law provided hx and concer)   Living Environment   Lives With facility resident   Living Arrangements extended care facility  (Memory care)   Home Accessibility no concerns   Self-Care   Dominant Hand right   Usual Activity Tolerance poor   Current Activity Tolerance poor   Regular Exercise no   Functional Level Prior   Ambulation 3-->assistive equipment and person   Transferring 3-->assistive equipment and person   Toileting 3-->assistive equipment and person   Bathing 3-->assistive equipment and person   Dressing 3-->assistive equipment and person   Eating 0-->independent   Communication 2-->difficulty understanding (not related to language barrier)   Swallowing 0-->swallows foods/liquids without difficulty   Cognition 2 - difficulty with organizing thoughts   Fall history within last six months no   Which of the above functional risks had a recent onset or change? dressing;bathing;toileting;transferring;ambulation   Prior Functional Level Comment Varied report from family and memory care facility  Deanne demonstrated no skilled abilities and family present requested return to Memory care with previous assistance and level of cares     General Information   Onset of Illness/Injury or Date of Surgery - Date 03/16/18   Referring Physician Dr Cristobal   Patient/Family Goals Statement Previous level of assistance and memory care   Pertinent History of Current Problem (include personal factors and/or comorbidities that impact the POC) Medical concerns   Precautions/Limitations fall precautions   Weight-Bearing Status - LUE full weight-bearing   Weight-Bearing Status - RUE full weight-bearing   Weight-Bearing Status - LLE full weight-bearing   Weight-Bearing Status - RLE full weight-bearing   Cognitive Status Examination   Orientation not oriented to person, place or time   Level of Consciousness confused   Follows Commands and  Answers Questions unable to follow multi-step instructions;unable to answer questions   Personal Safety and Judgment impaired;at risk behaviors demonstrated   Memory impaired   Cognitive Comment RN present when PT worked with Bird and was inagreemnt that bird is unable to follow commands and participate with moiblity without max assistance   Pain Assessment   Patient Currently in Pain (Hips)   Posture    Posture Comments Pushes and leans backwards   Range of Motion (ROM)   ROM Comment Functionalf for all ADL and MRADLs    Strength   Strength Comments Strong with MMT.  Poor motor planning and memory to assist with skilled assistance   Bed Mobility   Bed Mobility Comments requires moderate to max assistance and continued re direction  Unable to sit unsupervised   Transfer Skills   Transfer Comments Stand pivot max assist of two   Gait   Gait Comments Unable to get erect and did not appear to understand motor skills to walk and  her feet   Balance   Balance Comments Leans backwards and right side if not supported   Clinical Impression   Criteria for Skilled Therapeutic Intervention yes, treatment indicated;no significant expected improvement in functional status;patient/family refuse skilled intervention at this time   PT Diagnosis Weakness and MAS   Influenced by the following impairments Increased assistance and slef cares   Functional limitations due to impairments Possible medical and UTI   Clinical Presentation Unstable/Unpredictable   Clinical Presentation Rationale Memory unit, not recieving skilled intervention, staff assist 1-2 moderate with all mobiity and ADLs and MRADLs   Clinical Decision Making (Complexity) Moderate complexity   Therapy Frequency` daily   Risk & Benefits of therapy have been explained Yes   Patient, Family & other staff in agreement with plan of care Yes   Clinical Impression Comments Family with good understanding and in agreement tory but goal is to return to Memory care unit  "  [facility] AM-PAC  \"6 Clicks\" V.2 Basic Mobility Inpatient Short Form   1. Turning from your back to your side while in a flat bed without using bedrails? 3 - A Little   2. Moving from lying on your back to sitting on the side of a flat bed without using bedrails? 2 - A Lot   3. Moving to and from a bed to a chair (including a wheelchair)? 2 - A Lot   4. Standing up from a chair using your arms (e.g., wheelchair, or bedside chair)? 2 - A Lot   5. To walk in hospital room? 2 - A Lot   6. Climbing 3-5 steps with a railing? 2 - A Lot   Basic Mobility Raw Score (Score out of 24.Lower scores equate to lower levels of function) 13   Total Evaluation Time   Total Evaluation Time (Minutes) 30[KB1.1]        Revision History        User Key Date/Time User Provider Type Action    > KB1.1 3/17/2018  2:31 PM Mandy Lozano, PT Physical Therapist Sign            "

## 2018-03-16 NOTE — IP AVS SNAPSHOT
"    [facility] SURGICAL: [phone]                                              INTERAGENCY TRANSFER FORM - PHYSICIAN ORDERS   3/16/2018                    Hospital Admission Date: 3/16/2018  BOBBY KING   : 1933  Sex: Female        Attending Provider: Jared Osman MD     Allergies:  Donepezil    Infection:  None   Service:  GENERAL MEDI    Ht:  1.676 m (5' 6\")   Wt:  81.5 kg (179 lb 10.8 oz)   Admission Wt:  78.5 kg (173 lb 1 oz)    BMI:  29 kg/m 2   BSA:  1.95 m 2            Patient PCP Information     Provider PCP Type    Kori Card, YASMINE CNP General      ED Clinical Impression     Diagnosis Description Comment Added By Time Added    Generalized muscle weakness [M62.81] Generalized muscle weakness [M62.81]  Gutierrez Prabhakar MD 3/16/2018  4:06 PM    Atrial fibrillation with rapid ventricular response (H) [I48.91] Atrial fibrillation with rapid ventricular response (H) [I48.91]  Gutierrez Prabhakar MD 3/16/2018  4:06 PM      Hospital Problems as of 3/19/2018              Priority Class Noted POA    Hyperlipidemia LDL goal <130 Medium  2012 Yes    Hypothyroidism due to acquired atrophy of thyroid Medium  2012 Yes    Essential hypertension Medium  2012 Yes    Anxiety Medium  10/9/2014 Yes    Late onset Alzheimer's disease without behavioral disturbance Medium  12/10/2015 Yes    * (Principal)Atrial fibrillation with rapid ventricular response (H) Medium  3/16/2018 Yes    History of CVA (cerebrovascular accident) Medium  3/16/2018 Unknown    Right hemiparesis (H), secondary to previous CVA Medium  3/16/2018 Unknown    Non-intractable vomiting with nausea Medium  3/16/2018 Unknown      Non-Hospital Problems as of 3/19/2018              Priority Class Noted    Vitamin B12 deficiency disease Medium  2012    Vitamin D deficiency disease Medium  2012    Osteopenia Medium  2012    Advance care planning Medium  10/16/2012    Hypertension goal BP (blood " pressure) < 140/90 Medium  8/13/2013    Mixed incontinence Medium  9/25/2014    Diaper dermatitis Medium  10/8/2015    Facial droop Medium  7/24/2017    Acute cystitis without hematuria Medium  7/24/2017    UTI (urinary tract infection) Medium  7/24/2017    Fall Medium  7/24/2017    Stroke (H) Medium  7/26/2017    Health Care Home Medium  10/9/2017      Code Status History     Date Active Date Inactive Code Status Order ID Comments User Context    9/27/2013 11:56 AM 3/17/2018 12:34 AM DNR/DNI [id number]  Justine Small NP Outpatient    9/26/2013  3:39 PM 9/27/2013 11:56 AM Full Code [id number]  Justine Small NP Outpatient         Medication Review      START taking        Dose / Directions Comments    ciprofloxacin 250 MG tablet   Commonly known as:  CIPRO   Indication:  Urinary Tract Infection   Used for:  Acute cystitis without hematuria        Dose:  250 mg   Take 1 tablet (250 mg) by mouth 2 times daily for 4 days   Quantity:  8 tablet   Refills:  0        diltiazem 240 MG 24 hr capsule        Dose:  240 mg   Start taking on:  3/20/2018   Take 1 capsule (240 mg) by mouth daily   Refills:  0          CONTINUE these medications which may have CHANGED, or have new prescriptions. If we are uncertain of the size of tablets/capsules you have at home, strength may be listed as something that might have changed.        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 (H)        Dose:  20 mg   Take 1 tablet (20 mg) by mouth daily   Quantity:  31 tablet   Refills:  98        BIOFREEZE 4 % Gel   Generic drug:  Menthol (Topical Analgesic)        Externally apply topically 4 times daily as needed   Refills:  0        bisacodyl 10 MG Suppository   Commonly known as:  DULCOLAX        Dose:  10 mg   Place 10 mg rectally daily as needed for constipation   Refills:  0        cholecalciferol 1000 UNIT tablet   Commonly known as:  vitamin D3   Used for:  Senile osteoporosis        Dose:  2000 Units   Take 2 tablets (2,000 Units) by mouth daily   Quantity:  62 tablet   Refills:  98        clopidogrel 75 MG tablet   Commonly known as:  PLAVIX   Used for:  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)        Dose:  75 mg   Take 1 tablet (75 mg) by mouth daily   Quantity:  31 tablet   Refills:  98        cyanocobalamin 1000 MCG/ML injection   Commonly known as:  VITAMIN B12   Used for:  Vitamin B 12 deficiency        Dose:  1 mL   Inject 1 mL (1,000 mcg) Subcutaneous every 30 days   Quantity:  1 mL   Refills:  98        escitalopram 10 MG tablet   Commonly known as:  LEXAPRO   Used for:  Anxiety        Dose:  10 mg   Take 1 tablet (10 mg) by mouth daily   Quantity:  31 tablet   Refills:  98        ferrous sulfate 325 (65 FE) MG tablet   Commonly known as:  IRON   Used for:  Iron deficiency anemia secondary to inadequate dietary iron intake        Dose:  325 mg   Take 1 tablet (325 mg) by mouth daily   Quantity:  31 tablet   Refills:  PRN        levothyroxine 112 MCG tablet   Commonly known as:  SYNTHROID/LEVOTHROID   Used for:  Other specified hypothyroidism        Dose:  112 mcg   Take 1 tablet (112 mcg) by mouth daily   Quantity:  31 tablet   Refills:  98        miconazole 2 % Aerp powder   Commonly known as:  MICATIN        Apply topically 3 times daily as needed   Refills:  0        polyethylene glycol  "powder   Commonly known as:  MIRALAX/GLYCOLAX        Dose:  17 g   Take 17 g by mouth daily as needed for constipation   Refills:  0        senna-docusate 8.6-50 MG per tablet   Commonly known as:  SENNA S   Used for:  Constipation, unspecified constipation type        Dose:  1 tablet   Take 1 tablet by mouth daily   Quantity:  31 tablet   Refills:  PRN        syringe/needle (disp) 25G X 1\" 3 ML Misc   Used for:  Vitamin B 12 deficiency        USE AS DIRECTED WITH B12 INECTION   Quantity:  1 each   Refills:  98          STOP taking     AMLODIPINE BESYLATE PO           aspirin 81 MG chewable tablet                   After Care     Activity - Up with nursing assistance           Advance Diet as Tolerated       Follow this diet upon discharge: Orders Placed This Encounter      Low Saturated Fat Na <2400 mg       Fall precautions           General info for SNF       Length of Stay Estimate: Short Term Care: Estimated # of Days <30  Condition at Discharge: Stable  Level of care:skilled   Rehabilitation Potential: Fair  Admission H&P remains valid and up-to-date: Yes  Recent Chemotherapy: N/A  Use Nursing Home Standing Orders: Yes       Mantoux instructions       Give two-step Mantoux (PPD) Per Facility Policy Yes             Referrals     Occupational Therapy Adult Consult       Evaluate and treat as clinically indicated.    Reason:  weakness       Physical Therapy Adult Consult       Evaluate and treat as clinically indicated.    Reason:  weakness             Follow-Up Appointment Instructions     Future Labs/Procedures    Follow Up and recommended labs and tests     Comments:    Follow up with primary care provider in 2 weeks.      Follow-Up Appointment Instructions     Follow Up and recommended labs and tests       Follow up with primary care provider in 2 weeks.             Statement of Approval     Ordered          03/19/18 1059  I have reviewed and agree with all the recommendations and orders detailed in this " document.  EFFECTIVE NOW     Approved and electronically signed by:  Jared Osman MD

## 2018-03-16 NOTE — IP AVS SNAPSHOT
` `           [facility] SURGICAL: [phone]            Medication Administration Report for Deanne Zayas as of 03/19/18 1134   Legend:    Given Hold Not Given Due Canceled Entry Other Actions    Time Time (Time) Time  Time-Action       Inactive    Active    Linked        Medications 03/13/18 03/14/18 03/15/18 03/16/18 03/17/18 03/18/18 03/19/18    atorvastatin (LIPITOR) tablet 20 mg  Dose: 20 mg  Freq: DAILY Route: PO  Start: 03/17/18 0800        0839 (20 mg)-Given        0850 (20 mg)-Given        0938 (20 mg)-Given           bisacodyl (DULCOLAX) Suppository 10 mg  Dose: 10 mg  Freq: DAILY PRN Route: RE  PRN Reason: constipation  Start: 03/17/18 0743              cholecalciferol (vitamin D3) tablet 2,000 Units  Dose: 2,000 Units  Freq: DAILY Route: PO  Start: 03/17/18 0800        0839 (2,000 Units)-Given        0850 (2,000 Units)-Given        0938 (2,000 Units)-Given           ciprofloxacin (CIPRO) tablet 250 mg  Dose: 250 mg  Freq: EVERY 12 HOURS SCHEDULED Route: PO  Indications of Use: URINARY TRACT INFECTION  Start: 03/17/18 0800   Admin Instructions: Administer at least 2 hours before or 4 hours after aluminum, calcium, iron, zinc or magnesium containing medications. May be taken with food or on an empty stomach.  Do not administer alone with a dairy product like milk or yogurt or calcium-fortified juice,  but may be administered with a meal containing dairy.         0840 (250 mg)-Given       1948 (250 mg)-Given        0851 (250 mg)-Given       2050 (250 mg)-Given        0938 (250 mg)-Given       [ ] 2000           clopidogrel (PLAVIX) tablet 75 mg  Dose: 75 mg  Freq: DAILY Route: PO  Start: 03/17/18 0800        0841 (75 mg)-Given        0850 (75 mg)-Given        0938 (75 mg)-Given           diltiazem (CARDIZEM CD/CARTIA XT) 24 hr capsule 240 mg  Dose: 240 mg  Freq: DAILY Route: PO  Start: 03/18/18 0800   Admin Instructions: DO NOT CRUSH.          0850 (240 mg)-Given        0938 (240  mg)-Given           escitalopram (LEXAPRO) tablet 10 mg  Dose: 10 mg  Freq: DAILY Route: PO  Start: 03/17/18 0800        0839 (10 mg)-Given        0851 (10 mg)-Given        0938 (10 mg)-Given           ferrous sulfate (IRON) tablet 325 mg  Dose: 325 mg  Freq: DAILY Route: PO  Start: 03/17/18 0800   Admin Instructions: Absorbed best on an empty stomach. If stomach upset occurs, can take with meals.         0839 (325 mg)-Given        0850 (325 mg)-Given        0938 (325 mg)-Given           levothyroxine (SYNTHROID/LEVOTHROID) tablet 112 mcg  Dose: 112 mcg  Freq: EVERY MORNING BEFORE BREAKFAST Route: PO  Start: 03/17/18 0630   Admin Instructions: Separate oral administration of iron- or calcium-containing products and levothyroxine by at least 4 hours.         0611 (112 mcg)-Given        0623 (112 mcg)-Given        0656 (112 mcg)-Given           medication instruction  Freq: CONTINUOUS PRN Route: XX  Start: 03/17/18 0031   Admin Instructions: No oral medications if patient did not pass the swallow screen. Contact provider for alternative routes or NG placement.               metoclopramide (REGLAN) tablet 5 mg  Dose: 5 mg  Freq: EVERY 6 HOURS PRN Route: PO  PRN Comment: nausea and vomiting  Start: 03/17/18 0034   Admin Instructions: This is Step 3 of nausea and vomiting management.  Give if nausea not resolved 15 minutes after giving prochlorperazine (COMPAZINE).  If nausea not resolved in 15-30 minutes, Notify provider.              Or  metoclopramide (REGLAN) injection 5 mg  Dose: 5 mg  Freq: EVERY 6 HOURS PRN Route: IV  PRN Comment: nausea and vomiting  Start: 03/17/18 0034   Admin Instructions: This is Step 3 of nausea and vomiting management.  Give if nausea not resolved 15 minutes after giving prochlorperazine (COMPAZINE).  If nausea not resolved in 15-30 minutes, Notify provider.  Avoid use if patient has full bowel obstruction or perforation. Irritant. For ordered doses up to 10 mg, give IV Push undiluted over  2 minutes.               naloxone (NARCAN) injection 0.1-0.4 mg  Dose: 0.1-0.4 mg  Freq: EVERY 2 MIN PRN Route: IV  PRN Reason: opioid reversal  Start: 03/16/18 2120   Admin Instructions: For respiratory rate LESS than or EQUAL to 8.  Partial reversal dose:  0.1 mg titrated q 2 minutes for Analgesia Side Effects Monitoring Sedation Level of 3 (frequently drowsy, arousable, drifts to sleep during conversation).Full reversal dose:  0.4 mg bolus for Analgesia Side Effects Monitoring Sedation Level of 4 (somnolent, minimal or no response to stimulation).  For ordered doses up to 2mg give IVP. Give each 0.4mg over 15 seconds in emergency situations. For non-emergent situations further dilute in 9mL of NS to facilitate titration of response.               ondansetron (ZOFRAN-ODT) ODT tab 4 mg  Dose: 4 mg  Freq: EVERY 6 HOURS PRN Route: PO  PRN Reasons: nausea,vomiting  Start: 03/17/18 0034   Admin Instructions: This is Step 1 of nausea and vomiting management.  If nausea not resolved in 15 minutes, go to Step 2 prochlorperazine (COMPAZINE). Do not push through foil backing. Peel back foil and gently remove. Place on tongue immediately. Administration with liquid unnecessary  With dry hands, peel back foil backing and gently remove tablet; do not push oral disintegrating tablet through foil backing; administer immediately on tongue and oral disintegrating tablet dissolves in seconds; then swallow with saliva; liquid not required.                     Or  ondansetron (ZOFRAN) injection 4 mg  Dose: 4 mg  Freq: EVERY 6 HOURS PRN Route: IV  PRN Reasons: nausea,vomiting  Start: 03/17/18 0034   Admin Instructions: This is Step 1 of nausea and vomiting management.  If nausea not resolved in 15 minutes, go to Step 2 prochlorperazine (COMPAZINE).  Irritant. For ordered doses up to 4 mg, give IV Push undiluted over 2-5 minutes.         1752 (4 mg)-Given             potassium chloride (KLOR-CON) Packet 20-40 mEq  Dose: 20-40  mEq  Freq: EVERY 2 HOURS PRN Route: ORAL OR FEED  PRN Reason: potassium supplementation  Start: 03/18/18 0953   Admin Instructions: Use if unable to tolerate tablets.  If Serum K+ 3.0-3.3, dose = 60 mEq po total dose (40 mEq x1 followed in 2 hours by 20 mEq x1). Recheck K+ level 4 hours after dose and the next AM.  If Serum K+ 2.5-2.9, dose = 80 mEq po total dose (40 mEq Q2H x2). Recheck K+ level 4 hours after dose and the next AM.  If Serum K+ less than 2.5, See IV order.  Dissolve packet contents in 4-8 ounces of cold water or juice.               potassium chloride 10 mEq in 100 mL sterile water intermittent infusion (premix)  Dose: 10 mEq  Freq: EVERY 1 HOUR PRN Route: IV  PRN Reason: potassium supplementation  Start: 03/18/18 0953   Admin Instructions: Infuse via PERIPHERAL LINE or CENTRAL LINE. Use for central line replacement if patient weight less than 65 kg, if patient is on TPN with high potassium content or if unit does not stock 20 mEq bags.   If Serum K+ 3.0-3.3, dose = 10 mEq/hr x4 doses (40 mEq IV total dose). Recheck K+ level 2 hours after dose and the next AM.   If Serum K+ less than 3.0, dose = 10 mEq/hr x6 doses (60 mEq IV total dose). Recheck K+ level 2 hours after dose and the next AM.               potassium chloride SA (K-DUR/KLOR-CON M) CR tablet 20-40 mEq  Dose: 20-40 mEq  Freq: EVERY 2 HOURS PRN Route: PO  PRN Reason: potassium supplementation  Start: 03/18/18 0953   Admin Instructions: Use if able to take PO.   If Serum K+ 3.0-3.3, dose = 60 mEq po total dose (40 mEq x1 followed in 2 hours by 20 mEq x1). Recheck K+ level 4 hours after dose and the next AM.  If Serum K+ 2.5-2.9, dose = 80 mEq po total dose (40 mEq Q2H x2). Recheck K+ level 4 hours after dose and the next AM.  If Serum K+ less than 2.5, See IV order.  DO NOT CRUSH          1301 (40 mEq)-Given       1607 (20 mEq)-Given            prochlorperazine (COMPAZINE) injection 5 mg  Dose: 5 mg  Freq: EVERY 6 HOURS PRN Route: IV  PRN  Reasons: nausea,vomiting  Start: 03/17/18 0034   Admin Instructions: This is Step 2 of nausea and vomiting management. Give if nausea not resolved 15 minutes after giving ondansetron (ZOFRAN). If nausea not resolved in 15 minutes, go to Step 3 metoclopramide (REGLAN), if ordered.  For ordered doses up to 10 mg, give IV Push undiluted. Each 5mg over 1 minute.              Or  prochlorperazine (COMPAZINE) tablet 5 mg  Dose: 5 mg  Freq: EVERY 6 HOURS PRN Route: PO  PRN Reason: vomiting  Start: 03/17/18 0034   Admin Instructions: This is Step 2 of nausea and vomiting management. Give if nausea not resolved 15 minutes after giving ondansetron (ZOFRAN). If nausea not resolved in 15 minutes, go to Step 3 metoclopramide (REGLAN), if ordered.              Or  prochlorperazine (COMPAZINE) Suppository 12.5 mg  Dose: 12.5 mg  Freq: EVERY 12 HOURS PRN Route: RE  PRN Reasons: nausea,vomiting  Start: 03/17/18 0034   Admin Instructions: This is Step 2 of nausea and vomiting management. Give if nausea not resolved 15 minutes after giving ondansetron (ZOFRAN). If nausea not resolved in 15 minutes, go to Step 3 metoclopramide (REGLAN), if ordered.               senna-docusate (SENOKOT-S;PERICOLACE) 8.6-50 MG per tablet 1 tablet  Dose: 1 tablet  Freq: DAILY Route: PO  Start: 03/17/18 0800        0840 (1 tablet)-Given        (0943)-Not Given        (0939)-Not Given           senna-docusate (SENOKOT-S;PERICOLACE) 8.6-50 MG per tablet 1 tablet  Dose: 1 tablet  Freq: 2 TIMES DAILY PRN Route: PO  PRN Reason: constipation  Start: 03/17/18 0034   Admin Instructions: If no bowel movement in 24 hours, increase to 2 tablets PO.  Hold for loose stools.              Or  senna-docusate (SENOKOT-S;PERICOLACE) 8.6-50 MG per tablet 2 tablet  Dose: 2 tablet  Freq: 2 TIMES DAILY PRN Route: PO  PRN Reason: constipation  Start: 03/17/18 0034   Admin Instructions: Hold for loose stools.              Completed Medications  Medications 03/13/18 03/14/18  03/15/18 03/16/18 03/17/18 03/18/18 03/19/18         Dose: 500 mL  Freq: ONCE Route: IV  Last Dose: Stopped (03/16/18 1523)  Start: 03/16/18 1133   End: 03/16/18 1523       1448 (500 mL)-New Bag       1523-Stopped                Dose: 15 mg  Freq: ONCE Route: IV  Start: 03/16/18 1434   End: 03/16/18 1445   Admin Instructions: Irritant. For ordered doses up to 0.35 mg/kg, give undiluted IVP over 2 minutes if on cardiac monitor.        1445 (15 mg)-Given                Dose: 8 mL  Freq: ONCE Route: IV  Start: 03/16/18 2008   End: 03/16/18 2015 2015 (8 mL)-Given                Dose: 70 mL  Freq: ONCE Route: IV  Start: 03/16/18 1138   End: 03/16/18 1242       1242 (70 mL)-Given                Start: 03/16/18 1348   End: 03/16/18 1355   Admin Instructions: Laina Terrazas : cabinet override  Irritant. For ordered doses up to 4 mg, give IV Push undiluted over 2-5 minutes.        1355 (4 mg)-Given                Dose: 10 mL  Freq: ONCE Route: IK  Start: 03/16/18 2008   End: 03/16/18 2015 2015 (10 mL)-Given                Dose: 100 mL  Freq: ONCE Route: AS INSTRUCTE  Start: 03/16/18 1138   End: 03/16/18 1242   Admin Instructions: This entry is for use by Radiology to intermittently used as a flush in patients receiving a CT scan.        1242 (100 mL)-Given             Discontinued Medications  Medications 03/13/18 03/14/18 03/15/18 03/16/18 03/17/18 03/18/18 03/19/18         Dose: 81 mg  Freq: DAILY Route: PO  Start: 03/17/18 0800   End: 03/17/18 0855        0839 (81 mg)-Given       0855-Med Discontinued           Dose: 1,000 mcg  Freq: EVERY 30 DAYS Route: SC  Start: 03/17/18 0745   End: 03/17/18 0754               0754-Med Discontinued           Dose: 60 mg  Freq: EVERY 6 HOURS SCHEDULED Route: PO  Start: 03/16/18 2245   End: 03/18/18 0400   Admin Instructions: Hold if SBP < 110 or heart rate < 60        (5294)-Not Given        0611 (60 mg)-Given       1223 (60 mg)-Given       1836 (60 mg)-Given        0013 (60  mg)-Given       0400-Med Discontinued          Dose: 60 mg  Freq: EVERY 6 HOURS SCHEDULED Route: PO  Start: 03/16/18 1800   End: 03/16/18 2231       1556 (60 mg)-Given       2231-Med Discontinued                  Dose: 20 mEq  Freq: EVERY 1 HOUR PRN Route: IV  PRN Reason: potassium supplementation  Start: 03/18/18 0953   End: 03/18/18 0958   Admin Instructions: Infuse via CENTRAL LINE Only. May need EKG if less than 65 kg or on TPN - Max rate is 0.3 mEq/kg/hr for patients not on EKG monitoring.   If Serum K+ 3.0-3.3, dose = 20 mEq/hr x2 doses (40 mEq IV total dose). Recheck K+ level 2 hours after dose and the next AM.  If Serum K+ less than 3.0, dose = 20 mEq/hr x3 doses (60 mEq IV total dose). Recheck K+ level 2 hours after dose and the next AM.          0958-Med Discontinued          Rate: 75 mL/hr   Freq: CONTINUOUS Route: IV  Start: 03/17/18 0045   End: 03/19/18 1007   Admin Instructions: Minimum total IV fluid 30ml/hr         0045 ( )-New Bag [C]       0600 ( )-Rate/Dose Verify       1310 ( )-New Bag       2159 ( )-Rate/Dose Verify        0547 ( )-Rate/Dose Verify       1607 ( )-New Bag       2200 ( )-Rate/Dose Verify        0415 ( )-New Bag       1007-Med Discontinued    Medications 03/13/18 03/14/18 03/15/18 03/16/18 03/17/18 03/18/18 03/19/18

## 2018-03-16 NOTE — ED NOTES
Pt cleansed of incontinent urine and a large black stool. The patient states she feels much better and new brief applied.

## 2018-03-17 ENCOUNTER — APPOINTMENT (OUTPATIENT)
Dept: PHYSICAL THERAPY | Facility: CLINIC | Age: 83
DRG: 309 | End: 2018-03-17
Payer: COMMERCIAL

## 2018-03-17 LAB
ANION GAP SERPL CALCULATED.3IONS-SCNC: 8 MMOL/L (ref 3–14)
BUN SERPL-MCNC: 23 MG/DL (ref 7–30)
CALCIUM SERPL-MCNC: 8.1 MG/DL (ref 8.5–10.1)
CHLORIDE SERPL-SCNC: 108 MMOL/L (ref 94–109)
CO2 SERPL-SCNC: 24 MMOL/L (ref 20–32)
CREAT SERPL-MCNC: 1.16 MG/DL (ref 0.52–1.04)
ERYTHROCYTE [DISTWIDTH] IN BLOOD BY AUTOMATED COUNT: 13.9 % (ref 10–15)
GFR SERPL CREATININE-BSD FRML MDRD: 44 ML/MIN/1.7M2
GLUCOSE SERPL-MCNC: 106 MG/DL (ref 70–99)
HBA1C MFR BLD: 6.3 % (ref 4.3–6)
HCT VFR BLD AUTO: 34.9 % (ref 35–47)
HGB BLD-MCNC: 11.4 G/DL (ref 11.7–15.7)
MCH RBC QN AUTO: 30.7 PG (ref 26.5–33)
MCHC RBC AUTO-ENTMCNC: 32.7 G/DL (ref 31.5–36.5)
MCV RBC AUTO: 94 FL (ref 78–100)
PLATELET # BLD AUTO: 187 10E9/L (ref 150–450)
POTASSIUM SERPL-SCNC: 3.6 MMOL/L (ref 3.4–5.3)
RBC # BLD AUTO: 3.71 10E12/L (ref 3.8–5.2)
SODIUM SERPL-SCNC: 140 MMOL/L (ref 133–144)
TSH SERPL DL<=0.005 MIU/L-ACNC: 0.74 MU/L (ref 0.4–4)
WBC # BLD AUTO: 5.6 10E9/L (ref 4–11)

## 2018-03-17 PROCEDURE — 80048 BASIC METABOLIC PNL TOTAL CA: CPT | Performed by: FAMILY MEDICINE

## 2018-03-17 PROCEDURE — 93005 ELECTROCARDIOGRAM TRACING: CPT

## 2018-03-17 PROCEDURE — 25000128 H RX IP 250 OP 636: Performed by: PHYSICIAN ASSISTANT

## 2018-03-17 PROCEDURE — 85027 COMPLETE CBC AUTOMATED: CPT | Performed by: FAMILY MEDICINE

## 2018-03-17 PROCEDURE — 84443 ASSAY THYROID STIM HORMONE: CPT | Performed by: FAMILY MEDICINE

## 2018-03-17 PROCEDURE — 97530 THERAPEUTIC ACTIVITIES: CPT | Mod: GP | Performed by: PHYSICAL THERAPIST

## 2018-03-17 PROCEDURE — 83036 HEMOGLOBIN GLYCOSYLATED A1C: CPT | Performed by: FAMILY MEDICINE

## 2018-03-17 PROCEDURE — 25000132 ZZH RX MED GY IP 250 OP 250 PS 637: Performed by: FAMILY MEDICINE

## 2018-03-17 PROCEDURE — 12000007 ZZH R&B INTERMEDIATE

## 2018-03-17 PROCEDURE — 87506 IADNA-DNA/RNA PROBE TQ 6-11: CPT | Performed by: PHYSICIAN ASSISTANT

## 2018-03-17 PROCEDURE — 99232 SBSQ HOSP IP/OBS MODERATE 35: CPT | Performed by: FAMILY MEDICINE

## 2018-03-17 PROCEDURE — 97162 PT EVAL MOD COMPLEX 30 MIN: CPT | Mod: GP | Performed by: PHYSICAL THERAPIST

## 2018-03-17 PROCEDURE — 40000193 ZZH STATISTIC PT WARD VISIT: Performed by: PHYSICAL THERAPIST

## 2018-03-17 PROCEDURE — 25000132 ZZH RX MED GY IP 250 OP 250 PS 637: Performed by: PHYSICIAN ASSISTANT

## 2018-03-17 PROCEDURE — 36415 COLL VENOUS BLD VENIPUNCTURE: CPT | Performed by: FAMILY MEDICINE

## 2018-03-17 RX ORDER — LEVOTHYROXINE SODIUM 112 UG/1
112 TABLET ORAL
Status: DISCONTINUED | OUTPATIENT
Start: 2018-03-17 | End: 2018-03-19 | Stop reason: HOSPADM

## 2018-03-17 RX ORDER — ESCITALOPRAM OXALATE 10 MG/1
10 TABLET ORAL DAILY
Status: DISCONTINUED | OUTPATIENT
Start: 2018-03-17 | End: 2018-03-19 | Stop reason: HOSPADM

## 2018-03-17 RX ORDER — PROCHLORPERAZINE MALEATE 5 MG
5 TABLET ORAL EVERY 6 HOURS PRN
Status: DISCONTINUED | OUTPATIENT
Start: 2018-03-17 | End: 2018-03-19 | Stop reason: HOSPADM

## 2018-03-17 RX ORDER — ONDANSETRON 4 MG/1
4 TABLET, ORALLY DISINTEGRATING ORAL EVERY 6 HOURS PRN
Status: DISCONTINUED | OUTPATIENT
Start: 2018-03-17 | End: 2018-03-19 | Stop reason: HOSPADM

## 2018-03-17 RX ORDER — CYANOCOBALAMIN 1000 UG/ML
1000 INJECTION, SOLUTION INTRAMUSCULAR; SUBCUTANEOUS
Status: DISCONTINUED | OUTPATIENT
Start: 2018-03-17 | End: 2018-03-17 | Stop reason: CLARIF

## 2018-03-17 RX ORDER — ASPIRIN 81 MG/1
81 TABLET, CHEWABLE ORAL DAILY
Status: DISCONTINUED | OUTPATIENT
Start: 2018-03-17 | End: 2018-03-17

## 2018-03-17 RX ORDER — CIPROFLOXACIN 250 MG/1
250 TABLET, FILM COATED ORAL EVERY 12 HOURS SCHEDULED
Status: DISCONTINUED | OUTPATIENT
Start: 2018-03-17 | End: 2018-03-19 | Stop reason: HOSPADM

## 2018-03-17 RX ORDER — FERROUS SULFATE 325(65) MG
325 TABLET ORAL DAILY
Status: DISCONTINUED | OUTPATIENT
Start: 2018-03-17 | End: 2018-03-19 | Stop reason: HOSPADM

## 2018-03-17 RX ORDER — AMOXICILLIN 250 MG
1 CAPSULE ORAL 2 TIMES DAILY PRN
Status: DISCONTINUED | OUTPATIENT
Start: 2018-03-17 | End: 2018-03-19 | Stop reason: HOSPADM

## 2018-03-17 RX ORDER — AMOXICILLIN 250 MG
1 CAPSULE ORAL DAILY
Status: DISCONTINUED | OUTPATIENT
Start: 2018-03-17 | End: 2018-03-19 | Stop reason: HOSPADM

## 2018-03-17 RX ORDER — SODIUM CHLORIDE 9 MG/ML
INJECTION, SOLUTION INTRAVENOUS CONTINUOUS
Status: DISCONTINUED | OUTPATIENT
Start: 2018-03-17 | End: 2018-03-19

## 2018-03-17 RX ORDER — ONDANSETRON 2 MG/ML
4 INJECTION INTRAMUSCULAR; INTRAVENOUS EVERY 6 HOURS PRN
Status: DISCONTINUED | OUTPATIENT
Start: 2018-03-17 | End: 2018-03-19 | Stop reason: HOSPADM

## 2018-03-17 RX ORDER — PROCHLORPERAZINE 25 MG
12.5 SUPPOSITORY, RECTAL RECTAL EVERY 12 HOURS PRN
Status: DISCONTINUED | OUTPATIENT
Start: 2018-03-17 | End: 2018-03-19 | Stop reason: HOSPADM

## 2018-03-17 RX ORDER — ATORVASTATIN CALCIUM 20 MG/1
20 TABLET, FILM COATED ORAL DAILY
Status: DISCONTINUED | OUTPATIENT
Start: 2018-03-17 | End: 2018-03-19 | Stop reason: HOSPADM

## 2018-03-17 RX ORDER — METOCLOPRAMIDE HYDROCHLORIDE 5 MG/ML
5 INJECTION INTRAMUSCULAR; INTRAVENOUS EVERY 6 HOURS PRN
Status: DISCONTINUED | OUTPATIENT
Start: 2018-03-17 | End: 2018-03-19 | Stop reason: HOSPADM

## 2018-03-17 RX ORDER — AMOXICILLIN 250 MG
2 CAPSULE ORAL 2 TIMES DAILY PRN
Status: DISCONTINUED | OUTPATIENT
Start: 2018-03-17 | End: 2018-03-19 | Stop reason: HOSPADM

## 2018-03-17 RX ORDER — CLOPIDOGREL BISULFATE 75 MG/1
75 TABLET ORAL DAILY
Status: DISCONTINUED | OUTPATIENT
Start: 2018-03-17 | End: 2018-03-19 | Stop reason: HOSPADM

## 2018-03-17 RX ORDER — BISACODYL 10 MG
10 SUPPOSITORY, RECTAL RECTAL DAILY PRN
Status: DISCONTINUED | OUTPATIENT
Start: 2018-03-17 | End: 2018-03-19 | Stop reason: HOSPADM

## 2018-03-17 RX ORDER — DILTIAZEM HYDROCHLORIDE 120 MG/1
240 CAPSULE, COATED, EXTENDED RELEASE ORAL DAILY
Status: DISCONTINUED | OUTPATIENT
Start: 2018-03-18 | End: 2018-03-19 | Stop reason: HOSPADM

## 2018-03-17 RX ORDER — METOCLOPRAMIDE 5 MG/1
5 TABLET ORAL EVERY 6 HOURS PRN
Status: DISCONTINUED | OUTPATIENT
Start: 2018-03-17 | End: 2018-03-19 | Stop reason: HOSPADM

## 2018-03-17 RX ADMIN — DILTIAZEM HYDROCHLORIDE 60 MG: 60 TABLET, FILM COATED ORAL at 12:23

## 2018-03-17 RX ADMIN — ASPIRIN 81 MG 81 MG: 81 TABLET ORAL at 08:39

## 2018-03-17 RX ADMIN — CLOPIDOGREL 75 MG: 75 TABLET, FILM COATED ORAL at 08:41

## 2018-03-17 RX ADMIN — DILTIAZEM HYDROCHLORIDE 60 MG: 60 TABLET, FILM COATED ORAL at 06:11

## 2018-03-17 RX ADMIN — VITAMIN D, TAB 1000IU (100/BT) 2000 UNITS: 25 TAB at 08:39

## 2018-03-17 RX ADMIN — ATORVASTATIN CALCIUM 20 MG: 20 TABLET, FILM COATED ORAL at 08:39

## 2018-03-17 RX ADMIN — SODIUM CHLORIDE: 9 INJECTION, SOLUTION INTRAVENOUS at 00:45

## 2018-03-17 RX ADMIN — ONDANSETRON 4 MG: 2 INJECTION INTRAMUSCULAR; INTRAVENOUS at 17:52

## 2018-03-17 RX ADMIN — CIPROFLOXACIN HYDROCHLORIDE 250 MG: 250 TABLET, FILM COATED ORAL at 08:40

## 2018-03-17 RX ADMIN — DILTIAZEM HYDROCHLORIDE 60 MG: 60 TABLET, FILM COATED ORAL at 18:36

## 2018-03-17 RX ADMIN — CIPROFLOXACIN HYDROCHLORIDE 250 MG: 250 TABLET, FILM COATED ORAL at 19:48

## 2018-03-17 RX ADMIN — ESCITALOPRAM OXALATE 10 MG: 10 TABLET ORAL at 08:39

## 2018-03-17 RX ADMIN — FERROUS SULFATE TAB 325 MG (65 MG ELEMENTAL FE) 325 MG: 325 (65 FE) TAB at 08:39

## 2018-03-17 RX ADMIN — SODIUM CHLORIDE: 9 INJECTION, SOLUTION INTRAVENOUS at 13:10

## 2018-03-17 RX ADMIN — LEVOTHYROXINE SODIUM 112 MCG: 112 TABLET ORAL at 06:11

## 2018-03-17 RX ADMIN — SENNOSIDES AND DOCUSATE SODIUM 1 TABLET: 8.6; 5 TABLET ORAL at 08:40

## 2018-03-17 NOTE — PLAN OF CARE
Skin Assessment: No open areas observed                                 Groin Lower; Lower Buttocks bilat. Bruising (? Briefs too tight)                                 Left hip small bruise                                 Left knee small bruise                                 RUE, RIGHT LOWER EXTREMITY, small bruise                                 Otherwise skin unremarkable

## 2018-03-17 NOTE — PROGRESS NOTES
Diltiazem dose given at 1836. Pt had large emesis 10 min after dose. Assist with cleaning patient. Denies abdominal pain with palpation. Cont to monitor. Encourage only sips of clear liquids at this time.

## 2018-03-17 NOTE — PLAN OF CARE
"[place] [facility] [place] ADMISSION NOTE    Patient admitted to room 2205 at approximately 2100 via cart from emergency room. Patient was accompanied by transport tech and daughter.     Verbal SBAR report received from Debra HERNADEZ prior to patient arrival.     Patient trasferred to bed via air karla. Patient alert and oriented X 1. The patient is not having any pain.  . Admission vital signs: Blood pressure 111/51, pulse 91, temperature 97.2  F (36.2  C), temperature source Oral, resp. rate 16, height 1.676 m (5' 6\"), weight 78.5 kg (173 lb 1 oz), SpO2 95 %. Patient was oriented to plan of care, call light, bed controls, tv, telephone, bathroom and visiting hours.     The following safety risks were identified during admission: fall. Yellow risk band applied: YES.     Mary Schirmers    "

## 2018-03-17 NOTE — H&P
"[facility]    History and Physical  Hospital Medicine       Date of Admission:  3/16/2018  Date of Service: 3/16/2018     CC: Worsening right sided weakness, vomiting    Assessment & Plan   Deanne Zayas is a 84 year old female with a past medical history significant for Alzheimer's dementia, previous CVA with residual right sided hemiparesis, hypertension, dyslipidemia, hypothyroidism, and anxiety who presents on 3/16/2018 with new onset atrial fibrillation with RVR, worsening right sided weakness, and vomiting.      Atrial fibrillation with rapid ventricular response (H)  Very remote past history of arrhythmia \"over 40 years ago requiring shocks\" per family, but no known arrhythmia since that time. Presented with atrial fibrillation with RVR today, unknown onset. Occurs in setting of recent vomiting. Patient received 15 mg IV diltiazem and an additional 60 mg oral diltiazem while in the emergency department with improvement in rate. CHADS-VASC = 5, not on anticoagulation other than aspirin and Plavix prior to admission.  - Monitor on telemetry  - Continue diltiazem 60 mg with holding parameters  - Continue prior to admission aspirin and Plavix. May require additional anticoagulation but is a fall risk, defer to day team.  - TSH pending    Worsening right sided weakness / Previous CVA with persisting right hemiparesis (H)  Presented with worsening right sided weakness, which per family sometimes occurs when patient is stressed or sick. Concern for possible new stroke, although MRI brain, CT head, and CTA angio head & neck are without evidence for any acute intracranial or vascular abnormality (although old stroke findings are noted). Worsening weakness is likely due to unmasking by current stressors.  - Continue prior to admission aspirin and Plavix  - Possible anticoagulation initiation as above, defer to day team  - Neuro checks q2h      Non-intractable vomiting with " nausea  Vomited 4 times on day of admission. Lives at [facility], reportedly there is a gastroenteritis illness going around there. Possibly related to her atrial fibrillation and RVR as vomiting subsided after rate better controlled.  - Antiemetics prn  - Contact precautions  - Enteric panel pending    Essential hypertension  Previously diagnosed. Pressures initially elevated in the emergency department, now improved and are borderline soft. Taking amlodipine and lisinopril prior to admission, hold for now due to new introduction of diltiazem.    Hyperlipidemia LDL goal <130  Previously diagnosed. Taking Lipitor prior to admission, currently holding.    Hypothyroidism due to acquired atrophy of thyroid  Previously diagnosed. Taking levothyroxine prior to admission, continue.    Anxiety  Stable. Taking Lexapro prior to admission, continue.    Late onset Alzheimer's disease without behavioral disturbance  Per family, patient seems more confused and lethargic than baseline. Not currently on any medications for dementia.      Fluids: IV NS @ 75  Electrolytes: Monitor  Nutrition: Low fat diet as tolerated    DVT Prophylaxis: Pneumatic Compression Devices  Code Status: DNR / DNI - patient has healthcare directive. Discussed directly with patient's family.    Lines: Peripheral  Tan catheter: Not indicated    Disposition: Anticipate discharge in 2+ day(s). Appropriate for inpatient care.    Emeli Torres PA-C  [facility]  Pager: [phone]          Primary Care Physician   Kori Card [phone]    History is obtained from the patient (minimal input), patient's daughter, and review of old records via the EMR.    Past Medical History      Past Medical History:   Diagnosis Date     Alzheimer's dementia without behavioral disturbance      Depression      Dyslipidemia      Essential hypertension      History of CVA (cerebrovascular accident)      Hypothyroidism      Right  hemiparesis (H), secondary to previous CVA        Past Surgical History     Past Surgical History:   Procedure Laterality Date     CHOLECYSTECTOMY       CYSTOSCOPY N/A 8/29/2014    Procedure: CYSTOSCOPY;  Surgeon: ANNAMARIE Kern MD;  Location: [facility]     SURGICAL HISTORY OF -   2012     cataract surgery bilat.         History of Present Illness   Deanne Zayas is a 84 year old female with the above past medical history now presents on 3/16/2018 with worsening right sided weakness and vomiting. Patient is a difficult historian so most information was provided by her daughter. Patient has right sided weakness at baseline secondary to a stroke in June 2017. She lives at [facility], and staff today noticed her right sided weakness was worse today compared to baseline. Family states this sometimes happens with illness or stressors. There apparently is a GI illness going around [facility], and today patient vomited at least 4 or 5 times, all non-bloody bile-like emesis. Because of the worsening weakness and the vomiting, patient was brought to the emergency department. Patient is unable to describe much else, other than stating that she has some right leg pain and a dry cough that started today.    While in the emergency department, patient was found to be in atrial fibrillation with RVR with a pulse of 145. Patient does not have a known arrhythmia, other than daughter stating that patient needed to be shocked about 40+ years ago for her heart rhythm, but no known problems since then.      Prior to Admission Medications   Prior to Admission Medications   Prescriptions Last Dose Informant Patient Reported? Taking?   ACETAMINOPHEN PO Unknown at Unknown time group home Yes No   Sig: Take 1,000 mg by mouth 3 times daily   AMLODIPINE BESYLATE PO 3/15/2018 at [facility] Yes Yes   Sig: Take 5 mg by mouth daily   Menthol, Topical Analgesic, (BIOFREEZE) 4 % GEL Unknown at Unknown time group home Yes No   Sig:  "Externally apply topically 4 times daily as needed   aspirin 81 MG chewable tablet 3/16/2018 at [facility] prison No Yes   Sig: Take 1 tablet (81 mg) by mouth daily   atorvastatin (LIPITOR) 20 MG tablet 3/15/2018 at [address] [place] [address] No Yes   Sig: Take 1 tablet (20 mg) by mouth daily   bisacodyl (DULCOLAX) 10 MG Suppository Unknown at Unknown time prison Yes No   Sig: Place 10 mg rectally daily as needed for constipation   cholecalciferol (VITAMIN D3) 1000 UNIT tablet 3/16/2018 at 0900 prison No Yes   Sig: Take 2 tablets (2,000 Units) by mouth daily   clopidogrel (PLAVIX) 75 MG tablet 3/15/2018 at [address] [place] [address] No Yes   Sig: Take 1 tablet (75 mg) by mouth daily   cyanocobalamin (VITAMIN B12) 1000 MCG/ML injection Past Month at Unknown time prison No Yes   Sig: Inject 1 mL (1,000 mcg) Subcutaneous every 30 days   escitalopram (LEXAPRO) 10 MG tablet 3/16/2018 at [address] [place] [address] No Yes   Sig: Take 1 tablet (10 mg) by mouth daily   ferrous sulfate (IRON) 325 (65 FE) MG tablet 3/16/2018 at [address] [place] [address] No Yes   Sig: Take 1 tablet (325 mg) by mouth daily   levothyroxine (SYNTHROID/LEVOTHROID) 112 MCG tablet 3/16/2018 at [address] [place] [address] No Yes   Sig: Take 1 tablet (112 mcg) by mouth daily   lisinopril (PRINIVIL/ZESTRIL) 40 MG tablet 3/16/2018 at [address] [place] [address] No Yes   Sig: Take 1 tablet (40 mg) by mouth daily   miconazole (MICATIN) 2 % AERP powder Unknown at Unknown time prison Yes No   Sig: Apply topically 3 times daily as needed   polyethylene glycol (MIRALAX/GLYCOLAX) powder Unknown at Unknown time prison Yes No   Sig: Take 17 g by mouth daily as needed for constipation   senna-docusate (SENNA S) 8.6-50 MG per tablet 3/15/2018 at [address] [place] [address] No Yes   Sig: Take 1 tablet by mouth daily   syringe/needle, disp, 25G X 1\" 3 ML MISC Past Month at Unknown time prison No Yes   Sig: USE AS DIRECTED WITH B12 INECTION      Facility-Administered Medications: None " "    Allergies   Allergies   Allergen Reactions     Donepezil Other (See Comments)     At 10 mg, tremulousness and decreased appetite       Family History    Family History   Problem Relation Age of Onset     Family History Negative Mother      childbirth     Unknown/Adopted Mother       during child birth at a young age     Family History Negative Father      fell off roof broke neck     Obesity Sister        Social History   Social History     Social History     Marital status:      Spouse name: N/A     Number of children: N/A     Years of education: N/A     Occupational History     Not on file.     Social History Main Topics     Smoking status: Never Smoker     Smokeless tobacco: Never Used     Alcohol use No     Drug use: No     Sexual activity: Not on file     Other Topics Concern     Not on file     Social History Narrative       Review of Systems     A complete 10 point review of systems was difficult to obtain due to patient's dementia. Information was provided by family members to the best of their knowledge. It was negative except for items noted in the HPI/subjective.    Physical Exam   /51 (BP Location: Right arm)  Pulse 91  Temp 97.2  F (36.2  C) (Oral)  Resp 16  Ht 1.676 m (5' 6\")  Wt 78.5 kg (173 lb 1 oz)  SpO2 95%  BMI 27.93 kg/m2     Weight: 173 lbs .98 oz Body mass index is 27.93 kg/(m^2).     Constitutional: Alert, no apparent distress, appears nontoxic. Oriented to self, cooperative but has difficulty consistently following commands. Mostly answering yes or no questions.    Eyes: Eyes are clear, pupils are reactive. No scleral icterus. Did not follow commands to assess extroccular movements.    HEENT: Oropharynx is clear and moist, no lesions. Normocephalic, no evidence of cranial trauma.        Cardiovascular: Regular rhythm and rate, normal S1 and S2. No murmur, rubs, or gallops. Peripheral pulses in tact bilaterally. No lower extremity edema.     Respiratory: Lung sounds " are clear to auscultation bilaterally without wheezes, rhonchi, or crackles.    GI: Soft, non-distended. Non-tender, no rebound or guarding. No hepatosplenomegaly or masses appreciated. Normal bowel sounds.     Musculoskeletal: Without obvious deformity other than right toe contracture. Normal muscle bulk and tone.     Skin: Warm and dry, no rashes or ecchymoses. No mottling of skin.      Neurologic: Patient moves all extremities slowly. Patient has difficulty following commands for cranial nerves, but are grossly intact.  strength diminished on right compared to left. Gross strength diminished on right. Sensation equal bilaterally.    Genitourinary: Deferred    Data   Data reviewed today:     Recent Labs  Lab 03/16/18  1213 03/16/18  1145   WBC 7.2  --    HGB 12.4  --    MCV 93  --      --    INR  --  1.05   NA  --  138   POTASSIUM  --  4.0   CHLORIDE  --  105   CO2  --  25   BUN  --  18   CR  --  0.99   ANIONGAP  --  8   RUBIN  --  8.5   GLC  --  168*   TROPI  --  <0.015       Recent Results (from the past 24 hour(s))   CT Head w/o Contrast    Narrative    CT SCAN OF THE HEAD WITHOUT CONTRAST   3/16/2018 12:07 PM     HISTORY: Possible right-sided weakness.     TECHNIQUE:  Axial images of the head and coronal reformations without  IV contrast material. Radiation dose for this scan was reduced using  automated exposure control, adjustment of the mA and/or kV according  to patient size, or iterative reconstruction technique.    COMPARISON: MRI 7/26/2017    FINDINGS:  There is generalized atrophy of the brain.  There is low  attenuation in the white matter of the cerebral hemispheres consistent  with sequelae of small vessel ischemic disease. Old lacunar infarct in  the left paramedian russell is noted. There is no evidence of  intracranial hemorrhage, mass, acute infarct or anomaly.     The visualized portions of the sinuses and mastoids appear normal.  There is no evidence of trauma.      Impression     IMPRESSION:   1. No acute abnormality.  2. Old infarct in the left paramedian russell.  3.  Atrophy of the brain.  White matter changes consistent with  sequelae of small vessel ischemic disease.      JEFFERSON ROWLEY MD   CT Head Neck Angio w/o & w Contrast    Narrative    CT ANGIOGRAM OF THE HEAD AND NECK WITHOUT AND WITH CONTRAST  3/16/2018  1:13 PM     HISTORY: Possible right-sided weakness. Previous left pontine infarct.    TECHNIQUE:  Precontrast localizing scans were followed by CT  angiography with an injection of 70 mL Isovue 370 IV with scans  through the head and neck.  Images were transferred to a separate 3-D  workstation where multiplanar reformations and 3-D images were  created.  Estimates of carotid stenoses are made relative to the  distal internal carotid artery diameters except as noted. Radiation  dose for this scan was reduced using automated exposure control,  adjustment of the mA and/or kV according to patient size, or iterative  reconstruction technique.    COMPARISON: CT scan today. MRI exam 7/24/2017    CT HEAD FINDINGS:  No contrast enhancing lesions. There is generalized  atrophy of the brain.  White matter changes are present in the  cerebral hemispheres that are consistent with small vessel ischemic  disease in this age patient. Old lacunar infarct in left russell.  Cerebral blood flow is grossly normal.    CT ANGIOGRAM HEAD FINDINGS: There are mild to moderate stenoses in the  right middle cerebral artery M1 segment especially distally consistent  with atherosclerosis. Multiple mild to moderate stenoses are seen in  the anterior and posterior cerebral arteries as well. No arterial  occlusion is visible. Venous circulation is unremarkable.     CT ANGIOGRAM NECK FINDINGS:   Right carotid artery: Mild calcified plaque in the bifurcation.  No  significant stenosis.      Left carotid artery: Mild calcified plaque in the bifurcation.  Tortuous internal carotid.  No significant stenosis.       Vertebral arteries:  Tortuous vessels.  Moderate stenosis origin of  each of the vertebral arteries.      Other findings: None.      Impression    IMPRESSION:   1. No evidence of arterial occlusion.  2. Intracranial atherosclerotic stenoses especially in the right  middle cerebral artery M1 segment and in the anterior and posterior  cerebral arteries. No significant change.  3. Mild calcified atherosclerotic plaque in the carotid bifurcations  but no significant stenoses, unchanged.  4. Moderate stenoses of the origins of the vertebral arteries,  unchanged.      JEFFERSON ROWLEY MD   MR Brain w/o & w Contrast    Narrative    MRI BRAIN WITHOUT AND WITH CONTRAST  3/16/2018 8:15 PM    HISTORY: Possible stroke, right-sided weakness.    TECHNIQUE:  Multiplanar, multisequence MRI of the brain without and  with 8mL Gadavist.    COMPARISON: 7/26/2017    FINDINGS: Diffusion-weighted images are normal. There is no evidence  for acute infarct or intracranial hemorrhage. There is an old infarct  in the left paramedian russell. There is a focus of old hemosiderin in  the right subinsular cortical region consistent with an old  microbleed. This was present on the previous exam and is unchanged.  Mild to moderate cerebral atrophy is present. There are patchy  supratentorial white matter changes in both hemispheres without  enhancement or mass effect. Postcontrast images do not show any  abnormal areas of enhancement or any focal mass lesions.      Impression    IMPRESSION:  1. No evidence for acute infarct, acute hemorrhage, or any focal mass  lesions.  2. Old left cerebellar infarct.  3. Old microbleed in right subinsular cortex.  4. Cerebral atrophy with chronic white matter changes.    KIARRA RAMIREZ MD       I personally reviewed the head CT image(s) showing no acute abnormality..    Emeli Torres PA-C  [facility]  Pager: [phone]

## 2018-03-17 NOTE — PROGRESS NOTES
Secondary skin assessment done with admitting RN (SATYA Dhillon), agree with assessment and documented findings.

## 2018-03-17 NOTE — PROGRESS NOTES
Pt drinking coffee, sips water at 1600. Dinner tray brought to pt, did not eat it, had large emesis. Zofran given.  Assist clean patient, ceiling lift to bed.

## 2018-03-17 NOTE — PLAN OF CARE
Neuro's intact @ baseline according to daughter, completed  Patient unable to dorsiflex D/T pulls legs up instead of feet  IV infusing, labs A1C, TSH to be done this am, patient incontinent of both B & B

## 2018-03-17 NOTE — PROGRESS NOTES
03/17/18 0900   Quick Adds   Type of Visit Initial PT Evaluation       Present (No  but dtr and son in law provided hx and concer)   Living Environment   Lives With facility resident   Living Arrangements extended care facility  (Memory care)   Home Accessibility no concerns   Self-Care   Dominant Hand right   Usual Activity Tolerance poor   Current Activity Tolerance poor   Regular Exercise no   Functional Level Prior   Ambulation 3-->assistive equipment and person   Transferring 3-->assistive equipment and person   Toileting 3-->assistive equipment and person   Bathing 3-->assistive equipment and person   Dressing 3-->assistive equipment and person   Eating 0-->independent   Communication 2-->difficulty understanding (not related to language barrier)   Swallowing 0-->swallows foods/liquids without difficulty   Cognition 2 - difficulty with organizing thoughts   Fall history within last six months no   Which of the above functional risks had a recent onset or change? dressing;bathing;toileting;transferring;ambulation   Prior Functional Level Comment Varied report from family and memory care facility  Deanne demonstrated no skilled abilities and family present requested return to Memory care with previous assistance and level of cares     General Information   Onset of Illness/Injury or Date of Surgery - Date 03/16/18   Referring Physician Dr Cristobal   Patient/Family Goals Statement Previous level of assistance and memory care   Pertinent History of Current Problem (include personal factors and/or comorbidities that impact the POC) Medical concerns   Precautions/Limitations fall precautions   Weight-Bearing Status - LUE full weight-bearing   Weight-Bearing Status - RUE full weight-bearing   Weight-Bearing Status - LLE full weight-bearing   Weight-Bearing Status - RLE full weight-bearing   Cognitive Status Examination   Orientation not oriented to person, place or time   Level of  Consciousness confused   Follows Commands and Answers Questions unable to follow multi-step instructions;unable to answer questions   Personal Safety and Judgment impaired;at risk behaviors demonstrated   Memory impaired   Cognitive Comment RN present when PT worked with Bird and was inagreemnt that bird is unable to follow commands and participate with moiblity without max assistance   Pain Assessment   Patient Currently in Pain (Hips)   Posture    Posture Comments Pushes and leans backwards   Range of Motion (ROM)   ROM Comment Functionalf for all ADL and MRADLs    Strength   Strength Comments Strong with MMT.  Poor motor planning and memory to assist with skilled assistance   Bed Mobility   Bed Mobility Comments requires moderate to max assistance and continued re direction  Unable to sit unsupervised   Transfer Skills   Transfer Comments Stand pivot max assist of two   Gait   Gait Comments Unable to get erect and did not appear to understand motor skills to walk and  her feet   Balance   Balance Comments Leans backwards and right side if not supported   Clinical Impression   Criteria for Skilled Therapeutic Intervention yes, treatment indicated;no significant expected improvement in functional status;patient/family refuse skilled intervention at this time   PT Diagnosis Weakness and MAS   Influenced by the following impairments Increased assistance and slef cares   Functional limitations due to impairments Possible medical and UTI   Clinical Presentation Unstable/Unpredictable   Clinical Presentation Rationale Memory unit, not recieving skilled intervention, staff assist 1-2 moderate with all mobiity and ADLs and MRADLs   Clinical Decision Making (Complexity) Moderate complexity   Therapy Frequency` daily   Risk & Benefits of therapy have been explained Yes   Patient, Family & other staff in agreement with plan of care Yes   Clinical Impression Comments Family with good understanding and in agreement  "tory but goal is to return to Memory care unit   [facility] AM-PAC  \"6 Clicks\" V.2 Basic Mobility Inpatient Short Form   1. Turning from your back to your side while in a flat bed without using bedrails? 3 - A Little   2. Moving from lying on your back to sitting on the side of a flat bed without using bedrails? 2 - A Lot   3. Moving to and from a bed to a chair (including a wheelchair)? 2 - A Lot   4. Standing up from a chair using your arms (e.g., wheelchair, or bedside chair)? 2 - A Lot   5. To walk in hospital room? 2 - A Lot   6. Climbing 3-5 steps with a railing? 2 - A Lot   Basic Mobility Raw Score (Score out of 24.Lower scores equate to lower levels of function) 13   Total Evaluation Time   Total Evaluation Time (Minutes) 30     "

## 2018-03-17 NOTE — PLAN OF CARE
"Problem: Patient Care Overview  Goal: Plan of Care/Patient Progress Review  Discharge Planner PT   Patient plan for discharge: Spoke with dtr and son in law  Plan is to return back to memory care and previous living situation  Current status: Memory care, moderate asst of 2 for flexed pivot transfers, dependent with ADL MRADLs but able to feed herself  Barriers to return to prior living situation: Right foot is DF and dtr has concerns about pain and irritation  Recommendations for discharge: Previous living situation Dtr states \"not walking is a blessing due to she is such a high risk for falls and facility is unable to restrain her\"    Rationale for recommendations: Return to previous setting when medically able.  Family wants her familiar with setting, comfortable and not over stimulated or confused       Entered by: Mandy Lozano 03/17/2018 9:18 AM           "

## 2018-03-17 NOTE — PLAN OF CARE
Problem: Patient Care Overview  Goal: Plan of Care/Patient Progress Review  Outcome: No Change  Pt oriented to self and family.  Pt denies pain and nausea.  RUE/RLE and LUE/LLE equal strength.  Complaint weakness on RLE.  Incontinent with stools, liquid and black/green in color.  Stool sample collected, pending result.  Pt is tolerating small amounts of foods.  Transfer from wheelchair to bed is heavy with 2 assist. When assisting with transfer, pt leans back and is unable to participate with the transfer.  Leans more on the right side when lying and sitting.  Plan of care reviewed with patient and family.

## 2018-03-17 NOTE — PROGRESS NOTES
"[facility] Hospitalist Progress Note           Assessment and Plan:     Deanne Zayas is a 84 year old female with a past medical history significant for Alzheimer's dementia, previous CVA with residual right sided hemiparesis, hypertension, dyslipidemia, hypothyroidism, and anxiety who presents on 3/16/2018 with new onset atrial fibrillation with RVR, worsening right sided weakness, and vomiting.        Questionable Atrial flutter with rapid ventricular response (H) -   3/16/18 -- Very remote past history of arrhythmia \"over 40 years ago requiring shocks\" per family, but no known arrhythmia since that time. Presented with atrial fibrillation with RVR today, unknown onset. Occurs in setting of recent vomiting. Patient received 15 mg IV diltiazem and an additional 60 mg oral diltiazem while in the emergency department with improvement in rate. CHADS-VASC = 5, not on anticoagulation other than aspirin and Plavix prior to admission.  - Continue diltiazem 60 mg with holding parameters  - Continue prior to admission aspirin and Plavix. May require additional anticoagulation but is a fall risk  3/17/2018 -- TSH normal.  On review of ECG from admission, rate 140 but rhythm was regular - does not look like atrial fibrillation.  Could be atrial flutter vs sinus tach due to dehydration/illness vs other SVT - rapid resolution and rate less consistent with sinus tach.  Has remained normal sinus rhythm on tele since.  Continue tele for now - will discuss with stroke neuro as below, but not clear-cut that she needs anticoagulation.  Continue diltiazem, plan to transition to 240 XL tomorrow.  May benefit from outpatient holter.       Worsening right sided weakness / Previous CVA with persisting right hemiparesis (H)  3/16/18 -- Presented with worsening right sided weakness, which per family sometimes occurs when patient is stressed or sick. Concern for possible new stroke, although MRI brain, CT head, and CTA angio head & " neck are without evidence for any acute intracranial or vascular abnormality (although old stroke findings are noted). Worsening weakness is likely due to unmasking by current stressors.  - Continue prior to admission aspirin and Plavix  3/17/2018 --  Appears at baseline today - nothing on imagine for acute stroke.  Will discuss anticoagulation with stroke neuro today.       Non-intractable vomiting with nausea - suspect gastroenteritis  3/16/18 -- Vomited 4 times on day of admission. Lives at [place], reportedly there is a gastroenteritis illness going around there. Possibly related to her atrial fibrillation and RVR as vomiting subsided after rate better controlled.  - Antiemetics prn  3/17/2018 -- just mild nausea overnight, continue contact precautions, send enteric panel if has bowel movement but hasn't yet.      UTI  3/17/2018 -- UA positive but no clear symptoms - started on oral cipro, urine culture pending.       Essential hypertension  3/16/18 -- Previously diagnosed. Pressures initially elevated in the emergency department, now improved and are borderline soft. Taking amlodipine and lisinopril prior to admission, hold for now due to new introduction of diltiazem.  3/17/2018 -- blood pressure 110's this AM, continue just diltiazem for now, follow.       Hyperlipidemia LDL goal <130  3/16/18 --Taking Lipitor   3/17/2018 -- continued      Hypothyroidism due to acquired atrophy of thyroid  Previously diagnosed. Taking levothyroxine prior to admission, continue.     Anxiety  Stable. Taking Lexapro prior to admission, continue.     Late onset Alzheimer's disease without behavioral disturbance  Per family, patient seems more confused and lethargic than baseline. Not currently on any medications for dementia.     Vitamin B12 deficiency  3/17/2018 -- on monthly shots per neurology for borderline low level - check B12 with next draw.       Fluids: IV NS @ 75     DVT Prophylaxis: Pneumatic Compression  "Devices    Code Status: DNR / DNI - patient has healthcare directive. Discussed directly with patient's family.     Lines: Peripheral IV    Tan catheter: Not indicated    Disposition  Anticipate at least 1-2 more days inpatient.              Interval History:   No new concerns this AM.  No fever or chills.  Had some nausea overnight but none currently - unclear how reliable this is regardless as patient unable to reliably report any history or prior symptoms, does reliably report current symptoms and respond to questions appropriately, just can't recall any history.     Oriented to season and says \"it's too cold to be anything but winter\", but not sure of month or date and did not know where she is or where she lives currently.  Cannot provide any report of what occurred yesterday.             Review of Systems:    ROS: 10 point ROS neg other than the symptoms noted above in the HPI.             Medications:   Current active medications and PTA medications reviewed, see medication list for details.            Physical Exam:   Vitals were reviewed  Patient Vitals for the past 24 hrs:   BP Temp Temp src Pulse Heart Rate Resp SpO2 Height Weight   03/17/18 0607 - - - - - - - - 79.2 kg (174 lb 9.7 oz)   03/16/18 2352 111/51 97.2  F (36.2  C) Oral 91 - 16 95 % - -   03/16/18 2338 - - - - 79 - - - -   03/16/18 2125 110/53 99.5  F (37.5  C) Oral 84 - 16 95 % 1.676 m (5' 6\") 78.5 kg (173 lb 1 oz)   03/16/18 2122 - - - - 79 - - - -   03/16/18 1900 108/59 - - - - - - - -   03/16/18 1840 104/60 - - - 101 13 95 % - -   03/16/18 1820 123/88 - - - 113 10 96 % - -   03/16/18 1800 107/62 - - - 107 17 96 % - -   03/16/18 1740 116/68 - - - - - - - -   03/16/18 1738 - - - - 105 11 96 % - -   03/16/18 1735 - - - - 106 21 95 % - -   03/16/18 1700 115/67 - - - 110 11 95 % - -   03/16/18 1640 116/70 - - - 108 15 91 % - -   03/16/18 1620 121/73 - - - - - - - -   03/16/18 1600 135/72 - - - 111 18 95 % - -   03/16/18 1540 144/77 - - - 118 " 22 95 % - -   18 1520 142/74 - - - 112 22 94 % - -   18 1515 - - - - 113 20 95 % - -   18 1500 148/80 - - - 117 24 (!) 89 % - -   18 1445 - - - - 135 25 91 % - -   18 1440 (!) 154/99 - - - 138 28 92 % - -   18 1435 143/87 - - - 140 25 92 % - -   18 1420 (!) 146/108 - - - 138 23 - - -   18 1407 (!) 195/111 97.5  F (36.4  C) Axillary - 154 10 - - -   18 1401 - - - - 154 11 - - -   18 1400 (!) 193/127 - - - 149 8 - - -   18 1340 (!) 148/111 - - - 121 10 (!) 53 % - -   18 1339 - - - - 116 20 (!) 72 % - -   18 1300 152/79 - - - 102 13 95 % - -   18 1237 - - - - - - 97 % - -   18 1232 - - - - - - 96 % - -   18 1230 - - - - - - 96 % - -   18 1212 - - - - - - 98 % - -   18 1211 140/74 - - - - - - - -   18 1145 140/76 - - - 108 21 94 % - -   18 1130 143/80 - - - 109 22 95 % - -   18 1122 135/78 97.8  F (36.6  C) Axillary 105 - 19 94 % - -       Temperatures:  Current - Temp: 97.2  F (36.2  C); Max - Temp  Av  F (36.7  C)  Min: 97.2  F (36.2  C)  Max: 99.5  F (37.5  C)  Respiration range: Resp  Av.4  Min: 8  Max: 28  Pulse range: Pulse  Av.3  Min: 84  Max: 105  Blood pressure range: Systolic (24hrs), Av , Min:104 , Max:195   ; Diastolic (24hrs), Av, Min:51, Max:127    Pulse oximetry range: SpO2  Av.1 %  Min: 53 %  Max: 98 %  I/O last 3 completed shifts:  In: 382 [I.V.:382]  Out: -     Intake/Output Summary (Last 24 hours) at 18 0737  Last data filed at 18 0600   Gross per 24 hour   Intake              382 ml   Output                0 ml   Net              382 ml     EXAM:   GENERAL APPEARANCE ADULT: Alert, no acute distress, oriented x 1 and to season  EYES: PERRL, EOM normal, conjunctiva and lids normal, EOM normal   HENT: oral mucous membranes moist, head atraumatic and normocephalic   NECK: No adenopathy,masses or thyromegaly, supple  CV: regular rate and  rhythm no murmur  Pulm: clear to auscultation bilaterally  Abdomen: soft, non-tender, no masses, no masses, normal bowel sounds.  MS: extremities normal, no peripheral edema  SKIN: no suspicious lesions or rashes  NEURO: Alert, oriented, speech and mentation normal, Cranial nerves 2-12 are normal., Strength difficult to assess as patient does comlpy with requests/commands but with minimal effort - mildly weak diffusely, perhaps more so right upper extremity although this isn't very noticeable, more noticeable in right lower extremity, sounds like this is baseline. Sensation to light touch intact throughout upper and lower extremities bilaterally.     PSYCH: mentation appears normal, affect and mood normal             Data:     Results for orders placed or performed during the hospital encounter of 03/16/18 (from the past 24 hour(s))   Basic metabolic panel   Result Value Ref Range    Sodium 138 133 - 144 mmol/L    Potassium 4.0 3.4 - 5.3 mmol/L    Chloride 105 94 - 109 mmol/L    Carbon Dioxide 25 20 - 32 mmol/L    Anion Gap 8 3 - 14 mmol/L    Glucose 168 (H) 70 - 99 mg/dL    Urea Nitrogen 18 7 - 30 mg/dL    Creatinine 0.99 0.52 - 1.04 mg/dL    GFR Estimate 53 (L) >60 mL/min/1.7m2    GFR Estimate If Black 64 >60 mL/min/1.7m2    Calcium 8.5 8.5 - 10.1 mg/dL   INR   Result Value Ref Range    INR 1.05 0.86 - 1.14   Partial thromboplastin time   Result Value Ref Range    PTT <20 (L) 22 - 37 sec   Troponin I   Result Value Ref Range    Troponin I ES <0.015 0.000 - 0.045 ug/L   CT Head w/o Contrast    Narrative    CT SCAN OF THE HEAD WITHOUT CONTRAST   3/16/2018 12:07 PM     HISTORY: Possible right-sided weakness.     TECHNIQUE:  Axial images of the head and coronal reformations without  IV contrast material. Radiation dose for this scan was reduced using  automated exposure control, adjustment of the mA and/or kV according  to patient size, or iterative reconstruction technique.    COMPARISON: MRI 7/26/2017    FINDINGS:   There is generalized atrophy of the brain.  There is low  attenuation in the white matter of the cerebral hemispheres consistent  with sequelae of small vessel ischemic disease. Old lacunar infarct in  the left paramedian russell is noted. There is no evidence of  intracranial hemorrhage, mass, acute infarct or anomaly.     The visualized portions of the sinuses and mastoids appear normal.  There is no evidence of trauma.      Impression    IMPRESSION:   1. No acute abnormality.  2. Old infarct in the left paramedian russell.  3.  Atrophy of the brain.  White matter changes consistent with  sequelae of small vessel ischemic disease.      JEFFERSON ROWLEY MD   CBC with platelets differential   Result Value Ref Range    WBC 7.2 4.0 - 11.0 10e9/L    RBC Count 4.03 3.8 - 5.2 10e12/L    Hemoglobin 12.4 11.7 - 15.7 g/dL    Hematocrit 37.6 35.0 - 47.0 %    MCV 93 78 - 100 fl    MCH 30.8 26.5 - 33.0 pg    MCHC 33.0 31.5 - 36.5 g/dL    RDW 13.3 10.0 - 15.0 %    Platelet Count 205 150 - 450 10e9/L    Diff Method Automated Method     % Neutrophils 86.3 %    % Lymphocytes 6.0 %    % Monocytes 6.3 %    % Eosinophils 0.8 %    % Basophils 0.3 %    % Immature Granulocytes 0.3 %    Absolute Neutrophil 6.2 1.6 - 8.3 10e9/L    Absolute Lymphocytes 0.4 (L) 0.8 - 5.3 10e9/L    Absolute Monocytes 0.5 0.0 - 1.3 10e9/L    Absolute Eosinophils 0.1 0.0 - 0.7 10e9/L    Absolute Basophils 0.0 0.0 - 0.2 10e9/L    Abs Immature Granulocytes 0.0 0 - 0.4 10e9/L   CT Head Neck Angio w/o & w Contrast    Narrative    CT ANGIOGRAM OF THE HEAD AND NECK WITHOUT AND WITH CONTRAST  3/16/2018  1:13 PM     HISTORY: Possible right-sided weakness. Previous left pontine infarct.    TECHNIQUE:  Precontrast localizing scans were followed by CT  angiography with an injection of 70 mL Isovue 370 IV with scans  through the head and neck.  Images were transferred to a separate 3-D  workstation where multiplanar reformations and 3-D images were  created.  Estimates of carotid  stenoses are made relative to the  distal internal carotid artery diameters except as noted. Radiation  dose for this scan was reduced using automated exposure control,  adjustment of the mA and/or kV according to patient size, or iterative  reconstruction technique.    COMPARISON: CT scan today. MRI exam 7/24/2017    CT HEAD FINDINGS:  No contrast enhancing lesions. There is generalized  atrophy of the brain.  White matter changes are present in the  cerebral hemispheres that are consistent with small vessel ischemic  disease in this age patient. Old lacunar infarct in left russell.  Cerebral blood flow is grossly normal.    CT ANGIOGRAM HEAD FINDINGS: There are mild to moderate stenoses in the  right middle cerebral artery M1 segment especially distally consistent  with atherosclerosis. Multiple mild to moderate stenoses are seen in  the anterior and posterior cerebral arteries as well. No arterial  occlusion is visible. Venous circulation is unremarkable.     CT ANGIOGRAM NECK FINDINGS:   Right carotid artery: Mild calcified plaque in the bifurcation.  No  significant stenosis.      Left carotid artery: Mild calcified plaque in the bifurcation.  Tortuous internal carotid.  No significant stenosis.      Vertebral arteries:  Tortuous vessels.  Moderate stenosis origin of  each of the vertebral arteries.      Other findings: None.      Impression    IMPRESSION:   1. No evidence of arterial occlusion.  2. Intracranial atherosclerotic stenoses especially in the right  middle cerebral artery M1 segment and in the anterior and posterior  cerebral arteries. No significant change.  3. Mild calcified atherosclerotic plaque in the carotid bifurcations  but no significant stenoses, unchanged.  4. Moderate stenoses of the origins of the vertebral arteries,  unchanged.      JEFFERSON ROWLEY MD   UA reflex to Microscopic   Result Value Ref Range    Color Urine Yellow     Appearance Urine Clear     Glucose Urine Negative NEG^Negative  mg/dL    Bilirubin Urine Negative NEG^Negative    Ketones Urine Negative NEG^Negative mg/dL    Specific Gravity Urine 1.015 1.003 - 1.035    Blood Urine Small (A) NEG^Negative    pH Urine 5.0 5.0 - 7.0 pH    Protein Albumin Urine Negative NEG^Negative mg/dL    Urobilinogen mg/dL 0.0 0.0 - 2.0 mg/dL    Nitrite Urine Negative NEG^Negative    Leukocyte Esterase Urine Small (A) NEG^Negative    Source Midstream Urine     RBC Urine 4 (H) 0 - 2 /HPF    WBC Urine 37 (H) 0 - 5 /HPF    Bacteria Urine Few (A) NEG^Negative /HPF    Mucous Urine Present (A) NEG^Negative /LPF   Urine Culture Aerobic Bacterial   Result Value Ref Range    Specimen Description Midstream Urine     Special Requests Specimen received in preservative     Culture Micro PENDING    MR Brain w/o & w Contrast    Narrative    MRI BRAIN WITHOUT AND WITH CONTRAST  3/16/2018 8:15 PM    HISTORY: Possible stroke, right-sided weakness.    TECHNIQUE:  Multiplanar, multisequence MRI of the brain without and  with 8mL Gadavist.    COMPARISON: 7/26/2017    FINDINGS: Diffusion-weighted images are normal. There is no evidence  for acute infarct or intracranial hemorrhage. There is an old infarct  in the left paramedian russell. There is a focus of old hemosiderin in  the right subinsular cortical region consistent with an old  microbleed. This was present on the previous exam and is unchanged.  Mild to moderate cerebral atrophy is present. There are patchy  supratentorial white matter changes in both hemispheres without  enhancement or mass effect. Postcontrast images do not show any  abnormal areas of enhancement or any focal mass lesions.      Impression    IMPRESSION:  1. No evidence for acute infarct, acute hemorrhage, or any focal mass  lesions.  2. Old left cerebellar infarct.  3. Old microbleed in right subinsular cortex.  4. Cerebral atrophy with chronic white matter changes.    KIARRA RAMIREZ MD   TSH   Result Value Ref Range    TSH 0.74 0.40 - 4.00 mU/L   Hemoglobin  A1c   Result Value Ref Range    Hemoglobin A1C 6.3 (H) 4.3 - 6.0 %   CBC with platelets   Result Value Ref Range    WBC 5.6 4.0 - 11.0 10e9/L    RBC Count 3.71 (L) 3.8 - 5.2 10e12/L    Hemoglobin 11.4 (L) 11.7 - 15.7 g/dL    Hematocrit 34.9 (L) 35.0 - 47.0 %    MCV 94 78 - 100 fl    MCH 30.7 26.5 - 33.0 pg    MCHC 32.7 31.5 - 36.5 g/dL    RDW 13.9 10.0 - 15.0 %    Platelet Count 187 150 - 450 10e9/L     All cardiac studies reviewed by me.      Attestation:  I have reviewed today's vital signs, notes, medications, labs and imaging.  Amount of time performed on this daily note: 40 minutes.     Cristofer Cristobal MD, MD

## 2018-03-17 NOTE — PHARMACY
Hold monthly B12 injection while in hospital per non-essential medication holiday policy  Elaina Vo, Pharm.D.

## 2018-03-17 NOTE — PLAN OF CARE
OT orders received. After screen of chart review and speaking with staff who also spoke to family she is not appropriate for OT. She receives A with cares at her  unit, and is A of 2 for mobility. Family wishes her to return to

## 2018-03-18 ENCOUNTER — APPOINTMENT (OUTPATIENT)
Dept: PHYSICAL THERAPY | Facility: CLINIC | Age: 83
DRG: 309 | End: 2018-03-18
Payer: COMMERCIAL

## 2018-03-18 LAB
ANION GAP SERPL CALCULATED.3IONS-SCNC: 7 MMOL/L (ref 3–14)
BUN SERPL-MCNC: 16 MG/DL (ref 7–30)
C COLI+JEJUNI+LARI FUSA STL QL NAA+PROBE: NOT DETECTED
CALCIUM SERPL-MCNC: 7.7 MG/DL (ref 8.5–10.1)
CHLORIDE SERPL-SCNC: 112 MMOL/L (ref 94–109)
CHOLEST SERPL-MCNC: 95 MG/DL
CO2 SERPL-SCNC: 24 MMOL/L (ref 20–32)
CREAT SERPL-MCNC: 0.88 MG/DL (ref 0.52–1.04)
EC STX1 GENE STL QL NAA+PROBE: NOT DETECTED
EC STX2 GENE STL QL NAA+PROBE: NOT DETECTED
ENTERIC PATHOGEN COMMENT: ABNORMAL
ERYTHROCYTE [DISTWIDTH] IN BLOOD BY AUTOMATED COUNT: 14 % (ref 10–15)
GFR SERPL CREATININE-BSD FRML MDRD: 61 ML/MIN/1.7M2
GLUCOSE SERPL-MCNC: 106 MG/DL (ref 70–99)
HCT VFR BLD AUTO: 32.7 % (ref 35–47)
HDLC SERPL-MCNC: 38 MG/DL
HGB BLD-MCNC: 10.6 G/DL (ref 11.7–15.7)
LDLC SERPL CALC-MCNC: 34 MG/DL
MCH RBC QN AUTO: 30.5 PG (ref 26.5–33)
MCHC RBC AUTO-ENTMCNC: 32.4 G/DL (ref 31.5–36.5)
MCV RBC AUTO: 94 FL (ref 78–100)
NONHDLC SERPL-MCNC: 57 MG/DL
NOROV GI+II ORF1-ORF2 JNC STL QL NAA+PR: NOT DETECTED
PLATELET # BLD AUTO: 162 10E9/L (ref 150–450)
POTASSIUM SERPL-SCNC: 3.1 MMOL/L (ref 3.4–5.3)
POTASSIUM SERPL-SCNC: 3.9 MMOL/L (ref 3.4–5.3)
RBC # BLD AUTO: 3.47 10E12/L (ref 3.8–5.2)
RVA NSP5 STL QL NAA+PROBE: ABNORMAL
SALMONELLA SP RPOD STL QL NAA+PROBE: NOT DETECTED
SHIGELLA SP+EIEC IPAH STL QL NAA+PROBE: NOT DETECTED
SODIUM SERPL-SCNC: 143 MMOL/L (ref 133–144)
TRIGL SERPL-MCNC: 114 MG/DL
V CHOL+PARA RFBL+TRKH+TNAA STL QL NAA+PR: NOT DETECTED
VIT B12 SERPL-MCNC: 421 PG/ML (ref 193–986)
WBC # BLD AUTO: 4 10E9/L (ref 4–11)
Y ENTERO RECN STL QL NAA+PROBE: NOT DETECTED

## 2018-03-18 PROCEDURE — 84132 ASSAY OF SERUM POTASSIUM: CPT | Performed by: FAMILY MEDICINE

## 2018-03-18 PROCEDURE — 12000007 ZZH R&B INTERMEDIATE

## 2018-03-18 PROCEDURE — 80048 BASIC METABOLIC PNL TOTAL CA: CPT | Performed by: PHYSICIAN ASSISTANT

## 2018-03-18 PROCEDURE — 25000132 ZZH RX MED GY IP 250 OP 250 PS 637: Performed by: PHYSICIAN ASSISTANT

## 2018-03-18 PROCEDURE — 25000132 ZZH RX MED GY IP 250 OP 250 PS 637: Performed by: FAMILY MEDICINE

## 2018-03-18 PROCEDURE — 99232 SBSQ HOSP IP/OBS MODERATE 35: CPT | Performed by: FAMILY MEDICINE

## 2018-03-18 PROCEDURE — 85027 COMPLETE CBC AUTOMATED: CPT | Performed by: PHYSICIAN ASSISTANT

## 2018-03-18 PROCEDURE — 25000128 H RX IP 250 OP 636: Performed by: PHYSICIAN ASSISTANT

## 2018-03-18 PROCEDURE — 82607 VITAMIN B-12: CPT | Performed by: PHYSICIAN ASSISTANT

## 2018-03-18 PROCEDURE — 40000193 ZZH STATISTIC PT WARD VISIT: Performed by: PHYSICAL THERAPIST

## 2018-03-18 PROCEDURE — 97116 GAIT TRAINING THERAPY: CPT | Mod: GP | Performed by: PHYSICAL THERAPIST

## 2018-03-18 PROCEDURE — 36415 COLL VENOUS BLD VENIPUNCTURE: CPT | Performed by: PHYSICIAN ASSISTANT

## 2018-03-18 PROCEDURE — 97530 THERAPEUTIC ACTIVITIES: CPT | Mod: GP | Performed by: PHYSICAL THERAPIST

## 2018-03-18 PROCEDURE — 80061 LIPID PANEL: CPT | Performed by: PHYSICIAN ASSISTANT

## 2018-03-18 PROCEDURE — 36415 COLL VENOUS BLD VENIPUNCTURE: CPT | Performed by: FAMILY MEDICINE

## 2018-03-18 RX ORDER — POTASSIUM CHLORIDE 1.5 G/1.58G
20-40 POWDER, FOR SOLUTION ORAL
Status: DISCONTINUED | OUTPATIENT
Start: 2018-03-18 | End: 2018-03-19 | Stop reason: HOSPADM

## 2018-03-18 RX ORDER — POTASSIUM CHLORIDE 7.45 MG/ML
10 INJECTION INTRAVENOUS
Status: DISCONTINUED | OUTPATIENT
Start: 2018-03-18 | End: 2018-03-19 | Stop reason: HOSPADM

## 2018-03-18 RX ORDER — POTASSIUM CHLORIDE 29.8 MG/ML
20 INJECTION INTRAVENOUS
Status: DISCONTINUED | OUTPATIENT
Start: 2018-03-18 | End: 2018-03-18

## 2018-03-18 RX ORDER — POTASSIUM CHLORIDE 1500 MG/1
20-40 TABLET, EXTENDED RELEASE ORAL
Status: DISCONTINUED | OUTPATIENT
Start: 2018-03-18 | End: 2018-03-19 | Stop reason: HOSPADM

## 2018-03-18 RX ADMIN — ESCITALOPRAM OXALATE 10 MG: 10 TABLET ORAL at 08:51

## 2018-03-18 RX ADMIN — POTASSIUM CHLORIDE 20 MEQ: 1500 TABLET, EXTENDED RELEASE ORAL at 16:07

## 2018-03-18 RX ADMIN — ATORVASTATIN CALCIUM 20 MG: 20 TABLET, FILM COATED ORAL at 08:50

## 2018-03-18 RX ADMIN — LEVOTHYROXINE SODIUM 112 MCG: 112 TABLET ORAL at 06:23

## 2018-03-18 RX ADMIN — POTASSIUM CHLORIDE 40 MEQ: 1500 TABLET, EXTENDED RELEASE ORAL at 13:01

## 2018-03-18 RX ADMIN — SODIUM CHLORIDE: 9 INJECTION, SOLUTION INTRAVENOUS at 16:07

## 2018-03-18 RX ADMIN — DILTIAZEM HYDROCHLORIDE 60 MG: 60 TABLET, FILM COATED ORAL at 00:13

## 2018-03-18 RX ADMIN — CIPROFLOXACIN HYDROCHLORIDE 250 MG: 250 TABLET, FILM COATED ORAL at 08:51

## 2018-03-18 RX ADMIN — VITAMIN D, TAB 1000IU (100/BT) 2000 UNITS: 25 TAB at 08:50

## 2018-03-18 RX ADMIN — CLOPIDOGREL 75 MG: 75 TABLET, FILM COATED ORAL at 08:50

## 2018-03-18 RX ADMIN — CIPROFLOXACIN HYDROCHLORIDE 250 MG: 250 TABLET, FILM COATED ORAL at 20:50

## 2018-03-18 RX ADMIN — FERROUS SULFATE TAB 325 MG (65 MG ELEMENTAL FE) 325 MG: 325 (65 FE) TAB at 08:50

## 2018-03-18 RX ADMIN — DILTIAZEM HYDROCHLORIDE 240 MG: 120 CAPSULE, COATED, EXTENDED RELEASE ORAL at 08:50

## 2018-03-18 NOTE — PLAN OF CARE
Problem: Patient Care Overview  Goal: Plan of Care/Patient Progress Review  Discharge Planner PT   Patient plan for discharge: return to memory unit  Current status: Appears baseline with amount of assstance  Barriers to return to prior living situation: Medical at this time  Recommendations for discharge: Memory care with evaluation form in house PT to assess needs  Rationale for recommendations: Family request, dementia        Entered by: Mandy Lozano 03/18/2018 8:47 AM

## 2018-03-18 NOTE — PLAN OF CARE
Patient denies nausea through out shift  Neuro checks improved from 24 hrs previously  Patient unable to perform plantarflexion bilaterally last nite  Patient able to complete full neuro assessment tonite.  IV infusing without difficulty. Neuro's continue to remain intact  Registering at moderate strength.

## 2018-03-18 NOTE — PLAN OF CARE
Problem: Patient Care Overview  Goal: Plan of Care/Patient Progress Review  Outcome: Improving  Pt alert to self and family.  Used lift for all transfers.  Stool sample came back positive for rotavirus.  K - 3.1, potassium replacement protocol initiated.  First dose given at 1301.  Minimal food intake, no emesis, liquid stool x1 this morning.

## 2018-03-18 NOTE — PROGRESS NOTES
"[facility] Hospitalist Progress Note           Assessment and Plan:     Deanne Zayas is a 84 year old female with a past medical history significant for Alzheimer's dementia, previous CVA with residual right sided hemiparesis, hypertension, dyslipidemia, hypothyroidism, and anxiety who presents on 3/16/2018 with new onset atrial fibrillation with RVR, worsening right sided weakness, and vomiting.          Questionable Atrial flutter vs other SVT with rapid ventricular response (H) -   3/16/18 -- Very remote past history of arrhythmia \"over 40 years ago requiring shocks\" per family, but no known arrhythmia since that time.  Occurs in setting of recent vomiting. Patient received 15 mg IV diltiazem while in the emergency department with improvement in rate.   - Continue diltiazem 60 mg  3/17/2018 -- TSH normal.  On review of ECG from admission, rate 140 but rhythm was regular - does not look like atrial fibrillation.  Could be atrial flutter vs sinus tach due to dehydration/illness vs other SVT - rapid resolution and rate less consistent with sinus tach.  Has remained normal sinus rhythm on tele since.  Continue diltiazem, plan to transition to 240 XL tomorrow.   3/18/2018 -- doing well on diltiazem XL - continue this on discharge - discussed in detail with patient and daughter - they would not want anticoagulation for atrial fibrillation despite stroke risk even if present, do not think we'd see anything that would be of benefit on holter - would like to just try continuing the diltiazem which I agree with.       Acutely worsened right sided weakness / Previous CVA with persisting right hemiparesis (H) without new stroke, no back to baseline   3/16/18 -- Presented with worsening right sided weakness, which per family sometimes occurs when patient is stressed or sick.  MRI brain, CT head, and CTA angio head & neck are without evidence for any acute intracranial or vascular abnormality   3/17/2018 --  Appears " back to near baseline today   3/18/2018 -- discussed anticoagulation with stroke neuro yesterday as it appears the patient has never seen neurology for her stroke last summer and has been left on dual antiplatelet therapy ever since that time - agree with their recommendations to stop the aspirin as she is more than 3 months out from her stroke and just continue the plavix.         Non-intractable vomiting with nausea and diarrhea - due to rotavirus  3/16/18 -- Vomited 4 times on day of admission. Lives at [facility], reportedly there is a gastroenteritis illness going around there.   3/18/2018 -- improving        UTI due to klebsiella   3/17/2018 -- UA positive but no clear symptoms - started on oral cipro  3/18/2018 -- urine culture positive, susceptible to cipro     Hypokalemia  3/18/2018 -- due to diarrhea, poor intake, IVF - started on replacement.      Essential hypertension  3/16/18 -- Previously diagnosed. Pressures initially elevated in the emergency department, now improved and are borderline soft. Taking amlodipine and lisinopril prior to admission, hold for now due to new introduction of diltiazem.  3/17/2018 -- blood pressure 110's this AM, continue just diltiazem for now, follow.    3/18/2018 -- blood pressure doing reasonably well with this.      Hyperlipidemia LDL goal <130  3/16/18 --Taking Lipitor   3/18/2018 -- continued       Hypothyroidism due to acquired atrophy of thyroid  Previously diagnosed. Taking levothyroxine prior to admission, continue.      Anxiety  Stable. Taking Lexapro prior to admission, continue.      Late onset Alzheimer's disease without behavioral disturbance  3/16/18 -- Per family, patient seems more confused and lethargic than baseline. Not currently on any medications for dementia.  3/18/2018 -- appears back to baseline today.         Vitamin B12 deficiency  3/17/2018 -- on monthly shots per neurology for borderline low level  3/18/2018 -- B12 level normal - continue  current plan and follow-up with neurology.      Fluids: IV NS @ 75      DVT Prophylaxis: Pneumatic Compression Devices     Code Status: DNR / DNI - patient has healthcare directive. Discussed directly with patient's family.      Lines: Peripheral IV     Tan catheter: Not indicated    Disposition  Improving - hope for discharge back to [facility] care tomorrow if taking adequate orals -  they have a lift and can handle current cares.             Interval History:   Improving.  No fever or chills.   Per daughter and patient right sided symptoms are aback to normal.  Patient still generally weak, but no focal changes.  Speech and face at baseline.  Having diarrhea, but improving.  No pain today.  No other concerns. Oral intake starting to improve.             Review of Systems:    ROS: 10 point ROS neg other than the symptoms noted above in the HPI.             Medications:   Current active medications and PTA medications reviewed, see medication list for details.            Physical Exam:   Vitals were reviewed  Patient Vitals for the past 24 hrs:   BP Temp Temp src Pulse Heart Rate Resp SpO2 Weight   18 1116 133/61 97  F (36.1  C) Oral 60 - 16 100 % -   18 0741 131/63 98.4  F (36.9  C) Oral 68 - 18 97 % -   18 0341 - - - - - - - 81.5 kg (179 lb 10.8 oz)   18 0010 152/73 98.3  F (36.8  C) Oral 82 - 18 98 % -   18 146/68 - - - 86 18 98 % -   18 1827 148/57 99.1  F (37.3  C) - - 98 18 99 % -   18 1525 127/63 98.6  F (37  C) Oral 96 - 16 98 % -       Temperatures:  Current - Temp: 97  F (36.1  C); Max - Temp  Av.3  F (36.8  C)  Min: 97  F (36.1  C)  Max: 99.1  F (37.3  C)  Respiration range: Resp  Av.3  Min: 16  Max: 18  Pulse range: Pulse  Av.5  Min: 60  Max: 96  Blood pressure range: Systolic (24hrs), Av , Min:127 , Max:152   ; Diastolic (24hrs), Av, Min:57, Max:73    Pulse oximetry range: SpO2  Av.3 %  Min: 97 %  Max: 100 %  I/O last 3 completed  shifts:  In: 2412 [P.O.:730; I.V.:1682]  Out: -     Intake/Output Summary (Last 24 hours) at 03/18/18 1406  Last data filed at 03/18/18 1200   Gross per 24 hour   Intake             1472 ml   Output                0 ml   Net             1472 ml     EXAM:  General: awake and alert, NAD, oriented x 2  Head: normocephalic  Neck: unremarkable, no lymphadenopathy   HEENT: oropharynx pink and moist    Heart: Regular rate and rhythm, no murmurs, rubs, or gallops  Lungs: clear to auscultation bilaterally with good air movement throughout  Abdomen: soft, non-tender, no masses or organomegaly  Extremities: no edema in lower extremities, right sided weakness baseline per family and patient   Skin unremarkable.               Data:     Results for orders placed or performed during the hospital encounter of 03/16/18 (from the past 24 hour(s))   Lipid panel   Result Value Ref Range    Cholesterol 95 <200 mg/dL    Triglycerides 114 <150 mg/dL    HDL Cholesterol 38 (L) >49 mg/dL    LDL Cholesterol Calculated 34 <100 mg/dL    Non HDL Cholesterol 57 <130 mg/dL   Vitamin B12   Result Value Ref Range    Vitamin B12 421 193 - 986 pg/mL   CBC with platelets   Result Value Ref Range    WBC 4.0 4.0 - 11.0 10e9/L    RBC Count 3.47 (L) 3.8 - 5.2 10e12/L    Hemoglobin 10.6 (L) 11.7 - 15.7 g/dL    Hematocrit 32.7 (L) 35.0 - 47.0 %    MCV 94 78 - 100 fl    MCH 30.5 26.5 - 33.0 pg    MCHC 32.4 31.5 - 36.5 g/dL    RDW 14.0 10.0 - 15.0 %    Platelet Count 162 150 - 450 10e9/L   Basic metabolic panel   Result Value Ref Range    Sodium 143 133 - 144 mmol/L    Potassium 3.1 (L) 3.4 - 5.3 mmol/L    Chloride 112 (H) 94 - 109 mmol/L    Carbon Dioxide 24 20 - 32 mmol/L    Anion Gap 7 3 - 14 mmol/L    Glucose 106 (H) 70 - 99 mg/dL    Urea Nitrogen 16 7 - 30 mg/dL    Creatinine 0.88 0.52 - 1.04 mg/dL    GFR Estimate 61 >60 mL/min/1.7m2    GFR Estimate If Black 74 >60 mL/min/1.7m2    Calcium 7.7 (L) 8.5 - 10.1 mg/dL           Attestation:  I have reviewed  today's vital signs, notes, medications, labs and imaging.  Amount of time performed on this daily note: 30 minutes.     Cristofer Cristobal MD, MD

## 2018-03-18 NOTE — PROGRESS NOTES
Call placed to [facility]. Had discussion regarding changes in patient mobility as she is requiring a lift for transfers currently. [facility] would be able to accommodate the change in her mobility at discharge. Tentative dc tomorrow. Will have CT follow up tomorrow for plan finalization.

## 2018-03-19 ENCOUNTER — CARE COORDINATION (OUTPATIENT)
Dept: GERIATRIC MEDICINE | Facility: CLINIC | Age: 83
End: 2018-03-19

## 2018-03-19 VITALS
WEIGHT: 179.68 LBS | HEART RATE: 63 BPM | BODY MASS INDEX: 28.88 KG/M2 | RESPIRATION RATE: 16 BRPM | SYSTOLIC BLOOD PRESSURE: 147 MMHG | TEMPERATURE: 96.8 F | OXYGEN SATURATION: 95 % | HEIGHT: 66 IN | DIASTOLIC BLOOD PRESSURE: 69 MMHG

## 2018-03-19 DIAGNOSIS — Z76.89 HEALTH CARE HOME: Chronic | ICD-10-CM

## 2018-03-19 LAB
ANION GAP SERPL CALCULATED.3IONS-SCNC: 7 MMOL/L (ref 3–14)
BACTERIA SPEC CULT: ABNORMAL
BUN SERPL-MCNC: 10 MG/DL (ref 7–30)
CALCIUM SERPL-MCNC: 8.1 MG/DL (ref 8.5–10.1)
CHLORIDE SERPL-SCNC: 110 MMOL/L (ref 94–109)
CO2 SERPL-SCNC: 25 MMOL/L (ref 20–32)
CREAT SERPL-MCNC: 0.91 MG/DL (ref 0.52–1.04)
ERYTHROCYTE [DISTWIDTH] IN BLOOD BY AUTOMATED COUNT: 13.6 % (ref 10–15)
GFR SERPL CREATININE-BSD FRML MDRD: 59 ML/MIN/1.7M2
GLUCOSE SERPL-MCNC: 101 MG/DL (ref 70–99)
HCT VFR BLD AUTO: 32.6 % (ref 35–47)
HGB BLD-MCNC: 10.6 G/DL (ref 11.7–15.7)
Lab: ABNORMAL
MCH RBC QN AUTO: 30.2 PG (ref 26.5–33)
MCHC RBC AUTO-ENTMCNC: 32.5 G/DL (ref 31.5–36.5)
MCV RBC AUTO: 93 FL (ref 78–100)
PLATELET # BLD AUTO: 168 10E9/L (ref 150–450)
POTASSIUM SERPL-SCNC: 3.7 MMOL/L (ref 3.4–5.3)
RBC # BLD AUTO: 3.51 10E12/L (ref 3.8–5.2)
SODIUM SERPL-SCNC: 142 MMOL/L (ref 133–144)
SPECIMEN SOURCE: ABNORMAL
WBC # BLD AUTO: 3.2 10E9/L (ref 4–11)

## 2018-03-19 PROCEDURE — 99239 HOSP IP/OBS DSCHRG MGMT >30: CPT | Performed by: INTERNAL MEDICINE

## 2018-03-19 PROCEDURE — 25000128 H RX IP 250 OP 636: Performed by: PHYSICIAN ASSISTANT

## 2018-03-19 PROCEDURE — 85027 COMPLETE CBC AUTOMATED: CPT | Performed by: FAMILY MEDICINE

## 2018-03-19 PROCEDURE — 25000132 ZZH RX MED GY IP 250 OP 250 PS 637: Performed by: FAMILY MEDICINE

## 2018-03-19 PROCEDURE — 25000132 ZZH RX MED GY IP 250 OP 250 PS 637: Performed by: PHYSICIAN ASSISTANT

## 2018-03-19 PROCEDURE — 36415 COLL VENOUS BLD VENIPUNCTURE: CPT | Performed by: FAMILY MEDICINE

## 2018-03-19 PROCEDURE — 80048 BASIC METABOLIC PNL TOTAL CA: CPT | Performed by: FAMILY MEDICINE

## 2018-03-19 RX ORDER — LISINOPRIL 5 MG/1
5 TABLET ORAL DAILY
Qty: 90 TABLET | Refills: 3 | Status: SHIPPED | OUTPATIENT
Start: 2018-03-19 | End: 2019-03-06

## 2018-03-19 RX ORDER — DILTIAZEM HYDROCHLORIDE 240 MG/1
240 CAPSULE, COATED, EXTENDED RELEASE ORAL DAILY
Qty: 30 CAPSULE | Refills: 3 | Status: SHIPPED | OUTPATIENT
Start: 2018-03-20 | End: 2018-05-24

## 2018-03-19 RX ORDER — LISINOPRIL 5 MG/1
5 TABLET ORAL DAILY
Qty: 90 TABLET | Refills: 3 | DISCHARGE
Start: 2018-03-19 | End: 2018-03-19

## 2018-03-19 RX ORDER — CIPROFLOXACIN 250 MG/1
250 TABLET, FILM COATED ORAL 2 TIMES DAILY
Qty: 8 TABLET | Refills: 0 | DISCHARGE
Start: 2018-03-19 | End: 2018-03-19

## 2018-03-19 RX ORDER — CIPROFLOXACIN 250 MG/1
250 TABLET, FILM COATED ORAL 2 TIMES DAILY
Qty: 8 TABLET | Refills: 0 | Status: SHIPPED | OUTPATIENT
Start: 2018-03-19 | End: 2018-04-03

## 2018-03-19 RX ORDER — DILTIAZEM HYDROCHLORIDE 240 MG/1
240 CAPSULE, COATED, EXTENDED RELEASE ORAL DAILY
DISCHARGE
Start: 2018-03-20 | End: 2018-03-19

## 2018-03-19 RX ADMIN — CLOPIDOGREL 75 MG: 75 TABLET, FILM COATED ORAL at 09:38

## 2018-03-19 RX ADMIN — LEVOTHYROXINE SODIUM 112 MCG: 112 TABLET ORAL at 06:56

## 2018-03-19 RX ADMIN — DILTIAZEM HYDROCHLORIDE 240 MG: 120 CAPSULE, COATED, EXTENDED RELEASE ORAL at 09:38

## 2018-03-19 RX ADMIN — VITAMIN D, TAB 1000IU (100/BT) 2000 UNITS: 25 TAB at 09:38

## 2018-03-19 RX ADMIN — SODIUM CHLORIDE: 9 INJECTION, SOLUTION INTRAVENOUS at 04:15

## 2018-03-19 RX ADMIN — CIPROFLOXACIN HYDROCHLORIDE 250 MG: 250 TABLET, FILM COATED ORAL at 09:38

## 2018-03-19 RX ADMIN — FERROUS SULFATE TAB 325 MG (65 MG ELEMENTAL FE) 325 MG: 325 (65 FE) TAB at 09:38

## 2018-03-19 RX ADMIN — ESCITALOPRAM OXALATE 10 MG: 10 TABLET ORAL at 09:38

## 2018-03-19 RX ADMIN — ATORVASTATIN CALCIUM 20 MG: 20 TABLET, FILM COATED ORAL at 09:38

## 2018-03-19 NOTE — CONSULTS
Care Transition Initial Assessment - RN  Reason For Consult: other (see comments) ([facility] Resident)   Met with: Patient and daughter.    DATA   Principal Problem:    Atrial fibrillation with rapid ventricular response (H)  Active Problems:    Hyperlipidemia LDL goal <130    Hypothyroidism due to acquired atrophy of thyroid    Essential hypertension    Anxiety    Late onset Alzheimer's disease without behavioral disturbance    History of CVA (cerebrovascular accident)    Right hemiparesis (H), secondary to previous CVA    Non-intractable vomiting with nausea       Primary Care Clinic Name: [facility]  Primary Care MD Name: Debbie Card  Contact information and PCP information verified: Yes    ASSESSMENT  Cognitive Status: awake, alert and confused.       Resources List: Assisted Living     Lives With: alone  Living Arrangements: assisted living     Description of Support System: Supportive, Involved   Who is your support system?: Children, Facility resident(s)/Staff   Support Assessment: Adequate family and caregiver support   Insurance Concerns: No Insurance issues identified      This writer met with pt and daughter, Mayra, introduced self and role.  The patient lives at [facility] (phone: [phone] Fax: [phone]).  She receives total care and is assist of two for transfers.  JENNIFER CM is Yennifer Hernandez  [phone].  Updated Yennifer regarding discharge plan.  [facility] Financial Worker is Betzaida.      PLAN    [facility]      Discharge Planner   Discharge Plans in progress: [facility]  Barriers to discharge plan: None  Follow up plan: Follow up with PCP       Entered by: Marjan Caballero 03/19/2018 11:04 AM         Marjan Caballero RN, Care Coordinator [phone]

## 2018-03-19 NOTE — PROGRESS NOTES
3/19/18 CM is covering for Yennifer LLOYD and noticed this member had been admitted late Friday night to the hospital with weakness. CM contacted Ingrid Lloyd at [facility] for an update. Ingrid LLOYD states Love will be discharging back to her assisted living, [facility], some time today.  PROMISE will pass on this information to Yennifer LLOYD.  Kizzy Burris RN BA PHN  [facility] Case Management  [phone]

## 2018-03-19 NOTE — DISCHARGE SUMMARY
[place] Hospitalist Discharge Summary    Deanne Zayas MRN# [id number]   Age: 84 year old YOB: 1933     Date of Admission:  3/16/2018  Date of Discharge::  3/19/2018  Admitting Physician:  Jaspal Mckay MD  Discharge Physician:  Jared Osman MD  Primary Physician: Kori Card  Transferring Facility: N/A     Home clinic: [address] [place] [id number]          Admission Diagnoses:   Generalized muscle weakness [M62.81]  Atrial fibrillation with rapid ventricular response (H) [I48.91]          Discharge Diagnosis:   Principle diagnosis: Atrial fibrillation with rapid ventricular response   Secondary diagnoses:  Patient Active Problem List   Diagnosis     Hyperlipidemia LDL goal <130     Hypothyroidism due to acquired atrophy of thyroid     Vitamin B12 deficiency disease     Vitamin D deficiency disease     Essential hypertension     Osteopenia     Advance care planning     Hypertension goal BP (blood pressure) < 140/90     Mixed incontinence     Anxiety     Diaper dermatitis     Late onset Alzheimer's disease without behavioral disturbance     Facial droop     Acute cystitis without hematuria     UTI (urinary tract infection)     Fall     Stroke (H)     Health Care Home     Atrial fibrillation with rapid ventricular response (H)     History of CVA (cerebrovascular accident)     Right hemiparesis (H), secondary to previous CVA     Non-intractable vomiting with nausea          Brief History of Presenting Illness:   As per admit hx  Deanne Zayas is a 84 year old female with the above past medical history now presents on 3/16/2018 with worsening right sided weakness and vomiting. Patient is a difficult historian so most information was provided by her daughter. Patient has right sided weakness at baseline secondary to a stroke in June 2017. She lives at [facility], and staff today noticed her right sided weakness was worse today compared to baseline. Family  states this sometimes happens with illness or stressors. There apparently is a GI illness going around [place], and today patient vomited at least 4 or 5 times, all non-bloody bile-like emesis. Because of the worsening weakness and the vomiting, patient was brought to the emergency department. Patient is unable to describe much else, other than stating that she has some right leg pain and a dry cough that started today.       No results found for this or any previous visit (from the past 24 hour(s)).         Hospital Course:   AFIB RVR  Has had a remote h/o arrythmia requiring shocks years ago. Now presented in afib RVR at 140. Converted to SR next day. Was givin iv dilt and started on po in hospital. Doing well. HR stable 60-70s. Family not interested in anticoagulation. Sounds irregular today on exam--may have converted back to afib--stable HR though.  -continue dilt/ plavix    H/O CVA  Stop aspirin and continue plavix. This was discussed with neuro stroke.    N/V/D DUE TO ROTAVIRUS  Improved. Tolerating po    UTI  Due to klebsiella.  -continue cipro total 7 days    HTN  D/c amlodipine, change lisinopril to 5mg/day and continue dilt 240mg/day.    HLD  Continue meds    HYPOT4  Continue meds    ANXIETY  Continue meds                Procedures:   No procedures performed during this admission         Allergies:      Allergies   Allergen Reactions     Donepezil Other (See Comments)     At 10 mg, tremulousness and decreased appetite             Medications Prior to Admission:     Prescriptions Prior to Admission   Medication Sig Dispense Refill Last Dose     ferrous sulfate (IRON) 325 (65 FE) MG tablet Take 1 tablet (325 mg) by mouth daily 31 tablet PRN 3/16/2018 at 0900     cholecalciferol (VITAMIN D3) 1000 UNIT tablet Take 2 tablets (2,000 Units) by mouth daily 62 tablet 98 3/16/2018 at 0900     senna-docusate (SENNA S) 8.6-50 MG per tablet Take 1 tablet by mouth daily 31 tablet PRN 3/15/2018 at 1900      "cyanocobalamin (VITAMIN B12) 1000 MCG/ML injection Inject 1 mL (1,000 mcg) Subcutaneous every 30 days 1 mL 98 Past Month at Unknown time     syringe/needle, disp, 25G X 1\" 3 ML MISC USE AS DIRECTED WITH B12 INECTION 1 each 98 Past Month at Unknown time     escitalopram (LEXAPRO) 10 MG tablet Take 1 tablet (10 mg) by mouth daily 31 tablet 98 3/16/2018 at 0900     levothyroxine (SYNTHROID/LEVOTHROID) 112 MCG tablet Take 1 tablet (112 mcg) by mouth daily 31 tablet 98 3/16/2018 at 0900     clopidogrel (PLAVIX) 75 MG tablet Take 1 tablet (75 mg) by mouth daily 31 tablet 98 3/15/2018 at 1900     atorvastatin (LIPITOR) 20 MG tablet Take 1 tablet (20 mg) by mouth daily 31 tablet 98 3/15/2018 at 1900     [DISCONTINUED] AMLODIPINE BESYLATE PO Take 5 mg by mouth daily   3/15/2018 at 1900     [DISCONTINUED] aspirin 81 MG chewable tablet Take 1 tablet (81 mg) by mouth daily 31 tablet 98 3/16/2018 at 0900     [DISCONTINUED] lisinopril (PRINIVIL/ZESTRIL) 40 MG tablet Take 1 tablet (40 mg) by mouth daily 31 tablet 98 3/16/2018 at 0900     bisacodyl (DULCOLAX) 10 MG Suppository Place 10 mg rectally daily as needed for constipation   Unknown at Unknown time     polyethylene glycol (MIRALAX/GLYCOLAX) powder Take 17 g by mouth daily as needed for constipation   Unknown at Unknown time     Menthol, Topical Analgesic, (BIOFREEZE) 4 % GEL Externally apply topically 4 times daily as needed   Unknown at Unknown time     ACETAMINOPHEN PO Take 1,000 mg by mouth 3 times daily   Unknown at Unknown time     miconazole (MICATIN) 2 % AERP powder Apply topically 3 times daily as needed   Unknown at Unknown time             Discharge Medications:     Current Discharge Medication List      START taking these medications    Details   diltiazem 240 MG 24 hr capsule Take 1 capsule (240 mg) by mouth daily    Associated Diagnoses: Atrial fibrillation with rapid ventricular response (H)      ciprofloxacin (CIPRO) 250 MG tablet Take 1 tablet (250 mg) by " "mouth 2 times daily for 4 days  Qty: 8 tablet, Refills: 0    Associated Diagnoses: Acute cystitis without hematuria         CONTINUE these medications which have CHANGED    Details   lisinopril (PRINIVIL/ZESTRIL) 5 MG tablet Take 1 tablet (5 mg) by mouth daily  Qty: 90 tablet, Refills: 3    Associated Diagnoses: Benign essential hypertension         CONTINUE these medications which have NOT CHANGED    Details   ferrous sulfate (IRON) 325 (65 FE) MG tablet Take 1 tablet (325 mg) by mouth daily  Qty: 31 tablet, Refills: PRN    Associated Diagnoses: Iron deficiency anemia secondary to inadequate dietary iron intake      cholecalciferol (VITAMIN D3) 1000 UNIT tablet Take 2 tablets (2,000 Units) by mouth daily  Qty: 62 tablet, Refills: 98    Associated Diagnoses: Senile osteoporosis      senna-docusate (SENNA S) 8.6-50 MG per tablet Take 1 tablet by mouth daily  Qty: 31 tablet, Refills: PRN    Associated Diagnoses: Constipation, unspecified constipation type      cyanocobalamin (VITAMIN B12) 1000 MCG/ML injection Inject 1 mL (1,000 mcg) Subcutaneous every 30 days  Qty: 1 mL, Refills: 98    Associated Diagnoses: Vitamin B 12 deficiency      syringe/needle, disp, 25G X 1\" 3 ML MISC USE AS DIRECTED WITH B12 INECTION  Qty: 1 each, Refills: 98    Associated Diagnoses: Vitamin B 12 deficiency      escitalopram (LEXAPRO) 10 MG tablet Take 1 tablet (10 mg) by mouth daily  Qty: 31 tablet, Refills: 98    Associated Diagnoses: Anxiety      levothyroxine (SYNTHROID/LEVOTHROID) 112 MCG tablet Take 1 tablet (112 mcg) by mouth daily  Qty: 31 tablet, Refills: 98    Associated Diagnoses: Other specified hypothyroidism      clopidogrel (PLAVIX) 75 MG tablet Take 1 tablet (75 mg) by mouth daily  Qty: 31 tablet, Refills: 98    Associated Diagnoses: 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)      atorvastatin (LIPITOR) 20 MG tablet Take 1 tablet (20 mg) by mouth daily  Qty: 31 tablet, Refills: 98    " "Associated Diagnoses: Cerebrovascular accident (CVA) due to thrombosis of left middle cerebral artery (H)      bisacodyl (DULCOLAX) 10 MG Suppository Place 10 mg rectally daily as needed for constipation      polyethylene glycol (MIRALAX/GLYCOLAX) powder Take 17 g by mouth daily as needed for constipation      Menthol, Topical Analgesic, (BIOFREEZE) 4 % GEL Externally apply topically 4 times daily as needed      ACETAMINOPHEN PO Take 1,000 mg by mouth 3 times daily      miconazole (MICATIN) 2 % AERP powder Apply topically 3 times daily as needed         STOP taking these medications       AMLODIPINE BESYLATE PO Comments:   Reason for Stopping:         aspirin 81 MG chewable tablet Comments:   Reason for Stopping:                     Consultations:   No consultations were requested during this admission            Discharge Exam:   Blood pressure 147/69, pulse 63, temperature 96.8  F (36  C), temperature source Oral, resp. rate 16, height 1.676 m (5' 6\"), weight 81.5 kg (179 lb 10.8 oz), SpO2 95 %.  GENERAL APPEARANCE: healthy, alert and no distress  EYES: conjunctiva clear, eyes grossly normal  HENT: external ears and nose normal   NECK: supple, no masses or adenopathy  RESP: lungs clear to auscultation - no rales, rhonchi or wheezes  CV: irregular rate and rhythm, normal S1 S2, no S3 or S4 and no murmur, click or rub   ABDOMEN: soft, nontender, no HSM or masses and bowel sounds normal  MS: no clubbing, cyanosis; trace edema  SKIN: clear without significant rashes or lesions  NEURO: Normal strength and tone, sensory exam grossly normal, mentation intact and speech normal    Unresulted Labs Ordered in the Past 30 Days of this Admission     No orders found from 1/15/2018 to 3/17/2018.          No results found for this or any previous visit (from the past 24 hour(s)).         Pending Tests at Discharge:   None         Discharge Instructions and Follow-Up:   Discharge diet: Regular   Discharge activity: Activity as " tolerated   Discharge follow-up: Follow up with primary care provider in 2 weeks           Discharge Disposition:   Discharged to short-term care facility      Attestation:  I have reviewed today's vital signs, notes, medications, labs and imaging.    Time Spent on this Encounter   I, Jared Osman, personally saw the patient today and spent greater than 30 minutes discharging this patient.    Jared Osman MD

## 2018-03-19 NOTE — PLAN OF CARE
Problem: Patient Care Overview  Goal: Plan of Care/Patient Progress Review  Outcome: No Change  K+ re-check @ 2000 = 3.9  Patient seems more restless tonite than last nite  No change in neuro status.  Patient remains incontinent.  No nausea, emesis this shift thus far.  Patient taking pills without difficulty.

## 2018-03-19 NOTE — PROGRESS NOTES
[facility] DISCHARGE NOTE    Patient discharged to [facility] at 12:15 PM via wheel chair. Accompanied by son and daughter and staff. Discharge instructions reviewed with patient, son and daughter, opportunity offered to ask questions. Prescriptions sent to patients preferred pharmacy. All belongings sent with patient.    Flavia Cespedes

## 2018-03-19 NOTE — PLAN OF CARE
Problem: Patient Care Overview  Goal: Plan of Care/Patient Progress Review  Physical Therapy Discharge Summary    Reason for therapy discharge:    Discharged to memory care (baseline)    Progress towards therapy goal(s). See goals on Care Plan in [facility] electronic health record for goal details.  Goals partially met.  Barriers to achieving goals:   discharge from facility and near baseline.    Therapy recommendation(s):    therapy at memory unit if available

## 2018-03-19 NOTE — PHARMACY - DISCHARGE MEDICATION RECONCILIATION
Discharge medication review for this patient is complete. Pharmacist assisted with medication reconciliation of discharge medications with prior to admission medications.     The following changes were made to the discharge medication list based on pharmacist review:  Added:  Cipro and Diltiazem  Changed: Lisinopril to 5 mg daily  Discontinued: Amlodipine and Aspirin        Patient's Discharge Medication List  - medications as listed on After Visit Summary (AVS)     Review of your medicines      START taking       Dose / Directions    ciprofloxacin 250 MG tablet   Commonly known as:  CIPRO   Indication:  Urinary Tract Infection   Used for:  Acute cystitis without hematuria        Dose:  250 mg   Take 1 tablet (250 mg) by mouth 2 times daily   Quantity:  8 tablet   Refills:  0       diltiazem 240 MG 24 hr capsule        Dose:  240 mg   Start taking on:  3/20/2018   Take 1 capsule (240 mg) by mouth daily   Quantity:  30 capsule   Refills:  3         CONTINUE these medicines which may have CHANGED, or have new prescriptions. If we are uncertain of the size of tablets/capsules you have at home, strength may be listed as something that might have changed.       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 (H)        Dose:  20 mg   Take 1 tablet (20 mg) by mouth daily   Quantity:  31 tablet   Refills:  98       BIOFREEZE 4 % Gel   Generic drug:  Menthol  (Topical Analgesic)        Externally apply topically 4 times daily as needed   Refills:  0       bisacodyl 10 MG Suppository   Commonly known as:  DULCOLAX        Dose:  10 mg   Place 10 mg rectally daily as needed for constipation   Refills:  0       cholecalciferol 1000 UNIT tablet   Commonly known as:  vitamin D3   Used for:  Senile osteoporosis        Dose:  2000 Units   Take 2 tablets (2,000 Units) by mouth daily   Quantity:  62 tablet   Refills:  98       clopidogrel 75 MG tablet   Commonly known as:  PLAVIX   Used for:  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)        Dose:  75 mg   Take 1 tablet (75 mg) by mouth daily   Quantity:  31 tablet   Refills:  98       cyanocobalamin 1000 MCG/ML injection   Commonly known as:  VITAMIN B12   Used for:  Vitamin B 12 deficiency        Dose:  1 mL   Inject 1 mL (1,000 mcg) Subcutaneous every 30 days   Quantity:  1 mL   Refills:  98       escitalopram 10 MG tablet   Commonly known as:  LEXAPRO   Used for:  Anxiety        Dose:  10 mg   Take 1 tablet (10 mg) by mouth daily   Quantity:  31 tablet   Refills:  98       ferrous sulfate 325 (65 FE) MG tablet   Commonly known as:  IRON   Used for:  Iron deficiency anemia secondary to inadequate dietary iron intake        Dose:  325 mg   Take 1 tablet (325 mg) by mouth daily   Quantity:  31 tablet   Refills:  PRN       levothyroxine 112 MCG tablet   Commonly known as:  SYNTHROID/LEVOTHROID   Used for:  Other specified hypothyroidism        Dose:  112 mcg   Take 1 tablet (112 mcg) by mouth daily   Quantity:  31 tablet   Refills:  98       miconazole 2 % Aerp powder   Commonly known as:  MICATIN        Apply topically 3 times daily as needed   Refills:  0       polyethylene glycol powder   Commonly known as:  MIRALAX/GLYCOLAX        Dose:  17 g   Take 17 g by mouth daily as needed for constipation   Refills:  0       senna-docusate 8.6-50 MG per tablet   Commonly known as:  SENNA S   Used for:   "Constipation, unspecified constipation type        Dose:  1 tablet   Take 1 tablet by mouth daily   Quantity:  31 tablet   Refills:  PRN       syringe/needle (disp) 25G X 1\" 3 ML Misc   Used for:  Vitamin B 12 deficiency        USE AS DIRECTED WITH B12 INECTION   Quantity:  1 each   Refills:  98         STOP taking          AMLODIPINE BESYLATE PO           aspirin 81 MG chewable tablet                Where to get your medicines      Some of these will need a paper prescription and others can be bought over the counter. Ask your nurse if you have questions.     Bring a paper prescription for each of these medications      ciprofloxacin 250 MG tablet     diltiazem 240 MG 24 hr capsule     lisinopril 5 MG tablet             "

## 2018-03-20 ENCOUNTER — CARE COORDINATION (OUTPATIENT)
Dept: GERIATRIC MEDICINE | Facility: CLINIC | Age: 83
End: 2018-03-20

## 2018-03-20 NOTE — PROGRESS NOTES
3-20-18   CC reached to RN staff at the [facility] to f/u on how member is doing now that she is back to the [facility] from her hospital stay. CC will then f/u as needed.   CC then called member daughter Mayra to f/u and she said that member is more confused which occurs for her when she gets sick but she is doing okay at this time.  She also wanted to know if her products could be increased in size. CC said she would f/u with RN at the [facility] to see what is working for member and then increase as needed.    Yennifer Hernandez MA [facility] Coordinator   [phone]

## 2018-03-22 NOTE — PROGRESS NOTES
[facility]  PRIMARY CARE PROVIDER AND CLINIC:  Kori Card [address] / [place] [address]  Chief Complaint   Patient presents with     Hospital F/U       HPI:    Deanne Zayas is a 84 year old  (6/19/1933),admitted to the [facility] from [facility].  Hospital stay 3/16/18 through 3/19/18.  Admitted to this facility for  rehab, medical management and nursing care.  HPI information obtained from: facility chart records, facility staff, patient report and [facility] chart review.  Current issues are:        Deanne Zayas is a 84 year old female with a past medical history significant for Alzheimer's dementia, previous CVA with residual right sided hemiparesis, hypertension, dyslipidemia, hypothyroidism, and anxiety who presented on 3/16/2018 with new onset atrial fibrillation with RVR, worsening right sided weakness, and vomiting.    Atrial fibrillation with rapid ventricular response (H)  She was treated with IV diltiazem and 60mg of oral diltiazem and converted to NSR. CHADS-VASC was 5, however she has a history of multiple falls and family opted against anticoagulation. She remains on PTA plavix. Her amlodipine was discontinued and she was discharged on diltiazem ER 240mg daily. HR has been controlled. She denies any palpitations, lightheadedness or chest pain on exam today.     History of CVA (cerebrovascular accident)  Generalized muscle weakness  Does have some right sided weakness, worse when she is ill, stressed or tired. Head MRI showed No evidence for acute infarct, acute hemorrhage, or any focal mass lesions, Old left cerebellar infarct, Old microbleed in right subinsular cortex, Cerebral atrophy with chronic white matter changes. Neuro stroke was consulted while she was IP and her ASA was stopped and she was continued on her PTA plavix. She is also on Lipitor. She remains weaker that baseline - typically 1  person assist with transfers. She required a lift earlier this week on admission, and now is a 2 person max assist with transfers. She will have home PT and OT. She does have chronic right leg pain since her stroke, she is on PRN tylenol, declined need for medication on exam today.     Benign essential hypertension  Currently on lisinopril (decreased from 40mg to 5mg), her norvasc was discontinued and she was started on diltiazem for afib. Denies any chest pain or headaches on exam today.     Late onset Alzheimer's disease without behavioral disturbance  She does have memory, lives on locked memory care unit. She is on Lexapro 10mg for anxiety. No behavioral concerns were noted while she was hospitalized. She is impulsive at times and does forget to call staff for help.     Viral gastroenteritis due to rotaviruses  Had nausea, vomiting, and diarrhea. Symptoms started 1-2 days prior to hospital admission. Similar symptoms had been present in multiple other patients at the assisted living facility.  Stool was sent and tested positive for Rotavirus A. Symptoms improved during hospitalization. She is tolerating diet in assisted living, staff report a formed bowel movement this AM. She denies any abdominal pain, nausea, or bloating.     Urinary tract infection without hematuria, site unspecified  UA/UC grew >100,000 klebsiella. She is on last day of 7 day course of cipro. Denies any urinary symptoms currently. She does have some incontinence at baseline.       CODE STATUS/ADVANCE DIRECTIVES DISCUSSION:   DNR / DNI  Patient's living condition: lives in an assisted living facility    ALLERGIES:Donepezil  PAST MEDICAL HISTORY:  has a past medical history of Alzheimer's dementia without behavioral disturbance; Depression; Dyslipidemia; Essential hypertension; History of CVA (cerebrovascular accident); Hypothyroidism; and Right hemiparesis (H), secondary to previous CVA.  PAST SURGICAL HISTORY:  has a past surgical history  "that includes Cholecystectomy; surgical history of -  (2012 ); and Cystoscopy (N/A, 8/29/2014).  FAMILY HISTORY: family history includes Family History Negative in her father and mother; Obesity in her sister; Unknown/Adopted in her mother.  SOCIAL HISTORY:  reports that she has never smoked. She has never used smokeless tobacco. She reports that she does not drink alcohol or use illicit drugs.    Post Discharge Medication Reconciliation Status: discharge medications reconciled and changed, per note/orders (see AVS).  Current Outpatient Prescriptions   Medication Sig Dispense Refill     diltiazem 240 MG 24 hr capsule Take 1 capsule (240 mg) by mouth daily 30 capsule 3     lisinopril (PRINIVIL/ZESTRIL) 5 MG tablet Take 1 tablet (5 mg) by mouth daily 90 tablet 3     ciprofloxacin (CIPRO) 250 MG tablet Take 1 tablet (250 mg) by mouth 2 times daily 8 tablet 0     ferrous sulfate (IRON) 325 (65 FE) MG tablet Take 1 tablet (325 mg) by mouth daily 31 tablet PRN     cholecalciferol (VITAMIN D3) 1000 UNIT tablet Take 2 tablets (2,000 Units) by mouth daily 62 tablet 98     senna-docusate (SENNA S) 8.6-50 MG per tablet Take 1 tablet by mouth daily 31 tablet PRN     cyanocobalamin (VITAMIN B12) 1000 MCG/ML injection Inject 1 mL (1,000 mcg) Subcutaneous every 30 days 1 mL 98     syringe/needle, disp, 25G X 1\" 3 ML MISC USE AS DIRECTED WITH B12 INECTION 1 each 98     escitalopram (LEXAPRO) 10 MG tablet Take 1 tablet (10 mg) by mouth daily 31 tablet 98     levothyroxine (SYNTHROID/LEVOTHROID) 112 MCG tablet Take 1 tablet (112 mcg) by mouth daily 31 tablet 98     clopidogrel (PLAVIX) 75 MG tablet Take 1 tablet (75 mg) by mouth daily 31 tablet 98     atorvastatin (LIPITOR) 20 MG tablet Take 1 tablet (20 mg) by mouth daily 31 tablet 98     bisacodyl (DULCOLAX) 10 MG Suppository Place 10 mg rectally daily as needed for constipation       polyethylene glycol (MIRALAX/GLYCOLAX) powder Take 17 g by mouth daily as needed for constipation  "      Menthol, Topical Analgesic, (BIOFREEZE) 4 % GEL Externally apply topically 4 times daily as needed       ACETAMINOPHEN PO Take 1,000 mg by mouth 3 times daily       miconazole (MICATIN) 2 % AERP powder Apply topically 3 times daily as needed         ROS:  4 point ROS including Respiratory, CV, GI and , other than that noted in the HPI,  is negative and ROS somewhat limited due to dementia.     Exam:  /70  Pulse 74  Temp 97.7  F (36.5  C)  Resp 18  Wt 178 lb (80.7 kg)  SpO2 98%  BMI 28.73 kg/m2  GENERAL APPEARANCE:  Alert, in no distress, cooperative  RESP:  respiratory effort and palpation of chest normal, lungs clear to auscultation , no respiratory distress  CV:  Palpation and auscultation of heart done , no edema, +2 pedal pulses, irregular rhythm irregular, rate-normal  ABDOMEN:  normal bowel sounds, soft, nontender, no hepatosplenomegaly or other masses, no guarding or rebound, bowel sounds normal  M/S:   Gait and station abnormal mild right sided weakness, no joint tenderness, primarily WC bound but able to ambulate short distances  SKIN:  Inspection of skin and subcutaneous tissue baseline, Palpation of skin and subcutaneous tissue baseline  NEURO:   Cranial nerves 2-12 tested and WNL, slight right sided drift to tongue (baseline)  PSYCH:  oriented to self, place, insight and judgement impaired, memory impaired , affect and mood normal    Lab/Diagnostic data:     CBC RESULTS:   Recent Labs   Lab Test  03/19/18 0658 03/18/18   0715   WBC  3.2*  4.0   RBC  3.51*  3.47*   HGB  10.6*  10.6*   HCT  32.6*  32.7*   MCV  93  94   MCH  30.2  30.5   MCHC  32.5  32.4   RDW  13.6  14.0   PLT  168  162       Last Basic Metabolic Panel:  Recent Labs   Lab Test  03/19/18 0658 03/18/18 2003 03/18/18   0715   NA  142   --   143   POTASSIUM  3.7  3.9  3.1*   CHLORIDE  110*   --   112*   RUBIN  8.1*   --   7.7*   CO2  25   --   24   BUN  10   --   16   CR  0.91   --   0.88   GLC  101*   --   106*        Liver Function Studies -   Recent Labs   Lab Test  07/24/17   1425  10/25/12   0830  08/24/11   PROTTOTAL  6.8   --    --   6.9   ALBUMIN  3.8   --    --   4.2   BILITOTAL  0.5   --    --   1.4*   ALKPHOS  69   --    --   55   AST  16  26   < >  25   ALT  18  22   < >  16   BILIDIRECT   --    --    --   0.1    < > = values in this interval not displayed.       TSH   Date Value Ref Range Status   03/17/2018 0.74 0.40 - 4.00 mU/L Final   03/08/2018 1.66 0.40 - 4.00 mU/L Final   ]    Lab Results   Component Value Date    A1C 6.3 03/17/2018    A1C 5.9 08/21/2012       ASSESSMENT/PLAN:  (I48.91) Atrial fibrillation with rapid ventricular response (H)  (primary encounter diagnosis)  Comment: rate controlled, irregular HR on exam, so ? If in afib, asymptomatic  Plan: continue plavix, diltiazem, nursing to check vital weekly x 3 weeks to ensure stability    (Z86.73) History of CVA (cerebrovascular accident)  (M62.81) Generalized muscle weakness  Comment: stable, no acute infarct seen on imaging, not at baseline imaging  Plan: PT/OT, continue Plavix, Lipitor, monitor    (I10) Benign essential hypertension  Comment: stable on new medication  Plan: continue lisinopril at 5mg, diltiazme    (G30.1,  F02.80) Late onset Alzheimer's disease without behavioral disturbance  Comment: at baseline mentation, appropriate for memory care unit  Plan: Continue current cares, falls precautions. PT/OT as above    (A08.0) Viral gastroenteritis due to rotaviruses  Comment: appears resolved  Plan: diet as tolerated, continued to monitor and adjust PRN    (N39.0) Urinary tract infection without hematuria, site unspecified  Comment: no s/s of bacteremia, asymptomatic  Plan: complete abx (last dose today), continue to monitor and adjust PRN    Orders:  1. Check vitals weekly x 3 weeks Dx New BP meds      Electronically signed by:  YASMINE Tan CNP

## 2018-03-23 ENCOUNTER — ASSISTED LIVING VISIT (OUTPATIENT)
Dept: GERIATRICS | Facility: CLINIC | Age: 83
End: 2018-03-23
Payer: COMMERCIAL

## 2018-03-23 VITALS
WEIGHT: 178 LBS | SYSTOLIC BLOOD PRESSURE: 140 MMHG | DIASTOLIC BLOOD PRESSURE: 70 MMHG | OXYGEN SATURATION: 98 % | TEMPERATURE: 97.7 F | RESPIRATION RATE: 18 BRPM | BODY MASS INDEX: 28.73 KG/M2 | HEART RATE: 74 BPM

## 2018-03-23 DIAGNOSIS — I48.91 ATRIAL FIBRILLATION WITH RAPID VENTRICULAR RESPONSE (H): Primary | ICD-10-CM

## 2018-03-23 DIAGNOSIS — N39.0 URINARY TRACT INFECTION WITHOUT HEMATURIA, SITE UNSPECIFIED: ICD-10-CM

## 2018-03-23 DIAGNOSIS — M62.81 GENERALIZED MUSCLE WEAKNESS: ICD-10-CM

## 2018-03-23 DIAGNOSIS — I10 BENIGN ESSENTIAL HYPERTENSION: ICD-10-CM

## 2018-03-23 DIAGNOSIS — A08.0 VIRAL GASTROENTERITIS DUE TO ROTAVIRUSES: ICD-10-CM

## 2018-03-23 DIAGNOSIS — G30.1 LATE ONSET ALZHEIMER'S DISEASE WITHOUT BEHAVIORAL DISTURBANCE (H): ICD-10-CM

## 2018-03-23 DIAGNOSIS — F02.80 LATE ONSET ALZHEIMER'S DISEASE WITHOUT BEHAVIORAL DISTURBANCE (H): ICD-10-CM

## 2018-03-23 DIAGNOSIS — Z86.73 HISTORY OF CVA (CEREBROVASCULAR ACCIDENT): ICD-10-CM

## 2018-04-03 ENCOUNTER — NURSING HOME VISIT (OUTPATIENT)
Dept: GERIATRICS | Facility: CLINIC | Age: 83
End: 2018-04-03
Payer: COMMERCIAL

## 2018-04-03 VITALS
RESPIRATION RATE: 16 BRPM | DIASTOLIC BLOOD PRESSURE: 84 MMHG | TEMPERATURE: 97.2 F | OXYGEN SATURATION: 99 % | HEART RATE: 72 BPM | SYSTOLIC BLOOD PRESSURE: 147 MMHG

## 2018-04-03 DIAGNOSIS — G30.1 LATE ONSET ALZHEIMER'S DISEASE WITHOUT BEHAVIORAL DISTURBANCE (H): Primary | ICD-10-CM

## 2018-04-03 DIAGNOSIS — G47.01 INSOMNIA DUE TO MEDICAL CONDITION: ICD-10-CM

## 2018-04-03 DIAGNOSIS — F02.80 LATE ONSET ALZHEIMER'S DISEASE WITHOUT BEHAVIORAL DISTURBANCE (H): Primary | ICD-10-CM

## 2018-04-03 DIAGNOSIS — F41.9 ANXIETY: ICD-10-CM

## 2018-04-03 RX ORDER — TRAZODONE HYDROCHLORIDE 50 MG/1
25 TABLET, FILM COATED ORAL AT BEDTIME
Qty: 60 TABLET | Refills: 3 | Status: SHIPPED | OUTPATIENT
Start: 2018-04-03 | End: 2018-06-29

## 2018-04-03 NOTE — PROGRESS NOTES
[facility]    Chief Complaint   Patient presents with     RECHECK       HPI:    Deanne Zayas is a 84 year old  (6/19/1933), who is being seen today for an episodic care visit at [facility].  HPI information obtained from: facility chart records, facility staff, patient report and [facility] chart review.    Today's concern is:  Late onset Alzheimer's disease without behavioral disturbance  Anxiety  Insomnia due to medical condition  Staff report that patient is having increased anxiety at night, especially when residents are getting ready for bed. They report she is visible anxious and upset when the other residents are wheeled from the common room their bedrooms, from statements to staff she possible thinks she is babysitting and is distressed that they are being taken out of her care. They also report that she awakens frequently and is somewhat restless overnight. Patient reports that she thinks she is sleeping well, denies any anxiety. She is currently on lexapro 10mg daily.   - traZODone (DESYREL) 50 MG tablet; Take 0.5 tablets (25 mg) by mouth At Bedtime    ALLERGIES: Donepezil  Past Medical, Surgical, Family and Social History reviewed and updated in [facility].    Current Outpatient Prescriptions   Medication Sig Dispense Refill     Coloplast barrier cream CREA Apply topically 4 times daily as needed       diltiazem 240 MG 24 hr capsule Take 1 capsule (240 mg) by mouth daily 30 capsule 3     lisinopril (PRINIVIL/ZESTRIL) 5 MG tablet Take 1 tablet (5 mg) by mouth daily 90 tablet 3     ferrous sulfate (IRON) 325 (65 FE) MG tablet Take 1 tablet (325 mg) by mouth daily 31 tablet PRN     cholecalciferol (VITAMIN D3) 1000 UNIT tablet Take 2 tablets (2,000 Units) by mouth daily 62 tablet 98     senna-docusate (SENNA S) 8.6-50 MG per tablet Take 1 tablet by mouth daily 31 tablet PRN     cyanocobalamin (VITAMIN B12) 1000 MCG/ML injection Inject 1 mL (1,000 mcg) Subcutaneous every 30  "days 1 mL 98     syringe/needle, disp, 25G X 1\" 3 ML MISC USE AS DIRECTED WITH B12 INECTION 1 each 98     escitalopram (LEXAPRO) 10 MG tablet Take 1 tablet (10 mg) by mouth daily 31 tablet 98     levothyroxine (SYNTHROID/LEVOTHROID) 112 MCG tablet Take 1 tablet (112 mcg) by mouth daily 31 tablet 98     clopidogrel (PLAVIX) 75 MG tablet Take 1 tablet (75 mg) by mouth daily 31 tablet 98     atorvastatin (LIPITOR) 20 MG tablet Take 1 tablet (20 mg) by mouth daily 31 tablet 98     bisacodyl (DULCOLAX) 10 MG Suppository Place 10 mg rectally daily as needed for constipation       polyethylene glycol (MIRALAX/GLYCOLAX) powder Take 17 g by mouth daily as needed for constipation       Menthol, Topical Analgesic, (BIOFREEZE) 4 % GEL Externally apply topically 4 times daily as needed       ACETAMINOPHEN PO Take 1,000 mg by mouth 3 times daily       miconazole (MICATIN) 2 % AERP powder Apply topically 3 times daily as needed       Medications reviewed:  Medications reconciled to facility chart and changes were made to reflect current medications as identified as above med list. Below are the changes that were made:   Medications stopped since last EPIC medication reconciliation:   Medications Discontinued During This Encounter   Medication Reason     ciprofloxacin (CIPRO) 250 MG tablet Medication Reconciliation Clean Up       Medications started since last [facility] medication reconciliation:  Orders Placed This Encounter   Medications     Coloplast barrier cream CREA     Sig: Apply topically 4 times daily as needed         REVIEW OF SYSTEMS:  4 point ROS is limited due to cognitive impairment, but today patient reports pain to pressure wound on right lateral ankle, otherwise reports that she is feeling well.     Physical Exam:  /84  Pulse 72  Temp 97.2  F (36.2  C)  Resp 16  SpO2 99%  GENERAL APPEARANCE:  Alert, in no distress, cooperative  RESP:  respiratory effort and palpation of chest normal, lungs clear to " auscultation , no respiratory distress  CV:  Palpation and auscultation of heart done , no edema, +2 pedal pulses, irregular rhythm irregular, rate-normal  ABDOMEN:  normal bowel sounds, soft, nontender, no hepatosplenomegaly or other masses, no guarding or rebound  M/S:   Gait and station abnormal primarily WC bound, RUE 4/5, LUE 5/5, no gross joint deformities  SKIN:  Inspection of skin and subcutaneous tissue baseline, Palpation of skin and subcutaneous tissue baseline, wound healing well, no signs of infection pressure wound to right lateral ankles, scabbed, no erythema or drainage, tender to touch  NEURO:   Cranial nerves 2-12 are normal tested and grossly at patient's baseline, Examination of sensation by touch normal  PSYCH:  normal insight, judgement and memory, insight and judgement impaired, affect and mood normal    Recent Labs:     CBC RESULTS:   Recent Labs   Lab Test  03/19/18   0658 03/18/18   0715   WBC  3.2*  4.0   RBC  3.51*  3.47*   HGB  10.6*  10.6*   HCT  32.6*  32.7*   MCV  93  94   MCH  30.2  30.5   MCHC  32.5  32.4   RDW  13.6  14.0   PLT  168  162       Last Basic Metabolic Panel:  Recent Labs   Lab Test  03/19/18   0658 03/18/18 2003 03/18/18   0715   NA  142   --   143   POTASSIUM  3.7  3.9  3.1*   CHLORIDE  110*   --   112*   RUBIN  8.1*   --   7.7*   CO2  25   --   24   BUN  10   --   16   CR  0.91   --   0.88   GLC  101*   --   106*       Liver Function Studies -   Recent Labs   Lab Test  07/24/17   1425  10/25/12   0830  08/24/11   PROTTOTAL  6.8   --    --   6.9   ALBUMIN  3.8   --    --   4.2   BILITOTAL  0.5   --    --   1.4*   ALKPHOS  69   --    --   55   AST  16  26   < >  25   ALT  18  22   < >  16   BILIDIRECT   --    --    --   0.1    < > = values in this interval not displayed.       TSH   Date Value Ref Range Status   03/17/2018 0.74 0.40 - 4.00 mU/L Final   03/08/2018 1.66 0.40 - 4.00 mU/L Final   ]    Lab Results   Component Value Date    A1C 6.3 03/17/2018    A1C 5.9  08/21/2012         Assessment/Plan:  (G30.1,  F02.80) Late onset Alzheimer's disease without behavioral disturbance  (primary encounter diagnosis)  (F41.9) Anxiety  (G47.01) Insomnia due to medical condition  Comment: Suspect anxiety r/t progression of dementia, poor sleep  Plan: traZODone (DESYREL) 50 MG tablet - 1/2 tab qhs, continue lexapro, monitor mental status/sleep, BMP in 1 month,     Plan was discussed via phone with daughter Leeanna who was agreeable.     Orders:  1. Trazodone 25mg PO qhs DX: anxiety, insomnia  2. BMP in 1 month DX: Afib, medication monitoring        Electronically signed by  Bill Lowe

## 2018-05-03 ENCOUNTER — HOSPITAL LABORATORY (OUTPATIENT)
Facility: OTHER | Age: 83
End: 2018-05-03

## 2018-05-03 LAB
ANION GAP SERPL CALCULATED.3IONS-SCNC: 2 MMOL/L (ref 3–14)
BUN SERPL-MCNC: 11 MG/DL (ref 7–30)
CALCIUM SERPL-MCNC: 9.1 MG/DL (ref 8.5–10.1)
CHLORIDE SERPL-SCNC: 107 MMOL/L (ref 94–109)
CO2 SERPL-SCNC: 31 MMOL/L (ref 20–32)
CREAT SERPL-MCNC: 1.03 MG/DL (ref 0.52–1.04)
GFR SERPL CREATININE-BSD FRML MDRD: 51 ML/MIN/1.7M2
GLUCOSE SERPL-MCNC: 109 MG/DL (ref 70–99)
POTASSIUM SERPL-SCNC: 4.2 MMOL/L (ref 3.4–5.3)
SODIUM SERPL-SCNC: 140 MMOL/L (ref 133–144)

## 2018-05-24 DIAGNOSIS — I48.91 ATRIAL FIBRILLATION WITH RAPID VENTRICULAR RESPONSE (H): ICD-10-CM

## 2018-05-25 RX ORDER — DILTIAZEM HYDROCHLORIDE 240 MG/1
240 CAPSULE, COATED, EXTENDED RELEASE ORAL DAILY
Qty: 31 CAPSULE | Refills: 98 | Status: SHIPPED | OUTPATIENT
Start: 2018-05-25 | End: 2019-05-30

## 2018-06-29 DIAGNOSIS — G47.01 INSOMNIA DUE TO MEDICAL CONDITION: ICD-10-CM

## 2018-06-29 DIAGNOSIS — F41.9 ANXIETY: ICD-10-CM

## 2018-06-29 RX ORDER — TRAZODONE HYDROCHLORIDE 50 MG/1
25 TABLET, FILM COATED ORAL DAILY
Qty: 16 TABLET | Refills: 98 | Status: SHIPPED | OUTPATIENT
Start: 2018-06-29 | End: 2019-03-06

## 2018-07-05 ENCOUNTER — PATIENT OUTREACH (OUTPATIENT)
Dept: GERIATRIC MEDICINE | Facility: CLINIC | Age: 83
End: 2018-07-05

## 2018-07-05 NOTE — PROGRESS NOTES
Communication History     User: Yennifer Hernandez, [facility] Date/time: 7/5/2018 11:30 AM    Context:  Outcome:     Phone number:  Phone Type:     Comm. type: Telephone Call type: Outgoing    Contact: [employer] financial worker  Relation to patient: Provider        Daenne Zayas has come up inactive.  CC was sent notice the member MA was inactive.  CC reached out to [employer] financial worker and she stated that member was due for renewal and it was found that she has a life insurance policy that was not accounted for.  Betzaida and family are working on this so at this time she is closed until this is spent down.    Bet[employer]ida stated this should be taken care of and she does not feel there will be a lapse at this time.      CC stated that she was due for annual visit for her EW this month so CC will plan to see her and then will f/u with her to see when this will be reopened.      Yennifer Hernandez MA [facility] Care Coordinator   [phone]

## 2018-07-17 ENCOUNTER — ASSISTED LIVING VISIT (OUTPATIENT)
Dept: GERIATRICS | Facility: CLINIC | Age: 83
End: 2018-07-17
Payer: COMMERCIAL

## 2018-07-17 VITALS
TEMPERATURE: 98.7 F | SYSTOLIC BLOOD PRESSURE: 132 MMHG | DIASTOLIC BLOOD PRESSURE: 83 MMHG | RESPIRATION RATE: 16 BRPM | OXYGEN SATURATION: 94 % | HEART RATE: 78 BPM | BODY MASS INDEX: 28.75 KG/M2 | WEIGHT: 178.1 LBS

## 2018-07-17 DIAGNOSIS — K59.00 CONSTIPATION, UNSPECIFIED CONSTIPATION TYPE: ICD-10-CM

## 2018-07-17 DIAGNOSIS — I10 BENIGN ESSENTIAL HYPERTENSION: ICD-10-CM

## 2018-07-17 DIAGNOSIS — I69.359 HEMIPLEGIA AFFECTING DOMINANT SIDE, POST-STROKE (H): Primary | ICD-10-CM

## 2018-07-17 NOTE — PROGRESS NOTES
[facility]    Chief Complaint   Patient presents with     DAX       [facility] Medical Record Number:  [id number]    HPI:    Deanne Zayas is a 85 year old  (6/19/1933), who is being [facility] today [facility]  HPI information obtained from: facility chart records, facility staff, patient report and [facility] chart review.    Today's concern is:  Hemiplegia affecting dominant side, post-stroke (H)  She reports mild pain in RLE, which she has had since her CVA in 2017. She does have some weakness in RUE, remains primarily WC bound, but able to ambulate short distances, transfer with SBA. She is able to feed self with her right hand.     Constipation, unspecified constipation type  Patient reports she is having loose stools, is concerned about this. Staff report they are often loose, that she has 2-3 stools daily. She is on senna-s 1 tab qhs. Does have a history of constipation.     Benign essential hypertension  She is on diltiazem 240mg daily, lisinopril 5mg daily. Staff have not noted any hypo or hypertension recently. Patient reports that she is feeling well.       ALLERGIES: Donepezil  Past Medical, Surgical, Family and Social History reviewed and updated in [facility].    Current Outpatient Prescriptions   Medication Sig Dispense Refill     ACETAMINOPHEN PO Take 1,000 mg by mouth 3 times daily       atorvastatin (LIPITOR) 20 MG tablet Take 1 tablet (20 mg) by mouth daily 31 tablet 98     bisacodyl (DULCOLAX) 10 MG Suppository Place 10 mg rectally daily as needed for constipation       cholecalciferol (VITAMIN D3) 1000 UNIT tablet Take 2 tablets (2,000 Units) by mouth daily 62 tablet 98     clopidogrel (PLAVIX) 75 MG tablet Take 1 tablet (75 mg) by mouth daily 31 tablet 98     Coloplast barrier cream CREA Apply topically 4 times daily as needed       cyanocobalamin (VITAMIN B12) 1000 MCG/ML injection Inject 1 mL (1,000 mcg) Subcutaneous every 30 days 1  "mL 98     diltiazem 240 MG 24 hr capsule Take 1 capsule (240 mg) by mouth daily 31 capsule 98     escitalopram (LEXAPRO) 10 MG tablet Take 1 tablet (10 mg) by mouth daily 31 tablet 98     ferrous sulfate (IRON) 325 (65 FE) MG tablet Take 1 tablet (325 mg) by mouth daily 31 tablet PRN     levothyroxine (SYNTHROID/LEVOTHROID) 112 MCG tablet Take 1 tablet (112 mcg) by mouth daily 31 tablet 98     lisinopril (PRINIVIL/ZESTRIL) 5 MG tablet Take 1 tablet (5 mg) by mouth daily 90 tablet 3     Menthol, Topical Analgesic, (BIOFREEZE) 4 % GEL Externally apply topically 4 times daily as needed       miconazole (MICATIN) 2 % AERP powder Apply topically 3 times daily as needed       polyethylene glycol (MIRALAX/GLYCOLAX) powder Take 17 g by mouth daily as needed for constipation       senna-docusate (SENNA S) 8.6-50 MG per tablet Take 1 tablet by mouth daily 31 tablet PRN     syringe/needle, disp, 25G X 1\" 3 ML MISC USE AS DIRECTED WITH B12 INECTION 1 each 98     traZODone (DESYREL) 50 MG tablet Take 0.5 tablets (25 mg) by mouth daily 16 tablet 98     Medications reviewed:  Medications reconciled to facility chart and changes were made to reflect current medications as identified as above med list. Below are the changes that were made:   Medications stopped since last EPIC medication reconciliation:   There are no discontinued medications.    Medications started since last [facility] medication reconciliation:  No orders of the defined types were placed in this encounter.        REVIEW OF SYSTEMS:  4 point ROS including Respiratory, CV, GI and , other than that noted in the HPI,  is negative, but is poor historian    Physical Exam:  /83  Pulse 78  Temp 98.7  F (37.1  C)  Resp 16  Wt 178 lb 1.6 oz (80.8 kg)  SpO2 94%  BMI 28.75 kg/m2  GENERAL APPEARANCE:  Alert, in no distress, cooperative  RESP:  respiratory effort and palpation of chest normal, lungs clear to auscultation , no respiratory distress  CV:  Palpation and " auscultation of heart done , regular rate and rhythm, no murmur, rub, or gallop, no edema, +2 pedal pulses  ABDOMEN:  normal bowel sounds, soft, nontender, no hepatosplenomegaly or other masses, no guarding or rebound  M/S:   no gross deformities, no joint tenderness  SKIN:  wound healing well, no signs of infection small area of dry skin to  right lateal ankle (previous pressure sore)  NEURO:   Cranial nerves 2-12 are normal tested and grossly at patient's baseline  PSYCH:  oriented to self, place, insight and judgement impaired, memory impaired , affect and mood normal    Recent Labs:     CBC RESULTS:   Recent Labs   Lab Test  03/19/18   0658  03/18/18   0715   WBC  3.2*  4.0   RBC  3.51*  3.47*   HGB  10.6*  10.6*   HCT  32.6*  32.7*   MCV  93  94   MCH  30.2  30.5   MCHC  32.5  32.4   RDW  13.6  14.0   PLT  168  162       Last Basic Metabolic Panel:  Recent Labs   Lab Test  05/03/18   0810  03/19/18   0658   NA  140  142   POTASSIUM  4.2  3.7   CHLORIDE  107  110*   RUBIN  9.1  8.1*   CO2  31  25   BUN  11  10   CR  1.03  0.91   GLC  109*  101*       Liver Function Studies -   Recent Labs   Lab Test  07/24/17   1425  10/25/12   0830  08/24/11   PROTTOTAL  6.8   --    --   6.9   ALBUMIN  3.8   --    --   4.2   BILITOTAL  0.5   --    --   1.4*   ALKPHOS  69   --    --   55   AST  16  26   < >  25   ALT  18  22   < >  16   BILIDIRECT   --    --    --   0.1    < > = values in this interval not displayed.       TSH   Date Value Ref Range Status   03/17/2018 0.74 0.40 - 4.00 mU/L Final   03/08/2018 1.66 0.40 - 4.00 mU/L Final       Lab Results   Component Value Date    A1C 6.3 03/17/2018    A1C 5.9 08/21/2012         Assessment/Plan:  (I69.359) Hemiplegia affecting dominant side, post-stroke (H)  (primary encounter diagnosis)  Comment: neurologically stable, appropriate for memory care unit  Plan: continue plavix, cares per VINNIE    (K59.00) Constipation, unspecified constipation type  Comment: at baseline per staff, is  on iron supplement and limited mobility which can contribute to constipation  Plan: continue senna, PRN miralax, monitor    (I10) Benign essential hypertension  Comment: controlled, asymptomatic  Plan: continue diltiazem and lisinopril, monitor.     Orders:  1. No new orders        Electronically signed by  YASMINE Tan CNP

## 2018-07-20 ENCOUNTER — PATIENT OUTREACH (OUTPATIENT)
Dept: GERIATRIC MEDICINE | Facility: CLINIC | Age: 83
End: 2018-07-20

## 2018-07-31 ENCOUNTER — PATIENT OUTREACH (OUTPATIENT)
Dept: GERIATRIC MEDICINE | Facility: CLINIC | Age: 83
End: 2018-07-31

## 2018-08-08 ASSESSMENT — ACTIVITIES OF DAILY LIVING (ADL)
DEPENDENT_IADLS:: CLEANING;COOKING;LAUNDRY;SHOPPING;MEAL PREPARATION;MEDICATION MANAGEMENT;MONEY MANAGEMENT;TRANSPORTATION

## 2018-08-08 NOTE — PROGRESS NOTES
[facility] Care Coordination Contact    [facility] Home Visit Assessment     Home visit for Health Risk Assessment with Deanne Zayas completed on July31, 2018    Type of residence: Assisted living  Current living arrangement: I live in assisted living     Assessment completed with: Patient, Children    Current Care Plan  Member currently receiving the following home care services:   Member received 24/7 customized living services the [facility] on     Member currently receiving the following community resources:    Member received [employer] and incontinent products      Medication Review  Medication reconciliation completed in Epic: If no, please explain member is seen by FVP NP at [facility]  Medication set-up & administration: RN sets up  weekly.  Assisting Living staff administers medications.  Medication understanding concerns (by member, family or CC): No  Medication adherence concerns (by member, family or CC): No    Mental/Behavioral Health   Depression Screening: CC did not assess due to cognitive concerns for member.     Mental health DX:: Yes   Mental health DX how managed:: Medication (Anxiety )  No current  services    Falls Assessment:   Fallen 2 or more times in the past year?: Yes   Any fall with injury in the past year?: No    ADL/IADL Dependencies:   Dependent ADLs:: Wheelchair-with assist, Bathing, Dressing, Eating, Grooming, Positioning, Transfers, Toileting  Dependent IADLs:: Cleaning, Cooking, Laundry, Shopping, Meal Preparation, Medication Management, Money Management, Transportation    [facility] sponsored benefits: Shared information re: Silver Sneakers/gym memberships, ASA, Calcium +D.    PCA Assessment completed at visit: Not applicable     Elderly Waiver Eligibility: Yes-will continue on EW    Care Plan & Recommendations: Member daughter did request that products be sent to [facility] instead of her home so CC will request this.     See CC for detailed assessment  information.    Follow-Up Plan: Member informed of future contact, plan to f/u with member with a 6 month telephone assessment.  Contact information shared with member and family, encouraged member to call with any questions or concerns at any time.    [facility] care continuum providers: Please refer to Health Care Home on the Epic Problem List to view this patient's [facility] Care Plan Summary.  Yennifer AHMADI   [facility] Care Coordinator   [phone]

## 2018-08-10 ENCOUNTER — PATIENT OUTREACH (OUTPATIENT)
Dept: GERIATRIC MEDICINE | Facility: CLINIC | Age: 83
End: 2018-08-10

## 2018-08-10 NOTE — PROGRESS NOTES
[facility] Care Coordination Contact  Received after visit chart from care coordinator.  Completed following tasks: Mailed copy of care plan to client, Updated services in access, Submitted referrals/auths for [facility] and Entered MMIS   Provider Signature - Full Care Plan:  Member indicates that they would like their POC shared with the following [facility] providers:  ANSON Casillas.  Letter and full POC faxed to providers for signature.  Chart was returned to CC.   Mailed copy of CL tool to member, faxed copy to [facility] facility, uploaded into [employer] and submitted authorization to health plan.  Opal Em  Case Management Specialist  [facility]  [phone]

## 2018-08-10 NOTE — LETTER
August 10, 2018    DEANNE KING  C[address]O K[address]IE ALEJANDRO  [id number] [facility] [address]    Dear Deanne,     At [facility], we re dedicated to improving the health and well-being of our members.  Enclosed you will find the Comprehensive Care Plan that was developed with you on 7/31/2018. Please review the Care Plan carefully.    As a reminder, some of the things we discussed at your visit include:    Ways to improve or maintain your physical health such as walking 20 minutes a day.     Ways to reduce the risk of falls.     Health care needs you may have.     Don t forget to contact your care coordinator if you:    Have been hospitalized or plan to be hospitalized.     Have experienced a fall.      Have experienced a change in physical health, which may include bladder control and pain issues.      Are experiencing emotional problems.     If you do not agree with your Care Plan, have questions about it, or have experienced a change in your needs, please contact me, your care coordinator, at [phone]. If you are hearing impaired, please call the [place] [employer] at 179 or [phone] (zaxqnv-mi-zrihru relay service).    Sincerely,      Yennifer Hernandez MA, [facility]     [facility] is a health plan that contracts with both Medicare and the [place] Medical Assistance (Medicaid) program to provide benefits of both programs to enrollees. Enrollment in [facility] depends on contract renewal.    MSC+ [id number] [phone] ([id number])                                                  (12/16)

## 2018-09-04 DIAGNOSIS — R52 PAIN: ICD-10-CM

## 2018-09-04 DIAGNOSIS — R21 RASH AND NONSPECIFIC SKIN ERUPTION: ICD-10-CM

## 2018-09-04 DIAGNOSIS — K59.01 SLOW TRANSIT CONSTIPATION: Primary | ICD-10-CM

## 2018-09-07 RX ORDER — MICONAZOLE NITRATE 20 MG/G
POWDER TOPICAL
Qty: 85 G | Refills: 97 | Status: SHIPPED | OUTPATIENT
Start: 2018-09-07 | End: 2019-01-01

## 2018-09-07 RX ORDER — POLYETHYLENE GLYCOL 3350 17 G/17G
POWDER, FOR SOLUTION ORAL
Qty: 527 G | Refills: 97 | Status: SHIPPED | OUTPATIENT
Start: 2018-09-07 | End: 2019-01-01

## 2018-09-11 ENCOUNTER — ASSISTED LIVING VISIT (OUTPATIENT)
Dept: GERIATRICS | Facility: CLINIC | Age: 83
End: 2018-09-11
Payer: COMMERCIAL

## 2018-09-11 VITALS
SYSTOLIC BLOOD PRESSURE: 161 MMHG | DIASTOLIC BLOOD PRESSURE: 84 MMHG | OXYGEN SATURATION: 95 % | TEMPERATURE: 98.7 F | HEART RATE: 69 BPM | RESPIRATION RATE: 14 BRPM

## 2018-09-11 DIAGNOSIS — I69.359 HEMIPLEGIA AFFECTING DOMINANT SIDE, POST-STROKE (H): ICD-10-CM

## 2018-09-11 DIAGNOSIS — M62.81 GENERALIZED MUSCLE WEAKNESS: ICD-10-CM

## 2018-09-11 DIAGNOSIS — M25.511 ACUTE PAIN OF BOTH SHOULDERS: Primary | ICD-10-CM

## 2018-09-11 DIAGNOSIS — M25.512 ACUTE PAIN OF BOTH SHOULDERS: Primary | ICD-10-CM

## 2018-09-11 NOTE — PROGRESS NOTES
"[facility]    Chief Complaint   Patient presents with     DAX       [facility] Medical Record Number:  [id number]    HPI:    Deanne Zayas is a 85 year old  (6/19/1933), who is being seen today for an episodic care visit at [facility].  HPI information obtained from: facility chart records, facility staff, patient report and [facility] chart review.    Today's concern is:  Acute pain of both shoulders  Generalized muscle weakness  Hemiplegia affecting dominant side, post-stroke (H)  Staff report patient has not gotten out of bed for the past two days, has been c/o shoulder pain t/o the weekend. Spoke with son Gutierrez who stated she been c/o of arm pain since his visit last weekend. Staff did give tylenol earlier with little improvement. She is a poor historian d/t dementia, does have history of CVA with right sided weakness. Reports she does not feel well, feels \"achy,\" unable to verbalize any complaints beyond this. Has chronic loose stools, but has been baseline per staff. No abdominal pain, no SOB, denies chest pain. M[place], but limited ability lift left arm d/t pain. Reports pain with touch to both shoulder joints, Right>left. She has been eating and drinking per usual. Afebrile on exam today. No pain or discomfort with urination.       ALLERGIES: Donepezil  Past Medical, Surgical, Family and Social History [facility].    Current Outpatient Prescriptions   Medication Sig Dispense Refill     atorvastatin (LIPITOR) 20 MG tablet Take 1 tablet (20 mg) by mouth daily 31 tablet 98     BISAC-EVAC 10 MG Suppository UNWRAP AND INSERT ONE SUPPOSITORY RECTALLY EVERY 3 DAYS AS NEEDED IF NO BM 10 suppository 3     cholecalciferol (VITAMIN D3) 1000 UNIT tablet Take 2 tablets (2,000 Units) by mouth daily 62 tablet 98     clopidogrel (PLAVIX) 75 MG tablet Take 1 tablet (75 mg) by mouth daily 31 tablet 98     Coloplast barrier cream CREA Apply topically 4 times daily as needed  " "     cyanocobalamin (VITAMIN B12) 1000 MCG/ML injection Inject 1 mL (1,000 mcg) Subcutaneous every 30 days 1 mL 98     diltiazem 240 MG 24 hr capsule Take 1 capsule (240 mg) by mouth daily 31 capsule 98     escitalopram (LEXAPRO) 10 MG tablet Take 1 tablet (10 mg) by mouth daily 31 tablet 98     ferrous sulfate (IRON) 325 (65 FE) MG tablet Take 1 tablet (325 mg) by mouth daily 31 tablet PRN     levothyroxine (SYNTHROID/LEVOTHROID) 112 MCG tablet Take 1 tablet (112 mcg) by mouth daily 31 tablet 98     lisinopril (PRINIVIL/ZESTRIL) 5 MG tablet Take 1 tablet (5 mg) by mouth daily 90 tablet 3     MAPAP 500 MG tablet TAKE TWO TABLETS (1000MG) BY MOUTH THREE TIMES DAILY AS NEEDED FOR PAIN 90 tablet 11     Menthol, Topical Analgesic, (BIOFREEZE) 4 % GEL Externally apply topically 4 times daily as needed       miconazole (MICATIN) 2 % AERP powder Apply topically 3 times daily as needed       MICRO GUARD 2 % powder APPLY TOPICALLY TO VAGINA THREE TIMES DAILY AS NEEDED 85 g 97     polyethylene glycol (MIRALAX/GLYCOLAX) powder MIX 17GM OF POWDER IN 8OZ OF WATER UNTIL COMPLETELY DISSOLVED. DRINK SOLUTION BY MOUTH ONCE DAILY AS NEEDED FOR CONSTIPATION 527 g 97     senna-docusate (SENNA S) 8.6-50 MG per tablet Take 1 tablet by mouth daily 31 tablet PRN     syringe/needle, disp, 25G X 1\" 3 ML MISC USE AS DIRECTED WITH B12 INECTION 1 each 98     traZODone (DESYREL) 50 MG tablet Take 0.5 tablets (25 mg) by mouth daily 16 tablet 98     Medications reviewed:  Medications reconciled to facility chart and changes were made to reflect current medications as identified as above med list. Below are the changes that were made:   Medications stopped since last EPIC medication reconciliation:   There are no discontinued medications.    Medications started since last [facility] medication reconciliation:  No orders of the defined types were placed in this encounter.      REVIEW OF SYSTEMS:  4 point ROS including Respiratory, CV, GI and , other " than that noted in the HPI,  is negative but is limited d/t cognitive impairment.    Physical Exam:  /84  Pulse 69  Temp 98.7  F (37.1  C)  Resp 14  SpO2 95%  GENERAL APPEARANCE:  Alert, in mild distress due to pain, cooperative  ENT:  Mouth and posterior oropharynx normal, moist mucous membranes, [place]  EYES:  EOM, conjunctivae, lids, pupils and irises normal, PERRL  RESP:  respiratory effort and palpation of chest normal, lungs clear to auscultation , no respiratory distress, no cough noted  CV:  Palpation and auscultation of heart done , regular rate and rhythm, no murmur, rub, or gallop, no edema  ABDOMEN:  normal bowel sounds, soft, nontender, no hepatosplenomegaly or other masses, round, no guarding or rebound  M/S:   No knee or hip tenderness of deformities, no gross to deformities to BLE shoulders or elbows, decreased ROM, pain with movemet of bilat shoulders/arm pain >left then right, mild tenderness left shoulder, moderate tenderness right shoulder.   SKIN:  Inspection of skin and subcutaneous tissue baseline  NEURO:   Cranial nerves 2-12 are normal tested and grossly at patient's baseline  PSYCH:  oriented to self, place, insight and judgement impaired, memory impaired , affect and mood normal    Recent Labs:     CBC RESULTS:   Recent Labs   Lab Test  03/19/18   0658  03/18/18   0715   WBC  3.2*  4.0   RBC  3.51*  3.47*   HGB  10.6*  10.6*   HCT  32.6*  32.7*   MCV  93  94   MCH  30.2  30.5   MCHC  32.5  32.4   RDW  13.6  14.0   PLT  168  162       Last Basic Metabolic Panel:  Recent Labs   Lab Test  05/03/18   0810  03/19/18   0658   NA  140  142   POTASSIUM  4.2  3.7   CHLORIDE  107  110*   RUBIN  9.1  8.1*   CO2  31  25   BUN  11  10   CR  1.03  0.91   GLC  109*  101*       Liver Function Studies -   Recent Labs   Lab Test  07/24/17   1425  10/25/12   0830  08/24/11   PROTTOTAL  6.8   --    --   6.9   ALBUMIN  3.8   --    --   4.2   BILITOTAL  0.5   --    --   1.4*   ALKPHOS  69   --    --   55    AST  16  26   < >  25   ALT  18  22   < >  16   BILIDIRECT   --    --    --   0.1    < > = values in this interval not displayed.       TSH   Date Value Ref Range Status   03/17/2018 0.74 0.40 - 4.00 mU/L Final   03/08/2018 1.66 0.40 - 4.00 mU/L Final       Lab Results   Component Value Date    A1C 6.3 03/17/2018    A1C 5.9 08/21/2012         Assessment/Plan:  (M25.511,  M25.512) Acute pain of both shoulders  (primary encounter diagnosis)  (M62.81) Generalized muscle weakness  (I69.359) Hemiplegia affecting dominant side, post-stroke (H)  Comment: unclear etiology: virus vs musculoskeletal, but given shoulder pain could be referred cardiac pain, has previously had gall bladder removed, but could be other pain referred from abdominal. Of note she is on lipitor d/t history of CVA, so could by myalgia related to this.   Plan: EKG today, labs as below, increase scheduled APAP, encourage staff to use PRN Biofreeze,     Update: EKG completed, shows NSR, not concerning for ischemia.     Discussed with son Gutierrez via phone, he was agreeable to the above plan of care.     Orders:  1. EKG ASAP DX: shoulder pain, Afib  2. If lab able to please draw cbc with diff. CPK and CMP on 9/12 if unable to draw please draw on 9/13 Dx: Shoulder Pain, loose stools, elevated lipids  3. DC PRN tylenol, start tylenol 1000 mg PO every 8 hours PRN Dx: Pain    Electronically signed by  YASMINE Tan CNP

## 2018-09-13 ENCOUNTER — HOSPITAL LABORATORY (OUTPATIENT)
Facility: OTHER | Age: 83
End: 2018-09-13

## 2018-09-13 LAB
ALBUMIN SERPL-MCNC: 3.8 G/DL (ref 3.4–5)
ALP SERPL-CCNC: 80 U/L (ref 40–150)
ALT SERPL W P-5'-P-CCNC: 16 U/L (ref 0–50)
ANION GAP SERPL CALCULATED.3IONS-SCNC: 5 MMOL/L (ref 3–14)
AST SERPL W P-5'-P-CCNC: 15 U/L (ref 0–45)
BASOPHILS # BLD AUTO: 0.1 10E9/L (ref 0–0.2)
BASOPHILS NFR BLD AUTO: 0.9 %
BILIRUB SERPL-MCNC: 0.5 MG/DL (ref 0.2–1.3)
BUN SERPL-MCNC: 18 MG/DL (ref 7–30)
CALCIUM SERPL-MCNC: 9.4 MG/DL (ref 8.5–10.1)
CHLORIDE SERPL-SCNC: 107 MMOL/L (ref 94–109)
CK SERPL-CCNC: 55 U/L (ref 30–225)
CO2 SERPL-SCNC: 30 MMOL/L (ref 20–32)
CREAT SERPL-MCNC: 1.07 MG/DL (ref 0.52–1.04)
DIFFERENTIAL METHOD BLD: NORMAL
EOSINOPHIL # BLD AUTO: 0.3 10E9/L (ref 0–0.7)
EOSINOPHIL NFR BLD AUTO: 4.7 %
ERYTHROCYTE [DISTWIDTH] IN BLOOD BY AUTOMATED COUNT: 13.2 % (ref 10–15)
GFR SERPL CREATININE-BSD FRML MDRD: 49 ML/MIN/1.7M2
GLUCOSE SERPL-MCNC: 178 MG/DL (ref 70–99)
HCT VFR BLD AUTO: 39 % (ref 35–47)
HGB BLD-MCNC: 12.5 G/DL (ref 11.7–15.7)
IMM GRANULOCYTES # BLD: 0 10E9/L (ref 0–0.4)
IMM GRANULOCYTES NFR BLD: 0.4 %
LYMPHOCYTES # BLD AUTO: 1 10E9/L (ref 0.8–5.3)
LYMPHOCYTES NFR BLD AUTO: 17.4 %
MCH RBC QN AUTO: 30.9 PG (ref 26.5–33)
MCHC RBC AUTO-ENTMCNC: 32.1 G/DL (ref 31.5–36.5)
MCV RBC AUTO: 97 FL (ref 78–100)
MONOCYTES # BLD AUTO: 0.5 10E9/L (ref 0–1.3)
MONOCYTES NFR BLD AUTO: 8.1 %
NEUTROPHILS # BLD AUTO: 3.9 10E9/L (ref 1.6–8.3)
NEUTROPHILS NFR BLD AUTO: 68.5 %
NRBC # BLD AUTO: 0 10*3/UL
NRBC BLD AUTO-RTO: 0 /100
PLATELET # BLD AUTO: 235 10E9/L (ref 150–450)
POTASSIUM SERPL-SCNC: 3.6 MMOL/L (ref 3.4–5.3)
PROT SERPL-MCNC: 7.2 G/DL (ref 6.8–8.8)
RBC # BLD AUTO: 4.04 10E12/L (ref 3.8–5.2)
SODIUM SERPL-SCNC: 142 MMOL/L (ref 133–144)
WBC # BLD AUTO: 5.7 10E9/L (ref 4–11)

## 2018-10-16 ENCOUNTER — ASSISTED LIVING VISIT (OUTPATIENT)
Dept: GERIATRICS | Facility: CLINIC | Age: 83
End: 2018-10-16
Payer: COMMERCIAL

## 2018-10-16 VITALS
DIASTOLIC BLOOD PRESSURE: 78 MMHG | BODY MASS INDEX: 29.25 KG/M2 | HEART RATE: 75 BPM | TEMPERATURE: 98.4 F | RESPIRATION RATE: 20 BRPM | OXYGEN SATURATION: 98 % | WEIGHT: 181.2 LBS | SYSTOLIC BLOOD PRESSURE: 127 MMHG

## 2018-10-16 DIAGNOSIS — M15.9 OSTEOARTHRITIS OF MULTIPLE JOINTS, UNSPECIFIED OSTEOARTHRITIS TYPE: Primary | ICD-10-CM

## 2018-10-16 DIAGNOSIS — R52 PAIN: ICD-10-CM

## 2018-10-16 DIAGNOSIS — M62.81 GENERALIZED MUSCLE WEAKNESS: ICD-10-CM

## 2018-10-16 RX ORDER — ACETAMINOPHEN 500 MG
1000 TABLET ORAL EVERY 8 HOURS
Qty: 90 TABLET | Refills: 11 | Status: SHIPPED | OUTPATIENT
Start: 2018-10-16 | End: 2019-03-03

## 2018-10-16 NOTE — PROGRESS NOTES
[facility]    Chief Complaint   Patient presents with     RECHECK       [facility] Medical Record Number:  [id number]  Place of Service where encounter took place:  [facility] [[id number]]    HPI:    Deanne Zayas is a 85 year old  (6/19/1933), who is being seen today for an episodic care visit.  HPI information obtained from: facility chart records, facility staff, patient report and [facility] chart review.    Today's concern is:  Osteoarthritis of multiple joints, unspecified osteoarthritis type  Generalized muscle weakness  Pain  Asked to see patient today as staff has noticed increased stiffness and reports of pain. They have also noticed increased difficulty with transfers d/t stiffness. Daughter Leeanna reports that she has also noted some pain and stiffness on her visits. Deanne reports pain and stiffness in her knees, difficulty lifting her arms d/t shoulder pain but is able to lift right arm fully with only minimal assist. She is currently on PRN APAP, but has rarely used.   - acetaminophen (MAPAP) 500 MG tablet; Take 2 tablets (1,000 mg) by mouth every 8 hours    ALLERGIES: Donepezil  Past Medical, Surgical, Family and Social History reviewed and [facility].    Current Outpatient Prescriptions   Medication Sig Dispense Refill     acetaminophen (MAPAP) 500 MG tablet Take 2 tablets (1,000 mg) by mouth every 8 hours 90 tablet 11     atorvastatin (LIPITOR) 20 MG tablet Take 1 tablet (20 mg) by mouth daily 31 tablet 98     BISAC-EVAC 10 MG Suppository UNWRAP AND INSERT ONE SUPPOSITORY RECTALLY EVERY 3 DAYS AS NEEDED IF NO BM 10 suppository 3     cholecalciferol (VITAMIN D3) 1000 UNIT tablet Take 2 tablets (2,000 Units) by mouth daily 62 tablet 98     clopidogrel (PLAVIX) 75 MG tablet Take 1 tablet (75 mg) by mouth daily 31 tablet 98     Coloplast barrier cream CREA Apply topically 4 times daily as needed       cyanocobalamin (VITAMIN B12) 1000 MCG/ML  "injection Inject 1 mL (1,000 mcg) Subcutaneous every 30 days 1 mL 98     diltiazem 240 MG 24 hr capsule Take 1 capsule (240 mg) by mouth daily 31 capsule 98     escitalopram (LEXAPRO) 10 MG tablet Take 1 tablet (10 mg) by mouth daily 31 tablet 98     ferrous sulfate (IRON) 325 (65 FE) MG tablet Take 1 tablet (325 mg) by mouth daily 31 tablet PRN     levothyroxine (SYNTHROID/LEVOTHROID) 112 MCG tablet Take 1 tablet (112 mcg) by mouth daily 31 tablet 98     lisinopril (PRINIVIL/ZESTRIL) 5 MG tablet Take 1 tablet (5 mg) by mouth daily 90 tablet 3     Menthol, Topical Analgesic, (BIOFREEZE) 4 % GEL Externally apply topically 4 times daily as needed       miconazole (MICATIN) 2 % AERP powder Apply topically 3 times daily as needed       MICRO GUARD 2 % powder APPLY TOPICALLY TO VAGINA THREE TIMES DAILY AS NEEDED 85 g 97     polyethylene glycol (MIRALAX/GLYCOLAX) powder MIX 17GM OF POWDER IN 8OZ OF WATER UNTIL COMPLETELY DISSOLVED. DRINK SOLUTION BY MOUTH ONCE DAILY AS NEEDED FOR CONSTIPATION 527 g 97     senna-docusate (SENNA S) 8.6-50 MG per tablet Take 1 tablet by mouth daily 31 tablet PRN     syringe/needle, disp, 25G X 1\" 3 ML MISC USE AS DIRECTED WITH B12 INECTION 1 each 98     traZODone (DESYREL) 50 MG tablet Take 0.5 tablets (25 mg) by mouth daily 16 tablet 98     Medications reviewed:  Medications reconciled to facility chart and changes were made to reflect current medications as identified as above med list. Below are the changes that were made:   Medications stopped since last EPIC medication reconciliation:   Medications Discontinued During This Encounter   Medication Reason     MAPAP 500 MG tablet Reorder       Medications started since last [facility] medication reconciliation:  Orders Placed This Encounter   Medications     acetaminophen (MAPAP) 500 MG tablet     Sig: Take 2 tablets (1,000 mg) by mouth every 8 hours     Dispense:  90 tablet     Refill:  11         REVIEW OF SYSTEMS:  Limited secondary to " cognitive impairment but today pt reports pain/stiffness in knee, good appetite, no chest pain or SOB, sleeping well    Physical Exam:  /78  Pulse 75  Temp 98.4  F (36.9  C)  Resp 20  Wt 181 lb 3.2 oz (82.2 kg)  SpO2 98%  BMI 29.25 kg/m2  GENERAL APPEARANCE:  Alert, in no distress, cooperative  RESP:  respiratory effort and palpation of chest normal, lungs clear to auscultation , no respiratory distress  CV:  Palpation and auscultation of heart done , regular rate and rhythm, no murmur, rub, or gallop, no edema, +2 pedal pulses  ABDOMEN:  normal bowel sounds, soft, nontender, no hepatosplenomegaly or other masses, no guarding or rebound  M/S:   Gait and station abnormal non-ambulatory, crepitus to bilat knees, decreased ROM to left shoulder, no gross deformities  NEURO:   Cranial nerves 2-12 are normal tested and grossly at patient's baseline  PSYCH:  oriented to self, place, insight and judgement impaired, memory impaired , affect and mood normal    Recent Labs:     CBC RESULTS:   Recent Labs   Lab Test  09/13/18   1223  03/19/18   0658   WBC  5.7  3.2*   RBC  4.04  3.51*   HGB  12.5  10.6*   HCT  39.0  32.6*   MCV  97  93   MCH  30.9  30.2   MCHC  32.1  32.5   RDW  13.2  13.6   PLT  235  168       Last Basic Metabolic Panel:  Recent Labs   Lab Test  09/13/18   1223  05/03/18   0810   NA  142  140   POTASSIUM  3.6  4.2   CHLORIDE  107  107   RUBIN  9.4  9.1   CO2  30  31   BUN  18  11   CR  1.07*  1.03   GLC  178*  109*       Liver Function Studies -   Recent Labs   Lab Test  09/13/18   1223  07/24/17   1425  08/24/11   PROTTOTAL  7.2  6.8   --   6.9   ALBUMIN  3.8  3.8   --   4.2   BILITOTAL  0.5  0.5   --   1.4*   ALKPHOS  80  69   --   55   AST  15  16   < >  25   ALT  16  18   < >  16   BILIDIRECT   --    --    --   0.1    < > = values in this interval not displayed.       TSH   Date Value Ref Range Status   03/17/2018 0.74 0.40 - 4.00 mU/L Final   03/08/2018 1.66 0.40 - 4.00 mU/L Final       Lab  Results   Component Value Date    A1C 6.3 03/17/2018    A1C 5.9 08/21/2012         Assessment/Plan:  (M15.9) Osteoarthritis of multiple joints, unspecified osteoarthritis type  (primary encounter diagnosis)  (M62.81) Generalized muscle weakness  (R52) Pain  Comment: suspect muscle pain is arthritis given stiffness, age, and crepitus on exam, affecting mobility, but progression of dementia also likely contributing to poor mobility.   Plan: acetaminophen (MAPAP) 500 MG tablet        Change APAP to scheduled, encourage activity as tolerated. Consider PT in the future if no improvement, monitor and adjust    Orders:  1. Discontinue PRN Tylenol   2. Tylenol 1000 mg PO Q8h Dx: Pain, arthritis    Discussed patient visit and above plan of care with daughter Mayra, she was agreeable, no further questions or concerns.       Electronically signed by  YASMINE Tan CNP

## 2018-10-19 DIAGNOSIS — F41.9 ANXIETY: ICD-10-CM

## 2018-10-19 DIAGNOSIS — I63.012 CEREBROVASCULAR ACCIDENT (CVA) DUE TO THROMBOSIS OF LEFT VERTEBRAL ARTERY (H): ICD-10-CM

## 2018-10-19 DIAGNOSIS — I63.312 CEREBROVASCULAR ACCIDENT (CVA) DUE TO THROMBOSIS OF LEFT MIDDLE CEREBRAL ARTERY (H): ICD-10-CM

## 2018-10-19 DIAGNOSIS — E03.8 OTHER SPECIFIED HYPOTHYROIDISM: ICD-10-CM

## 2018-10-19 RX ORDER — ESCITALOPRAM OXALATE 10 MG/1
TABLET ORAL
Qty: 31 TABLET | Refills: 98 | Status: SHIPPED | OUTPATIENT
Start: 2018-10-19 | End: 2019-03-03

## 2018-10-19 RX ORDER — LEVOTHYROXINE SODIUM 112 UG/1
TABLET ORAL
Qty: 31 TABLET | Refills: 98 | Status: SHIPPED | OUTPATIENT
Start: 2018-10-19 | End: 2019-08-09 | Stop reason: DRUGHIGH

## 2018-10-19 RX ORDER — ATORVASTATIN CALCIUM 20 MG/1
TABLET, FILM COATED ORAL
Qty: 31 TABLET | Refills: 98 | Status: SHIPPED | OUTPATIENT
Start: 2018-10-19 | End: 2020-01-01

## 2018-10-19 RX ORDER — CLOPIDOGREL BISULFATE 75 MG/1
TABLET ORAL
Qty: 31 TABLET | Refills: 98 | Status: SHIPPED | OUTPATIENT
Start: 2018-10-19 | End: 2019-03-06

## 2019-01-01 ENCOUNTER — HOSPITAL LABORATORY (OUTPATIENT)
Facility: OTHER | Age: 84
End: 2019-01-01

## 2019-01-01 ENCOUNTER — PATIENT OUTREACH (OUTPATIENT)
Dept: GERIATRIC MEDICINE | Facility: CLINIC | Age: 84
End: 2019-01-01

## 2019-01-01 ENCOUNTER — DOCUMENTATION ONLY (OUTPATIENT)
Dept: OTHER | Facility: CLINIC | Age: 84
End: 2019-01-01

## 2019-01-01 ENCOUNTER — ASSISTED LIVING VISIT (OUTPATIENT)
Dept: GERIATRICS | Facility: CLINIC | Age: 84
End: 2019-01-01
Payer: COMMERCIAL

## 2019-01-01 VITALS
RESPIRATION RATE: 16 BRPM | HEART RATE: 73 BPM | DIASTOLIC BLOOD PRESSURE: 81 MMHG | WEIGHT: 158 LBS | SYSTOLIC BLOOD PRESSURE: 154 MMHG | OXYGEN SATURATION: 96 % | TEMPERATURE: 98.4 F | BODY MASS INDEX: 26.29 KG/M2

## 2019-01-01 DIAGNOSIS — E87.6 HYPOKALEMIA: ICD-10-CM

## 2019-01-01 DIAGNOSIS — F02.80 LATE ONSET ALZHEIMER'S DISEASE WITHOUT BEHAVIORAL DISTURBANCE (H): Primary | ICD-10-CM

## 2019-01-01 DIAGNOSIS — R63.4 WEIGHT LOSS: ICD-10-CM

## 2019-01-01 DIAGNOSIS — Z71.89 ACP (ADVANCE CARE PLANNING): ICD-10-CM

## 2019-01-01 DIAGNOSIS — E03.4 HYPOTHYROIDISM DUE TO ACQUIRED ATROPHY OF THYROID: ICD-10-CM

## 2019-01-01 DIAGNOSIS — G30.1 LATE ONSET ALZHEIMER'S DISEASE WITHOUT BEHAVIORAL DISTURBANCE (H): Primary | ICD-10-CM

## 2019-01-01 DIAGNOSIS — G30.1 LATE ONSET ALZHEIMER'S DISEASE WITHOUT BEHAVIORAL DISTURBANCE (H): ICD-10-CM

## 2019-01-01 DIAGNOSIS — R63.0 ANOREXIA: Primary | ICD-10-CM

## 2019-01-01 DIAGNOSIS — K21.9 GASTROESOPHAGEAL REFLUX DISEASE, ESOPHAGITIS PRESENCE NOT SPECIFIED: ICD-10-CM

## 2019-01-01 DIAGNOSIS — R21 RASH AND NONSPECIFIC SKIN ERUPTION: ICD-10-CM

## 2019-01-01 DIAGNOSIS — F02.80 LATE ONSET ALZHEIMER'S DISEASE WITHOUT BEHAVIORAL DISTURBANCE (H): ICD-10-CM

## 2019-01-01 DIAGNOSIS — M15.8 OTHER OSTEOARTHRITIS INVOLVING MULTIPLE JOINTS: Primary | ICD-10-CM

## 2019-01-01 LAB
ALBUMIN SERPL-MCNC: 3.2 G/DL (ref 3.4–5)
ALP SERPL-CCNC: 83 U/L (ref 40–150)
ALT SERPL W P-5'-P-CCNC: 12 U/L (ref 0–50)
ANION GAP SERPL CALCULATED.3IONS-SCNC: 5 MMOL/L (ref 3–14)
AST SERPL W P-5'-P-CCNC: 18 U/L (ref 0–45)
BILIRUB SERPL-MCNC: 0.4 MG/DL (ref 0.2–1.3)
BUN SERPL-MCNC: 16 MG/DL (ref 7–30)
CALCIUM SERPL-MCNC: 9.1 MG/DL (ref 8.5–10.1)
CHLORIDE SERPL-SCNC: 110 MMOL/L (ref 94–109)
CO2 SERPL-SCNC: 28 MMOL/L (ref 20–32)
CREAT SERPL-MCNC: 0.75 MG/DL (ref 0.52–1.04)
ERYTHROCYTE [DISTWIDTH] IN BLOOD BY AUTOMATED COUNT: 12.8 % (ref 10–15)
GFR SERPL CREATININE-BSD FRML MDRD: 72 ML/MIN/{1.73_M2}
GLUCOSE SERPL-MCNC: 97 MG/DL (ref 70–99)
HCT VFR BLD AUTO: 37.7 % (ref 35–47)
HGB BLD-MCNC: 12.1 G/DL (ref 11.7–15.7)
MCH RBC QN AUTO: 32.6 PG (ref 26.5–33)
MCHC RBC AUTO-ENTMCNC: 32.1 G/DL (ref 31.5–36.5)
MCV RBC AUTO: 102 FL (ref 78–100)
PLATELET # BLD AUTO: 239 10E9/L (ref 150–450)
POTASSIUM SERPL-SCNC: 3 MMOL/L (ref 3.4–5.3)
POTASSIUM SERPL-SCNC: 3.4 MMOL/L (ref 3.4–5.3)
PROT SERPL-MCNC: 5.8 G/DL (ref 6.8–8.8)
RBC # BLD AUTO: 3.71 10E12/L (ref 3.8–5.2)
SODIUM SERPL-SCNC: 143 MMOL/L (ref 133–144)
TSH SERPL DL<=0.005 MIU/L-ACNC: 0.46 MU/L (ref 0.4–4)
WBC # BLD AUTO: 5.3 10E9/L (ref 4–11)

## 2019-01-01 RX ORDER — LEVOTHYROXINE SODIUM 100 UG/1
100 TABLET ORAL DAILY
Qty: 31 TABLET | Refills: 98 | Status: SHIPPED | OUTPATIENT
Start: 2019-01-01 | End: 2019-01-01 | Stop reason: DRUGHIGH

## 2019-01-01 RX ORDER — MICONAZOLE NITRATE 20 MG/G
POWDER TOPICAL
Qty: 85 G | Refills: 97 | Status: SHIPPED | OUTPATIENT
Start: 2019-01-01

## 2019-01-01 RX ORDER — FAMOTIDINE 20 MG/1
20 TABLET, FILM COATED ORAL AT BEDTIME
Qty: 30 TABLET | Refills: 11 | Status: SHIPPED | OUTPATIENT
Start: 2019-01-01 | End: 2020-01-01

## 2019-01-01 RX ORDER — POTASSIUM CHLORIDE 750 MG/1
10 TABLET, EXTENDED RELEASE ORAL DAILY
Qty: 30 TABLET | Refills: 11 | Status: SHIPPED | OUTPATIENT
Start: 2019-01-01 | End: 2020-01-01

## 2019-01-01 RX ORDER — LEVOTHYROXINE SODIUM 88 UG/1
88 TABLET ORAL DAILY
Qty: 30 TABLET | Refills: 11 | Status: SHIPPED | OUTPATIENT
Start: 2019-01-01 | End: 2020-01-01

## 2019-01-04 DIAGNOSIS — K59.00 CONSTIPATION, UNSPECIFIED CONSTIPATION TYPE: ICD-10-CM

## 2019-01-04 DIAGNOSIS — M81.0 SENILE OSTEOPOROSIS: ICD-10-CM

## 2019-01-04 DIAGNOSIS — D50.8 IRON DEFICIENCY ANEMIA SECONDARY TO INADEQUATE DIETARY IRON INTAKE: ICD-10-CM

## 2019-01-04 RX ORDER — FERROUS SULFATE 325(65) MG
TABLET ORAL
Qty: 31 TABLET | Refills: 98 | Status: SHIPPED | OUTPATIENT
Start: 2019-01-04 | End: 2020-01-01

## 2019-01-04 RX ORDER — DOCUSATE SODIUM AND SENNOSIDES 50; 8.6 MG/1; MG/1
TABLET ORAL
Qty: 31 TABLET | Refills: 98 | Status: SHIPPED | OUTPATIENT
Start: 2019-01-04 | End: 2019-04-09

## 2019-01-04 RX ORDER — CHOLECALCIFEROL (VITAMIN D3) 25 MCG
TABLET ORAL
Qty: 62 TABLET | Refills: 98 | Status: SHIPPED | OUTPATIENT
Start: 2019-01-04 | End: 2020-01-01

## 2019-01-15 ENCOUNTER — PATIENT OUTREACH (OUTPATIENT)
Dept: GERIATRIC MEDICINE | Facility: CLINIC | Age: 84
End: 2019-01-15

## 2019-01-15 NOTE — PROGRESS NOTES
1-15-19   CC received notice that member MA is terming so CC reached out to financial worker [employer].  CC was told that member needed to turn in [employer] information and also [facility] income.  CC reached out to her daughter Leeanna and she is working on getting this information and will send this into [employer].     She then said there was also a clerical error from the bank for her MA august.  She is working with [employer] on this to fix it and she will let CC know if there are any concerns or questions with this.   Yennifer Hernandez MA [facility] Coordinator   [phone]

## 2019-01-25 ENCOUNTER — ASSISTED LIVING VISIT (OUTPATIENT)
Dept: GERIATRICS | Facility: CLINIC | Age: 84
End: 2019-01-25
Payer: COMMERCIAL

## 2019-01-25 VITALS
DIASTOLIC BLOOD PRESSURE: 88 MMHG | BODY MASS INDEX: 29.21 KG/M2 | RESPIRATION RATE: 18 BRPM | HEART RATE: 74 BPM | SYSTOLIC BLOOD PRESSURE: 150 MMHG | OXYGEN SATURATION: 95 % | TEMPERATURE: 98 F | WEIGHT: 181 LBS

## 2019-01-25 DIAGNOSIS — E53.8 VITAMIN B 12 DEFICIENCY: ICD-10-CM

## 2019-01-25 DIAGNOSIS — E78.5 HYPERLIPIDEMIA LDL GOAL <130: ICD-10-CM

## 2019-01-25 DIAGNOSIS — I69.359 HEMIPLEGIA AFFECTING DOMINANT SIDE, POST-STROKE (H): ICD-10-CM

## 2019-01-25 DIAGNOSIS — I10 HYPERTENSION GOAL BP (BLOOD PRESSURE) < 140/90: ICD-10-CM

## 2019-01-25 DIAGNOSIS — Z00.00 ANNUAL PHYSICAL EXAM: Primary | ICD-10-CM

## 2019-01-25 DIAGNOSIS — F41.9 ANXIETY: ICD-10-CM

## 2019-01-25 DIAGNOSIS — F02.80 LATE ONSET ALZHEIMER'S DISEASE WITHOUT BEHAVIORAL DISTURBANCE (H): ICD-10-CM

## 2019-01-25 DIAGNOSIS — I48.91 ATRIAL FIBRILLATION, UNSPECIFIED TYPE (H): ICD-10-CM

## 2019-01-25 DIAGNOSIS — K59.01 SLOW TRANSIT CONSTIPATION: ICD-10-CM

## 2019-01-25 DIAGNOSIS — D50.8 IRON DEFICIENCY ANEMIA SECONDARY TO INADEQUATE DIETARY IRON INTAKE: ICD-10-CM

## 2019-01-25 DIAGNOSIS — R29.6 RECURRENT FALLS: ICD-10-CM

## 2019-01-25 DIAGNOSIS — M85.80 OSTEOPENIA, UNSPECIFIED LOCATION: ICD-10-CM

## 2019-01-25 DIAGNOSIS — I25.10 CORONARY ARTERY DISEASE INVOLVING NATIVE HEART WITHOUT ANGINA PECTORIS, UNSPECIFIED VESSEL OR LESION TYPE: ICD-10-CM

## 2019-01-25 DIAGNOSIS — M15.9 OSTEOARTHRITIS OF MULTIPLE JOINTS, UNSPECIFIED OSTEOARTHRITIS TYPE: ICD-10-CM

## 2019-01-25 DIAGNOSIS — G30.1 LATE ONSET ALZHEIMER'S DISEASE WITHOUT BEHAVIORAL DISTURBANCE (H): ICD-10-CM

## 2019-01-25 PROBLEM — E03.8 OTHER SPECIFIED HYPOTHYROIDISM: Status: ACTIVE | Noted: 2019-01-25

## 2019-01-25 NOTE — PROGRESS NOTES
[facility]  Chief Complaint   Patient presents with     Annual Comprehensive Nursing Home       [facility] Medical Record Number:  [id number]  Place of Service where encounter took place:  [facility] [[id number]]      HPI:    Deanne Zayas is a 85 year old  (6/19/1933), who is being seen today for an annual comprehensive visit.  HPI information obtained from: facility chart records, facility staff, patient report and [facility] Epic chart review.      Today's concerns are:  Annual physical exam  Hemiplegia affecting dominant side, post-stroke (H)  S/p CVA in 2017. MRI 3/2018 showed showed No evidence for acute infarct, acute hemorrhage, or any focal mass lesions, Old left cerebellar infarct, Old microbleed in right subinsular cortex, Cerebral atrophy with chronic white matter changes. D/t right sided weakness she is primarily WC bound can take a few steps and transfer with assistance. She is currently on plavix, no ASA, and atorvastatin.     Unspecified atrial fibrillation (H)  Present in 3/2018 with afib with RVR. She was given IV diltiazem and converted to NSR. Her amlodipine was discontinued an she was started on oral diltiazem. CHADS-VASC was 5, however she has a history of multiple falls and family opted against anticoagulation. She is on plavix. No recent tachycardia noted, denies any SOB or chest pain.     Late onset Alzheimer's disease without behavioral disturbance  Lives in memory care unit. Slums 8/2017 was 8/30. She can be impulsive at times, but behaviors typically appropriate. She has an allergy to donepezil. Weight has been stable, has good appetite. No concern per staff, daughter Mayra feels she is doing well.     Coronary artery disease involving native heart without angina pectoris, unspecified vessel or lesion type  Hyperlipidemia LDL goal <130  Hypertension goal BP (blood pressure) < 140/90  No recent c/o cardiac symptoms per staff. Is currently  on plavix, atorvastatin, lisinopril 5mg daily. 's-140's.     Osteopenia, unspecified location  No recent fractures noted, is on vitamin D supplement,     Osteoarthritis of multiple joints, unspecified osteoarthritis type  Pain in multiple joints, most frequently noted in right leg, complains of pain in right shoulder currently. Reports pain to staff when using arm to lift self or grab bars in bathroom or bed rail. She is able to feed self with arm. She is on APAP 1000mg BID.     Other specified hypothyroidism  TSH 0.74 3/2018; on synthroid 112mcg daily.     Slow transit constipation  Chronic issues, staff reports currently having soft/loose stools 1-2 times daily. She is on miralax, senna-s daily.     Vitamin B 12 deficiency  Level of 412 3/2018. Remains on monthly B12 injection.     Iron deficiency anemia secondary to inadequate dietary iron intake  Currently on daily iron supplement, baseline Hgb ~12    Recurrent falls  Multiple falls in the past year, typically when patient is attempting to self transfer. She has right sided weakness from CVA and advanced dementia. She is primarily wheelchair bound. No injuries noted from falls.         ALLERGIES: Donepezil  PROBLEM LIST:  Patient Active Problem List   Diagnosis     Hyperlipidemia LDL goal <130     Hypothyroidism due to acquired atrophy of thyroid     Vitamin B 12 deficiency     Vitamin D deficiency disease     Essential hypertension     Osteopenia     Advance care planning     Hypertension goal BP (blood pressure) < 140/90     Mixed incontinence     Anxiety     Diaper dermatitis     Late onset Alzheimer's disease without behavioral disturbance     Facial droop     Acute cystitis without hematuria     UTI (urinary tract infection)     Fall     Stroke (H)     Health Care Home     Atrial fibrillation with rapid ventricular response (H)     History of CVA (cerebrovascular accident)     Right hemiparesis (H), secondary to previous CVA     Non-intractable  vomiting with nausea     Slow transit constipation     Iron deficiency anemia secondary to inadequate dietary iron intake     Recurrent falls     Coronary artery disease involving native heart without angina pectoris, unspecified vessel or lesion type     Other specified hypothyroidism     PAST MEDICAL HISTORY:  has a past medical history of Alzheimer's dementia without behavioral disturbance, Depression, Dyslipidemia, Essential hypertension, History of CVA (cerebrovascular accident), Hypothyroidism, and Right hemiparesis (H), secondary to previous CVA.  PAST SURGICAL HISTORY:  has a past surgical history that includes Cholecystectomy; surgical history of -  (2012 ); and Cystoscopy (N/A, 8/29/2014).  FAMILY HISTORY: family history includes Family History Negative in her father and mother; Obesity in her sister; Unknown/Adopted in her mother.  SOCIAL HISTORY:  reports that  has never smoked. she has never used smokeless tobacco. She reports that she does not drink alcohol or use drugs.  IMMUNIZATIONS:  Most Recent Immunizations   Administered Date(s) Administered     FLU 6-35 months 09/28/2012     Influenza (High Dose) 3 valent vaccine 09/25/2018     Influenza (IIV3) PF 09/19/2013     Influenza Vaccine IM 3yrs+ 4 Valent IIV4 10/08/2014     Pneumo Conj 13-V (2010&after) 09/24/2015     Pneumococcal 23 valent 08/16/2005     TD (ADULT, 7+) 10/13/2009     TDAP Vaccine (Adacel) 10/16/2012     Td (Adult), Adsorbed 04/07/2004     Above immunizations pulled from [facility]. [facility] and facility records also reconciled. Outstanding information sent to  to update [facility].  Future immunizations are not needed at this point as all recommended immunizations are up to date.   MEDICATIONS:  Current Outpatient Medications   Medication Sig Dispense Refill     acetaminophen (MAPAP) 500 MG tablet Take 2 tablets (1,000 mg) by mouth every 8 hours 90 tablet 11     atorvastatin (LIPITOR) 20 MG tablet TAKE 1 TABLET BY  "MOUTH ONCE DAILY 31 tablet 98     BISAC-EVAC 10 MG Suppository UNWRAP AND INSERT ONE SUPPOSITORY RECTALLY EVERY 3 DAYS AS NEEDED IF NO BM 10 suppository 3     clopidogrel (PLAVIX) 75 MG tablet TAKE 1 TABLET BY MOUTH ONCE DAILY 31 tablet 98     Coloplast barrier cream CREA Apply topically 4 times daily as needed       cyanocobalamin (VITAMIN B12) 1000 MCG/ML injection Inject 1 mL (1,000 mcg) Subcutaneous every 30 days 1 mL 98     diltiazem 240 MG 24 hr capsule Take 1 capsule (240 mg) by mouth daily 31 capsule 98     escitalopram (LEXAPRO) 10 MG tablet TAKE 1 TABLET BY MOUTH ONCE DAILY 31 tablet 98     FEROSUL 325 (65 Fe) MG tablet TAKE 1 TABLET BY MOUTH ONCE DAILY 31 tablet 98     levothyroxine (SYNTHROID/LEVOTHROID) 112 MCG tablet TAKE 1 TABLET BY MOUTH ONCE DAILY 31 tablet 98     lisinopril (PRINIVIL/ZESTRIL) 5 MG tablet Take 1 tablet (5 mg) by mouth daily 90 tablet 3     miconazole (MICATIN) 2 % AERP powder Apply topically 3 times daily as needed       MICRO GUARD 2 % powder APPLY TOPICALLY TO VAGINA THREE TIMES DAILY AS NEEDED 85 g 97     polyethylene glycol (MIRALAX/GLYCOLAX) powder MIX 17GM OF POWDER IN 8OZ OF WATER UNTIL COMPLETELY DISSOLVED. DRINK SOLUTION BY MOUTH ONCE DAILY AS NEEDED FOR CONSTIPATION 527 g 97     SM STOOL SOFTENER 8.6-50 MG tablet TAKE 1 TABLET BY MOUTH ONCE DAILY 31 tablet 98     syringe/needle, disp, 25G X 1\" 3 ML MISC USE AS DIRECTED WITH B12 INECTION 1 each 98     traZODone (DESYREL) 50 MG tablet Take 0.5 tablets (25 mg) by mouth daily 16 tablet 98     VITAMIN D3 1000 units tablet TAKE TWO TABLETS (2000 UNITS) BY MOUTH ONCE DAILY 62 tablet 98     Medications reviewed:  Medications reconciled to facility chart and changes were made to reflect current medications as identified as above med list. Below are the changes that were made:   Medications stopped since last EPIC medication reconciliation:   Medications Discontinued During This Encounter   Medication Reason     Menthol, Topical " Analgesic, (BIOFREEZE) 4 % GEL Medication Reconciliation Clean Up       Medications started since last [facility] medication reconciliation:  No orders of the defined types were placed in this encounter.        Case Management:  I have reviewed the Assisted Living care plan, current immunizations and preventive care/cancer screening..Future cancer screening is not clinically indicated secondary to age/goals of care Patient's desire to return to the community is not assessible due to cognitive impairment. Current Level of Care is appropriate.      Advance Directive Discussion:    I reviewed the current advanced directives as reflected in EPIC, the POLST and the facility chart, and verified the congruency of orders. I contacted the first party Mayra Moran and discussed the plan of Care.  I did not due to cognitive impairment review the advance directives with the resident.     Team Discussion:  I communicated with the appropriate disciplines involved with the Plan of Care:   Nursing      Patient Goal:  Patient's goal is pain control and comfort.    Information reviewed:  Medications, vital signs, orders, and nursing notes.    ROS:  Limited secondary to cognitive impairment but today pt reports pain in right shoulder    Exam:  /88   Pulse 74   Temp 98  F (36.7  C)   Resp 18   Wt 82.1 kg (181 lb)   SpO2 95%   BMI 29.21 kg/m      GENERAL APPEARANCE:  Alert, in no distress, cooperative  ENT:  Mouth and posterior oropharynx normal, moist mucous membranes, normal hearing acuity  EYES:  EOM, conjunctivae, lids, pupils and irises normal, PERRL  RESP:  respiratory effort and palpation of chest normal, lungs clear to auscultation , no respiratory distress  CV:  Palpation and auscultation of heart done , regular rate and rhythm, no murmur, rub, or gallop, no edema, +2 pedal pulses  ABDOMEN:  normal bowel sounds, soft, nontender, no hepatosplenomegaly or other masses, no guarding or rebound  M/S:   decrease ROM to right  shoulder, tenderness to right bicep. drags right leg wtih ambulation, no other gross deformties  SKIN:  Inspection of skin and subcutaneous tissue baseline, Palpation of skin and subcutaneous tissue baseline  NEURO:   Cranial nerves 2-12 are normal tested and grossly at patient's baseline, Examination of sensation by touch normal  PSYCH:  oriented to self, insight and judgement impaired, memory impaired , affect and mood normal     Lab/Diagnostic data:     CBC RESULTS:   Recent Labs   Lab Test 09/13/18  1223 03/19/18  0658   WBC 5.7 3.2*   RBC 4.04 3.51*   HGB 12.5 10.6*   HCT 39.0 32.6*   MCV 97 93   MCH 30.9 30.2   MCHC 32.1 32.5   RDW 13.2 13.6    168       Last Basic Metabolic Panel:  Recent Labs   Lab Test 09/13/18  1223 05/03/18  0810    140   POTASSIUM 3.6 4.2   CHLORIDE 107 107   RUBIN 9.4 9.1   CO2 30 31   BUN 18 11   CR 1.07* 1.03   * 109*       Liver Function Studies -   Recent Labs   Lab Test 09/13/18  1223 07/24/17  1425  08/24/11   PROTTOTAL 7.2 6.8  --  6.9   ALBUMIN 3.8 3.8  --  4.2   BILITOTAL 0.5 0.5  --  1.4*   ALKPHOS 80 69  --  55   AST 15 16   < > 25   ALT 16 18   < > 16   BILIDIRECT  --   --   --  0.1    < > = values in this interval not displayed.       TSH   Date Value Ref Range Status   03/17/2018 0.74 0.40 - 4.00 mU/L Final   03/08/2018 1.66 0.40 - 4.00 mU/L Final       Lab Results   Component Value Date    A1C 6.3 03/17/2018    A1C 5.9 08/21/2012         ASSESSMENT/PLAN  (Z00.00) Annual physical exam  (primary encounter diagnosis)  (I69.359) Hemiplegia affecting dominant side, post-stroke (H)  Comment: CVA in 2017, chronic right sided weakness, at neurological baseline  Plan: continue plavix, statin, monitor.     (I48.91) Atrial fibrillation, unspecified type (H)  Comment: rate controlled, no on AC d/t falls risk  Plan: continue plavix diltiazem, monitor and adjust    (G30.1,  F02.80) Late onset Alzheimer's disease without behavioral disturbance  Comment: Expect further  cognitive and functional declines. Expect weight loss. No recent behavioral concerns, appropriate for memory care unit  Plan: continues cares per memory care unit, monitor.     (I25.10) Coronary artery disease involving native heart without angina pectoris, unspecified vessel or lesion type  (E78.5) Hyperlipidemia LDL goal <130  (I10) Hypertension goal BP (blood pressure) < 140/90  Comment: stable, BP controlled within goal on current regimen.   Plan: Continue atorvastatin, plavix, lisinopril 5mg, diltiazem, consider lipid panel, BMP in the next few months.     (M85.80) Osteopenia, unspecified location  Comment: no recent fractures  Plan: continue vitamin D supplement, consider level check on next lab toña    (M15.9) Osteoarthritis of multiple joints, unspecified osteoarthritis type  Comment: continues to have some right shoulder pain  Plan: continue APAP BID, will add PRN biofreeze, if no improvement consider steroid injection - given falls risk and dementia will attempt to avoid narcotics. Avoid NSAIDs given HTN, age, risk for bleed given plavix use    (K59.01) Slow transit constipation  Comment: stable  Plan: continue miralax and senna-s    (E53.8) Vitamin B 12 deficiency  Comment: stable  Plan: continue monthly rechecks, check B12 level annually.     (D50.8) Iron deficiency anemia secondary to inadequate dietary iron intake  Comment: Hgb stable  Plan: continue iron supplement, CBC on annual lab draw in March    (R29.6) Recurrent falls  Comment: likely r/t impulsiveness in the setting of dementia,right sided weakness from CVA.   Plan: falls precautions per facility, will likely remains a falls risk and further decline is expected    (F41.9) Anxiety  Comment: PHQ-9 3/27, mood as been stable   Plan: continue lexapro 10mg daily, trazodone 50mg at bedtime. Monitor and adjust.       Orders:  1. Biofreeze 4 or 5% gel apply BID (AM and HS) to right shoulder Dx: Pain      Electronically signed by:  Kori Card,  APRN CNP

## 2019-01-25 NOTE — LETTER
1/25/2019        RE: Deanne Zayas  C/o Leeanna Moran  [address]  [place] [address]        [facility]  Chief Complaint   Patient presents with     Annual Comprehensive Nursing Home       [address] [facility] Record Number:  [id number]  Place of Service where encounter took place:  [facility] [[id number]]      HPI:    Deanne Zayas is a 85 year old  (6/19/1933), who is being seen today for an annual comprehensive visit.  HPI information obtained from: facility chart records, facility staff, patient report and [facility] chart review.      Today's concerns are:  Annual physical exam  Hemiplegia affecting dominant side, post-stroke (H)  S/p CVA in 2017. MRI 3/2018 showed showed No evidence for acute infarct, acute hemorrhage, or any focal mass lesions, Old left cerebellar infarct, Old microbleed in right subinsular cortex, Cerebral atrophy with chronic white matter changes. D/t right sided weakness she is primarily WC bound can take a few steps and transfer with assistance. She is currently on plavix, no ASA, and atorvastatin.     Unspecified atrial fibrillation (H)  Present in 3/2018 with afib with RVR. She was given IV diltiazem and converted to NSR. Her amlodipine was discontinued an she was started on oral diltiazem. CHADS-VASC was 5, however she has a history of multiple falls and family opted against anticoagulation. She is on plavix. No recent tachycardia noted, denies any SOB or chest pain.     Late onset Alzheimer's disease without behavioral disturbance  Lives in memory care unit. Slums 8/2017 was 8/30. She can be impulsive at times, but behaviors typically appropriate. She has an allergy to donepezil. Weight has been stable, has good appetite. No concern per staff, daughter Mayra feels she is doing well.     Coronary artery disease involving native heart without angina pectoris, unspecified vessel or lesion type  Hyperlipidemia LDL  goal <130  Hypertension goal BP (blood pressure) < 140/90  No recent c/o cardiac symptoms per staff. Is currently on plavix, atorvastatin, lisinopril 5mg daily. 's-140's.     Osteopenia, unspecified location  No recent fractures noted, is on vitamin D supplement,     Osteoarthritis of multiple joints, unspecified osteoarthritis type  Pain in multiple joints, most frequently noted in right leg, complains of pain in right shoulder currently. Reports pain to staff when using arm to lift self or grab bars in bathroom or bed rail. She is able to feed self with arm. She is on APAP 1000mg BID.     Other specified hypothyroidism  TSH 0.74 3/2018; on synthroid 112mcg daily.     Slow transit constipation  Chronic issues, staff reports currently having soft/loose stools 1-2 times daily. She is on miralax, senna-s daily.     Vitamin B 12 deficiency  Level of 412 3/2018. Remains on monthly B12 injection.     Iron deficiency anemia secondary to inadequate dietary iron intake  Currently on daily iron supplement, baseline Hgb ~12    Recurrent falls  Multiple falls in the past year, typically when patient is attempting to self transfer. She has right sided weakness from CVA and advanced dementia. She is primarily wheelchair bound. No injuries noted from falls.         ALLERGIES: Donepezil  PROBLEM LIST:  Patient Active Problem List   Diagnosis     Hyperlipidemia LDL goal <130     Hypothyroidism due to acquired atrophy of thyroid     Vitamin B 12 deficiency     Vitamin D deficiency disease     Essential hypertension     Osteopenia     Advance care planning     Hypertension goal BP (blood pressure) < 140/90     Mixed incontinence     Anxiety     Diaper dermatitis     Late onset Alzheimer's disease without behavioral disturbance     Facial droop     Acute cystitis without hematuria     UTI (urinary tract infection)     Fall     Stroke (H)     Health Care Home     Atrial fibrillation with rapid ventricular response (H)     History  of CVA (cerebrovascular accident)     Right hemiparesis (H), secondary to previous CVA     Non-intractable vomiting with nausea     Slow transit constipation     Iron deficiency anemia secondary to inadequate dietary iron intake     Recurrent falls     Coronary artery disease involving native heart without angina pectoris, unspecified vessel or lesion type     Other specified hypothyroidism     PAST MEDICAL HISTORY:  has a past medical history of Alzheimer's dementia without behavioral disturbance, Depression, Dyslipidemia, Essential hypertension, History of CVA (cerebrovascular accident), Hypothyroidism, and Right hemiparesis (H), secondary to previous CVA.  PAST SURGICAL HISTORY:  has a past surgical history that includes Cholecystectomy; surgical history of -  (2012 ); and Cystoscopy (N/A, 8/29/2014).  FAMILY HISTORY: family history includes Family History Negative in her father and mother; Obesity in her sister; Unknown/Adopted in her mother.  SOCIAL HISTORY:  reports that  has never smoked. she has never used smokeless tobacco. She reports that she does not drink alcohol or use drugs.  IMMUNIZATIONS:  Most Recent Immunizations   Administered Date(s) Administered     FLU 6-35 months 09/28/2012     Influenza (High Dose) 3 valent vaccine 09/25/2018     Influenza (IIV3) PF 09/19/2013     Influenza Vaccine IM 3yrs+ 4 Valent IIV4 10/08/2014     Pneumo Conj 13-V (2010&after) 09/24/2015     Pneumococcal 23 valent 08/16/2005     TD (ADULT, 7+) 10/13/2009     TDAP Vaccine (Adacel) 10/16/2012     Td (Adult), Adsorbed 04/07/2004     Above immunizations pulled from [facility]. [facility] and facility records also reconciled. Outstanding information sent to  to update [facility].  Future immunizations are not needed at this point as all recommended immunizations are up to date.   MEDICATIONS:  Current Outpatient Medications   Medication Sig Dispense Refill     acetaminophen (MAPAP) 500 MG tablet Take 2  "tablets (1,000 mg) by mouth every 8 hours 90 tablet 11     atorvastatin (LIPITOR) 20 MG tablet TAKE 1 TABLET BY MOUTH ONCE DAILY 31 tablet 98     BISAC-EVAC 10 MG Suppository UNWRAP AND INSERT ONE SUPPOSITORY RECTALLY EVERY 3 DAYS AS NEEDED IF NO BM 10 suppository 3     clopidogrel (PLAVIX) 75 MG tablet TAKE 1 TABLET BY MOUTH ONCE DAILY 31 tablet 98     Coloplast barrier cream CREA Apply topically 4 times daily as needed       cyanocobalamin (VITAMIN B12) 1000 MCG/ML injection Inject 1 mL (1,000 mcg) Subcutaneous every 30 days 1 mL 98     diltiazem 240 MG 24 hr capsule Take 1 capsule (240 mg) by mouth daily 31 capsule 98     escitalopram (LEXAPRO) 10 MG tablet TAKE 1 TABLET BY MOUTH ONCE DAILY 31 tablet 98     FEROSUL 325 (65 Fe) MG tablet TAKE 1 TABLET BY MOUTH ONCE DAILY 31 tablet 98     levothyroxine (SYNTHROID/LEVOTHROID) 112 MCG tablet TAKE 1 TABLET BY MOUTH ONCE DAILY 31 tablet 98     lisinopril (PRINIVIL/ZESTRIL) 5 MG tablet Take 1 tablet (5 mg) by mouth daily 90 tablet 3     miconazole (MICATIN) 2 % AERP powder Apply topically 3 times daily as needed       MICRO GUARD 2 % powder APPLY TOPICALLY TO VAGINA THREE TIMES DAILY AS NEEDED 85 g 97     polyethylene glycol (MIRALAX/GLYCOLAX) powder MIX 17GM OF POWDER IN 8OZ OF WATER UNTIL COMPLETELY DISSOLVED. DRINK SOLUTION BY MOUTH ONCE DAILY AS NEEDED FOR CONSTIPATION 527 g 97     SM STOOL SOFTENER 8.6-50 MG tablet TAKE 1 TABLET BY MOUTH ONCE DAILY 31 tablet 98     syringe/needle, disp, 25G X 1\" 3 ML MISC USE AS DIRECTED WITH B12 INECTION 1 each 98     traZODone (DESYREL) 50 MG tablet Take 0.5 tablets (25 mg) by mouth daily 16 tablet 98     VITAMIN D3 1000 units tablet TAKE TWO TABLETS (2000 UNITS) BY MOUTH ONCE DAILY 62 tablet 98     Medications reviewed:  Medications reconciled to facility chart and changes were made to reflect current medications as identified as above med list. Below are the changes that were made:   Medications stopped since last EPIC " medication reconciliation:   Medications Discontinued During This Encounter   Medication Reason     Menthol, Topical Analgesic, (BIOFREEZE) 4 % GEL Medication Reconciliation Clean Up       Medications started since last [facility] medication reconciliation:  No orders of the defined types were placed in this encounter.        Case Management:  I have reviewed the Assisted Living care plan, current immunizations and preventive care/cancer screening..Future cancer screening is not clinically indicated secondary to age/goals of care Patient's desire to return to the community is not assessible due to cognitive impairment. Current Level of Care is appropriate.      Advance Directive Discussion:    I reviewed the current advanced directives as reflected in EPIC, the POLST and the facility chart, and verified the congruency of orders. I contacted the first party Mayra Moran and discussed the plan of Care.  I did not due to cognitive impairment review the advance directives with the resident.     Team Discussion:  I communicated with the appropriate disciplines involved with the Plan of Care:   Nursing      Patient Goal:  Patient's goal is pain control and comfort.    Information reviewed:  Medications, vital signs, orders, and nursing notes.    ROS:  Limited secondary to cognitive impairment but today pt reports pain in right shoulder    Exam:  /88   Pulse 74   Temp 98  F (36.7  C)   Resp 18   Wt 82.1 kg (181 lb)   SpO2 95%   BMI 29.21 kg/m       GENERAL APPEARANCE:  Alert, in no distress, cooperative  ENT:  Mouth and posterior oropharynx normal, moist mucous membranes, normal hearing acuity  EYES:  EOM, conjunctivae, lids, pupils and irises normal, PERRL  RESP:  respiratory effort and palpation of chest normal, lungs clear to auscultation , no respiratory distress  CV:  Palpation and auscultation of heart done , regular rate and rhythm, no murmur, rub, or gallop, no edema, +2 pedal pulses  ABDOMEN:  normal bowel  sounds, soft, nontender, no hepatosplenomegaly or other masses, no guarding or rebound  M/S:   decrease ROM to right shoulder, tenderness to right bicep. drags right leg wtih ambulation, no other gross deformties  SKIN:  Inspection of skin and subcutaneous tissue baseline, Palpation of skin and subcutaneous tissue baseline  NEURO:   Cranial nerves 2-12 are normal tested and grossly at patient's baseline, Examination of sensation by touch normal  PSYCH:  oriented to self, insight and judgement impaired, memory impaired , affect and mood normal     Lab/Diagnostic data:     CBC RESULTS:   Recent Labs   Lab Test 09/13/18  1223 03/19/18  0658   WBC 5.7 3.2*   RBC 4.04 3.51*   HGB 12.5 10.6*   HCT 39.0 32.6*   MCV 97 93   MCH 30.9 30.2   MCHC 32.1 32.5   RDW 13.2 13.6    168       Last Basic Metabolic Panel:  Recent Labs   Lab Test 09/13/18  1223 05/03/18  0810    140   POTASSIUM 3.6 4.2   CHLORIDE 107 107   RUBIN 9.4 9.1   CO2 30 31   BUN 18 11   CR 1.07* 1.03   * 109*       Liver Function Studies -   Recent Labs   Lab Test 09/13/18  1223 07/24/17  1425  08/24/11   PROTTOTAL 7.2 6.8  --  6.9   ALBUMIN 3.8 3.8  --  4.2   BILITOTAL 0.5 0.5  --  1.4*   ALKPHOS 80 69  --  55   AST 15 16   < > 25   ALT 16 18   < > 16   BILIDIRECT  --   --   --  0.1    < > = values in this interval not displayed.       TSH   Date Value Ref Range Status   03/17/2018 0.74 0.40 - 4.00 mU/L Final   03/08/2018 1.66 0.40 - 4.00 mU/L Final       Lab Results   Component Value Date    A1C 6.3 03/17/2018    A1C 5.9 08/21/2012         ASSESSMENT/PLAN  (Z00.00) Annual physical exam  (primary encounter diagnosis)  (I69.359) Hemiplegia affecting dominant side, post-stroke (H)  Comment: CVA in 2017, chronic right sided weakness, at neurological baseline  Plan: continue plavix, statin, monitor.     (I48.91) Atrial fibrillation, unspecified type (H)  Comment: rate controlled, no on AC d/t falls risk  Plan: continue plavix diltiazem, monitor  and adjust    (G30.1,  F02.80) Late onset Alzheimer's disease without behavioral disturbance  Comment: Expect further cognitive and functional declines. Expect weight loss. No recent behavioral concerns, appropriate for memory care unit  Plan: continues cares per memory care unit, monitor.     (I25.10) Coronary artery disease involving native heart without angina pectoris, unspecified vessel or lesion type  (E78.5) Hyperlipidemia LDL goal <130  (I10) Hypertension goal BP (blood pressure) < 140/90  Comment: stable, BP controlled within goal on current regimen.   Plan: Continue atorvastatin, plavix, lisinopril 5mg, diltiazem, consider lipid panel, BMP in the next few months.     (M85.80) Osteopenia, unspecified location  Comment: no recent fractures  Plan: continue vitamin D supplement, consider level check on next lab toña    (M15.9) Osteoarthritis of multiple joints, unspecified osteoarthritis type  Comment: continues to have some right shoulder pain  Plan: continue APAP BID, will add PRN biofreeze, if no improvement consider steroid injection - given falls risk and dementia will attempt to avoid narcotics. Avoid NSAIDs given HTN, age, risk for bleed given plavix use    (K59.01) Slow transit constipation  Comment: stable  Plan: continue miralax and senna-s    (E53.8) Vitamin B 12 deficiency  Comment: stable  Plan: continue monthly rechecks, check B12 level annually.     (D50.8) Iron deficiency anemia secondary to inadequate dietary iron intake  Comment: Hgb stable  Plan: continue iron supplement, CBC on annual lab draw in March    (R29.6) Recurrent falls  Comment: likely r/t impulsiveness in the setting of dementia,right sided weakness from CVA.   Plan: falls precautions per facility, will likely remains a falls risk and further decline is expected    (F41.9) Anxiety  Comment: PHQ-9 3/27, mood as been stable   Plan: continue lexapro 10mg daily, trazodone 50mg at bedtime. Monitor and adjust.       Orders:  1.  Biofreeze 4 or 5% gel apply BID (AM and HS) to right shoulder Dx: Pain      Electronically signed by:  YASMINE Tan CNP        Sincerely,        YASMINE Tan CNP

## 2019-01-30 ENCOUNTER — HOSPITAL ENCOUNTER (INPATIENT)
Facility: CLINIC | Age: 84
LOS: 2 days | Discharge: NURSING, REHAB OR LONG-TERM CARE | DRG: 690 | End: 2019-02-01
Attending: EMERGENCY MEDICINE | Admitting: FAMILY MEDICINE
Payer: COMMERCIAL

## 2019-01-30 ENCOUNTER — APPOINTMENT (OUTPATIENT)
Dept: GENERAL RADIOLOGY | Facility: CLINIC | Age: 84
DRG: 690 | End: 2019-01-30
Payer: COMMERCIAL

## 2019-01-30 ENCOUNTER — APPOINTMENT (OUTPATIENT)
Dept: CT IMAGING | Facility: CLINIC | Age: 84
DRG: 690 | End: 2019-01-30
Payer: COMMERCIAL

## 2019-01-30 DIAGNOSIS — N39.0 URINARY TRACT INFECTION WITHOUT HEMATURIA, SITE UNSPECIFIED: ICD-10-CM

## 2019-01-30 DIAGNOSIS — N30.00 ACUTE CYSTITIS WITHOUT HEMATURIA: Primary | ICD-10-CM

## 2019-01-30 DIAGNOSIS — R53.1 WEAKNESS GENERALIZED: ICD-10-CM

## 2019-01-30 LAB
ALBUMIN UR-MCNC: 30 MG/DL
ANION GAP SERPL CALCULATED.3IONS-SCNC: 7 MMOL/L (ref 3–14)
APPEARANCE UR: ABNORMAL
BACTERIA #/AREA URNS HPF: ABNORMAL /HPF
BASOPHILS # BLD AUTO: 0.1 10E9/L (ref 0–0.2)
BASOPHILS NFR BLD AUTO: 0.7 %
BILIRUB UR QL STRIP: NEGATIVE
BUN SERPL-MCNC: 21 MG/DL (ref 7–30)
CALCIUM SERPL-MCNC: 9.1 MG/DL (ref 8.5–10.1)
CHLORIDE SERPL-SCNC: 106 MMOL/L (ref 94–109)
CO2 SERPL-SCNC: 27 MMOL/L (ref 20–32)
COLOR UR AUTO: YELLOW
CREAT SERPL-MCNC: 1.12 MG/DL (ref 0.52–1.04)
DIFFERENTIAL METHOD BLD: ABNORMAL
EOSINOPHIL # BLD AUTO: 0.1 10E9/L (ref 0–0.7)
EOSINOPHIL NFR BLD AUTO: 0.7 %
ERYTHROCYTE [DISTWIDTH] IN BLOOD BY AUTOMATED COUNT: 13 % (ref 10–15)
GFR SERPL CREATININE-BSD FRML MDRD: 45 ML/MIN/{1.73_M2}
GLUCOSE SERPL-MCNC: 175 MG/DL (ref 70–99)
GLUCOSE UR STRIP-MCNC: NEGATIVE MG/DL
HCT VFR BLD AUTO: 39.1 % (ref 35–47)
HGB BLD-MCNC: 13 G/DL (ref 11.7–15.7)
HGB UR QL STRIP: NEGATIVE
IMM GRANULOCYTES # BLD: 0.1 10E9/L (ref 0–0.4)
IMM GRANULOCYTES NFR BLD: 0.4 %
KETONES UR STRIP-MCNC: NEGATIVE MG/DL
LACTATE BLD-SCNC: 1.4 MMOL/L (ref 0.7–2)
LEUKOCYTE ESTERASE UR QL STRIP: ABNORMAL
LYMPHOCYTES # BLD AUTO: 1 10E9/L (ref 0.8–5.3)
LYMPHOCYTES NFR BLD AUTO: 8.5 %
MCH RBC QN AUTO: 32.8 PG (ref 26.5–33)
MCHC RBC AUTO-ENTMCNC: 33.2 G/DL (ref 31.5–36.5)
MCV RBC AUTO: 99 FL (ref 78–100)
MONOCYTES # BLD AUTO: 1.1 10E9/L (ref 0–1.3)
MONOCYTES NFR BLD AUTO: 8.7 %
MUCOUS THREADS #/AREA URNS LPF: PRESENT /LPF
NEUTROPHILS # BLD AUTO: 9.8 10E9/L (ref 1.6–8.3)
NEUTROPHILS NFR BLD AUTO: 81 %
NITRATE UR QL: NEGATIVE
NRBC # BLD AUTO: 0 10*3/UL
NRBC BLD AUTO-RTO: 0 /100
PH UR STRIP: 5 PH (ref 5–7)
PLATELET # BLD AUTO: 272 10E9/L (ref 150–450)
POTASSIUM SERPL-SCNC: 3.9 MMOL/L (ref 3.4–5.3)
RBC # BLD AUTO: 3.96 10E12/L (ref 3.8–5.2)
RBC #/AREA URNS AUTO: 29 /HPF (ref 0–2)
SODIUM SERPL-SCNC: 140 MMOL/L (ref 133–144)
SOURCE: ABNORMAL
SP GR UR STRIP: 1.02 (ref 1–1.03)
SQUAMOUS #/AREA URNS AUTO: 6 /HPF (ref 0–1)
TROPONIN I SERPL-MCNC: <0.015 UG/L (ref 0–0.04)
UROBILINOGEN UR STRIP-MCNC: 0 MG/DL (ref 0–2)
WBC # BLD AUTO: 12.2 10E9/L (ref 4–11)
WBC #/AREA URNS AUTO: 566 /HPF (ref 0–5)
WBC CLUMPS #/AREA URNS HPF: PRESENT /HPF

## 2019-01-30 PROCEDURE — 87088 URINE BACTERIA CULTURE: CPT | Performed by: EMERGENCY MEDICINE

## 2019-01-30 PROCEDURE — 71045 X-RAY EXAM CHEST 1 VIEW: CPT

## 2019-01-30 PROCEDURE — 99285 EMERGENCY DEPT VISIT HI MDM: CPT | Mod: 25

## 2019-01-30 PROCEDURE — 70450 CT HEAD/BRAIN W/O DYE: CPT

## 2019-01-30 PROCEDURE — 83605 ASSAY OF LACTIC ACID: CPT | Performed by: EMERGENCY MEDICINE

## 2019-01-30 PROCEDURE — 85025 COMPLETE CBC W/AUTO DIFF WBC: CPT | Performed by: EMERGENCY MEDICINE

## 2019-01-30 PROCEDURE — 87040 BLOOD CULTURE FOR BACTERIA: CPT | Performed by: FAMILY MEDICINE

## 2019-01-30 PROCEDURE — 25000128 H RX IP 250 OP 636: Performed by: EMERGENCY MEDICINE

## 2019-01-30 PROCEDURE — 87186 SC STD MICRODIL/AGAR DIL: CPT | Performed by: EMERGENCY MEDICINE

## 2019-01-30 PROCEDURE — 81001 URINALYSIS AUTO W/SCOPE: CPT | Performed by: EMERGENCY MEDICINE

## 2019-01-30 PROCEDURE — 93010 ELECTROCARDIOGRAM REPORT: CPT | Mod: Z6 | Performed by: EMERGENCY MEDICINE

## 2019-01-30 PROCEDURE — 93005 ELECTROCARDIOGRAM TRACING: CPT

## 2019-01-30 PROCEDURE — 99223 1ST HOSP IP/OBS HIGH 75: CPT | Mod: AI | Performed by: FAMILY MEDICINE

## 2019-01-30 PROCEDURE — 84484 ASSAY OF TROPONIN QUANT: CPT | Performed by: EMERGENCY MEDICINE

## 2019-01-30 PROCEDURE — 70498 CT ANGIOGRAPHY NECK: CPT

## 2019-01-30 PROCEDURE — 99285 EMERGENCY DEPT VISIT HI MDM: CPT | Mod: 25 | Performed by: EMERGENCY MEDICINE

## 2019-01-30 PROCEDURE — 83036 HEMOGLOBIN GLYCOSYLATED A1C: CPT | Performed by: FAMILY MEDICINE

## 2019-01-30 PROCEDURE — 25000125 ZZHC RX 250: Performed by: EMERGENCY MEDICINE

## 2019-01-30 PROCEDURE — 36415 COLL VENOUS BLD VENIPUNCTURE: CPT | Performed by: FAMILY MEDICINE

## 2019-01-30 PROCEDURE — 80048 BASIC METABOLIC PNL TOTAL CA: CPT | Performed by: EMERGENCY MEDICINE

## 2019-01-30 PROCEDURE — 96365 THER/PROPH/DIAG IV INF INIT: CPT

## 2019-01-30 PROCEDURE — 87086 URINE CULTURE/COLONY COUNT: CPT | Performed by: EMERGENCY MEDICINE

## 2019-01-30 PROCEDURE — 12000000 ZZH R&B MED SURG/OB

## 2019-01-30 RX ORDER — IOPAMIDOL 755 MG/ML
70 INJECTION, SOLUTION INTRAVASCULAR ONCE
Status: COMPLETED | OUTPATIENT
Start: 2019-01-30 | End: 2019-01-30

## 2019-01-30 RX ORDER — CEFTRIAXONE SODIUM 1 G/50ML
1 INJECTION, SOLUTION INTRAVENOUS ONCE
Status: COMPLETED | OUTPATIENT
Start: 2019-01-30 | End: 2019-01-31

## 2019-01-30 RX ADMIN — CEFTRIAXONE SODIUM 1 G: 1 INJECTION, SOLUTION INTRAVENOUS at 23:13

## 2019-01-30 RX ADMIN — IOPAMIDOL 70 ML: 755 INJECTION, SOLUTION INTRAVENOUS at 21:41

## 2019-01-30 RX ADMIN — SODIUM CHLORIDE 1000 ML: 9 INJECTION, SOLUTION INTRAVENOUS at 23:13

## 2019-01-30 RX ADMIN — SODIUM CHLORIDE 100 ML: 9 INJECTION, SOLUTION INTRAVENOUS at 21:41

## 2019-01-31 ENCOUNTER — PATIENT OUTREACH (OUTPATIENT)
Dept: GERIATRIC MEDICINE | Facility: CLINIC | Age: 84
End: 2019-01-31

## 2019-01-31 PROBLEM — G81.91 RIGHT HEMIPARESIS (H): Status: ACTIVE | Noted: 2018-03-16

## 2019-01-31 PROBLEM — N39.0 URINARY TRACT INFECTION: Status: ACTIVE | Noted: 2019-01-31

## 2019-01-31 LAB
ANION GAP SERPL CALCULATED.3IONS-SCNC: 7 MMOL/L (ref 3–14)
BUN SERPL-MCNC: 18 MG/DL (ref 7–30)
CALCIUM SERPL-MCNC: 8.1 MG/DL (ref 8.5–10.1)
CHLORIDE SERPL-SCNC: 110 MMOL/L (ref 94–109)
CO2 SERPL-SCNC: 26 MMOL/L (ref 20–32)
CREAT SERPL-MCNC: 0.94 MG/DL (ref 0.52–1.04)
ERYTHROCYTE [DISTWIDTH] IN BLOOD BY AUTOMATED COUNT: 13 % (ref 10–15)
GFR SERPL CREATININE-BSD FRML MDRD: 55 ML/MIN/{1.73_M2}
GLUCOSE SERPL-MCNC: 149 MG/DL (ref 70–99)
HBA1C MFR BLD: 5.9 % (ref 0–5.6)
HCT VFR BLD AUTO: 32.9 % (ref 35–47)
HGB BLD-MCNC: 10.5 G/DL (ref 11.7–15.7)
MCH RBC QN AUTO: 32.1 PG (ref 26.5–33)
MCHC RBC AUTO-ENTMCNC: 31.9 G/DL (ref 31.5–36.5)
MCV RBC AUTO: 101 FL (ref 78–100)
PLATELET # BLD AUTO: 211 10E9/L (ref 150–450)
POTASSIUM SERPL-SCNC: 3.7 MMOL/L (ref 3.4–5.3)
RBC # BLD AUTO: 3.27 10E12/L (ref 3.8–5.2)
SODIUM SERPL-SCNC: 143 MMOL/L (ref 133–144)
WBC # BLD AUTO: 10.2 10E9/L (ref 4–11)

## 2019-01-31 PROCEDURE — 25000132 ZZH RX MED GY IP 250 OP 250 PS 637: Performed by: FAMILY MEDICINE

## 2019-01-31 PROCEDURE — 85027 COMPLETE CBC AUTOMATED: CPT | Performed by: FAMILY MEDICINE

## 2019-01-31 PROCEDURE — 99232 SBSQ HOSP IP/OBS MODERATE 35: CPT | Performed by: INTERNAL MEDICINE

## 2019-01-31 PROCEDURE — 25000128 H RX IP 250 OP 636: Performed by: FAMILY MEDICINE

## 2019-01-31 PROCEDURE — 25000132 ZZH RX MED GY IP 250 OP 250 PS 637: Performed by: INTERNAL MEDICINE

## 2019-01-31 PROCEDURE — 80048 BASIC METABOLIC PNL TOTAL CA: CPT | Performed by: FAMILY MEDICINE

## 2019-01-31 PROCEDURE — 12000000 ZZH R&B MED SURG/OB

## 2019-01-31 PROCEDURE — 36415 COLL VENOUS BLD VENIPUNCTURE: CPT | Performed by: FAMILY MEDICINE

## 2019-01-31 RX ORDER — DILTIAZEM HYDROCHLORIDE 120 MG/1
240 CAPSULE, COATED, EXTENDED RELEASE ORAL DAILY
Status: DISCONTINUED | OUTPATIENT
Start: 2019-01-31 | End: 2019-02-01 | Stop reason: HOSPADM

## 2019-01-31 RX ORDER — LEVOTHYROXINE SODIUM 112 UG/1
112 TABLET ORAL
Status: DISCONTINUED | OUTPATIENT
Start: 2019-01-31 | End: 2019-02-01 | Stop reason: HOSPADM

## 2019-01-31 RX ORDER — HYDROMORPHONE HCL/0.9% NACL/PF 0.2MG/0.2
0.2 SYRINGE (ML) INTRAVENOUS
Status: DISCONTINUED | OUTPATIENT
Start: 2019-01-31 | End: 2019-01-31

## 2019-01-31 RX ORDER — SODIUM CHLORIDE 9 MG/ML
INJECTION, SOLUTION INTRAVENOUS CONTINUOUS
Status: DISCONTINUED | OUTPATIENT
Start: 2019-01-31 | End: 2019-01-31

## 2019-01-31 RX ORDER — LISINOPRIL 5 MG/1
5 TABLET ORAL DAILY
Status: DISCONTINUED | OUTPATIENT
Start: 2019-01-31 | End: 2019-02-01 | Stop reason: HOSPADM

## 2019-01-31 RX ORDER — ACETAMINOPHEN 500 MG
1000 TABLET ORAL EVERY 8 HOURS
Status: DISCONTINUED | OUTPATIENT
Start: 2019-01-31 | End: 2019-02-01 | Stop reason: HOSPADM

## 2019-01-31 RX ORDER — SULFAMETHOXAZOLE/TRIMETHOPRIM 800-160 MG
1 TABLET ORAL 2 TIMES DAILY
Status: DISCONTINUED | OUTPATIENT
Start: 2019-01-31 | End: 2019-02-01 | Stop reason: HOSPADM

## 2019-01-31 RX ORDER — CEFTRIAXONE SODIUM 1 G/50ML
1 INJECTION, SOLUTION INTRAVENOUS EVERY 24 HOURS
Status: DISCONTINUED | OUTPATIENT
Start: 2019-02-01 | End: 2019-01-31

## 2019-01-31 RX ORDER — POLYETHYLENE GLYCOL 3350 17 G/17G
17 POWDER, FOR SOLUTION ORAL DAILY
Status: DISCONTINUED | OUTPATIENT
Start: 2019-01-31 | End: 2019-02-01 | Stop reason: HOSPADM

## 2019-01-31 RX ORDER — ONDANSETRON 2 MG/ML
4 INJECTION INTRAMUSCULAR; INTRAVENOUS EVERY 6 HOURS PRN
Status: DISCONTINUED | OUTPATIENT
Start: 2019-01-31 | End: 2019-01-31

## 2019-01-31 RX ORDER — NALOXONE HYDROCHLORIDE 0.4 MG/ML
.1-.4 INJECTION, SOLUTION INTRAMUSCULAR; INTRAVENOUS; SUBCUTANEOUS
Status: DISCONTINUED | OUTPATIENT
Start: 2019-01-31 | End: 2019-01-31

## 2019-01-31 RX ORDER — ESCITALOPRAM OXALATE 10 MG/1
10 TABLET ORAL DAILY
Status: DISCONTINUED | OUTPATIENT
Start: 2019-01-31 | End: 2019-02-01 | Stop reason: HOSPADM

## 2019-01-31 RX ORDER — ATORVASTATIN CALCIUM 20 MG/1
20 TABLET, FILM COATED ORAL EVERY EVENING
Status: DISCONTINUED | OUTPATIENT
Start: 2019-01-31 | End: 2019-02-01 | Stop reason: HOSPADM

## 2019-01-31 RX ORDER — AMOXICILLIN 250 MG
1 CAPSULE ORAL DAILY
Status: DISCONTINUED | OUTPATIENT
Start: 2019-01-31 | End: 2019-02-01 | Stop reason: HOSPADM

## 2019-01-31 RX ORDER — BISACODYL 10 MG
10 SUPPOSITORY, RECTAL RECTAL DAILY PRN
Status: DISCONTINUED | OUTPATIENT
Start: 2019-01-31 | End: 2019-02-01 | Stop reason: HOSPADM

## 2019-01-31 RX ORDER — ONDANSETRON 4 MG/1
4 TABLET, ORALLY DISINTEGRATING ORAL EVERY 6 HOURS PRN
Status: DISCONTINUED | OUTPATIENT
Start: 2019-01-31 | End: 2019-02-01 | Stop reason: HOSPADM

## 2019-01-31 RX ORDER — NALOXONE HYDROCHLORIDE 0.4 MG/ML
.1-.4 INJECTION, SOLUTION INTRAMUSCULAR; INTRAVENOUS; SUBCUTANEOUS
Status: DISCONTINUED | OUTPATIENT
Start: 2019-01-31 | End: 2019-02-01 | Stop reason: HOSPADM

## 2019-01-31 RX ORDER — CLOPIDOGREL BISULFATE 75 MG/1
75 TABLET ORAL DAILY
Status: DISCONTINUED | OUTPATIENT
Start: 2019-01-31 | End: 2019-02-01 | Stop reason: HOSPADM

## 2019-01-31 RX ORDER — FERROUS SULFATE 325(65) MG
325 TABLET ORAL DAILY
Status: DISCONTINUED | OUTPATIENT
Start: 2019-01-31 | End: 2019-02-01 | Stop reason: HOSPADM

## 2019-01-31 RX ADMIN — CLOPIDOGREL 75 MG: 75 TABLET, FILM COATED ORAL at 10:28

## 2019-01-31 RX ADMIN — ATORVASTATIN CALCIUM 20 MG: 20 TABLET, FILM COATED ORAL at 17:26

## 2019-01-31 RX ADMIN — ACETAMINOPHEN 1000 MG: 500 TABLET ORAL at 01:01

## 2019-01-31 RX ADMIN — ACETAMINOPHEN 1000 MG: 500 TABLET ORAL at 10:29

## 2019-01-31 RX ADMIN — LISINOPRIL 5 MG: 5 TABLET ORAL at 10:28

## 2019-01-31 RX ADMIN — Medication 25 MG: at 10:28

## 2019-01-31 RX ADMIN — SENNOSIDES AND DOCUSATE SODIUM 1 TABLET: 8.6; 5 TABLET ORAL at 10:28

## 2019-01-31 RX ADMIN — FERROUS SULFATE TAB 325 MG (65 MG ELEMENTAL FE) 325 MG: 325 (65 FE) TAB at 10:28

## 2019-01-31 RX ADMIN — POLYETHYLENE GLYCOL 3350 17 G: 17 POWDER, FOR SOLUTION ORAL at 10:29

## 2019-01-31 RX ADMIN — LEVOTHYROXINE SODIUM 112 MCG: 0.11 TABLET ORAL at 06:28

## 2019-01-31 RX ADMIN — ACETAMINOPHEN 1000 MG: 500 TABLET ORAL at 17:27

## 2019-01-31 RX ADMIN — ESCITALOPRAM OXALATE 10 MG: 10 TABLET ORAL at 10:28

## 2019-01-31 RX ADMIN — DILTIAZEM HYDROCHLORIDE 240 MG: 120 CAPSULE, COATED, EXTENDED RELEASE ORAL at 10:28

## 2019-01-31 RX ADMIN — VITAMIN D, TAB 1000IU (100/BT) 2000 UNITS: 25 TAB at 10:29

## 2019-01-31 RX ADMIN — SULFAMETHOXAZOLE AND TRIMETHOPRIM 1 TABLET: 800; 160 TABLET ORAL at 20:33

## 2019-01-31 RX ADMIN — SODIUM CHLORIDE: 9 INJECTION, SOLUTION INTRAVENOUS at 00:54

## 2019-01-31 ASSESSMENT — ACTIVITIES OF DAILY LIVING (ADL)
ADLS_ACUITY_SCORE: 31

## 2019-01-31 ASSESSMENT — MIFFLIN-ST. JEOR: SCORE: 1259.88

## 2019-01-31 NOTE — ED TRIAGE NOTES
Pt arrives via EMS from [facility] care unit for right sided weakness. EMS states that the staff at [facility] thought she was leaning to the right when they stood her up and that she was weaker than normal. Hx of prior CVA and dementia. Pt oriented to self- baseline. Hand grasps equal, no facial droop.

## 2019-01-31 NOTE — PROGRESS NOTES
"[facility] ADMISSION NOTE    Patient admitted to room 2208 at approximately 0005 via cart from emergency room. Patient was accompanied by transport tech.     Verbal SBAR report received from Brooks Donato RN prior to patient arrival.     Patient trasferred to bed via air karla. Patient alert and oriented X 2. Pain is controlled without any medications.  . Admission vital signs: Blood pressure 173/73, pulse 103, temperature 98.3  F (36.8  C), temperature source Oral, resp. rate 18, height 1.651 m (5' 5\"), weight 81.4 kg (179 lb 7.3 oz), SpO2 92 %. Patient was oriented to plan of care, call light, bed controls, tv, telephone, bathroom and visiting hours.     Risk Assessment    The following safety risks were identified during admission: fall and skin. Yellow risk band applied: YES.     Skin Initial Assessment    This writer admitted this patient and completed a full skin assessment and Tano score in the Adult PCS flowsheet. Appropriate interventions initiated as needed.     Secondary skin check completed by Martina Lobo RN.    Tano Risk Assessment  Sensory Perception: 3-->slightly limited  Moisture: 3-->occasionally moist  Activity: 2-->chairfast  Mobility: 3-->slightly limited  Nutrition: 3-->adequate  Friction and Shear: 3-->no apparent problem  Tano Score: 17  Bed Support Surface: Atmos Air mattress  Reassessed using Bed Algorithm: No  Tano Intervention(s) Implemented: antiembolic/splint/device removed for skin assessment, draw sheets, heels suspended, HOB elevated 30 degrees or less, patient /family education on pressure injury prevention, pillows between bony prominences, repositioned/turned q2hr    Julia Moncada"

## 2019-01-31 NOTE — PROGRESS NOTES
[facility] Medicine Progress Note  Date of Service: 01/31/2019     Assessment & Plan   Deanne Zayas is a 85 year old female admitted on 1/30/2019. She   Presented with worsened right sided weakness and was later found to have rigors in the ER.    Acute on chronic right hemiparesis-suspect a watershed effect due to acute illness in patient with previous stroke.    Head CTA and CT neg for acute findings. Mild to moderate right sided weakness is likely chronic and may be exacerbated by acute illness. Recurrent CVA possible but unlikely.    Appears to be improving. Significant right shoulder pain due to likely arthritis limits exam but right lower extremity strength appears improved. Suspect some generalized weakness due to acute illness.   - physical therapy, occupational therapy evals pending   - continue to treat UTI    UTI / rigors    UA suggestive of UTI with marked leucocytosis, pt with rigors in ER, CXR clear. WBC mildly elevated. No fever but given her age, I would consider rigors as a fever equivalent.     Improving after starting ceftriaxone. UC pending.    - change to oral Bactrim this evening (lost IV site), await culture results     Paroxysmal atrial fibrillation     Present in 3/2018 with afib with RVR. She was given IV diltiazem and converted to NSR. CHADS-VASC was 5, however she has a history of multiple falls and family opted against anticoagulation. Pt in afib currently.    HR < 100. No changes today.      Late onset Alzheimer's disease without behavioral disturbance  Lives in memory care unit. [facility] 8/2017 was 8/30.      Coronary artery disease   Hyperlipidemia LDL goal <130  Hypertension goal BP (blood pressure) < 140/90  On plavix, atorvastatin, lisinopril 5mg daily. BP decent control.    - no changes today    Osteoarthritis of multiple joints, unspecified osteoarthritis type    Pain in multiple joints. Marked pain in right shoulder which does not seem  new. This interferes with strength exam.    - continue acetaminophen 1000 mg TID     hypothyroidism  On synthroid 112mcg daily.      Slow transit constipation  Chronic issues, staff reports currently having soft/loose stools 1-2 times daily. She is on miralax, senna-s daily.      Vitamin B 12 deficiency  Level of 412 3/2018. Remains on monthly B12 injection.      Iron deficiency anemia secondary to inadequate dietary iron intake  Currently on daily iron supplement, baseline Hgb ~12     Recurrent falls  Multiple falls in the past year, typically when patient is attempting to self transfer. She has right sided weakness from CVA and advanced dementia. She is primarily wheelchair bound. No injuries noted from falls.      CKD stage 3  Stable creatinine    Mild hyperglycemia  Check HgbA1c but I did not order insulin order set.       Diet: regular  DVT Prophylaxis: Pneumatic Compression Devices  Tan Catheter: not present  Code Status: dnr, dni  IV-saline at 100 cc    Discussion: Suspect patient was systemically ill from UTI causing worsening of her chronic weakness, now improving. Assessing her function with physical therapy and occupational therapy. High risk for falls made worse by acute illness.     Disposition: Anticipate discharge 1-2 more days if strength continue to improve     Attestation:  Total time: 30 minutes    Lucas Castro MD  [facility] Medicine        Interval History   C/o not feeling well, though she has a hard time saying why. She notes pain in right arm from shoulder to forearm that is not new and is worse with movement. Does not recall urinary symptoms. Denies chest pain, shortness of breath, nausea, abdominal pain presently.     Physical Exam   Temp:  [97.8  F (36.6  C)-98.7  F (37.1  C)] 97.8  F (36.6  C)  Pulse:  [] 73  Heart Rate:  [96-98] 98  Resp:  [16-18] 16  BP: (133-191)/() 157/70  SpO2:  [92 %-100 %] 94 %    Weights:   Vitals:    01/30/19 2039 01/31/19 0022   Weight: 81.6 kg (180  lb) 81.4 kg (179 lb 7.3 oz)    Body mass index is 29.86 kg/m .    Constitutional: alert, oriented to self but not month, year or age. Appears weak and pale but otherwise nontoxic and is cooperative and pleasant. Feeding herself but very slowly. When I tell her she is 86 yo, she is surprised. Does not seem a very reliable historian.   CV: Regular, no murmur, trace bilateral lower extremity edema   Respiratory: CTA bilaterally, normal respiratory effort  GI: Soft, non-tender, bowel sounds present  Skin: Warm and dry  Musculoskeletal: Pain in right shoulder with more than 90 deg internal to external rotation, cannot raise arm to shoulder height due to pain as we get close. Shoulder is painful to palpation diffusely, painful tight muscles upper back right >> left. No erythema or increased warmth. Right elbow ROM decent and without swelling. Right wrist normal.   Neuro: right  sl weaker then left, marked decreased strength right upper extremity but seems may be due to pain. Able to lift right lower extremity and hip flexion sl weaker than left. Dorsiflexion about equal right and left.     Data   Recent Labs   Lab 01/31/19  0547 01/30/19  2101   WBC 10.2 12.2*   HGB 10.5* 13.0   * 99    272    140   POTASSIUM 3.7 3.9   CHLORIDE 110* 106   CO2 26 27   BUN 18 21   CR 0.94 1.12*   ANIONGAP 7 7   RUBIN 8.1* 9.1   * 175*   TROPI  --  <0.015       Recent Labs   Lab 01/31/19  0547 01/30/19  2101   * 175*        Unresulted Labs Ordered in the Past 30 Days of this Admission     Date and Time Order Name Status Description    1/30/2019 2332 Blood culture Preliminary     1/30/2019 2332 Blood culture Preliminary     1/30/2019 2301 Urine Culture Aerobic Bacterial Preliminary            Imaging:   Recent Results (from the past 24 hour(s))   CT Head w/o Contrast    Narrative    CT OF THE HEAD WITHOUT CONTRAST 1/30/2019 9:52 PM     COMPARISON: Brain MR 3/16/2018    HISTORY: Ataxia, stroke  suspected    TECHNIQUE: 5 mm thick axial CT images of the head were acquired  without IV contrast material.    FINDINGS: There is moderate diffuse cerebral volume loss. There are  extensive confluent areas of decreased density in the cerebral white  matter bilaterally that are consistent with sequela of chronic small  vessel ischemic disease.    The ventricles and basal cisterns are within normal limits in  configuration given the degree of cerebral volume loss.  There is no  midline shift. There are no extra-axial fluid collections.    No intracranial hemorrhage, mass or recent infarct.    The visualized paranasal sinuses are well-aerated. There is no  mastoiditis. There are no fractures of the visualized bones.      Impression    IMPRESSION: Diffuse cerebral volume loss and cerebral white matter  changes consistent with chronic small vessel ischemic disease. No  evidence for acute intracranial pathology.      Radiation dose for this scan was reduced using automated exposure  control, adjustment of the mA and/or kV according to patient size, or  iterative reconstruction technique    CLINTON BURGESS MD   CT Head Neck Angio w/o & w Contrast    Narrative    CT ANGIOGRAM OF THE HEAD AND NECK WITHOUT AND WITH CONTRAST  1/30/2019  9:55 PM     COMPARISON: None    HISTORY: Ataxia, stroke suspected.    TECHNIQUE: Precontrast localizing scans were followed by CT  angiography with an injection of 70 mL Isovue -370 nonionic  intravenous contrast material with scans through the head and neck.  Images were transferred to a separate 3-D workstation where  multiplanar reformations and 3-D images were created. Estimates of  carotid stenoses are made relative to the distal internal carotid  artery diameters except as noted.      FINDINGS:   Neck CTA: The common carotid arteries bilaterally are patent without  stenosis. There is normal variant retropharyngeal location of the mid  common carotid arteries bilaterally. There is minimal  calcified  atherosclerotic plaque at the origins of the internal carotid arteries  on both sides that does not result in any stenosis on either side. The  cervical internal carotid arteries bilaterally are tortuous, but are  patent without stenosis. The vertebral arteries bilaterally are  tortuous, but are patent without stenosis.    Head CTA: The basilar, bilateral distal internal carotid, bilateral  anterior cerebral, bilateral middle cerebral and bilateral posterior  cerebral arteries are patent and unremarkable. The anterior  communicating artery is patent and unremarkable. There is calcified  atherosclerotic plaque of the intracranial distal internal carotid  arteries on both sides that does not result in any stenosis.      Impression    IMPRESSION: Calcified atherosclerotic plaque of the proximal and  distal internal carotid arteries on both sides that does not result in  any stenosis on either side. Otherwise, normal neck and head CTA.      Radiation dose for this scan was reduced using automated exposure  control, adjustment of the mA and/or kV according to patient size, or  iterative reconstruction technique    CLINTON BURGESS MD   XR Chest Port 1 View    Narrative    CHEST SINGLE VIEW PORTABLE  1/31/2019 12:11 AM     HISTORY: Rigors.    COMPARISON: None.    FINDINGS: The lungs are clear. Normal-sized cardiac silhouette.  Tortuous or ectatic thoracic aorta.      Impression    IMPRESSION: No evidence of active cardiopulmonary disease.    MONI HOWELL MD        I reviewed all new labs and imaging results over the last 24 hours. I personally reviewed the chest x-ray image(s) showing clear lungs.    Medications     sodium chloride 100 mL/hr at 01/31/19 0054       acetaminophen  1,000 mg Oral Q8H     atorvastatin  20 mg Oral QPM     [START ON 2/1/2019] cefTRIAXone  1 g Intravenous Q24H     clopidogrel  75 mg Oral Daily     diltiazem ER COATED BEADS  240 mg Oral Daily     escitalopram  10 mg Oral Daily      ferrous sulfate  325 mg Oral Daily     levothyroxine  112 mcg Oral QAM AC     lisinopril  5 mg Oral Daily     polyethylene glycol  17 g Oral Daily     senna-docusate  1 tablet Oral Daily     traZODone  25 mg Oral Daily     vitamin D3  2,000 Units Oral Daily   Reviewed meds    Lucas Castro MD  [facility] Medicine

## 2019-01-31 NOTE — ED NOTES
Due to risk of falls, pt placed into visible room, curtains open. Bed rails up. Bed in low position. Call light within reach. Pt encouraged to call if any need arise.

## 2019-01-31 NOTE — ED NOTES
Bed: ED08  Expected date:   Expected time:   Means of arrival:   Comments:  86 yo F, L sided weakness (-) stroke scale

## 2019-01-31 NOTE — H&P
[facility]    History and Physical - Hospitalist Service       Date of Admission:  1/30/2019    Assessment & Plan   Deanne Zayas is a 85 year old female admitted on 1/30/2019. She   Presented with worsened right sided weakness and was later found to have rigors in the ER.    Acute on chronic right hemiparesis-suspect a watershed effect due to acute illness in patient with previous stroke.  Head CTA and CT neg for acute findings. Mild to moderate right sided weakness is likely chronic and may be exacerbated by acute illness. Recurrent CVA possible but unlikely.    UTI / rigors  UA suggestive of UTI, pt with rigors in ER, CXR ordered by me is pending. WBC mildly elevated. Temp in ER is currently normal. Follow abdominal exam.     Paroxysmal atrial fibrillation Present in 3/2018 with afib with RVR. She was given IV diltiazem and converted to NSR. CHADS-VASC was 5, however she has a history of multiple falls and family opted against anticoagulation. Pt in afib currently.     Late onset Alzheimer's disease without behavioral disturbance  Lives in memory care unit. [facility] 8/2017 was 8/30.      Coronary artery disease   Hyperlipidemia LDL goal <130  Hypertension goal BP (blood pressure) < 140/90  On plavix, atorvastatin, lisinopril 5mg daily. 's-140's.     Osteoarthritis of multiple joints, unspecified osteoarthritis type  Pain in multiple joints     hypothyroidism  On synthroid 112mcg daily.      Slow transit constipation  Chronic issues, staff reports currently having soft/loose stools 1-2 times daily. She is on miralax, senna-s daily.      Vitamin B 12 deficiency  Level of 412 3/2018. Remains on monthly B12 injection.      Iron deficiency anemia secondary to inadequate dietary iron intake  Currently on daily iron supplement, baseline Hgb ~12     Recurrent falls  Multiple falls in the past year, typically when patient is attempting to self transfer. She has right sided weakness from  CVA and advanced dementia. She is primarily wheelchair bound. No injuries noted from falls.      CKD stage 3  Stable creatinine    Mild hyperglycemia  Check HgbA1c but I did not order insulin order set.       Diet: regular  DVT Prophylaxis: Pneumatic Compression Devices  Tan Catheter: not present  Code Status: dnr, dni  IV-saline at 100 cc    Disposition Plan   Expected discharge: 2 - 3 days, recommended to prior living arrangement once infection has improved and neuro status is stable..  Entered: Jaspal Mckay MD, MD 01/30/2019, 11:41 PM     The patient's care was discussed with the Patient.    Jaspal Mckay MD, MD  [facility]    ______________________________________________________________________    Chief Complaint   Increased right sided weakness at [facility]    History is obtained from the records and ER MD. No family present. Pt unable to give me a clear history.    History of Present Illness   Deanne Zayas is a 85 year old female with previous CVA, right hemiparesis, htn , previous afib (not anticoagulated due to fall risk), dementia, cad, hyperlipidemia and hypothyroidism presents with weakness, leaning to right and increased right sided weakness.    Later in ER pt had obvious rigors/chills and found to have an abnormal UA.   Given rocephin in ER.    She reports pain in her arms and legs. Family did not accompany pt to ER.      Review of Systems    Unable to obtain clearly due to dementia    Past Medical History    I have reviewed this patient's medical history and updated it with pertinent information if needed.   Past Medical History:   Diagnosis Date     Alzheimer's dementia without behavioral disturbance      Depression      Dyslipidemia      Essential hypertension      History of CVA (cerebrovascular accident)      Hypothyroidism      Right hemiparesis (H), secondary to previous CVA        Past Surgical History   I have reviewed this patient's surgical  history and updated it with pertinent information if needed.  Past Surgical History:   Procedure Laterality Date     CHOLECYSTECTOMY       CYSTOSCOPY N/A 2014    Procedure: CYSTOSCOPY;  Surgeon: ANNAMARIE Kern MD;  Location: [facility] OR     SURGICAL HISTORY OF -        cataract surgery bilat.        Social History   I have reviewed this patient's social history and updated it with pertinent information if needed.  Social History     Tobacco Use     Smoking status: Never Smoker     Smokeless tobacco: Never Used   Substance Use Topics     Alcohol use: No     Drug use: No       Family History   I have reviewed this patient's family history and updated it with pertinent information if needed.   Family History   Problem Relation Age of Onset     Family History Negative Mother         childbirth     Unknown/Adopted Mother          during child birth at a young age     Family History Negative Father         fell off roof broke neck     Obesity Sister        Prior to Admission Medications   Prior to Admission Medications   Prescriptions Last Dose Informant Patient Reported? Taking?   BISAC-EVAC 10 MG Suppository   No No   Sig: UNWRAP AND INSERT ONE SUPPOSITORY RECTALLY EVERY 3 DAYS AS NEEDED IF NO BM   Coloplast barrier cream CREA   Yes No   Sig: Apply topically 4 times daily as needed   FEROSUL 325 (65 Fe) MG tablet   No No   Sig: TAKE 1 TABLET BY MOUTH ONCE DAILY   MICRO GUARD 2 % powder   No No   Sig: APPLY TOPICALLY TO VAGINA THREE TIMES DAILY AS NEEDED   SM STOOL SOFTENER 8.6-50 MG tablet   No No   Sig: TAKE 1 TABLET BY MOUTH ONCE DAILY   VITAMIN D3 1000 units tablet   No No   Sig: TAKE TWO TABLETS (2000 UNITS) BY MOUTH ONCE DAILY   acetaminophen (MAPAP) 500 MG tablet   No No   Sig: Take 2 tablets (1,000 mg) by mouth every 8 hours   atorvastatin (LIPITOR) 20 MG tablet   No No   Sig: TAKE 1 TABLET BY MOUTH ONCE DAILY   clopidogrel (PLAVIX) 75 MG tablet   No No   Sig: TAKE 1 TABLET BY MOUTH ONCE DAILY  "  cyanocobalamin (VITAMIN B12) 1000 MCG/ML injection  Nursing Home No No   Sig: Inject 1 mL (1,000 mcg) Subcutaneous every 30 days   diltiazem 240 MG 24 hr capsule   No No   Sig: Take 1 capsule (240 mg) by mouth daily   escitalopram (LEXAPRO) 10 MG tablet   No No   Sig: TAKE 1 TABLET BY MOUTH ONCE DAILY   levothyroxine (SYNTHROID/LEVOTHROID) 112 MCG tablet   No No   Sig: TAKE 1 TABLET BY MOUTH ONCE DAILY   lisinopril (PRINIVIL/ZESTRIL) 5 MG tablet   No No   Sig: Take 1 tablet (5 mg) by mouth daily   miconazole (MICATIN) 2 % AERP powder  Nursing Home Yes No   Sig: Apply topically 3 times daily as needed   polyethylene glycol (MIRALAX/GLYCOLAX) powder   No No   Sig: MIX 17GM OF POWDER IN 8OZ OF WATER UNTIL COMPLETELY DISSOLVED. DRINK SOLUTION BY MOUTH ONCE DAILY AS NEEDED FOR CONSTIPATION   syringe/needle, disp, 25G X 1\" 3 ML MISC  Nursing Home No No   Sig: USE AS DIRECTED WITH B12 INECTION   traZODone (DESYREL) 50 MG tablet   No No   Sig: Take 0.5 tablets (25 mg) by mouth daily      Facility-Administered Medications: None     Allergies   Allergies   Allergen Reactions     Donepezil Other (See Comments)     At 10 mg, tremulousness and decreased appetite       Physical Exam   Vital Signs: Temp: 98.7  F (37.1  C) Temp src: Oral BP: (!) 186/94 Pulse: 116 Heart Rate: 96   SpO2: 95 % O2 Device: None (Room air)    Weight: 180 lbs 0 oz    Constitutional: alert, confused, some rigors  Eyes: Lids and lashes normal, pupils equal, round and reactive to light, extra ocular muscles intact, sclera clear, conjunctiva normal  ENT: Normocephalic, without obvious abnormality, atraumatic, sinuses nontender on palpation, external ears without lesions, oral pharynx with moist mucous membranes, tonsils without erythema or exudates, gums normal and good dentition.  Respiratory: No increased work of breathing, good air exchange, clear to auscultation bilaterally, no crackles or wheezing  Cardiovascular: ireg rate, no murmur, click or " rub  GI: soft, mild suprapubic tenderness, no guarding or rebound, bs pos  Genitounirinary:  Grossly normal genitalia  Skin: appears flushed  Musculoskeletal: no lower extremity pitting edema present  there is no redness, warmth, or swelling of the joints  Neurologic: alert, disoriented to time , place and events, mild 4/5 weakness rue and rle, CN are intact, reflexes symmetrical  Neuropsychiatric: Orientation: disoriented to time and place    Data   Data reviewed today: I reviewed all medications, new labs and imaging results over the last 24 hours. I personally reviewed the EKG tracing showing afib and the chest CT image(s) showing no acute abnormalities.    Recent Labs   Lab 01/30/19  2101   WBC 12.2*   HGB 13.0   MCV 99         POTASSIUM 3.9   CHLORIDE 106   CO2 27   BUN 21   CR 1.12*   ANIONGAP 7   RUBIN 9.1   *   TROPI <0.015

## 2019-01-31 NOTE — PLAN OF CARE
Pt had a restful night, neuro's are intact, she did c/o some pain in her shoulder's.  When this writer palpated pt's abdomin she c/o of tenderness in her RUQ, bowel sounds are positive, abdomin is soft. Will continue to monitor close for changes.

## 2019-01-31 NOTE — PROGRESS NOTES
Skin affirmation note    Admitting nurse completed full skin assessment, Tano score and Tano interventions. This writer agrees with the initial skin assessment findings.

## 2019-01-31 NOTE — PLAN OF CARE
OT: Per chart review at baseline pt is Ax2 for pivot transfer, w/c bound, has assist with all ADLs including: bathing, dressing, eating, grooming/hygiene and toileting. Per discussion with RN pt was Ax2 for pivot transfer today, which appears baseline. Discussed all these finding with SW- Per SW plan is to return to [facility] with continued services. No IP OT evaluation indicated at this time as pt appears to be at baseline for ADLs.

## 2019-01-31 NOTE — CONSULTS
Care Transition Initial Assessment - RN    DATA   Principal Problem:    Urinary tract infection  Active Problems:    Right hemiparesis (H), secondary to previous CVA       Primary Care Clinic Name:  Geriatrics  Primary Care MD Name: Kori Card  Contact information and PCP information verified: Yes    ASSESSMENT  Cognitive Status: awake.       Resources List: Assisted Living              Description of Support System: Supportive, Involved   Who is your support system?: Facility resident(s)/Staff   Support Assessment: Adequate family and caregiver support   Insurance Concerns: No Insurance issues identified      Patient lives at [facility] (phone: [phone] Fax: [phone]) in memory care unit. She is primarily wheel chair bound. Plan to return to [facility] when medically able. Patient also has a CM through  [facility] ([phone]).    1430: Spoke with patients daughter at bedside, patient is assist of 2 to her wheelchair, she gets assistance with meds, dressing, bathing, and transferring at [facility]. She will need transportation arranged on discharge.     PLAN    Return to [facility]      Discharge Planner   [facility]  Barriers to discharge plan: clinical improvement  Follow up plan: per MD       Entered by: DU BOWSER 01/31/2019 11:40 AM         Elaina Miller, MSN, RN, Care Coordinator  [facility] [phone]  [facility] [phone]

## 2019-01-31 NOTE — PROGRESS NOTES
1-31-19  CC was notified that member was sent to ED and now inpatient to rule out stroke. CC then called and left  with discharge office asking for update.      CC received call back and member is going to be inpatient for 2-3 more days.  UTI was found and is being treated.  CC also talked with RN at [facility] and she said that member was doing well at this time and she will update CC as needed once member returns.     CC also follow-up with financial worker on member MA renewal.  Betzaida stated she still has not gotten anything back from member on this.  CC has talked with daughter on 1-15 so CC left her another  asking for update.   Yennifer Hernandez MA [facility] Care Coordinator   [phone]        [facility] Six-Month Telephone Assessment    6 month telephone assessment completed.    ER visits: Yes -  [facility]  Hospitalizations: Yes -  [facility]  TCU stays: No  Significant health status changes: none at this time AL will update CC as needed   Falls/Injuries: No  ADL/IADL changes: No  Changes in services: No    Caregiver Assessment follow up:  None     Goals: See POC in chart for goal progress documentation.  Updated     Will see member in 6 months for an annual health risk assessment.   Encouraged member to call CC with any questions or concerns in the meantime.

## 2019-01-31 NOTE — PLAN OF CARE
PT-  Per discussion w/ RN, pt at baseline w/ transfers .  Pt  resides at [facility] and transfers  w/ assistance of 2; will defer PT  eval

## 2019-01-31 NOTE — PROGRESS NOTES
Pt has been alert, calm, quiet. VSS, afebrile. Incontinent of urine and stool. Lungs are clear, on room air. Needs assist to eat and drink. Transfers to chair from bed with assist of 2.

## 2019-02-01 VITALS
BODY MASS INDEX: 29.9 KG/M2 | OXYGEN SATURATION: 99 % | HEIGHT: 65 IN | SYSTOLIC BLOOD PRESSURE: 144 MMHG | WEIGHT: 179.45 LBS | TEMPERATURE: 97.5 F | DIASTOLIC BLOOD PRESSURE: 61 MMHG | HEART RATE: 77 BPM | RESPIRATION RATE: 18 BRPM

## 2019-02-01 PROCEDURE — 25000132 ZZH RX MED GY IP 250 OP 250 PS 637: Performed by: FAMILY MEDICINE

## 2019-02-01 PROCEDURE — 25000132 ZZH RX MED GY IP 250 OP 250 PS 637: Performed by: INTERNAL MEDICINE

## 2019-02-01 PROCEDURE — 99238 HOSP IP/OBS DSCHRG MGMT 30/<: CPT | Performed by: INTERNAL MEDICINE

## 2019-02-01 RX ORDER — SULFAMETHOXAZOLE/TRIMETHOPRIM 800-160 MG
1 TABLET ORAL 2 TIMES DAILY
Qty: 6 TABLET | Refills: 0 | Status: SHIPPED | OUTPATIENT
Start: 2019-02-01 | End: 2019-03-06

## 2019-02-01 RX ORDER — SULFAMETHOXAZOLE/TRIMETHOPRIM 800-160 MG
1 TABLET ORAL 2 TIMES DAILY
Refills: 0 | DISCHARGE
Start: 2019-02-01 | End: 2019-02-01

## 2019-02-01 RX ADMIN — LISINOPRIL 5 MG: 5 TABLET ORAL at 08:21

## 2019-02-01 RX ADMIN — Medication 25 MG: at 08:42

## 2019-02-01 RX ADMIN — ACETAMINOPHEN 1000 MG: 500 TABLET ORAL at 00:30

## 2019-02-01 RX ADMIN — ESCITALOPRAM OXALATE 10 MG: 10 TABLET ORAL at 08:20

## 2019-02-01 RX ADMIN — VITAMIN D, TAB 1000IU (100/BT) 2000 UNITS: 25 TAB at 08:24

## 2019-02-01 RX ADMIN — SENNOSIDES AND DOCUSATE SODIUM 1 TABLET: 8.6; 5 TABLET ORAL at 08:26

## 2019-02-01 RX ADMIN — CLOPIDOGREL 75 MG: 75 TABLET, FILM COATED ORAL at 08:21

## 2019-02-01 RX ADMIN — ACETAMINOPHEN 1000 MG: 500 TABLET ORAL at 08:18

## 2019-02-01 RX ADMIN — SULFAMETHOXAZOLE AND TRIMETHOPRIM 1 TABLET: 800; 160 TABLET ORAL at 08:24

## 2019-02-01 RX ADMIN — DILTIAZEM HYDROCHLORIDE 240 MG: 120 CAPSULE, COATED, EXTENDED RELEASE ORAL at 08:24

## 2019-02-01 RX ADMIN — FERROUS SULFATE TAB 325 MG (65 MG ELEMENTAL FE) 325 MG: 325 (65 FE) TAB at 08:20

## 2019-02-01 RX ADMIN — LEVOTHYROXINE SODIUM 112 MCG: 0.11 TABLET ORAL at 06:19

## 2019-02-01 ASSESSMENT — ACTIVITIES OF DAILY LIVING (ADL)
ADLS_ACUITY_SCORE: 31

## 2019-02-01 NOTE — PLAN OF CARE
Pt  VSS, afebrile, confused at times, but answers appropriately and follows directions.  Patient has been incontinent of urine and stool this shift.   Transfered to chair or BSC with assist of 2.   Patient required tray set up for meal, however fed herself supper without difficulty.

## 2019-02-01 NOTE — DISCHARGE SUMMARY
[facility]  Discharge Summary  Hospital Medicine       Date of Admission:  1/30/2019  Date of Discharge:  2/1/2019  Discharging Provider: Lucas Castro MD      Identification and Chief Compaint: Deanne Zayas is a 85 year old female who presented on 1/30/2019 with complaint of worsened right sided weakness and was later found to have rigors in the ER.    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  No follow up labs or test are needed.               Unresulted Labs Ordered in the Past 30 Days of this Admission     Date and Time Order Name Status Description    1/30/2019 2332 Blood culture Preliminary     1/30/2019 2332 Blood culture Preliminary     1/30/2019 2301 Urine Culture Aerobic Bacterial Preliminary       These results will be followed up by Dr. Castro    Hospital Course      Deanne Zayas is a 85 year old female admitted on 1/30/2019. She   Presented with worsened right sided weakness and was later found to have rigors in the ER.    Acute on chronic right hemiparesis-suspect a watershed effect due to acute illness in patient with previous stroke.    Head CTA and CT neg for acute findings. Mild to moderate right sided weakness is likely chronic and may be exacerbated by acute illness. Recurrent CVA possible but unlikely.    On discharge, patient strength appears  at baseline. Moving right upper extremity much more easily today. Requires assist with transfers at baseline.     UTI / rigors    UA suggestive of UTI with marked leucocytosis, pt with rigors in ER, CXR clear. WBC mildly elevated. No fever but given her age, I would consider rigors as a fever equivalent.     Improved after starting ceftriaxone. Changed to Bactrim. UC 50-100k GNR with ID/sens pending. Appears to be back to baseline, clinically. Will complete total 5 days with 3 more days of Bactrim. Will follow up UC results and call in antibiotic change if necessary, but I expect Bactrim will be adequate coverage.   ADDENDUM 2/4/2019: UCx positive for ,000 Klebsiella R:ampicillin, S:ceftriaxone, Bactrim, amp/sulbactam and also positive for 10-50,000 Aerococcus urinae. A.urinae is uncommon but can cause UTI with systemic illness and is usually resistant to sulfamides and fluoroquinolones but sensitive to betalactams, including ceftriaxone and amoxicillin. I called [facility] and discussed with patient's nurse and am adding amoxicillin-clavulonate 875-125 twice daily for 3 days to cover the A.urinae and also extends coverage of the Klebsiella. She asked that I enter the medication into Epic as a transitional med, and she will see that the pharmacy fills it. ( 2/4/2019)    Paroxysmal atrial fibrillation     Present in 3/2018 with afib with RVR. She was given IV diltiazem and converted to NSR. CHADS-VASC was 5, however she has a history of multiple falls and family opted against anticoagulation.     Rhythm appears regular on discharge. No apparent need for rate control medication.      Late onset Alzheimer's disease without behavioral disturbance   Lives in memory care unit. [facility] 8/2017 was 8/30.      Coronary artery disease   Hyperlipidemia LDL goal <130  Hypertension goal BP (blood pressure) < 140/90   On plavix, atorvastatin, lisinopril 5mg daily. BP decent control.     Osteoarthritis of multiple joints,  unspecified osteoarthritis type    Pain in multiple joints. Marked pain in right shoulder which does not seem new. This interferes with strength exam.     On discharge, appears to be having less shoulder pain and better movement of right upper extremity.    - continue acetaminophen 1000 mg TID     Hypothyroidism   On synthroid 112mcg daily.      Slow transit constipation   Chronic issues, staff reports currently having soft/loose stools 1-2 times daily. She is on miralax, senna-s daily.      Vitamin B 12 deficiency   Level of 412 3/2018. Remains on monthly B12 injection.      Iron deficiency anemia secondary to inadequate dietary iron intake   Currently on daily iron supplement, baseline Hgb ~12     Recurrent falls   Multiple falls in the past year, typically when patient is attempting to self transfer. She has right sided weakness from CVA and advanced dementia. She is primarily wheelchair bound. No injuries noted from falls.      CKD stage 3   Stable creatinine    Mild hyperglycemia   On admission. Likely stress response. Hgb A1c 5.9. No treatment necessary.       Consultations This Hospital Stay    None    Code Status   DNR/DNI    The discharge plan was discussed with the patient and also daughter by phone, and daughter expressed understanding.     Time Spent on this Encounter   Total time on this discharge was 25 minutes.       Lucas Castro MD  [facility]  ______________________________________________________________________    Physical Exam   Vital Signs: Temp: 97.5  F (36.4  C) Temp src: Oral BP: 144/61 Pulse: 77   Resp: 18 SpO2: 99 % O2 Device: None (Room air)    Weight: 179 lbs 7.27 oz  Constitutional: alert, good color, NAD, oriented to self, cooperative, interactive.  CV: Regular rhythm, no edema  Respiratory: CTA bilaterally  GI: Soft, nontender  Skin: Warm and dry  Neuro: , bicep and triceps strength slight less on right but better than yesterday. Good movement of  right lower extremity against gravity.   Musculoskeletal: Pain with rom of right shoulder but seems less bothersome to patient today       Primary Care Physician   Kori Card  [address] [id number]     Discharge Disposition   Discharged to memory care unit  Condition at discharge: Stable    Significant Results and Procedures   Results for orders placed or performed during the hospital encounter of 01/30/19   CT Head w/o Contrast    Narrative    CT OF THE HEAD WITHOUT CONTRAST 1/30/2019 9:52 PM     COMPARISON: Brain MR 3/16/2018    HISTORY: Ataxia, stroke suspected    TECHNIQUE: 5 mm thick axial CT images of the head were acquired  without IV contrast material.    FINDINGS: There is moderate diffuse cerebral volume loss. There are  extensive confluent areas of decreased density in the cerebral white  matter bilaterally that are consistent with sequela of chronic small  vessel ischemic disease.    The ventricles and basal cisterns are within normal limits in  configuration given the degree of cerebral volume loss.  There is no  midline shift. There are no extra-axial fluid collections.    No intracranial hemorrhage, mass or recent infarct.    The visualized paranasal sinuses are well-aerated. There is no  mastoiditis. There are no fractures of the visualized bones.      Impression    IMPRESSION: Diffuse cerebral volume loss and cerebral white matter  changes consistent with chronic small vessel ischemic disease. No  evidence for acute intracranial pathology.      Radiation dose for this scan was reduced using automated exposure  control, adjustment of the mA and/or kV according to patient size, or  iterative reconstruction technique    CLINTON BURGESS MD   CT Head Neck Angio w/o & w Contrast    Narrative    CT ANGIOGRAM OF THE HEAD AND NECK WITHOUT AND WITH CONTRAST  1/30/2019  9:55 PM     COMPARISON: None    HISTORY: Ataxia, stroke suspected.    TECHNIQUE: Precontrast localizing scans  were followed by CT  angiography with an injection of 70 mL Isovue -370 nonionic  intravenous contrast material with scans through the head and neck.  Images were transferred to a separate 3-D workstation where  multiplanar reformations and 3-D images were created. Estimates of  carotid stenoses are made relative to the distal internal carotid  artery diameters except as noted.      FINDINGS:   Neck CTA: The common carotid arteries bilaterally are patent without  stenosis. There is normal variant retropharyngeal location of the mid  common carotid arteries bilaterally. There is minimal calcified  atherosclerotic plaque at the origins of the internal carotid arteries  on both sides that does not result in any stenosis on either side. The  cervical internal carotid arteries bilaterally are tortuous, but are  patent without stenosis. The vertebral arteries bilaterally are  tortuous, but are patent without stenosis.    Head CTA: The basilar, bilateral distal internal carotid, bilateral  anterior cerebral, bilateral middle cerebral and bilateral posterior  cerebral arteries are patent and unremarkable. The anterior  communicating artery is patent and unremarkable. There is calcified  atherosclerotic plaque of the intracranial distal internal carotid  arteries on both sides that does not result in any stenosis.      Impression    IMPRESSION: Calcified atherosclerotic plaque of the proximal and  distal internal carotid arteries on both sides that does not result in  any stenosis on either side. Otherwise, normal neck and head CTA.      Radiation dose for this scan was reduced using automated exposure  control, adjustment of the mA and/or kV according to patient size, or  iterative reconstruction technique    CLINTON BURGESS MD   XR Chest Port 1 View    Narrative    CHEST SINGLE VIEW PORTABLE  1/31/2019 12:11 AM     HISTORY: Rigors.    COMPARISON: None.    FINDINGS: The lungs are clear. Normal-sized cardiac  silhouette.  Tortuous or ectatic thoracic aorta.      Impression    IMPRESSION: No evidence of active cardiopulmonary disease.    MONI HOWELL MD     Procedures    None    Discharge Orders      General info for SNF    Length of Stay Estimate: Long Term Care  Condition at Discharge: Stable  Level of care:skilled   Rehabilitation Potential: Poor  Admission H&P remains valid and up-to-date: Yes  Recent Chemotherapy: N/A  Use Nursing Home Standing Orders: Yes     Mantoux instructions    Give two-step Mantoux (PPD) Per Facility Policy Yes     Reason for your hospital stay    Urinary tract infection with systemic symptoms. Weakness due to UTI.     Follow Up and recommended labs and tests    Follow up with primary care provider in 1 weeks.  No follow up labs or test are needed.     Activity - Up with nursing assistance     Advance Diet as Tolerated    Follow this diet upon discharge: Regular diet     Discharge Medications   Current Discharge Medication List      START taking these medications    Details   sulfamethoxazole-trimethoprim (BACTRIM DS/SEPTRA DS) 800-160 MG tablet Take 1 tablet by mouth 2 times daily for 3 days Through morning dose of 2/4/2019  Refills: 0    Associated Diagnoses: Acute cystitis without hematuria         CONTINUE these medications which have NOT CHANGED    Details   acetaminophen (MAPAP) 500 MG tablet Take 2 tablets (1,000 mg) by mouth every 8 hours  Qty: 90 tablet, Refills: 11    Associated Diagnoses: Pain      atorvastatin (LIPITOR) 20 MG tablet TAKE 1 TABLET BY MOUTH ONCE DAILY  Qty: 31 tablet, Refills: 98    Associated Diagnoses: Cerebrovascular accident (CVA) due to thrombosis of left middle cerebral artery (H)      clopidogrel (PLAVIX) 75 MG tablet TAKE 1 TABLET BY MOUTH ONCE DAILY  Qty: 31 tablet, Refills: 98    Associated Diagnoses: 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)      cyanocobalamin (VITAMIN B12) 1000 MCG/ML injection Inject 1 mL (1,000  mcg) Subcutaneous every 30 days  Qty: 1 mL, Refills: 98    Associated Diagnoses: Vitamin B 12 deficiency      diltiazem 240 MG 24 hr capsule Take 1 capsule (240 mg) by mouth daily  Qty: 31 capsule, Refills: 98    Associated Diagnoses: Atrial fibrillation with rapid ventricular response (H)      escitalopram (LEXAPRO) 10 MG tablet TAKE 1 TABLET BY MOUTH ONCE DAILY  Qty: 31 tablet, Refills: 98    Associated Diagnoses: Anxiety      FEROSUL 325 (65 Fe) MG tablet TAKE 1 TABLET BY MOUTH ONCE DAILY  Qty: 31 tablet, Refills: 98    Associated Diagnoses: Iron deficiency anemia secondary to inadequate dietary iron intake      levothyroxine (SYNTHROID/LEVOTHROID) 112 MCG tablet TAKE 1 TABLET BY MOUTH ONCE DAILY  Qty: 31 tablet, Refills: 98    Associated Diagnoses: Other specified hypothyroidism      lisinopril (PRINIVIL/ZESTRIL) 5 MG tablet Take 1 tablet (5 mg) by mouth daily  Qty: 90 tablet, Refills: 3    Associated Diagnoses: Benign essential hypertension      miconazole (MICATIN) 2 % AERP powder Apply topically 3 times daily as needed      MICRO GUARD 2 % powder APPLY TOPICALLY TO VAGINA THREE TIMES DAILY AS NEEDED  Qty: 85 g, Refills: 97    Associated Diagnoses: Rash and nonspecific skin eruption      SM STOOL SOFTENER 8.6-50 MG tablet TAKE 1 TABLET BY MOUTH ONCE DAILY  Qty: 31 tablet, Refills: 98    Associated Diagnoses: Constipation, unspecified constipation type      traZODone (DESYREL) 50 MG tablet Take 0.5 tablets (25 mg) by mouth daily  Qty: 16 tablet, Refills: 98    Associated Diagnoses: Anxiety; Insomnia due to medical condition      VITAMIN D3 1000 units tablet TAKE TWO TABLETS (2000 UNITS) BY MOUTH ONCE DAILY  Qty: 62 tablet, Refills: 98    Associated Diagnoses: Senile osteoporosis      BISAC-EVAC 10 MG Suppository UNWRAP AND INSERT ONE SUPPOSITORY RECTALLY EVERY 3 DAYS AS NEEDED IF NO BM  Qty: 10 suppository, Refills: 3    Associated Diagnoses: Slow transit constipation      Coloplast barrier cream CREA Apply  "topically 4 times daily as needed      polyethylene glycol (MIRALAX/GLYCOLAX) powder MIX 17GM OF POWDER IN 8OZ OF WATER UNTIL COMPLETELY DISSOLVED. DRINK SOLUTION BY MOUTH ONCE DAILY AS NEEDED FOR CONSTIPATION  Qty: 527 g, Refills: 97    Associated Diagnoses: Slow transit constipation      syringe/needle, disp, 25G X 1\" 3 ML MISC USE AS DIRECTED WITH B12 INECTION  Qty: 1 each, Refills: 98    Associated Diagnoses: Vitamin B 12 deficiency           Allergies   Allergies   Allergen Reactions     Donepezil Other (See Comments)     At 10 mg, tremulousness and decreased appetite      "

## 2019-02-01 NOTE — PLAN OF CARE
No temperature overnight. Turned and repositioned every 2 hours. Patient requested pneumoboots off at midnight and has declined wearing them overnight as she can't sleep with them on.

## 2019-02-01 NOTE — PHARMACY - DISCHARGE MEDICATION RECONCILIATION
Discharge medication review for this patient is complete. Pharmacist assisted with medication reconciliation of discharge medications with prior to admission medications.     The following changes were made to the discharge medication list based on pharmacist review:  Added:  none  Discontinued: none  Changed: none      Patient's Discharge Medication List  - medications as listed on After Visit Summary (AVS)     Review of your medicines      START taking      Dose / Directions   sulfamethoxazole-trimethoprim 800-160 MG tablet  Commonly known as:  BACTRIM DS/SEPTRA DS  Indication:  Urinary Tract Infection  Used for:  Acute cystitis without hematuria      Dose:  1 tablet  Take 1 tablet by mouth 2 times daily for 3 days Through morning dose of 2/4/2019  Refills:  0        CONTINUE these medicines which may have CHANGED, or have new prescriptions. If we are uncertain of the size of tablets/capsules you have at home, strength may be listed as something that might have changed.      Dose / Directions   atorvastatin 20 MG tablet  Commonly known as:  LIPITOR  This may have changed:      how much to take    how to take this    when to take this  Used for:  Cerebrovascular accident (CVA) due to thrombosis of left middle cerebral artery (H)      TAKE 1 TABLET BY MOUTH ONCE DAILY  Quantity:  31 tablet  Refills:  98     clopidogrel 75 MG tablet  Commonly known as:  PLAVIX  This may have changed:      how much to take    how to take this    when to take this  Used for:  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)      TAKE 1 TABLET BY MOUTH ONCE DAILY  Quantity:  31 tablet  Refills:  98     diltiazem ER COATED BEADS 240 MG 24 hr capsule  Commonly known as:  CARDIZEM CD/CARTIA XT  This may have changed:  when to take this  Used for:  Atrial fibrillation with rapid ventricular response (H)      Dose:  240 mg  Take 1 capsule (240 mg) by mouth daily  Quantity:  31 capsule  Refills:  98      escitalopram 10 MG tablet  Commonly known as:  LEXAPRO  This may have changed:      how much to take    how to take this    when to take this  Used for:  Anxiety      TAKE 1 TABLET BY MOUTH ONCE DAILY  Quantity:  31 tablet  Refills:  98     traZODone 50 MG tablet  Commonly known as:  DESYREL  This may have changed:  when to take this  Used for:  Anxiety, Insomnia due to medical condition      Dose:  25 mg  Take 0.5 tablets (25 mg) by mouth daily  Quantity:  16 tablet  Refills:  98        CONTINUE these medicines which have NOT CHANGED      Dose / Directions   acetaminophen 500 MG tablet  Commonly known as:  MAPAP  Used for:  Pain      Dose:  1000 mg  Take 2 tablets (1,000 mg) by mouth every 8 hours  Quantity:  90 tablet  Refills:  11     BISAC-EVAC 10 MG suppository  Used for:  Slow transit constipation  Generic drug:  bisacodyl      UNWRAP AND INSERT ONE SUPPOSITORY RECTALLY EVERY 3 DAYS AS NEEDED IF NO BM  Quantity:  10 suppository  Refills:  3     Coloplast barrier cream Crea      Apply topically 4 times daily as needed  Refills:  0     FEROSUL 325 (65 Fe) MG tablet  Used for:  Iron deficiency anemia secondary to inadequate dietary iron intake  Generic drug:  ferrous sulfate      TAKE 1 TABLET BY MOUTH ONCE DAILY  Quantity:  31 tablet  Refills:  98     levothyroxine 112 MCG tablet  Commonly known as:  SYNTHROID/LEVOTHROID  Used for:  Other specified hypothyroidism      TAKE 1 TABLET BY MOUTH ONCE DAILY  Quantity:  31 tablet  Refills:  98     lisinopril 5 MG tablet  Commonly known as:  PRINIVIL/ZESTRIL  Used for:  Benign essential hypertension      Dose:  5 mg  Take 1 tablet (5 mg) by mouth daily  Quantity:  90 tablet  Refills:  3     * miconazole 2 % Aerp powder  Commonly known as:  MICATIN      Apply topically 3 times daily as needed  Refills:  0     * MICRO GUARD 2 % external powder  Used for:  Rash and nonspecific skin eruption  Generic drug:  miconazole      APPLY TOPICALLY TO VAGINA THREE TIMES DAILY AS  "NEEDED  Quantity:  85 g  Refills:  97     polyethylene glycol powder  Commonly known as:  MIRALAX/GLYCOLAX  Used for:  Slow transit constipation      MIX 17GM OF POWDER IN 8OZ OF WATER UNTIL COMPLETELY DISSOLVED. DRINK SOLUTION BY MOUTH ONCE DAILY AS NEEDED FOR CONSTIPATION  Quantity:  527 g  Refills:  97     SM STOOL SOFTENER 8.6-50 MG tablet  Used for:  Constipation, unspecified constipation type  Generic drug:  senna-docusate      TAKE 1 TABLET BY MOUTH ONCE DAILY  Quantity:  31 tablet  Refills:  98     syringe/needle (disp) 25G X 1\" 3 ML Misc  Used for:  Vitamin B 12 deficiency      USE AS DIRECTED WITH B12 INECTION  Quantity:  1 each  Refills:  98     vitamin B-12 1000 MCG/ML injection  Commonly known as:  CYANOCOBALAMIN  Used for:  Vitamin B 12 deficiency      Dose:  1 mL  Inject 1 mL (1,000 mcg) Subcutaneous every 30 days  Quantity:  1 mL  Refills:  98     vitamin D3 1000 units (25 mcg) tablet  Commonly known as:  CHOLECALCIFEROL  Used for:  Senile osteoporosis      TAKE TWO TABLETS (2000 UNITS) BY MOUTH ONCE DAILY  Quantity:  62 tablet  Refills:  98         * This list has 2 medication(s) that are the same as other medications prescribed for you. Read the directions carefully, and ask your doctor or other care provider to review them with you.                "

## 2019-02-01 NOTE — PLAN OF CARE
[facility] [facility] DISCHARGE NOTE    Patient discharged to [facility] at 11:32 AM via wheelchair. Accompanied by daughter and staff. Discharge instructions reviewed with patient and daughter, opportunity offered to ask questions. Report given to Esme at [facility]. All belongings sent with patient.    Diamond Sharma

## 2019-02-01 NOTE — PROGRESS NOTES
Name: Deanne Zayas    MRN#: [id number]    Reason for Hospitalization: Urinary tract infection without hematuria, site unspecified [N39.0]    Discharge Date: 02/01/19    Patient/Family Response to DC Plan: in agreement    Transportation: [facility] transport @ 1130 ([phone])    [facility]: declined    Care Coordinator Hand off completed: No    Other Providers (Care Coordinator, [facility], [facility] etc.):  No    Future Appointments : No future appointments.    Discharge Disposition: nursing home- [facility] (phone: [phone] Fax: [phone])    Clemencia Alvarez RN Care Coordinator  [facility] [phone]  [facility] [phone]    ADDENDUM* Yennifer Price ([phone]) CW updated with the patients discharge plans for today

## 2019-02-03 LAB
BACTERIA SPEC CULT: ABNORMAL
BACTERIA SPEC CULT: ABNORMAL
Lab: ABNORMAL
SPECIMEN SOURCE: ABNORMAL

## 2019-02-06 LAB
BACTERIA SPEC CULT: NO GROWTH
BACTERIA SPEC CULT: NO GROWTH
Lab: NORMAL
Lab: NORMAL
SPECIMEN SOURCE: NORMAL
SPECIMEN SOURCE: NORMAL

## 2019-02-07 ENCOUNTER — PATIENT OUTREACH (OUTPATIENT)
Dept: GERIATRIC MEDICINE | Facility: CLINIC | Age: 84
End: 2019-02-07

## 2019-02-07 NOTE — PROGRESS NOTES
[place] [facility]    Chief Complaint   Patient presents with     RECHECK       [place] Medical Record Number:  [id number]  Place of Service where encounter took place:  [facility] ([facility]) [[id number]]    HPI:    Deanne Zayas is a 85 year old  (6/19/1933), who is being seen today for an episodic care visit.  HPI information obtained from: facility chart records, facility staff, patient report and [facility] chart review.    Deanne Zayas was hospitalized 1/30-2/1/19 after she presented with worsening right sided weakness and rigors. UA concerning for UTI, WBC mildly elevated. SHe was started on ceftriaxone and given IVF with improvement. She returned to baseline neuro status. She was transitioned to oral bactrim and discharged back to [facility]. ON 2/5 cultures returned with Klebsiella sensitive to bactrim x 3 days and A.urinae sensistive to beta lactams. Hospitalist contracted [facility] and started patient on augmentin x 3 days.     Deanne has been doing well since hospital return. Staff reports she has been in a pleasant mood, and participating activities on unit per her usual. She is eating and sleeping well. Denies any pain. No recent fevers. Mobility is at baseline. No diarrhea, have not noted any urinary symptoms. Deanne reports she is feeling fine today, does not remember having UTI or going to the hospital.     ALLERGIES: Donepezil  Past Medical, Surgical, Family and Social History reviewed and updated in [facility].    Current Outpatient Medications   Medication Sig Dispense Refill     acetaminophen (MAPAP) 500 MG tablet Take 2 tablets (1,000 mg) by mouth every 8 hours 90 tablet 11     atorvastatin (LIPITOR) 20 MG tablet TAKE 1 TABLET BY MOUTH ONCE DAILY (Patient taking differently: TAKE 1 TABLET BY MOUTH ONCE DAILY AT BEDTIME) 31 tablet 98     clopidogrel (PLAVIX) 75 MG tablet TAKE 1 TABLET BY MOUTH ONCE DAILY (Patient taking differently: TAKE 1 TABLET BY MOUTH ONCE DAILY IN THE EVENING) 31  "tablet 98     Coloplast barrier cream CREA Apply topically 4 times daily as needed       cyanocobalamin (VITAMIN B12) 1000 MCG/ML injection Inject 1 mL (1,000 mcg) Subcutaneous every 30 days 1 mL 98     diltiazem 240 MG 24 hr capsule Take 1 capsule (240 mg) by mouth daily (Patient taking differently: Take 240 mg by mouth every morning ) 31 capsule 98     escitalopram (LEXAPRO) 10 MG tablet TAKE 1 TABLET BY MOUTH ONCE DAILY (Patient taking differently: TAKE 1 TABLET BY MOUTH ONCE DAILY IN EVENING) 31 tablet 98     FEROSUL 325 (65 Fe) MG tablet TAKE 1 TABLET BY MOUTH ONCE DAILY 31 tablet 98     levothyroxine (SYNTHROID/LEVOTHROID) 112 MCG tablet TAKE 1 TABLET BY MOUTH ONCE DAILY 31 tablet 98     lisinopril (PRINIVIL/ZESTRIL) 5 MG tablet Take 1 tablet (5 mg) by mouth daily 90 tablet 3     miconazole (MICATIN) 2 % AERP powder Apply topically 3 times daily as needed       MICRO GUARD 2 % powder APPLY TOPICALLY TO VAGINA THREE TIMES DAILY AS NEEDED 85 g 97     polyethylene glycol (MIRALAX/GLYCOLAX) powder MIX 17GM OF POWDER IN 8OZ OF WATER UNTIL COMPLETELY DISSOLVED. DRINK SOLUTION BY MOUTH ONCE DAILY AS NEEDED FOR CONSTIPATION 527 g 97     SM STOOL SOFTENER 8.6-50 MG tablet TAKE 1 TABLET BY MOUTH ONCE DAILY 31 tablet 98     syringe/needle, disp, 25G X 1\" 3 ML MISC USE AS DIRECTED WITH B12 INECTION 1 each 98     traZODone (DESYREL) 50 MG tablet Take 0.5 tablets (25 mg) by mouth daily (Patient taking differently: Take 25 mg by mouth At Bedtime ) 16 tablet 98     VITAMIN D3 1000 units tablet TAKE TWO TABLETS (2000 UNITS) BY MOUTH ONCE DAILY 62 tablet 98     BISAC-EVAC 10 MG Suppository UNWRAP AND INSERT ONE SUPPOSITORY RECTALLY EVERY 3 DAYS AS NEEDED IF NO BM 10 suppository 3     Medications reviewed:  Medications reconciled to facility chart and changes were made to reflect current medications as identified as above med list. Below are the changes that were made:   Medications stopped since last EPIC medication " reconciliation:   There are no discontinued medications.    Medications started since last [facility] medication reconciliation:  No orders of the defined types were placed in this encounter.    REVIEW OF SYSTEMS:  4 point ROS including Respiratory, CV, GI and , other than that noted in the HPI,  is negative    Physical Exam:  /74   Pulse 69   Temp 98.8  F (37.1  C)   Resp 18   Wt 82.1 kg (181 lb)   SpO2 96%   BMI 30.12 kg/m    GENERAL APPEARANCE:  Alert, in no distress  RESP:  respiratory effort and palpation of chest normal, lungs clear to auscultation , no respiratory distress  CV:  Palpation and auscultation of heart done , regular rate and rhythm, no murmur, rub, or gallop, no edema, +2 pedal pulses  ABDOMEN:  normal bowel sounds, soft, nontender, no hepatosplenomegaly or other masses, no guarding or rebound  M/S:   non-ambulatory, decreased ROM to bilat shoulders, crepitus to right shoulder, no gross joint deformities  SKIN:  Inspection of skin and subcutaneous tissue baseline  NEURO:   Cranial nerves 2-12 are normal tested and grossly at patient's baseline, right sided extremities 3-4/5, left sided extremities 5/5, MORTENSEN extremities,   PSYCH:  oriented to self, , insight and judgement impaired, memory impaired , affect and mood normal    Recent Labs:     CBC RESULTS:   Recent Labs   Lab Test 01/31/19  0547 01/30/19  2101   WBC 10.2 12.2*   RBC 3.27* 3.96   HGB 10.5* 13.0   HCT 32.9* 39.1   * 99   MCH 32.1 32.8   MCHC 31.9 33.2   RDW 13.0 13.0    272       Last Basic Metabolic Panel:  Recent Labs   Lab Test 01/31/19  0547 01/30/19  2101    140   POTASSIUM 3.7 3.9   CHLORIDE 110* 106   RUBIN 8.1* 9.1   CO2 26 27   BUN 18 21   CR 0.94 1.12*   * 175*       Liver Function Studies -   Recent Labs   Lab Test 09/13/18  1223 07/24/17  1425     PROTTOTAL 7.2 6.8     ALBUMIN 3.8 3.8     BILITOTAL 0.5 0.5     ALKPHOS 80 69     AST 15 16     ALT 16 18     BILIDIRECT  --   --       < > =  values in this interval not displayed.       TSH   Date Value Ref Range Status   03/17/2018 0.74 0.40 - 4.00 mU/L Final   03/08/2018 1.66 0.40 - 4.00 mU/L Final       Lab Results   Component Value Date    A1C 5.9 01/30/2019    A1C 6.3 03/17/2018       Assessment/Plan:  (N39.0,  R31.9) Urinary tract infection with hematuria, site unspecified  (primary encounter diagnosis)  Comment: has completed abx for UTI with both klebsiella and A.urinae. 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

## 2019-02-07 NOTE — PROGRESS NOTES
2-7-19   CC did f/u with SATYA Victoria at the [facility] again just to check on member to see if there were any changes now that member had been back for a bit.  Esme said there was no changes to her plan at this time and member is doing well   Yennifer Hernandez MA [facility] Care Coordinator   [phone]      2-1-19   CC was at the [facility] when she received a call that member was going to be discharged back to the [facility] today.  CC did informed the SATYA Victoria of this as well.    CC then asked for update once member is back if there are any changes to her plan at this time.     1-31-19  CC called and talked with member daughter Mayra to see if she had follow up with financial worker on the needed paperwork for MA.  Daughter was just getting back from vacation and said she would check her mail to see if the requested paperwork had been sent and then get this to financial worker.  Daughter also was aware of the hospital stay and was on they way to visit member at this time. She will call CC if needed.   Yennifer Hernandez MA [facility] Care Coordinator   [phone]

## 2019-02-08 ENCOUNTER — ASSISTED LIVING VISIT (OUTPATIENT)
Dept: GERIATRICS | Facility: CLINIC | Age: 84
End: 2019-02-08
Payer: COMMERCIAL

## 2019-02-08 VITALS
OXYGEN SATURATION: 96 % | TEMPERATURE: 98.8 F | HEART RATE: 69 BPM | WEIGHT: 181 LBS | BODY MASS INDEX: 30.12 KG/M2 | SYSTOLIC BLOOD PRESSURE: 145 MMHG | RESPIRATION RATE: 18 BRPM | DIASTOLIC BLOOD PRESSURE: 74 MMHG

## 2019-02-08 DIAGNOSIS — I48.91 ATRIAL FIBRILLATION, UNSPECIFIED TYPE (H): ICD-10-CM

## 2019-02-08 DIAGNOSIS — R31.9 URINARY TRACT INFECTION WITH HEMATURIA, SITE UNSPECIFIED: Primary | ICD-10-CM

## 2019-02-08 DIAGNOSIS — N39.0 URINARY TRACT INFECTION WITH HEMATURIA, SITE UNSPECIFIED: Primary | ICD-10-CM

## 2019-02-08 DIAGNOSIS — I69.359 HEMIPLEGIA AFFECTING DOMINANT SIDE, POST-STROKE (H): ICD-10-CM

## 2019-02-08 NOTE — LETTER
2/8/2019        RE: Deanne Zayas  C/o Leeanna Moran  [address] [address] [place] [address]        [place] [facility]    Chief Complaint   Patient presents with     RECHECK       [place] Medical Record Number:  [id number]  Place of Service where encounter took place:  [facility] ([facility] [[id number]]    HPI:    Deanne Zayas is a 85 year old  (6/19/1933), who is being seen today for an episodic care visit.  HPI information obtained from: facility chart records, facility staff, patient report and [facility] chart review.    Deanne Zayas was hospitalized 1/30-2/1/19 after she presented with worsening right sided weakness and rigors. UA concerning for UTI, WBC mildly elevated. SHe was started on ceftriaxone and given IVF with improvement. She returned to baseline neuro status. She was transitioned to oral bactrim and discharged back to [facility]. ON 2/5 cultures returned with Klebsiella sensitive to bactrim x 3 days and A.urinae sensistive to beta lactams. Hospitalist contracted [facility] and started patient on augmentin x 3 days.     Deanne has been doing well since hospital return. Staff reports she has been in a pleasant mood, and participating activities on unit per her usual. She is eating and sleeping well. Denies any pain. No recent fevers. Mobility is at baseline. No diarrhea, have not noted any urinary symptoms. Deanne reports she is feeling fine today, does not remember having UTI or going to the hospital.     ALLERGIES: Donepezil  Past Medical, Surgical, Family and Social History reviewed and updated in [facility].    Current Outpatient Medications   Medication Sig Dispense Refill     acetaminophen (MAPAP) 500 MG tablet Take 2 tablets (1,000 mg) by mouth every 8 hours 90 tablet 11     atorvastatin (LIPITOR) 20 MG tablet TAKE 1 TABLET BY MOUTH ONCE DAILY (Patient taking differently: TAKE 1 TABLET BY MOUTH ONCE DAILY AT BEDTIME) 31 tablet 98     clopidogrel (PLAVIX) 75 MG tablet TAKE 1  "TABLET BY MOUTH ONCE DAILY (Patient taking differently: TAKE 1 TABLET BY MOUTH ONCE DAILY IN THE EVENING) 31 tablet 98     Coloplast barrier cream CREA Apply topically 4 times daily as needed       cyanocobalamin (VITAMIN B12) 1000 MCG/ML injection Inject 1 mL (1,000 mcg) Subcutaneous every 30 days 1 mL 98     diltiazem 240 MG 24 hr capsule Take 1 capsule (240 mg) by mouth daily (Patient taking differently: Take 240 mg by mouth every morning ) 31 capsule 98     escitalopram (LEXAPRO) 10 MG tablet TAKE 1 TABLET BY MOUTH ONCE DAILY (Patient taking differently: TAKE 1 TABLET BY MOUTH ONCE DAILY IN EVENING) 31 tablet 98     FEROSUL 325 (65 Fe) MG tablet TAKE 1 TABLET BY MOUTH ONCE DAILY 31 tablet 98     levothyroxine (SYNTHROID/LEVOTHROID) 112 MCG tablet TAKE 1 TABLET BY MOUTH ONCE DAILY 31 tablet 98     lisinopril (PRINIVIL/ZESTRIL) 5 MG tablet Take 1 tablet (5 mg) by mouth daily 90 tablet 3     miconazole (MICATIN) 2 % AERP powder Apply topically 3 times daily as needed       MICRO GUARD 2 % powder APPLY TOPICALLY TO VAGINA THREE TIMES DAILY AS NEEDED 85 g 97     polyethylene glycol (MIRALAX/GLYCOLAX) powder MIX 17GM OF POWDER IN 8OZ OF WATER UNTIL COMPLETELY DISSOLVED. DRINK SOLUTION BY MOUTH ONCE DAILY AS NEEDED FOR CONSTIPATION 527 g 97     SM STOOL SOFTENER 8.6-50 MG tablet TAKE 1 TABLET BY MOUTH ONCE DAILY 31 tablet 98     syringe/needle, disp, 25G X 1\" 3 ML MISC USE AS DIRECTED WITH B12 INECTION 1 each 98     traZODone (DESYREL) 50 MG tablet Take 0.5 tablets (25 mg) by mouth daily (Patient taking differently: Take 25 mg by mouth At Bedtime ) 16 tablet 98     VITAMIN D3 1000 units tablet TAKE TWO TABLETS (2000 UNITS) BY MOUTH ONCE DAILY 62 tablet 98     BISAC-EVAC 10 MG Suppository UNWRAP AND INSERT ONE SUPPOSITORY RECTALLY EVERY 3 DAYS AS NEEDED IF NO BM 10 suppository 3     Medications reviewed:  Medications reconciled to facility chart and changes were made to reflect current medications as identified as above " med list. Below are the changes that were made:   Medications stopped since last EPIC medication reconciliation:   There are no discontinued medications.    Medications started since last [facility] medication reconciliation:  No orders of the defined types were placed in this encounter.    REVIEW OF SYSTEMS:  4 point ROS including Respiratory, CV, GI and , other than that noted in the HPI,  is negative    Physical Exam:  /74   Pulse 69   Temp 98.8  F (37.1  C)   Resp 18   Wt 82.1 kg (181 lb)   SpO2 96%   BMI 30.12 kg/m     GENERAL APPEARANCE:  Alert, in no distress  RESP:  respiratory effort and palpation of chest normal, lungs clear to auscultation , no respiratory distress  CV:  Palpation and auscultation of heart done , regular rate and rhythm, no murmur, rub, or gallop, no edema, +2 pedal pulses  ABDOMEN:  normal bowel sounds, soft, nontender, no hepatosplenomegaly or other masses, no guarding or rebound  M/S:   non-ambulatory, decreased ROM to bilat shoulders, crepitus to right shoulder, no gross joint deformities  SKIN:  Inspection of skin and subcutaneous tissue baseline  NEURO:   Cranial nerves 2-12 are normal tested and grossly at patient's baseline, right sided extremities 3-4/5, left sided extremities 5/5, MORTENSEN extremities,   PSYCH:  oriented to self, , insight and judgement impaired, memory impaired , affect and mood normal    Recent Labs:     CBC RESULTS:   Recent Labs   Lab Test 01/31/19  0547 01/30/19  2101   WBC 10.2 12.2*   RBC 3.27* 3.96   HGB 10.5* 13.0   HCT 32.9* 39.1   * 99   MCH 32.1 32.8   MCHC 31.9 33.2   RDW 13.0 13.0    272       Last Basic Metabolic Panel:  Recent Labs   Lab Test 01/31/19  0547 01/30/19  2101    140   POTASSIUM 3.7 3.9   CHLORIDE 110* 106   RUBIN 8.1* 9.1   CO2 26 27   BUN 18 21   CR 0.94 1.12*   * 175*       Liver Function Studies -   Recent Labs   Lab Test 09/13/18  1223 07/24/17  1425     PROTTOTAL 7.2 6.8     ALBUMIN 3.8 3.8      BILITOTAL 0.5 0.5     ALKPHOS 80 69     AST 15 16     ALT 16 18     BILIDIRECT  --   --       < > = values in this interval not displayed.       TSH   Date Value Ref Range Status   03/17/2018 0.74 0.40 - 4.00 mU/L Final   03/08/2018 1.66 0.40 - 4.00 mU/L Final       Lab Results   Component Value Date    A1C 5.9 01/30/2019    A1C 6.3 03/17/2018       Assessment/Plan:  (N39.0,  R31.9) Urinary tract infection with hematuria, site unspecified  (primary encounter diagnosis)  Comment: has completed abx for UTI with both klebsiella and A.urinae. 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

## 2019-02-21 DIAGNOSIS — I10 BENIGN ESSENTIAL HYPERTENSION: ICD-10-CM

## 2019-02-22 RX ORDER — LISINOPRIL 5 MG/1
TABLET ORAL
Qty: 30 TABLET | Refills: 11 | Status: SHIPPED | OUTPATIENT
Start: 2019-02-22 | End: 2019-04-09

## 2019-02-25 DIAGNOSIS — E53.8 VITAMIN B 12 DEFICIENCY: ICD-10-CM

## 2019-02-25 RX ORDER — CYANOCOBALAMIN 1000 UG/ML
1 INJECTION, SOLUTION INTRAMUSCULAR; SUBCUTANEOUS
Qty: 1 ML | Refills: 97 | Status: SHIPPED | OUTPATIENT
Start: 2019-02-25 | End: 2020-01-01

## 2019-03-03 DIAGNOSIS — R52 PAIN: ICD-10-CM

## 2019-03-03 DIAGNOSIS — F41.9 ANXIETY: ICD-10-CM

## 2019-03-04 RX ORDER — ESCITALOPRAM OXALATE 10 MG/1
TABLET ORAL
Qty: 30 TABLET | Refills: 11 | Status: SHIPPED | OUTPATIENT
Start: 2019-03-04 | End: 2020-01-01

## 2019-03-04 RX ORDER — PSEUDOEPHED/ACETAMINOPH/DIPHEN 30MG-500MG
TABLET ORAL
Qty: 120 TABLET | Refills: 98 | Status: SHIPPED | OUTPATIENT
Start: 2019-03-04 | End: 2020-01-01

## 2019-03-06 ENCOUNTER — NURSING HOME VISIT (OUTPATIENT)
Dept: GERIATRICS | Facility: CLINIC | Age: 84
End: 2019-03-06
Payer: COMMERCIAL

## 2019-03-06 DIAGNOSIS — F41.9 ANXIETY: ICD-10-CM

## 2019-03-06 DIAGNOSIS — I63.012 CEREBROVASCULAR ACCIDENT (CVA) DUE TO THROMBOSIS OF LEFT VERTEBRAL ARTERY (H): ICD-10-CM

## 2019-03-06 DIAGNOSIS — I63.312 CEREBROVASCULAR ACCIDENT (CVA) DUE TO THROMBOSIS OF LEFT MIDDLE CEREBRAL ARTERY (H): ICD-10-CM

## 2019-03-06 DIAGNOSIS — G47.01 INSOMNIA DUE TO MEDICAL CONDITION: ICD-10-CM

## 2019-03-06 RX ORDER — TRAZODONE HYDROCHLORIDE 50 MG/1
25 TABLET, FILM COATED ORAL AT BEDTIME
Start: 2019-03-06

## 2019-03-06 RX ORDER — CLOPIDOGREL BISULFATE 75 MG/1
TABLET ORAL
Qty: 31 TABLET | Refills: 98
Start: 2019-03-06 | End: 2020-01-01

## 2019-04-08 NOTE — PROGRESS NOTES
[place] [facility]  [place] Medical Record Number:  [id number]  Place of Service where encounter took place:  [facility] [[id number]]  Chief Complaint   Patient presents with     RECHECK       HPI:    Deanne Zayas  is a 85 year old (6/19/1933), who is being seen today for an episodic care visit.  HPI information obtained from: facility chart records, facility staff, patient report and [facility] chart review. Today's concern is:     Essential hypertension  Hemiplegia affecting dominant side, post-stroke (H)  Physical deconditioning  Late onset Alzheimer's disease without behavioral disturbance   Asked to see patient today by facility staff as they have noted increased weakness and she is requiring increase assistance with transfers and cars. Deanne reports she is feeling well today. She continues to have some right shoulder pain, which is chronic and she feels is at baseline. She has no complaints or concerns. Staff report she is having more difficulty even sitting up on the toilet and is needing at least 2 people and a lift at times for transfers. She does have a history of CVA with right sided weakness.     Past Medical and Surgical History reviewed in Epic today.    MEDICATIONS:  Current Outpatient Medications   Medication Sig Dispense Refill     ACETAMINOPHEN EXTRA STRENGTH 500 MG tablet TAKE TWO TABLETS (1000MG) BY MOUTH EVERY 8 HOURS 120 tablet 98     atorvastatin (LIPITOR) 20 MG tablet TAKE 1 TABLET BY MOUTH ONCE DAILY 31 tablet 98     BISAC-EVAC 10 MG Suppository UNWRAP AND INSERT ONE SUPPOSITORY RECTALLY EVERY 3 DAYS AS NEEDED IF NO BM 10 suppository 3     clopidogrel (PLAVIX) 75 MG tablet TAKE 1 TABLET BY MOUTH ONCE DAILY IN THE EVENING 31 tablet 98     Coloplast barrier cream CREA Apply topically 4 times daily as needed       diltiazem 240 MG 24 hr capsule Take 1 capsule (240 mg) by mouth daily 31 capsule 98     escitalopram (LEXAPRO) 10 MG tablet TAKE 1  "TABLET BY MOUTH ONCE DAILY 30 tablet 11     FEROSUL 325 (65 Fe) MG tablet TAKE 1 TABLET BY MOUTH ONCE DAILY 31 tablet 98     levothyroxine (SYNTHROID/LEVOTHROID) 112 MCG tablet TAKE 1 TABLET BY MOUTH ONCE DAILY 31 tablet 98     lisinopril (PRINIVIL/ZESTRIL) 10 MG tablet Take 1 tablet (10 mg) by mouth daily 60 tablet 3     Menthol, Topical Analgesic, (BIOFREEZE) 4 % GEL Apply to right shoulder 2 times daily       MICRO GUARD 2 % powder APPLY TOPICALLY TO VAGINA THREE TIMES DAILY AS NEEDED 85 g 97     polyethylene glycol (MIRALAX/GLYCOLAX) powder MIX 17GM OF POWDER IN 8OZ OF WATER UNTIL COMPLETELY DISSOLVED. DRINK SOLUTION BY MOUTH ONCE DAILY AS NEEDED FOR CONSTIPATION 527 g 97     senna (SENOKOT) 8.6 MG tablet Take 1 tablet by mouth daily       syringe/needle, disp, ([employer]) 25G X 1\" 3 ML MISC USE AS DIRECTED WITH B12 INJECTION 1 each 97     traZODone (DESYREL) 50 MG tablet Take 0.5 tablets (25 mg) by mouth At Bedtime       vitamin B-12 (CYANOCOBALAMIN) 1000 MCG/ML injection INJECT 1 ML (1,000 MCG) SUBCUTANEOUS EVERY 30 DAYS 1 mL 97     VITAMIN D3 1000 units tablet TAKE TWO TABLETS (2000 UNITS) BY MOUTH ONCE DAILY 62 tablet 98         REVIEW OF SYSTEMS:  4 point ROS including Respiratory, CV, GI and , other than that noted in the HPI,  is negative    Objective:  /80   Pulse 71   Temp 98.7  F (37.1  C)   Resp 16   Wt 80.1 kg (176 lb 8 oz)   SpO2 95%   BMI 29.37 kg/m    Exam:  GENERAL APPEARANCE:  Alert, in no distress, cooperative  RESP:  respiratory effort and palpation of chest normal, lungs clear to auscultation , no respiratory distress  CV:  Palpation and auscultation of heart done , regular rate and rhythm, no murmur, rub, or gallop  ABDOMEN:  bowel sounds normal, soft, non-tender  M/S:   MORTENSEN, non-ambulatory, generalized weakness, decreased ROM and tenderness to right shoulder  NEURO:   Cranial nerves 2-12 are normal tested and grossly at patient's baseline, mild right facial " droop  PSYCH:  oriented to self, insight and judgement impaired, memory impaired , affect and mood normal    Labs:   Recent labs in [facility] reviewed by me today.     ASSESSMENT/PLAN:  (I10) Essential hypertension  (primary encounter diagnosis)  Comment: Elevated BP - goal is SBP <150  Plan: Increase lisinopril from 5mg daily to 10mg daily, diltiazem, monitor and adjust     (I69.359) Hemiplegia affecting dominant side, post-stroke (H)  (R53.81) Physical deconditioning  (G30.1,  F02.80) Late onset Alzheimer's disease without behavioral disturbance  Comment: Increased generalized weakness noted -suspect multifactorial d/t limited activity in the setting of progressing dementia; further decline is expected  Plan: Will refer to home PT/OT for evaluation and treatment, thought progress may be limited. Continue Plavix, atorvastatin, monitor and adjust. Discussed with daughter Leeanna and she was agreeable to plan of care.     transcribed by : Shaina Perez  Orders:  1. Increase Lisinopril to 10 mg PO. Dx: HTN  2. Home PT/OT- referral in [facility]    Electronically signed by:  YASMINE Tan CNP

## 2019-04-09 ENCOUNTER — ASSISTED LIVING VISIT (OUTPATIENT)
Dept: GERIATRICS | Facility: CLINIC | Age: 84
End: 2019-04-09
Payer: COMMERCIAL

## 2019-04-09 VITALS
BODY MASS INDEX: 29.37 KG/M2 | HEART RATE: 71 BPM | TEMPERATURE: 98.7 F | WEIGHT: 176.5 LBS | DIASTOLIC BLOOD PRESSURE: 80 MMHG | RESPIRATION RATE: 16 BRPM | OXYGEN SATURATION: 95 % | SYSTOLIC BLOOD PRESSURE: 156 MMHG

## 2019-04-09 DIAGNOSIS — I10 ESSENTIAL HYPERTENSION: Primary | ICD-10-CM

## 2019-04-09 DIAGNOSIS — F02.80 LATE ONSET ALZHEIMER'S DISEASE WITHOUT BEHAVIORAL DISTURBANCE (H): ICD-10-CM

## 2019-04-09 DIAGNOSIS — G30.1 LATE ONSET ALZHEIMER'S DISEASE WITHOUT BEHAVIORAL DISTURBANCE (H): ICD-10-CM

## 2019-04-09 DIAGNOSIS — I69.359 HEMIPLEGIA AFFECTING DOMINANT SIDE, POST-STROKE (H): ICD-10-CM

## 2019-04-09 DIAGNOSIS — R53.81 PHYSICAL DECONDITIONING: ICD-10-CM

## 2019-04-09 RX ORDER — LISINOPRIL 10 MG/1
10 TABLET ORAL DAILY
Qty: 60 TABLET | Refills: 3 | Status: SHIPPED | OUTPATIENT
Start: 2019-04-09 | End: 2020-01-01

## 2019-04-09 RX ORDER — SENNOSIDES A AND B 8.6 MG/1
2 TABLET, FILM COATED ORAL DAILY
COMMUNITY

## 2019-04-09 NOTE — LETTER
4/9/2019        RE: Deanne Zayas  C/o Leeanna Dean  [address] [facility] [address]        [facility]  [place] Medical Record Number:  [id number]  Place of Service where encounter took place:  [facility] [[id number]]  Chief Complaint   Patient presents with     RECHECK       HPI:    Deanne Zayas  is a 85 year old (6/19/1933), who is being seen today for an episodic care visit.  HPI information obtained from: facility chart records, facility staff, patient report and [facility] chart review. Today's concern is:     Essential hypertension  Hemiplegia affecting dominant side, post-stroke (H)  Physical deconditioning  Late onset Alzheimer's disease without behavioral disturbance   Asked to see patient today by facility staff as they have noted increased weakness and she is requiring increase assistance with transfers and cars. Deanne reports she is feeling well today. She continues to have some right shoulder pain, which is chronic and she feels is at baseline. She has no complaints or concerns. Staff report she is having more difficulty even sitting up on the toilet and is needing at least 2 people and a lift at times for transfers. She does have a history of CVA with right sided weakness.     Past Medical and Surgical History reviewed in Epic today.    MEDICATIONS:  Current Outpatient Medications   Medication Sig Dispense Refill     ACETAMINOPHEN EXTRA STRENGTH 500 MG tablet TAKE TWO TABLETS (1000MG) BY MOUTH EVERY 8 HOURS 120 tablet 98     atorvastatin (LIPITOR) 20 MG tablet TAKE 1 TABLET BY MOUTH ONCE DAILY 31 tablet 98     BISAC-EVAC 10 MG Suppository UNWRAP AND INSERT ONE SUPPOSITORY RECTALLY EVERY 3 DAYS AS NEEDED IF NO BM 10 suppository 3     clopidogrel (PLAVIX) 75 MG tablet TAKE 1 TABLET BY MOUTH ONCE DAILY IN THE EVENING 31 tablet 98     Coloplast barrier cream CREA Apply topically 4 times daily as needed       diltiazem 240 MG 24  "hr capsule Take 1 capsule (240 mg) by mouth daily 31 capsule 98     escitalopram (LEXAPRO) 10 MG tablet TAKE 1 TABLET BY MOUTH ONCE DAILY 30 tablet 11     FEROSUL 325 (65 Fe) MG tablet TAKE 1 TABLET BY MOUTH ONCE DAILY 31 tablet 98     levothyroxine (SYNTHROID/LEVOTHROID) 112 MCG tablet TAKE 1 TABLET BY MOUTH ONCE DAILY 31 tablet 98     lisinopril (PRINIVIL/ZESTRIL) 10 MG tablet Take 1 tablet (10 mg) by mouth daily 60 tablet 3     Menthol, Topical Analgesic, (BIOFREEZE) 4 % GEL Apply to right shoulder 2 times daily       MICRO GUARD 2 % powder APPLY TOPICALLY TO VAGINA THREE TIMES DAILY AS NEEDED 85 g 97     polyethylene glycol (MIRALAX/GLYCOLAX) powder MIX 17GM OF POWDER IN 8OZ OF WATER UNTIL COMPLETELY DISSOLVED. DRINK SOLUTION BY MOUTH ONCE DAILY AS NEEDED FOR CONSTIPATION 527 g 97     senna (SENOKOT) 8.6 MG tablet Take 1 tablet by mouth daily       syringe/needle, disp, ([employer] SAFETY SYRINGE) 25G X 1\" 3 ML MISC USE AS DIRECTED WITH B12 INJECTION 1 each 97     traZODone (DESYREL) 50 MG tablet Take 0.5 tablets (25 mg) by mouth At Bedtime       vitamin B-12 (CYANOCOBALAMIN) 1000 MCG/ML injection INJECT 1 ML (1,000 MCG) SUBCUTANEOUS EVERY 30 DAYS 1 mL 97     VITAMIN D3 1000 units tablet TAKE TWO TABLETS (2000 UNITS) BY MOUTH ONCE DAILY 62 tablet 98         REVIEW OF SYSTEMS:  4 point ROS including Respiratory, CV, GI and , other than that noted in the HPI,  is negative    Objective:  /80   Pulse 71   Temp 98.7  F (37.1  C)   Resp 16   Wt 80.1 kg (176 lb 8 oz)   SpO2 95%   BMI 29.37 kg/m     Exam:  GENERAL APPEARANCE:  Alert, in no distress, cooperative  RESP:  respiratory effort and palpation of chest normal, lungs clear to auscultation , no respiratory distress  CV:  Palpation and auscultation of heart done , regular rate and rhythm, no murmur, rub, or gallop  ABDOMEN:  bowel sounds normal, soft, non-tender  M/S:   MORTENSEN, non-ambulatory, generalized weakness, decreased ROM and tenderness to right " shoulder  NEURO:   Cranial nerves 2-12 are normal tested and grossly at patient's baseline, mild right facial droop  PSYCH:  oriented to self, insight and judgement impaired, memory impaired , affect and mood normal    Labs:   Recent labs in [facility] reviewed by me today.     ASSESSMENT/PLAN:  (I10) Essential hypertension  (primary encounter diagnosis)  Comment: Elevated BP - goal is SBP <150  Plan: Increase lisinopril from 5mg daily to 10mg daily, diltiazem, monitor and adjust     (I69.359) Hemiplegia affecting dominant side, post-stroke (H)  (R53.81) Physical deconditioning  (G30.1,  F02.80) Late onset Alzheimer's disease without behavioral disturbance  Comment: Increased generalized weakness noted -suspect multifactorial d/t limited activity in the setting of progressing dementia; further decline is expected  Plan: Will refer to home PT/OT for evaluation and treatment, thought progress may be limited. Continue Plavix, atorvastatin, monitor and adjust. Discussed with daughter Leeanna and she was agreeable to plan of care.     transcribed by : Shaina Perez  Orders:  1. Increase Lisinopril to 10 mg PO. Dx: HTN  2. Home PT/OT- referral in [facility]    Electronically signed by:  YASMINE Tan CNP             Sincerely,        YASMINE Tan CNP

## 2019-04-13 ENCOUNTER — TELEPHONE (OUTPATIENT)
Dept: GERIATRICS | Facility: CLINIC | Age: 84
End: 2019-04-13

## 2019-04-13 NOTE — TELEPHONE ENCOUNTER
Therapist called re: request for PT and OT. Gave orders for both     Electronically signed by YASMINE Hussein GNP

## 2019-05-30 DIAGNOSIS — I48.91 ATRIAL FIBRILLATION WITH RAPID VENTRICULAR RESPONSE (H): ICD-10-CM

## 2019-05-31 RX ORDER — DILTIAZEM HYDROCHLORIDE 240 MG/1
CAPSULE, EXTENDED RELEASE ORAL
Qty: 31 CAPSULE | Refills: 98 | Status: SHIPPED | OUTPATIENT
Start: 2019-05-31 | End: 2020-01-01

## 2019-06-25 ENCOUNTER — PATIENT OUTREACH (OUTPATIENT)
Dept: GERIATRIC MEDICINE | Facility: CLINIC | Age: 84
End: 2019-06-25

## 2019-06-25 DIAGNOSIS — Z76.89 HEALTH CARE HOME: Chronic | ICD-10-CM

## 2019-07-20 NOTE — PROGRESS NOTES
[facility] Care Coordination Contact    [facility] Home Visit Assessment     Home visit for Health Risk Assessment with Deanne Zayas completed on June 25, 2019    Type of residence:: Assisted living - [facility] [address] [facility]    Current living arrangement:: I live in assisted living     Assessment completed with:: Patient, Care Team Member  CC has called members daughter prior to assessment and she did not feel she needed to be present at visit.  She had no concerns for member at this time and will reach out to CC or AL staff if needed      Current Care Plan  Member currently receiving the following home care services:   24/7 Customized living services at AL    Member currently receiving the following community resources:      Medication Review  Medication reconciliation completed in Epic: If no, please explain NP with [facility] manages medications   Medication set-up & administration: RN at AL sets up  weekly.  Assisting Living staff administers medications.  Medication Risk Assessment Medication (1 or more, place referral to MTM): N/A: No risk factors identified  MTM Referral Placed: No: No risk factors idenified    Mental/Behavioral Health   Depression Screening: See PHQ assessment flowsheet.   Mental health DX:: No      Mental Health Diagnosis: No  Mental Health Services: None: No further intervention needed at this time.    Falls Assessment:   Fallen 2 or more times in the past year?: Yes - member is now using a mechanical lift for all transfers. At last year assessment member had a great deal of anxiety surrounding this and would thrash out at staff during transfers.  When CC reviewed RS tool with RN at Al she stated this has improved greatly but member still has some anxious moments and needs reassurance from staff through this process.       ADL/IADL Dependencies:   Dependent ADLs:: Wheelchair-with assist, Bathing, Dressing, Eating, Grooming, Positioning, Transfers, Toileting,  Incontinence  Dependent IADLs:: Cleaning, Cooking, Laundry, Shopping, Meal Preparation, Medication Management, Money Management, Transportation    [facility] sponsored benefits: Shared information re: Silver Sneakers/gym memberships, ASA, Calcium +D.    PCA Assessment completed at visit: Not applicable     Elderly Waiver Eligibility: Yes-will continue on EW    Care Plan & Recommendations: member has done well in this last year and AL staff are still able to meet her needs.  CC met with member outside on the patio with other tennants so is getting outside and participating in activities as she tolerates.      See [facility]CC for detailed assessment information.    Follow-Up Plan: Member informed of future contact, plan to f/u with member with a 6 month telephone assessment.  Contact information shared with member and family, encouraged member to call with any questions or concerns at any time.    [place] care continuum providers: Please refer to Health Care Home on the Epic Problem List to view this patient's [facility] Care Plan Summary.  Yennifer Hernandez MA [facility] Care Coordinator   [phone]

## 2019-07-22 ENCOUNTER — PATIENT OUTREACH (OUTPATIENT)
Dept: GERIATRIC MEDICINE | Facility: CLINIC | Age: 84
End: 2019-07-22

## 2019-07-22 DIAGNOSIS — G47.01 INSOMNIA DUE TO MEDICAL CONDITION: ICD-10-CM

## 2019-07-22 DIAGNOSIS — F41.9 ANXIETY: ICD-10-CM

## 2019-07-22 NOTE — LETTER
July 22, 2019      DEANNE KING  C/O KATHIE ALLEN  [address] [address] [facility] [address]      Dear Deanne:    At [facility], we are dedicated to improving your health and well-being. Enclosed is the Comprehensive Care Plan that we developed with you on 6/25/2019. Please review the Care Plan carefully.    As a reminder, some of the things we discussed at your visit include:    Your physical and mental health    Ways to reduce falls    Health care needs you may have    Don t forget to contact your care coordinator if you:    Have been hospitalized or plan to be hospitalized     Have had a fall     Have experienced a change in physical health    Are experiencing emotional problems     If you do not agree with your Care Plan, have questions about it, or have experienced a change in your needs, please call me at [phone]. If you are hearing impaired, please call the [place] [employer] at 672 or [phone] (pkcybn-gt-rckuab relay service).    Sincerely,    Yennifer Hernandez MA, LSW    E-mail: [email or web address]  Phone: [phone]      [facility] is a health plan that contracts with both Medicare and the [place] Medical Assistance (Medicaid) program to provide benefits of both programs to enrollees. Enrollment in [facility] depends on contract renewal.    [id number])     [id number] (11/18)

## 2019-07-23 RX ORDER — TRAZODONE HYDROCHLORIDE 50 MG/1
TABLET, FILM COATED ORAL
Qty: 16 TABLET | Refills: 98 | Status: SHIPPED | OUTPATIENT
Start: 2019-07-23 | End: 2019-08-06

## 2019-08-06 ENCOUNTER — ASSISTED LIVING VISIT (OUTPATIENT)
Dept: GERIATRICS | Facility: CLINIC | Age: 84
End: 2019-08-06
Payer: COMMERCIAL

## 2019-08-06 VITALS
DIASTOLIC BLOOD PRESSURE: 82 MMHG | BODY MASS INDEX: 28.12 KG/M2 | RESPIRATION RATE: 16 BRPM | SYSTOLIC BLOOD PRESSURE: 154 MMHG | TEMPERATURE: 97.8 F | OXYGEN SATURATION: 95 % | HEART RATE: 75 BPM | WEIGHT: 169 LBS

## 2019-08-06 DIAGNOSIS — E03.4 HYPOTHYROIDISM DUE TO ACQUIRED ATROPHY OF THYROID: ICD-10-CM

## 2019-08-06 DIAGNOSIS — F02.80 LATE ONSET ALZHEIMER'S DISEASE WITHOUT BEHAVIORAL DISTURBANCE (H): ICD-10-CM

## 2019-08-06 DIAGNOSIS — N18.30 CKD (CHRONIC KIDNEY DISEASE) STAGE 3, GFR 30-59 ML/MIN (H): ICD-10-CM

## 2019-08-06 DIAGNOSIS — I48.91 ATRIAL FIBRILLATION WITH RAPID VENTRICULAR RESPONSE (H): ICD-10-CM

## 2019-08-06 DIAGNOSIS — G30.1 LATE ONSET ALZHEIMER'S DISEASE WITHOUT BEHAVIORAL DISTURBANCE (H): ICD-10-CM

## 2019-08-06 DIAGNOSIS — I10 ESSENTIAL HYPERTENSION: Primary | ICD-10-CM

## 2019-08-06 NOTE — LETTER
8/6/2019        RE: Deanne Zayas  C/o Leeanna Dean  [address] [facility] [address] [facility] [address]        [place] [place] [facility]  [place] [facility] Record Number:  [id number]  Place of Service where encounter took place:  [facility] [[id number]]  Chief Complaint   Patient presents with     RECHECK       HPI:    Deanne Zayas  is a 86 year old (6/19/1933), who is being seen today for an episodic care visit.  HPI information obtained from: facility chart records, facility staff, patient report and [facility] chart review. Today's concern is:     CKD (chronic kidney disease) stage 3, GFR 30-59 ml/min (H)  Essential hypertension  Atrial fibrillation with rapid ventricular response (H)  Hypothyroidism due to acquired atrophy of thyroid  Late onset Alzheimer's disease without behavioral disturbance   Patient seen today for routine visit at [facility] where she lives in memory care unit. Staff report some decline in function - requiring increased assistance with transfers. She is primarily WC bound. Does continue to have right shoulder pain, which limits ability to lift right arm completely, staff feels this has been well controlled. She has loose stools on occasion, but is on a laxative. Deanne report she is feeling fine. Staff have not noted any SOB or wheezing. She does have limited appetite, appears to have lost 12 lbs in the past 6 months.     Past Medical and Surgical History reviewed in Epic today.    MEDICATIONS:  Current Outpatient Medications   Medication Sig Dispense Refill     ACETAMINOPHEN EXTRA STRENGTH 500 MG tablet TAKE TWO TABLETS (1000MG) BY MOUTH EVERY 8 HOURS 120 tablet 98     atorvastatin (LIPITOR) 20 MG tablet TAKE 1 TABLET BY MOUTH ONCE DAILY 31 tablet 98     BISAC-EVAC 10 MG Suppository UNWRAP AND INSERT ONE SUPPOSITORY RECTALLY EVERY 3 DAYS AS NEEDED IF NO BM 10 suppository 3     clopidogrel (PLAVIX) 75 MG tablet TAKE 1 TABLET BY MOUTH ONCE DAILY IN THE  "EVENING 31 tablet 98     Coloplast barrier cream CREA Apply topically 4 times daily as needed       diltiazem ER (TIAZAC) 240 MG 24 hr ER beaded capsule TAKE 1 CAPSULE BY MOUTH ONCE DAILY 31 capsule 98     escitalopram (LEXAPRO) 10 MG tablet TAKE 1 TABLET BY MOUTH ONCE DAILY 30 tablet 11     FEROSUL 325 (65 Fe) MG tablet TAKE 1 TABLET BY MOUTH ONCE DAILY 31 tablet 98     levothyroxine (SYNTHROID/LEVOTHROID) 112 MCG tablet TAKE 1 TABLET BY MOUTH ONCE DAILY 31 tablet 98     lisinopril (PRINIVIL/ZESTRIL) 10 MG tablet Take 1 tablet (10 mg) by mouth daily 60 tablet 3     Menthol, Topical Analgesic, (BIOFREEZE) 4 % GEL Apply to right shoulder 2 times daily       MICRO GUARD 2 % powder APPLY TOPICALLY TO VAGINA THREE TIMES DAILY AS NEEDED 85 g 97     polyethylene glycol (MIRALAX/GLYCOLAX) powder MIX 17GM OF POWDER IN 8OZ OF WATER UNTIL COMPLETELY DISSOLVED. DRINK SOLUTION BY MOUTH ONCE DAILY AS NEEDED FOR CONSTIPATION 527 g 97     senna (SENOKOT) 8.6 MG tablet Take 1 tablet by mouth daily       syringe/needle, disp, ([employer] SAFETY SYRINGE) 25G X 1\" 3 ML MISC USE AS DIRECTED WITH B12 INJECTION 1 each 97     traZODone (DESYREL) 50 MG tablet Take 0.5 tablets (25 mg) by mouth At Bedtime       VITAMIN D3 1000 units tablet TAKE TWO TABLETS (2000 UNITS) BY MOUTH ONCE DAILY 62 tablet 98     traZODone (DESYREL) 50 MG tablet TAKE ONE-HALF TABLET (25MG) BY MOUTH ONCE DAILY 16 tablet 98     vitamin B-12 (CYANOCOBALAMIN) 1000 MCG/ML injection INJECT 1 ML (1,000 MCG) SUBCUTANEOUS EVERY 30 DAYS 1 mL 97         REVIEW OF SYSTEMS:  Limited secondary to cognitive impairment but today pt reports 4 point ROS including Respiratory, CV, GI and , other than that noted in the HPI as negative    Objective:  BP (!) 154/82   Pulse 75   Temp 97.8  F (36.6  C)   Resp 16   Wt 76.7 kg (169 lb)   SpO2 95%   BMI 28.12 kg/m     Exam:  GENERAL APPEARANCE:  Alert, in no distress  RESP:  respiratory effort and palpation of chest normal, lungs " clear to auscultation , no respiratory distress  CV:  Palpation and auscultation of heart done , regular rate and rhythm, no murmur, rub, or gallop, +2 pedal pulses, peripheral edema 1+ in BLE  ABDOMEN:  no guarding or rebound, bowel sounds normal  M/S:   non-ambulatory, decreased ROM to right shoulder  SKIN:  Inspection of skin and subcutaneous tissue baseline, exam limited as patient is fully clothed  NEURO:   Cranial nerves 2-12 are normal tested and grossly at patient's baseline  PSYCH:  insight and judgement impaired, memory impaired , affect and mood normal    Labs:   Recent labs in [facility] reviewed by me today.     ASSESSMENT/PLAN:  (I10) Essential hypertension  (primary encounter diagnosis)  Comment: SBP typically 120's-150's, given age, falls risk and overall frailty this is likely appropriate.   Plan: Continue lisinopril 10mg daily.     (N18.3) CKD (chronic kidney disease) stage 3, GFR 30-59 ml/min (H)  Comment: GFR 50-60, Creatinine 0.9-1.1   Plan: Avoid nephrotoxic medications, BMP PRN    (I48.91) Atrial fibrillation with rapid ventricular response (H)  Comment: HR controled, not on AC d/t falls risk  Plan: continue diltiazem 240mg daily, plavix 75mg daily, continue to monitor    (E03.4) Hypothyroidism due to acquired atrophy of thyroid  Comment: TSH 0.74 3/2018  Plan: TSH on next lab draw, continue levothyroxine at current dose until then.     (G30.1,  F02.80) Late onset Alzheimer's disease without behavioral disturbance  Comment: Some decline noted. Expect further cognitive and functional declines. Expect weight loss. Appropriate for memory care unit. Allefgy to aricept, likely no benefit to Namenda given advanced disease.   Plan: Continue current cares, monitor.     transcribed by : Shaina Perez  Orders:  1. TSH, BMP on next lab day - dx: CKD, hypothyroidism      Electronically signed by:  YASMINE Tan CNP             Sincerely,        YASMINE Tan CNP

## 2019-08-06 NOTE — PROGRESS NOTES
[facility] [facility]  [place] Medical Record Number:  [id number]  Place of Service where encounter took place:  [facility] [[id number]]  Chief Complaint   Patient presents with     RECHECK       HPI:    Deanne Zayas  is a 86 year old (6/19/1933), who is being seen today for an episodic care visit.  HPI information obtained from: facility chart records, facility staff, patient report and [facility] chart review. Today's concern is:     CKD (chronic kidney disease) stage 3, GFR 30-59 ml/min (H)  Essential hypertension  Atrial fibrillation with rapid ventricular response (H)  Hypothyroidism due to acquired atrophy of thyroid  Late onset Alzheimer's disease without behavioral disturbance   Patient seen today for routine visit at [facility] where she lives in memory care unit. Staff report some decline in function - requiring increased assistance with transfers. She is primarily WC bound. Does continue to have right shoulder pain, which limits ability to lift right arm completely, staff feels this has been well controlled. She has loose stools on occasion, but is on a laxative. Deanne report she is feeling fine. Staff have not noted any SOB or wheezing. She does have limited appetite, appears to have lost 12 lbs in the past 6 months.     Past Medical and Surgical History reviewed in Epic today.    MEDICATIONS:  Current Outpatient Medications   Medication Sig Dispense Refill     ACETAMINOPHEN EXTRA STRENGTH 500 MG tablet TAKE TWO TABLETS (1000MG) BY MOUTH EVERY 8 HOURS 120 tablet 98     atorvastatin (LIPITOR) 20 MG tablet TAKE 1 TABLET BY MOUTH ONCE DAILY 31 tablet 98     BISAC-EVAC 10 MG Suppository UNWRAP AND INSERT ONE SUPPOSITORY RECTALLY EVERY 3 DAYS AS NEEDED IF NO BM 10 suppository 3     clopidogrel (PLAVIX) 75 MG tablet TAKE 1 TABLET BY MOUTH ONCE DAILY IN THE EVENING 31 tablet 98     Coloplast barrier cream CREA Apply topically 4 times daily as needed       diltiazem ER  "(TIAZAC) 240 MG 24 hr ER beaded capsule TAKE 1 CAPSULE BY MOUTH ONCE DAILY 31 capsule 98     escitalopram (LEXAPRO) 10 MG tablet TAKE 1 TABLET BY MOUTH ONCE DAILY 30 tablet 11     FEROSUL 325 (65 Fe) MG tablet TAKE 1 TABLET BY MOUTH ONCE DAILY 31 tablet 98     levothyroxine (SYNTHROID/LEVOTHROID) 112 MCG tablet TAKE 1 TABLET BY MOUTH ONCE DAILY 31 tablet 98     lisinopril (PRINIVIL/ZESTRIL) 10 MG tablet Take 1 tablet (10 mg) by mouth daily 60 tablet 3     Menthol, Topical Analgesic, (BIOFREEZE) 4 % GEL Apply to right shoulder 2 times daily       MICRO GUARD 2 % powder APPLY TOPICALLY TO VAGINA THREE TIMES DAILY AS NEEDED 85 g 97     polyethylene glycol (MIRALAX/GLYCOLAX) powder MIX 17GM OF POWDER IN 8OZ OF WATER UNTIL COMPLETELY DISSOLVED. DRINK SOLUTION BY MOUTH ONCE DAILY AS NEEDED FOR CONSTIPATION 527 g 97     senna (SENOKOT) 8.6 MG tablet Take 1 tablet by mouth daily       syringe/needle, disp, ([employer] SAFETY SYRINGE) 25G X 1\" 3 ML MISC USE AS DIRECTED WITH B12 INJECTION 1 each 97     traZODone (DESYREL) 50 MG tablet Take 0.5 tablets (25 mg) by mouth At Bedtime       VITAMIN D3 1000 units tablet TAKE TWO TABLETS (2000 UNITS) BY MOUTH ONCE DAILY 62 tablet 98     traZODone (DESYREL) 50 MG tablet TAKE ONE-HALF TABLET (25MG) BY MOUTH ONCE DAILY 16 tablet 98     vitamin B-12 (CYANOCOBALAMIN) 1000 MCG/ML injection INJECT 1 ML (1,000 MCG) SUBCUTANEOUS EVERY 30 DAYS 1 mL 97         REVIEW OF SYSTEMS:  Limited secondary to cognitive impairment but today pt reports 4 point ROS including Respiratory, CV, GI and , other than that noted in the HPI as negative    Objective:  BP (!) 154/82   Pulse 75   Temp 97.8  F (36.6  C)   Resp 16   Wt 76.7 kg (169 lb)   SpO2 95%   BMI 28.12 kg/m    Exam:  GENERAL APPEARANCE:  Alert, in no distress  RESP:  respiratory effort and palpation of chest normal, lungs clear to auscultation , no respiratory distress  CV:  Palpation and auscultation of heart done , regular rate and " rhythm, no murmur, rub, or gallop, +2 pedal pulses, peripheral edema 1+ in BLE  ABDOMEN:  no guarding or rebound, bowel sounds normal  M/S:   non-ambulatory, decreased ROM to right shoulder  SKIN:  Inspection of skin and subcutaneous tissue baseline, exam limited as patient is fully clothed  NEURO:   Cranial nerves 2-12 are normal tested and grossly at patient's baseline  PSYCH:  insight and judgement impaired, memory impaired , affect and mood normal    Labs:   Recent labs in [facility] reviewed by me today.     ASSESSMENT/PLAN:  (I10) Essential hypertension  (primary encounter diagnosis)  Comment: SBP typically 120's-150's, given age, falls risk and overall frailty this is likely appropriate.   Plan: Continue lisinopril 10mg daily.     (N18.3) CKD (chronic kidney disease) stage 3, GFR 30-59 ml/min (H)  Comment: GFR 50-60, Creatinine 0.9-1.1   Plan: Avoid nephrotoxic medications, BMP PRN    (I48.91) Atrial fibrillation with rapid ventricular response (H)  Comment: HR controled, not on AC d/t falls risk  Plan: continue diltiazem 240mg daily, plavix 75mg daily, continue to monitor    (E03.4) Hypothyroidism due to acquired atrophy of thyroid  Comment: TSH 0.74 3/2018  Plan: TSH on next lab draw, continue levothyroxine at current dose until then.     UPDATE 8/9/19: TSH 0.74, will decrease levothyroxine to 100mcg daily. Recheck TSH in 8 weeks    (G30.1,  F02.80) Late onset Alzheimer's disease without behavioral disturbance  Comment: Some decline noted. Expect further cognitive and functional declines. Expect weight loss. Appropriate for memory care unit. Allefgy to aricept, likely no benefit to Namenda given advanced disease.   Plan: Continue current cares, monitor.     transcribed by : Shaina Perez  Orders:  1. TSH, BMP on next lab day - dx: CKD, hypothyroidism      Electronically signed by:  YASMINE Tan CNP

## 2019-08-08 ENCOUNTER — HOSPITAL LABORATORY (OUTPATIENT)
Facility: OTHER | Age: 84
End: 2019-08-08

## 2019-08-08 LAB
ANION GAP SERPL CALCULATED.3IONS-SCNC: 7 MMOL/L (ref 3–14)
BUN SERPL-MCNC: 17 MG/DL (ref 7–30)
CALCIUM SERPL-MCNC: 9 MG/DL (ref 8.5–10.1)
CHLORIDE SERPL-SCNC: 108 MMOL/L (ref 94–109)
CO2 SERPL-SCNC: 26 MMOL/L (ref 20–32)
CREAT SERPL-MCNC: 0.95 MG/DL (ref 0.52–1.04)
GFR SERPL CREATININE-BSD FRML MDRD: 54 ML/MIN/{1.73_M2}
GLUCOSE SERPL-MCNC: 105 MG/DL (ref 70–99)
POTASSIUM SERPL-SCNC: 3.6 MMOL/L (ref 3.4–5.3)
SODIUM SERPL-SCNC: 141 MMOL/L (ref 133–144)
TSH SERPL DL<=0.005 MIU/L-ACNC: 0.72 MU/L (ref 0.4–4)

## 2019-08-09 RX ORDER — LEVOTHYROXINE SODIUM 100 UG/1
100 TABLET ORAL DAILY
Qty: 30 TABLET | Refills: 2 | Status: SHIPPED | OUTPATIENT
Start: 2019-08-09 | End: 2019-01-01

## 2019-09-03 NOTE — PROGRESS NOTES
[facility] Care Coordination Contact    2nd Attempt: Signed Letter not received from hemal Murray per process.     [facility]  Care Management Specialist  [facility]  [phone]

## 2019-10-01 NOTE — PROGRESS NOTES
"[facility]  [facility] Record Number:  [id number]  Place of Service where encounter took place:  [facility] [[id number]]  Chief Complaint   Patient presents with     RECHECK       HPI:    Deanne Zayas  is a 86 year old (6/19/1933), who is being seen today for an episodic care visit.  HPI information obtained from: facility chart records, facility staff, patient report and [facility] chart review. Today's concern is:     Anorexia  Weight loss  Gastroesophageal reflux disease, esophagitis presence not specified  Late onset Alzheimer's disease without behavioral disturbance (H)  ACP (advance care planning)   Asked to see patient today d/t poor appetite. Staff report she has not been eating much for the past 7-10 days, drinking mostly fluids. Has even declined dessert at times, which is very unusual for her as she loves sweets. She has advanced dementia, and is a poor historian. CMP done and was unremarkable except for hypokalemia. She does have loose/soft stools which her baseline. Staff have not noted any black, bloody or tarry stools. She is on an iron supplement  Deanne denies any nausea or foul taste in her mouth. Denies any abdominal pain, indigestion or heart burn. Staff have not noted any emesis. Does report \"pain\" to abdomen on palpation, but no guarding or other reaction to palpation so question if just discomfort from palpation as she is unable to verbalize any specific area of pain. She is afebrile, BP and HR stable. Does have noted weight loss - ~10 kg in the past 8 months.     Past Medical and Surgical History reviewed in Epic today.    MEDICATIONS:  Current Outpatient Medications   Medication Sig Dispense Refill     ACETAMINOPHEN EXTRA STRENGTH 500 MG tablet TAKE TWO TABLETS (1000MG) BY MOUTH EVERY 8 HOURS 120 tablet 98     atorvastatin (LIPITOR) 20 MG tablet TAKE 1 TABLET BY MOUTH ONCE DAILY 31 tablet 98     BIOFREEZE 4 % GEL APPLY TO RIGHT " "SHOULDER TWICE DAILY 237 mL 98     BISAC-EVAC 10 MG Suppository UNWRAP AND INSERT ONE SUPPOSITORY RECTALLY EVERY 3 DAYS AS NEEDED IF NO BM 10 suppository 3     clopidogrel (PLAVIX) 75 MG tablet TAKE 1 TABLET BY MOUTH ONCE DAILY IN THE EVENING 31 tablet 98     Coloplast barrier cream CREA Apply topically 4 times daily as needed       diltiazem ER (TIAZAC) 240 MG 24 hr ER beaded capsule TAKE 1 CAPSULE BY MOUTH ONCE DAILY 31 capsule 98     escitalopram (LEXAPRO) 10 MG tablet TAKE 1 TABLET BY MOUTH ONCE DAILY 30 tablet 11     famotidine (PEPCID) 20 MG tablet Take 1 tablet (20 mg) by mouth At Bedtime 30 tablet 11     FEROSUL 325 (65 Fe) MG tablet TAKE 1 TABLET BY MOUTH ONCE DAILY 31 tablet 98     levothyroxine (SYNTHROID/LEVOTHROID) 100 MCG tablet Take 1 tablet (100 mcg) by mouth daily 31 tablet 98     lisinopril (PRINIVIL/ZESTRIL) 10 MG tablet Take 1 tablet (10 mg) by mouth daily 60 tablet 3     MICRO GUARD 2 % powder APPLY TOPICALLY TO VAGINA THREE TIMES DAILY AS NEEDED 85 g 97     polyethylene glycol (MIRALAX/GLYCOLAX) powder MIX 17GM OF POWDER IN 8OZ OF WATER UNTIL COMPLETELY DISSOLVED. DRINK SOLUTION BY MOUTH ONCE DAILY AS NEEDED FOR CONSTIPATION 527 g 97     senna (SENOKOT) 8.6 MG tablet Take 1 tablet by mouth daily       syringe/needle, disp, ([employer] SAFETY SYRINGE) 25G X 1\" 3 ML MISC USE AS DIRECTED WITH B12 INJECTION 1 each 97     traZODone (DESYREL) 50 MG tablet Take 0.5 tablets (25 mg) by mouth At Bedtime       vitamin B-12 (CYANOCOBALAMIN) 1000 MCG/ML injection INJECT 1 ML (1,000 MCG) SUBCUTANEOUS EVERY 30 DAYS 1 mL 97     VITAMIN D3 1000 units tablet TAKE TWO TABLETS (2000 UNITS) BY MOUTH ONCE DAILY 62 tablet 98         REVIEW OF SYSTEMS:  Unobtainable secondary to cognitive impairment.     Objective:  BP (!) 154/81   Pulse 73   Temp 98.4  F (36.9  C)   Resp 16   Wt 71.7 kg (158 lb)   SpO2 96%   BMI 26.29 kg/m    Exam:  GENERAL APPEARANCE:  Alert, in no distress  RESP:  respiratory effort and " palpation of chest normal, lungs clear to auscultation , no respiratory distress  CV:  Palpation and auscultation of heart done , regular rate and rhythm, no murmur, rub, or gallop, +2 pedal pulses, peripheral edema 1+ in BLE  ABDOMEN:  normal bowel sounds, ? diffused, mild tenderness, no distention, Bae's sign negative, McBurney's point negative, no guarding or rebound, bowel sounds normal  M/S:   generalized weakness, no gross deformities  NEURO:   chronic mild left facial droop, speech clear,   PSYCH:  insight and judgement impaired, memory impaired , appropriate responses to questions.     Labs:   Recent labs in [facility] reviewed by me today.  and   Most Recent 3 BMP's:  Recent Labs   Lab Test 09/30/19  0549 08/08/19  0745 01/31/19  0547    141 143   POTASSIUM 3.0* 3.6 3.7   CHLORIDE 110* 108 110*   CO2 28 26 26   BUN 16 17 18   CR 0.75 0.95 0.94   ANIONGAP 5 7 7   RUBIN 9.1 9.0 8.1*   GLC 97 105* 149*       ASSESSMENT/PLAN:  (R63.0) Anorexia  (primary encounter diagnosis)  (R63.4) Weight loss  (K21.9) Gastroesophageal reflux disease, esophagitis presence not specified  Comment: Unclear etiology, no red flags on exam, recent CMP unremarkable. ? Underlying GERD, cannot r/o GI bleed as she is on plavix, but VSS, no melena noted. Could also be progression of dementia. No obvious s/s of infection. BM's are at baseline.  Plan: Will start pepcid 20mg daily. Will check CBC on next lab draw.     (G30.1,  F02.80) Late onset Alzheimer's disease without behavioral disturbance (H)  Comment: Significant weight loss, staff report continued weakness, overall slow decline. Likely contributing to poor appetite as above.   Plan: Continue current cares. If CBC at baseline will consider hospice cares.     Hypokalemia  Comment: Suspect d/t poor oral intake, is not on any potassium wasting medications.   Plan: state k-dur 10 meq BID on 10/2, then start daily on 10/3, recheck K+ on 10/3    (Z71.89) ACP (advance care  planning)  Comment: Discussed weight loss poor appetite with daughter Leeanna. Discussed it may be d/t progression of dementia. She stated her mother would not want feeding tubes. Discussed that patient likely appropriate for hospice, she verbalized agreement with this if work-up above does not indicate other pathology contributing to her poor appetite.   Plan: If CBC at patient baseline, consider hospice consult. Will update Leeanna later this week to discuss lab results and to determine if they would like hospice consult at that time.     transcribed by : Shaina Perez  Orders:  1. CBC on next lab day - dx: anorexia  2. Pepcid 20 mg PO at bedtime - dx: GERD      Electronically signed by:  YASMINE Tan CNP

## 2019-10-01 NOTE — LETTER
"    10/1/2019        RE: Deanne Zayas  C/o Leeanna Moran  [address] [address] [facility] [address]        [facility]  [facility] Record Number:  [id number]  Place of Service where encounter took place:  [facility] [[id number]]  Chief Complaint   Patient presents with     RECHECK       HPI:    Deanne Zayas  is a 86 year old (6/19/1933), who is being seen today for an episodic care visit.  HPI information obtained from: facility chart records, facility staff, patient report and [facility] chart review. Today's concern is:     Anorexia  Weight loss  Gastroesophageal reflux disease, esophagitis presence not specified  Late onset Alzheimer's disease without behavioral disturbance (H)  ACP (advance care planning)   Asked to see patient today d/t poor appetite. Staff report she has not been eating much for the past 7-10 days, drinking mostly fluids. Has even declined dessert at times, which is very unusual for her as she loves sweets. She has advanced dementia, and is a poor historian. CMP done and was unremarkable. She does have loose/soft stools which her baseline. Staff have not noted any black, bloody or tarry stools. She is on an iron supplement  Deanne denies any nausea or foul taste in her mouth. Denies any abdominal pain, indigestion or heart burn. Staff have not noted any emesis. Does report \"pain\" to abdomen on palpation, but no guarding or other reaction to palpation so question if just discomfort from palpation as she is unable to verbalize any specific area of pain. She is afebrile, BP and HR stable. Does have noted weight loss - ~10 kg in the past 8 months.     Past Medical and Surgical History reviewed in Epic today.    MEDICATIONS:  Current Outpatient Medications   Medication Sig Dispense Refill     ACETAMINOPHEN EXTRA STRENGTH 500 MG tablet TAKE TWO TABLETS (1000MG) BY MOUTH EVERY 8 HOURS 120 tablet 98     atorvastatin (LIPITOR) 20 MG " "tablet TAKE 1 TABLET BY MOUTH ONCE DAILY 31 tablet 98     BIOFREEZE 4 % GEL APPLY TO RIGHT SHOULDER TWICE DAILY 237 mL 98     BISAC-EVAC 10 MG Suppository UNWRAP AND INSERT ONE SUPPOSITORY RECTALLY EVERY 3 DAYS AS NEEDED IF NO BM 10 suppository 3     clopidogrel (PLAVIX) 75 MG tablet TAKE 1 TABLET BY MOUTH ONCE DAILY IN THE EVENING 31 tablet 98     Coloplast barrier cream CREA Apply topically 4 times daily as needed       diltiazem ER (TIAZAC) 240 MG 24 hr ER beaded capsule TAKE 1 CAPSULE BY MOUTH ONCE DAILY 31 capsule 98     escitalopram (LEXAPRO) 10 MG tablet TAKE 1 TABLET BY MOUTH ONCE DAILY 30 tablet 11     famotidine (PEPCID) 20 MG tablet Take 1 tablet (20 mg) by mouth At Bedtime 30 tablet 11     FEROSUL 325 (65 Fe) MG tablet TAKE 1 TABLET BY MOUTH ONCE DAILY 31 tablet 98     levothyroxine (SYNTHROID/LEVOTHROID) 100 MCG tablet Take 1 tablet (100 mcg) by mouth daily 31 tablet 98     lisinopril (PRINIVIL/ZESTRIL) 10 MG tablet Take 1 tablet (10 mg) by mouth daily 60 tablet 3     MICRO GUARD 2 % powder APPLY TOPICALLY TO VAGINA THREE TIMES DAILY AS NEEDED 85 g 97     polyethylene glycol (MIRALAX/GLYCOLAX) powder MIX 17GM OF POWDER IN 8OZ OF WATER UNTIL COMPLETELY DISSOLVED. DRINK SOLUTION BY MOUTH ONCE DAILY AS NEEDED FOR CONSTIPATION 527 g 97     senna (SENOKOT) 8.6 MG tablet Take 1 tablet by mouth daily       syringe/needle, disp, ([employer]POINT SAFETY SYRINGE) 25G X 1\" 3 ML MISC USE AS DIRECTED WITH B12 INJECTION 1 each 97     traZODone (DESYREL) 50 MG tablet Take 0.5 tablets (25 mg) by mouth At Bedtime       vitamin B-12 (CYANOCOBALAMIN) 1000 MCG/ML injection INJECT 1 ML (1,000 MCG) SUBCUTANEOUS EVERY 30 DAYS 1 mL 97     VITAMIN D3 1000 units tablet TAKE TWO TABLETS (2000 UNITS) BY MOUTH ONCE DAILY 62 tablet 98         REVIEW OF SYSTEMS:  Unobtainable secondary to cognitive impairment.     Objective:  BP (!) 154/81   Pulse 73   Temp 98.4  F (36.9  C)   Resp 16   Wt 71.7 kg (158 lb)   SpO2 96%   BMI 26.29 " kg/m     Exam:  GENERAL APPEARANCE:  Alert, in no distress  RESP:  respiratory effort and palpation of chest normal, lungs clear to auscultation , no respiratory distress  CV:  Palpation and auscultation of heart done , regular rate and rhythm, no murmur, rub, or gallop, +2 pedal pulses, peripheral edema 1+ in BLE  ABDOMEN:  normal bowel sounds, ? diffused, mild tenderness, no distention, Bae's sign negative, McBurney's point negative, no guarding or rebound, bowel sounds normal  M/S:   generalized weakness, no gross deformities  NEURO:   chronic mild left facial droop, speech clear,   PSYCH:  insight and judgement impaired, memory impaired , appropriate responses to questions.     Labs:   Recent labs in [facility] reviewed by me today.  and   Most Recent 3 BMP's:  Recent Labs   Lab Test 09/30/19  0549 08/08/19  0745 01/31/19  0547    141 143   POTASSIUM 3.0* 3.6 3.7   CHLORIDE 110* 108 110*   CO2 28 26 26   BUN 16 17 18   CR 0.75 0.95 0.94   ANIONGAP 5 7 7   RUBIN 9.1 9.0 8.1*   GLC 97 105* 149*       ASSESSMENT/PLAN:  (R63.0) Anorexia  (primary encounter diagnosis)  (R63.4) Weight loss  (K21.9) Gastroesophageal reflux disease, esophagitis presence not specified  Comment: Unclear etiology, no red flags on exam, recent CMP unremarkable. ? Underlying GERD, cannot r/o GI bleed as she is on plavix, but VSS, no melena noted. Could also be progression of dementia. No obvious s/s of infection. BM's are at baseline.  Plan: Will start pepcid 20mg daily. Will check CBC on next lab draw.     (G30.1,  F02.80) Late onset Alzheimer's disease without behavioral disturbance (H)  Comment: Significant weight loss, staff report continued weakness, overall slow decline. Likely contributing to poor appetite as above.   Plan: Continue current cares. If CBC at baseline will consider hospice cares.     (Z71.89) ACP (advance care planning)  Comment: Discussed weight loss poor appetite with daughter Leeanna. Discussed it may be d/t  progression of dementia. She stated her mother would not want feeding tubes. Discussed that patient likely appropriate for hospice, she verbalized agreement with this if work-up above does not indicate other pathology contributing to her poor appetite.   Plan: If CBC at patient baseline, consider hospice consult. Will update Leeanna later this week to discuss lab results and to determine if they would like hospice consult at that time.     transcribed by : Shaina Perez  Orders:  1. CBC on next lab day - dx: anorexia  2. Pepcid 20 mg PO at bedtime - dx: GERD      Electronically signed by:  YASMINE Tan CNP             Sincerely,        YASMINE Tan CNP

## 2019-10-04 NOTE — PROGRESS NOTES
Labs unremarkable. Decline/poor appetite likely due to progression of dementia. Discussed with daughter Leeanna, she would like to pursue hospice cares at this time. Referral placed.

## 2019-10-15 NOTE — PROGRESS NOTES
[facility] Care Coordination Contact    No Letter Received: 60 day tracking of letter complete, no letter received from [facility] on [facility]. Tracking discontinued.     Ximena Zelayas  Care Management Specialist  [facility]  [phone]

## 2019-12-24 NOTE — PROGRESS NOTES
12-24-19   CC received notice that member had to provider verification for [place] renewal.  CC reached out to to member daughter and she thought she had provided what she needed. CC stated she would call financial worker and f/u. CC left VM for Betzaida at [facility] and asked for call back if member needed to provide anything more.  CC will then f/u as needed with daughter.   Yennifer Hernandez MA [facility] Coordinator   [phone]

## 2020-01-01 ENCOUNTER — PATIENT OUTREACH (OUTPATIENT)
Dept: GERIATRIC MEDICINE | Facility: CLINIC | Age: 85
End: 2020-01-01

## 2020-01-01 ENCOUNTER — HOSPITAL LABORATORY (OUTPATIENT)
Facility: OTHER | Age: 85
End: 2020-01-01

## 2020-01-01 ENCOUNTER — VIRTUAL VISIT (OUTPATIENT)
Dept: GERIATRICS | Facility: CLINIC | Age: 85
End: 2020-01-01
Payer: MEDICARE

## 2020-01-01 ENCOUNTER — ASSISTED LIVING VISIT (OUTPATIENT)
Dept: GERIATRICS | Facility: CLINIC | Age: 85
End: 2020-01-01
Payer: MEDICARE

## 2020-01-01 VITALS
HEART RATE: 92 BPM | RESPIRATION RATE: 18 BRPM | WEIGHT: 114 LBS | DIASTOLIC BLOOD PRESSURE: 75 MMHG | BODY MASS INDEX: 18.97 KG/M2 | OXYGEN SATURATION: 96 % | SYSTOLIC BLOOD PRESSURE: 122 MMHG | TEMPERATURE: 97.1 F

## 2020-01-01 VITALS
TEMPERATURE: 97.3 F | DIASTOLIC BLOOD PRESSURE: 91 MMHG | OXYGEN SATURATION: 96 % | HEART RATE: 96 BPM | WEIGHT: 138.4 LBS | SYSTOLIC BLOOD PRESSURE: 151 MMHG | RESPIRATION RATE: 14 BRPM | BODY MASS INDEX: 23.03 KG/M2

## 2020-01-01 DIAGNOSIS — I10 ESSENTIAL HYPERTENSION: ICD-10-CM

## 2020-01-01 DIAGNOSIS — K21.9 GASTROESOPHAGEAL REFLUX DISEASE, ESOPHAGITIS PRESENCE NOT SPECIFIED: ICD-10-CM

## 2020-01-01 DIAGNOSIS — M15.9 OSTEOARTHRITIS OF MULTIPLE JOINTS, UNSPECIFIED OSTEOARTHRITIS TYPE: ICD-10-CM

## 2020-01-01 DIAGNOSIS — R52 PAIN: ICD-10-CM

## 2020-01-01 DIAGNOSIS — I69.359 HEMIPLEGIA AFFECTING DOMINANT SIDE, POST-STROKE (H): ICD-10-CM

## 2020-01-01 DIAGNOSIS — E03.4 HYPOTHYROIDISM DUE TO ACQUIRED ATROPHY OF THYROID: ICD-10-CM

## 2020-01-01 DIAGNOSIS — G30.1 LATE ONSET ALZHEIMER'S DISEASE WITHOUT BEHAVIORAL DISTURBANCE (H): ICD-10-CM

## 2020-01-01 DIAGNOSIS — K59.01 SLOW TRANSIT CONSTIPATION: ICD-10-CM

## 2020-01-01 DIAGNOSIS — I48.91 ATRIAL FIBRILLATION WITH RAPID VENTRICULAR RESPONSE (H): ICD-10-CM

## 2020-01-01 DIAGNOSIS — R22.31 LOCALIZED SWELLING OF RIGHT UPPER EXTREMITY: Primary | ICD-10-CM

## 2020-01-01 DIAGNOSIS — N18.30 CKD (CHRONIC KIDNEY DISEASE) STAGE 3, GFR 30-59 ML/MIN (H): ICD-10-CM

## 2020-01-01 DIAGNOSIS — Z51.5 HOSPICE CARE PATIENT: ICD-10-CM

## 2020-01-01 DIAGNOSIS — Z00.00 ANNUAL PHYSICAL EXAM: Primary | ICD-10-CM

## 2020-01-01 DIAGNOSIS — F02.80 LATE ONSET ALZHEIMER'S DISEASE WITHOUT BEHAVIORAL DISTURBANCE (H): ICD-10-CM

## 2020-01-01 DIAGNOSIS — F41.9 ANXIETY: ICD-10-CM

## 2020-01-01 LAB
COVID-19 VIRUS PCR TO MAYO - RESULT: NORMAL
COVID-19 VIRUS PCR TO MAYO - RESULT: NORMAL
SARS-COV-2 RNA SPEC QL NAA+PROBE: NOT DETECTED
SPECIMEN SOURCE: NORMAL

## 2020-01-01 RX ORDER — ESCITALOPRAM OXALATE 10 MG/1
TABLET ORAL
Qty: 28 TABLET | Refills: 97 | Status: SHIPPED | OUTPATIENT
Start: 2020-01-01

## 2020-01-01 RX ORDER — PSEUDOEPHED/ACETAMINOPH/DIPHEN 30MG-500MG
TABLET ORAL
Qty: 180 TABLET | Refills: 19 | Status: SHIPPED | OUTPATIENT
Start: 2020-01-01

## 2020-01-01 RX ORDER — LORAZEPAM 0.5 MG/1
0.5 TABLET ORAL EVERY 4 HOURS PRN
COMMUNITY

## 2020-01-01 RX ORDER — DILTIAZEM HYDROCHLORIDE 240 MG/1
CAPSULE, EXTENDED RELEASE ORAL
Qty: 30 CAPSULE | Refills: 97 | Status: SHIPPED | OUTPATIENT
Start: 2020-01-01

## 2020-01-01 RX ORDER — ACETAMINOPHEN 650 MG/1
650 SUPPOSITORY RECTAL EVERY 4 HOURS PRN
COMMUNITY
Start: 2020-01-01

## 2020-01-01 RX ORDER — LOPERAMIDE HYDROCHLORIDE 2 MG/1
2-4 TABLET ORAL DAILY PRN
COMMUNITY

## 2020-01-01 RX ORDER — BISACODYL 10 MG
10 SUPPOSITORY, RECTAL RECTAL DAILY PRN
COMMUNITY

## 2020-01-01 RX ORDER — MORPHINE SULFATE 30 MG/1
2.5 TABLET ORAL 3 TIMES DAILY
COMMUNITY

## 2020-01-01 ASSESSMENT — ACTIVITIES OF DAILY LIVING (ADL)
DEPENDENT_IADLS:: CLEANING;COOKING;LAUNDRY;SHOPPING;MEAL PREPARATION;MEDICATION MANAGEMENT;MONEY MANAGEMENT;TRANSPORTATION;INCONTINENCE

## 2020-01-20 NOTE — PROGRESS NOTES
[place] [facility]  Chief Complaint   Patient presents with     Annual Comprehensive Exam Assisted Living     [place] Medical Record Number:  [id number]  Place of Service where encounter took place:  [facility] ([facility]) [[id number]]    HPI:    Deanne Zayas  is a 86 year old  (6/19/1933), who is being seen today for an annual comprehensive visit. HPI information obtained from: facility chart records, facility staff, patient report and [place] Epic chart review.  Today's concerns are:     Annual physical exam  Hemiplegia affecting dominant side, post-stroke (H)  Atrial fibrillation with rapid ventricular response (H)  CKD (chronic kidney disease) stage 3, GFR 30-59 ml/min (H)  Hypothyroidism due to acquired atrophy of thyroid  Late onset Alzheimer's disease without behavioral disturbance (H)  Hospice care patient  Gastroesophageal reflux disease, esophagitis presence not specified  Essential hypertension  Anxiety  Osteoarthritis of multiple joints, unspecified osteoarthritis type  Slow transit constipation   Patient being seen today for annual exam. She currently resides in memory care unit. Has advanced dementia, previous CVA. Signed onto hospice ~3 months ago as had noted decline and weight loss. She remain on max assistance for all ADL's. Continues to have weight loss, very poor appetite. Has lost about 20 lbs in the past 3 months. She is smiling and pleasant today. She denies any pain. States she is feeling well. Staff report nor behavioral concerns. Continue to report increased care needs and only eating a few bits of food and sips of coffee. She is regularly by the hospice nurse.     ALLERGIES: Donepezil  PAST MEDICAL HISTORY:  has a past medical history of Alzheimer's dementia without behavioral disturbance (H), Depression, Dyslipidemia, Essential hypertension, History of CVA (cerebrovascular accident), Hypothyroidism, and Right hemiparesis (H), secondary to previous  CVA.  PAST SURGICAL HISTORY:  has a past surgical history that includes Cholecystectomy; surgical history of -  (2012 ); and Cystoscopy (N/A, 8/29/2014).  IMMUNIZATIONS:  Immunization History   Administered Date(s) Administered     FLU 6-35 months 09/24/2009, 09/28/2012     Influenza (High Dose) 3 valent vaccine 10/07/2015, 09/27/2016, 09/26/2017, 09/25/2018, 09/24/2019     Influenza (IIV3) PF 11/03/2003, 10/19/2004, 11/07/2006, 11/06/2007, 10/19/2010, 09/27/2011, 09/28/2012, 09/19/2013     Influenza Vaccine IM > 6 months Valent IIV4 10/08/2014     Pneumo Conj 13-V (2010&after) 09/24/2015     Pneumococcal 23 valent 08/16/2005     TD (ADULT, 7+) 01/01/1996, 10/13/2009     TDAP Vaccine (Adacel) 10/16/2012     Td (Adult), Adsorbed 01/01/1996, 04/07/2004     Above immunizations pulled from [place] [facility]. [facility] and facility records also reconciled. Outstanding information sent to  to update [facility].  Future immunizations are not needed at this point as all recommended immunizations are up to date.     Current Outpatient Medications   Medication Sig Dispense Refill     acetaminophen (TYLENOL) 325 MG suppository Place 650 mg rectally every 4 hours as needed for fever       ACETAMINOPHEN EXTRA STRENGTH 500 MG tablet TAKE TWO TABLETS (1000MG) BY MOUTH EVERY 8 HOURS 120 tablet 98     atropine 1 % SOLN Place 2 drops under the tongue every 2 hours as needed for secretions       BIOFREEZE 4 % GEL APPLY TO RIGHT SHOULDER TWICE DAILY 237 mL 98     bisacodyl (DULCOLAX) 10 MG suppository Place 10 mg rectally daily as needed for constipation       Coloplast barrier cream CREA Apply topically 4 times daily as needed       diltiazem ER (TIAZAC) 240 MG 24 hr ER beaded capsule TAKE 1 CAPSULE BY MOUTH ONCE DAILY 31 capsule 98     escitalopram (LEXAPRO) 10 MG tablet TAKE 1 TABLET BY MOUTH ONCE DAILY 30 tablet 11     loperamide (IMODIUM A-D) 2 MG tablet Take 2-4 mg by mouth daily as needed for diarrhea       LORazepam  "(ATIVAN) 0.5 MG tablet Take 0.5 mg by mouth every 4 hours as needed for anxiety       MICRO GUARD 2 % external powder APPLY TOPICALLY TO VAGINA THREE TIMES DAILY AS NEEDED 85 g 97     morphine 2.5 MG solu-tab Take 2.5 mg by mouth every 2 hours as needed for shortness of breath / dyspnea or moderate to severe pain       omeprazole (PRILOSEC) 20 MG DR capsule Take 20 mg by mouth daily       senna (SENOKOT) 8.6 MG tablet Take 1 tablet by mouth daily       syringe/needle, disp, ([employer] SAFETY SYRINGE) 25G X 1\" 3 ML MISC USE AS DIRECTED WITH B12 INJECTION 1 each 97     traZODone (DESYREL) 50 MG tablet Take 0.5 tablets (25 mg) by mouth At Bedtime       vitamin B-12 (CYANOCOBALAMIN) 1000 MCG/ML injection INJECT 1 ML (1,000 MCG) SUBCUTANEOUS EVERY 30 DAYS 1 mL 97     VITAMIN D3 1000 units tablet TAKE TWO TABLETS (2000 UNITS) BY MOUTH ONCE DAILY 62 tablet 98     atorvastatin (LIPITOR) 20 MG tablet TAKE 1 TABLET BY MOUTH ONCE DAILY 31 tablet 98     clopidogrel (PLAVIX) 75 MG tablet TAKE 1 TABLET BY MOUTH ONCE DAILY IN THE EVENING 31 tablet 98     famotidine (PEPCID) 20 MG tablet Take 1 tablet (20 mg) by mouth At Bedtime 30 tablet 11     FEROSUL 325 (65 Fe) MG tablet TAKE 1 TABLET BY MOUTH ONCE DAILY 31 tablet 98     levothyroxine (SYNTHROID/LEVOTHROID) 88 MCG tablet Take 1 tablet (88 mcg) by mouth daily 30 tablet 11     lisinopril (PRINIVIL/ZESTRIL) 10 MG tablet Take 1 tablet (10 mg) by mouth daily 60 tablet 3     potassium chloride ER (K-DUR/KLOR-CON M) 10 MEQ CR tablet Take 1 tablet (10 mEq) by mouth daily 30 tablet 11       Case Management:  I have reviewed the Assisted Living care plan, current immunizations and preventive care/cancer screening. .Future cancer screening is not clinically indicated secondary to age/goals of care Patient's desire to return to the community is not assessible due to cognitive impairment. Current Level of Care is appropriate.    Advance Directive Discussion:    I reviewed the current " advanced directives as reflected in EPIC, the POLST and the facility chart, and verified the congruency of orders. I contacted the first party daughter Leeanna and discussed the plan of Care.  I did not due to cognitive impairment review the advance directives with the resident.     Team Discussion:  I communicated with the appropriate disciplines involved with the Plan of Care:   Nursing   and hospice  Patient's goal is unobtainable secondary to cognitive impairment and family's/responsible party's goal for the patient is comfort, pain control.  Information reviewed:  Medications, vital signs, orders, and nursing notes.    ROS:  Limited secondary to cognitive impairment but today pt reports no pain, no SOB, no concerns    Vitals:  BP (!) 151/91   Pulse 96   Temp 97.3  F (36.3  C)   Resp 14   Wt 62.8 kg (138 lb 6.4 oz)   SpO2 96%   BMI 23.03 kg/m   Body mass index is 23.03 kg/m .  Exam:  GENERAL APPEARANCE:  Alert, in no distress, thin, cooperative  ENT:  Mouth and posterior oropharynx normal, moist mucous membranes, upper dentures in place, mild [place]  RESP:  respiratory effort and palpation of chest normal, lungs clear to auscultation , no respiratory distress  CV:  Palpation and auscultation of heart done , regular rate and rhythm, no murmur, rub, or gallop, no edema  ABDOMEN:  no guarding or rebound, bowel sounds normal, no distension  M/S:   non-ambulatory, decrease ROM RUE, BLE  SKIN:  Inspection of skin and subcutaneous tissue baseline, Palpation of skin and subcutaneous tissue baseline  NEURO:   Cranial nerves 2-12 are normal tested and grossly at patient's baseline, Examination of sensation by touch is decreased bilat feet  PSYCH:  insight and judgement impaired, memory impaired , affect and mood normal, pleasent and smiling     Lab/Diagnostic data:   Recent labs in [facility] reviewed by me today.     ASSESSMENT/PLAN  (Z00.00) Annual physical exam  (primary encounter diagnosis)  (I69.359) Hemiplegia affecting  dominant side, post-stroke (H)  Comment: S/p CVA in 2017. MRI 3/2018 showed showed No evidence for acute infarct, acute hemorrhage, or any focal mass lesions, Old left cerebellar infarct, Old microbleed in right subinsular cortex, Cerebral atrophy with chronic white matter changes. Is wheelchair bound, require extensive assistance with all ADL's.   Plan: Continue hospice cares - no longer on ASA or statin d/t difficulty swallowing pills and goals of care.     (I48.91) Atrial fibrillation with rapid ventricular response (H)  Comment: Asymptomatic, HR appears controlled  Plan: No AC d/t goals of care, diltiazem Er 240mg - ok to open and sprinkle on pudding per pharmacy.     (N18.3) CKD (chronic kidney disease) stage 3, GFR 30-59 ml/min (H)  Comment: Chronic  Plan: avoid nephrotoxic medications, no planned labs given goal of care    (E03.4) Hypothyroidism due to acquired atrophy of thyroid  Comment: Recent TSH 0.72, not on medication  Plan: No further labs, is on hospice    (G30.1,  F02.80) Late onset Alzheimer's disease without behavioral disturbance (H)  (Z51.5) Hospice care patient  Comment: Significant decline and weight loss noted, appropriate for hospice cares. Lives in memory care unit.   Plan: continue hospice cares, continue current facility cares    (K21.9) Gastroesophageal reflux disease, esophagitis presence not specified  Comment: stable  Plan: continue omeprazole.    (I10) Essential hypertension  Based on JNC-8 goals,  patients age of 86 year old, presence of diabetes or CKD, and goals of care goal BP is  <140/90 mm Hg. Goal of care is hospice, continue current plan of care    (F41.9) Anxiety  Comment: Chronic, though mood stable recently per facility staff  Plan: continue PRN lorazepam, lexapro, trazodone at HS.     (M15.9) Osteoarthritis of multiple joints, unspecified osteoarthritis type  Comment: Intermittent pain, none today  Plan: continue APAP, PRN morphine, biofreeze,     (K59.01) Slow transit  constipation  Comment: intermittent  Plan: continue PRN supp, senna, once daily and BID PRN, PRN imodium. Continue to monitor and adjust.       transcribed by : Shaina Boykin  Orders:  1. NNO    Electronically signed by:  YASMINE Tan CNP

## 2020-01-21 NOTE — PROGRESS NOTES
1-   CC was at the facility today so did 6 month in person with RN staff and NP     [facility] Six-Month Telephone Assessment    6 month telephone assessment completed.    ER visits: No  Hospitalizations: No  TCU stays: No  Significant health status changes: member is now on Hospice services and these are continuing at time of 6 m   Falls/Injuries: No  ADL/IADL changes: No  Changes in services: No    Caregiver Assessment follow up:  None     Goals: See POC in chart for goal progress documentation.  Update     Will see member in 6 months for an annual health risk assessment.   Encouraged member to call CC with any questions or concerns in the meantime.     Yennifer Hernandez MA [facility] Care Coordinator   [phone]

## 2020-01-21 NOTE — LETTER
1/21/2020        RE: Deanne Zayas  C/o Leeanna Moran  [id number] [id number]        [place] [facility]  Chief Complaint   Patient presents with     Annual Comprehensive Exam Assisted Living     [place] Medical Record Number:  [id number]  Place of Service where encounter took place:  [facility] ([facility]) [[id number]]    HPI:    Deanne Zayas  is a 86 year old  (6/19/1933), who is being seen today for an annual comprehensive visit. HPI information obtained from: facility chart records, facility staff, patient report and [place] Epic chart review.  Today's concerns are:     Annual physical exam  Hemiplegia affecting dominant side, post-stroke (H)  Atrial fibrillation with rapid ventricular response (H)  CKD (chronic kidney disease) stage 3, GFR 30-59 ml/min (H)  Hypothyroidism due to acquired atrophy of thyroid  Late onset Alzheimer's disease without behavioral disturbance (H)  Hospice care patient  Gastroesophageal reflux disease, esophagitis presence not specified  Essential hypertension  Anxiety  Osteoarthritis of multiple joints, unspecified osteoarthritis type  Slow transit constipation   Patient being seen today for annual exam. She currently resides in memory care unit. Has advanced dementia, previous CVA. Signed onto hospice ~3 months ago as had noted decline and weight loss. She remain on max assistance for all ADL's. Continues to have weight loss, very poor appetite. Has lost about 20 lbs in the past 3 months. She is smiling and pleasant today. She denies any pain. States she is feeling well. Staff report nor behavioral concerns. Continue to report increased care needs and only eating a few bits of food and sips of coffee. She is regularly by the hospice nurse.     ALLERGIES: Donepezil  PAST MEDICAL HISTORY:  has a past medical history of Alzheimer's dementia without behavioral disturbance (H), Depression, Dyslipidemia, Essential hypertension,  History of CVA (cerebrovascular accident), Hypothyroidism, and Right hemiparesis (H), secondary to previous CVA.  PAST SURGICAL HISTORY:  has a past surgical history that includes Cholecystectomy; surgical history of -  (2012 ); and Cystoscopy (N/A, 8/29/2014).  IMMUNIZATIONS:  Immunization History   Administered Date(s) Administered     FLU 6-35 months 09/24/2009, 09/28/2012     Influenza (High Dose) 3 valent vaccine 10/07/2015, 09/27/2016, 09/26/2017, 09/25/2018, 09/24/2019     Influenza (IIV3) PF 11/03/2003, 10/19/2004, 11/07/2006, 11/06/2007, 10/19/2010, 09/27/2011, 09/28/2012, 09/19/2013     Influenza Vaccine IM > 6 months Valent IIV4 10/08/2014     Pneumo Conj 13-V (2010&after) 09/24/2015     Pneumococcal 23 valent 08/16/2005     TD (ADULT, 7+) 01/01/1996, 10/13/2009     TDAP Vaccine (Adacel) 10/16/2012     Td (Adult), Adsorbed 01/01/1996, 04/07/2004     Above immunizations pulled from [place]. MIIC and facility records also reconciled. Outstanding information sent to  to update [place] [facility].  Future immunizations are not needed at this point as all recommended immunizations are up to date.     Current Outpatient Medications   Medication Sig Dispense Refill     acetaminophen (TYLENOL) 325 MG suppository Place 650 mg rectally every 4 hours as needed for fever       ACETAMINOPHEN EXTRA STRENGTH 500 MG tablet TAKE TWO TABLETS (1000MG) BY MOUTH EVERY 8 HOURS 120 tablet 98     atropine 1 % SOLN Place 2 drops under the tongue every 2 hours as needed for secretions       BIOFREEZE 4 % GEL APPLY TO RIGHT SHOULDER TWICE DAILY 237 mL 98     bisacodyl (DULCOLAX) 10 MG suppository Place 10 mg rectally daily as needed for constipation       Coloplast barrier cream CREA Apply topically 4 times daily as needed       diltiazem ER (TIAZAC) 240 MG 24 hr ER beaded capsule TAKE 1 CAPSULE BY MOUTH ONCE DAILY 31 capsule 98     escitalopram (LEXAPRO) 10 MG tablet TAKE 1 TABLET BY MOUTH ONCE DAILY 30 tablet 11  "    loperamide (IMODIUM A-D) 2 MG tablet Take 2-4 mg by mouth daily as needed for diarrhea       LORazepam (ATIVAN) 0.5 MG tablet Take 0.5 mg by mouth every 4 hours as needed for anxiety       MICRO GUARD 2 % external powder APPLY TOPICALLY TO VAGINA THREE TIMES DAILY AS NEEDED 85 g 97     morphine 2.5 MG solu-tab Take 2.5 mg by mouth every 2 hours as needed for shortness of breath / dyspnea or moderate to severe pain       omeprazole (PRILOSEC) 20 MG DR capsule Take 20 mg by mouth daily       senna (SENOKOT) 8.6 MG tablet Take 1 tablet by mouth daily       syringe/needle, disp, ([employer] SAFETY SYRINGE) 25G X 1\" 3 ML MISC USE AS DIRECTED WITH B12 INJECTION 1 each 97     traZODone (DESYREL) 50 MG tablet Take 0.5 tablets (25 mg) by mouth At Bedtime       vitamin B-12 (CYANOCOBALAMIN) 1000 MCG/ML injection INJECT 1 ML (1,000 MCG) SUBCUTANEOUS EVERY 30 DAYS 1 mL 97     VITAMIN D3 1000 units tablet TAKE TWO TABLETS (2000 UNITS) BY MOUTH ONCE DAILY 62 tablet 98     atorvastatin (LIPITOR) 20 MG tablet TAKE 1 TABLET BY MOUTH ONCE DAILY 31 tablet 98     clopidogrel (PLAVIX) 75 MG tablet TAKE 1 TABLET BY MOUTH ONCE DAILY IN THE EVENING 31 tablet 98     famotidine (PEPCID) 20 MG tablet Take 1 tablet (20 mg) by mouth At Bedtime 30 tablet 11     FEROSUL 325 (65 Fe) MG tablet TAKE 1 TABLET BY MOUTH ONCE DAILY 31 tablet 98     levothyroxine (SYNTHROID/LEVOTHROID) 88 MCG tablet Take 1 tablet (88 mcg) by mouth daily 30 tablet 11     lisinopril (PRINIVIL/ZESTRIL) 10 MG tablet Take 1 tablet (10 mg) by mouth daily 60 tablet 3     potassium chloride ER (K-DUR/KLOR-CON M) 10 MEQ CR tablet Take 1 tablet (10 mEq) by mouth daily 30 tablet 11       Case Management:  I have reviewed the Assisted Living care plan, current immunizations and preventive care/cancer screening. .Future cancer screening is not clinically indicated secondary to age/goals of care Patient's desire to return to the community is not assessible due to cognitive " impairment. Current Level of Care is appropriate.    Advance Directive Discussion:    I reviewed the current advanced directives as reflected in EPIC, the POLST and the facility chart, and verified the congruency of orders. I contacted the first party daughter Leeanna and discussed the plan of Care.  I did not due to cognitive impairment review the advance directives with the resident.     Team Discussion:  I communicated with the appropriate disciplines involved with the Plan of Care:   Nursing   and hospice  Patient's goal is unobtainable secondary to cognitive impairment and family's/responsible party's goal for the patient is comfort, pain control.  Information reviewed:  Medications, vital signs, orders, and nursing notes.    ROS:  Limited secondary to cognitive impairment but today pt reports no pain, no SOB, no concerns    Vitals:  BP (!) 151/91   Pulse 96   Temp 97.3  F (36.3  C)   Resp 14   Wt 62.8 kg (138 lb 6.4 oz)   SpO2 96%   BMI 23.03 kg/m    Body mass index is 23.03 kg/m .  Exam:  GENERAL APPEARANCE:  Alert, in no distress, thin, cooperative  ENT:  Mouth and posterior oropharynx normal, moist mucous membranes, upper dentures in place, mild [place]  RESP:  respiratory effort and palpation of chest normal, lungs clear to auscultation , no respiratory distress  CV:  Palpation and auscultation of heart done , regular rate and rhythm, no murmur, rub, or gallop, no edema  ABDOMEN:  no guarding or rebound, bowel sounds normal, no distension  M/S:   non-ambulatory, decrease ROM RUE, BLE  SKIN:  Inspection of skin and subcutaneous tissue baseline, Palpation of skin and subcutaneous tissue baseline  NEURO:   Cranial nerves 2-12 are normal tested and grossly at patient's baseline, Examination of sensation by touch is decreased bilat feet  PSYCH:  insight and judgement impaired, memory impaired , affect and mood normal, pleasent and smiling     Lab/Diagnostic data:   Recent labs in [facility] reviewed by me today.      ASSESSMENT/PLAN  (Z00.00) Annual physical exam  (primary encounter diagnosis)  (I69.359) Hemiplegia affecting dominant side, post-stroke (H)  Comment: S/p CVA in 2017. MRI 3/2018 showed showed No evidence for acute infarct, acute hemorrhage, or any focal mass lesions, Old left cerebellar infarct, Old microbleed in right subinsular cortex, Cerebral atrophy with chronic white matter changes. Is wheelchair bound, require extensive assistance with all ADL's.   Plan: Continue hospice cares - no longer on ASA or statin d/t difficulty swallowing pills and goals of care.     (I48.91) Atrial fibrillation with rapid ventricular response (H)  Comment: Asymptomatic, HR appears controlled  Plan: No AC d/t goals of care, diltiazem Er 240mg - ok to open and sprinkle on pudding per pharmacy.     (N18.3) CKD (chronic kidney disease) stage 3, GFR 30-59 ml/min (H)  Comment: Chronic  Plan: avoid nephrotoxic medications, no planned labs given goal of care    (E03.4) Hypothyroidism due to acquired atrophy of thyroid  Comment: Recent TSH 0.72, not on medication  Plan: No further labs, is on hospice    (G30.1,  F02.80) Late onset Alzheimer's disease without behavioral disturbance (H)  (Z51.5) Hospice care patient  Comment: Significant decline and weight loss noted, appropriate for hospice cares. Lives in memory care unit.   Plan: continue hospice cares, continue current facility cares    (K21.9) Gastroesophageal reflux disease, esophagitis presence not specified  Comment: stable  Plan: continue omeprazole.    (I10) Essential hypertension  Based on JNC-8 goals,  patients age of 86 year old, presence of diabetes or CKD, and goals of care goal BP is  <140/90 mm Hg. Goal of care is hospice, continue current plan of care    (F41.9) Anxiety  Comment: Chronic, though mood stable recently per facility staff  Plan: continue PRN lorazepam, lexapro, trazodone at HS.     (M15.9) Osteoarthritis of multiple joints, unspecified osteoarthritis  type  Comment: Intermittent pain, none today  Plan: continue APAP, PRN morphine, biofreeze,     (K59.01) Slow transit constipation  Comment: intermittent  Plan: continue PRN supp, senna, once daily and BID PRN, PRN imodium. Continue to monitor and adjust.       transcribed by : Shaina Boykin  Orders:  1. NNO    Electronically signed by:  YASMINE Tan CNP           Sincerely,        YASMINE Tan CNP

## 2020-02-14 NOTE — PROGRESS NOTES
2-  CC received AVS form so CC reached out to member daughter Leeanna.  She had not received this yet but said she would look for it and send it in.   She then shared that there had been an issues with making the [place] payment to [facility] due to member now being on HO. This in some way shut down the option of making the payment for this member as had been done in the past.     Leeanna said they were working with [employer] to get this correct but was not sure if this form would further make an issues in this process.     CC stated that she did not think so but she would f/u with financial worker and see. CC then called and left VM from [employer] financial worker for her to review and give direction on the AVS form and let CC know.     CC will then f/u as needed.   Yennifer Hernandez MA [facility] Care Coordinator   [phone]

## 2020-02-18 NOTE — PROGRESS NOTES
2-  CC did hear back from members financial worker and she still needs fill this out and send in the AVS form  CC called and left VM for member daughter to call CC back to explain   Yennifer Hernandez MA [facility] Coordinator   [phone]

## 2020-02-21 NOTE — PROGRESS NOTES
CC received message from member daughter that she still did not have the AVS for so CC emailed this to her and encouraged her to complete this and send it financial worker   Yennifer Hernandez MA [facility] Coordinator   [phone]

## 2020-04-15 NOTE — PROGRESS NOTES
4-15-20   CC was contacted by members rosalio Nunez last week in regards to MA spendown billing issue she was having.  Member has large monthly income so she has MA spendown which is paid directly to [facility] instead of the traditional EW spendown.  Daughter is very familiar with this process and the last several months has been struggling to resolve this.    Per the last contact that she had with [facility] help desk she was informed that billing was suspended and that  had to be involved since member was enrolled.      CC then reached out to [facility] billing and talked with Miladys Segovia.  Conversation was had about how traditionally this was handled when member had EW spendown and CC informed her that members process was different due to her higher monthly income.  Miladys said she would reach out to her contact at [facility] thru email     Miladys email included the contact below.  Ricki Gauthier deferred to Brooke Blake who worked with the managed care departments in [facility].    Per Brooke email there was a billing error that resulted in the billing being suspended.  This has been corrected.  There was also an error by the [facility] when entering a designated provider when a member is on a HO program.  Per Brooke she will be reaching out to the [facility] to address this and did not need anything from CC.        CC did then reached out to members rosalio Nunez and told her to look for the billing to come from the [facility] as it had been coming previously.  CC did ask that she reach out again if she had any issues or further concerns.     CC then reached out to both Miladys at  and Delgado financial worker at [facility] updated them on what CC was made aware of and asked them to call or f/u if there were any further concerns or issues.      Ricki Gauthier    Nursing Facility Rates & Policy  [facility]  [address] [address]  [place] [address]  O: [phone]  F: [phone]  [email or web address].    Brooke Luciano  SENAIT ([facility]) <[email or web address].      Millie Segovia/ / Hospice M[facility]  [facility]  [address] [place] [address]/[place] [address]  [email or web address]  Office: [phone]/Fax: [phone]    Yennifer Hernandez MA [facility] Coordinator   [phone]

## 2020-05-29 NOTE — PROGRESS NOTES
[facility] Care Coordination Contact    [facility] Home Visit Assessment     Home visit for Health Risk Assessment with Deanne Zayas completed on May 14, 2020       Assessment completed with:: Care Team Member - RN at the AL via phone due to Covid visit restrictions.  CC the contacted daughter via phone      Current Care Plan  Member currently receiving the following home care services:   Member is currently on HO   Member currently receiving the following community resources:        Medication Review  Medication reconciliation completed in Epic: No - member medication is managed by NP at AL, staff at AL and    Medication set-up & administration: RN set up weekly.  [facility] staff administers medications.  Medication Risk Assessment Medication (1 or more, place referral to MTM): N/A: No risk factors identified  MTM Referral Placed: No: No risk factors idenified    Mental/Behavioral Health   Depression Screening: unable to completed              Falls Assessment: no falls reported            ADL/IADL Dependencies: bathing, dressing, eating, grooming, incontinence, positioning, transfers, wheelchair with assist, toileting    Cleaning, cooking, laundry, shopping, meal prep, medication, money management, transportation,        [employer] Health Plan sponsored benefits: Shared information re: Silver Sneakers/gym memberships, ASA, Calcium +D.    PCA Assessment completed at visit: Not Applicable     Elderly Waiver Eligibility: Yes-will continue on EW    Care Plan & Recommendations: per staff member is stable and doing well at this time.  No concerns and will continue with  support at this time as well      See LTCC for detailed assessment information.    Follow-Up Plan: Member informed of future contact, plan to f/u with member with a 6 month telephone assessment.  Contact information shared with member and family, encouraged member to call with any questions or concerns at any time.    Acra  care continuum providers: Please refer to Health Care Home on the Epic Problem List to view this patient's [facility] Care Plan Summary.    Yennifer Hernandez MA W   [facility] Care Coordinator   [phone] - work cell phone   [phone] - work fax

## 2020-06-04 NOTE — LETTER
June 4, 2020      DEANNE KING  C/O KATHIE ALLEN  [address] [address] [address]      Dear Deanne:    At [facility], we are dedicated to improving your health and well-being. Enclosed is the Comprehensive Care Plan that we developed with you on 5/14/2020. Please review the Care Plan carefully.    As a reminder, some of the things we discussed at your visit include:    Your physical and mental health    Ways to reduce falls    Health care needs you may have    Don t forget to contact your care coordinator if you:    Have been hospitalized or plan to be hospitalized     Have had a fall     Have experienced a change in physical health    Are experiencing emotional problems     If you do not agree with your Care Plan, have questions about it, or have experienced a change in your needs, please call me at [phone]. If you are hearing impaired, please call the [place] Relay at 002 or [phone] (jvscdm-za-wxkkxf relay service).    Sincerely,    Yennifer Hernandez MA, LSW    E-mail: [email or web address]  Phone: [phone]      [facility]) is a health plan that contracts with both Medicare and the [place] Medical Assistance (Medicaid) program to provide benefits of both programs to enrollees. Enrollment in [facility] depends on contract renewal.    [id number])     [id number] (11/18)

## 2020-06-04 NOTE — PROGRESS NOTES
[facility] Care Coordination Contact    Received after visit chart from care coordinator.  Completed following tasks: Mailed copy of care plan to client, Updated services in access and Submitted referrals/auths for [facility].   , Mailed copy of CL tool to member, faxed copy to AL facility, uploaded into [employer] and submitted authorization to health plan.   and Provider Signature - No POC Shared:  Member indicates that they do not want their POC shared with any EW providers.     Mailed POC/ POC signature page to daughter Mayra to have member sign and return.     Elaina Finley  Care Management Specialist   [facility]   [phone]

## 2020-07-07 NOTE — PROGRESS NOTES
[facility] Care Coordination Contact    2nd Attempt: Signed Letter not received from Kishor on [facility], resent per process.   Opal Em  Case Management Specialist  [facility]  [phone]

## 2020-07-28 NOTE — PROGRESS NOTES
"[facility]   Deanne Zayas is being evaluated via a billable video visit due to the restrictions of the Covid-19 pandemic.   The patient has been notified of following:  \"This video visit will be conducted via a call between you and your provider. We have found that certain health care needs can be provided without the need for an in-person physical exam.  This service lets us provide the care you need with a video conversation. If during the course of the call the provider feels a video visit is not appropriate, you will not be charged for this service.\"   The provider has received verbal consent for a Video Visit from the patient or first contact? Yes  Patient  or facility staff would like the video invitation sent by: N/A   Video Start Time: 1129    [place] [place] Medical Record Number:  [id number]  Place of Location at the time of visit: [facility]  Chief Complaint   Patient presents with     RECHECK     HPI:  Deanne Zayas  is a 87 year old (6/19/1933), who is being seen today for a visit.  HPI information obtained from: facility chart records, facility staff, patient report, [facility] Epic chart review and hospice nurse report. Today's concern is:     Localized swelling of right upper extremity  Hemiplegia affecting dominant side, post-stroke (H)  Hospice care patient   Patient seen today per request of facility staff.  They note over the past several days has had worsening edema to right arm and some increased pain.  In the past 24 hours as significantly increasing swelling in hand, fingers and forearm.  Some firmness and tenderness noted to posterior forearm with warmth.  No redness or sores or lesions noted.  Deanne is currently on hospice, has had significant weight loss of 20 pounds in past 3-1/2 months.  She is eating poorly.  Staff also report over the weekend that she had period of worsening right-sided weakness and right-sided facial droop.  This has improved, however " today right hand appears flaccid and she is difficult bleeding controlling it.  Deanne is alert and talking denies any pain in arm except for with movement and and palpation of posterior forearm.  Denies any shortness of breath.  Rings on fingers of right hand removed by nursing staff during visit due to edema to prevent damage to fingers or impaired circulation.    Past Medical and Surgical History reviewed in Epic today.  MEDICATIONS:    Current Outpatient Medications   Medication Sig Dispense Refill     acetaminophen (TYLENOL) 650 MG suppository Place 1 suppository (650 mg) rectally every 4 hours as needed for fever       ACETAMINOPHEN EXTRA STRENGTH 500 MG tablet TAKE TWO TABLETS (1000MG) BY MOUTH EVERY 8 HOURS 180 tablet 19     atropine 1 % SOLN Place 2 drops under the tongue every 2 hours as needed for secretions       BIOFREEZE 4 % GEL APPLY TO RIGHT SHOULDER TWICE DAILY 237 mL 98     bisacodyl (DULCOLAX) 10 MG suppository Place 10 mg rectally daily as needed for constipation       Coloplast barrier cream CREA Apply topically 4 times daily as needed       diltiazem ER (TIAZAC) 240 MG 24 hr ER beaded capsule TAKE 1 CAPSULE BY MOUTH ONCE DAILY 30 capsule 97     escitalopram (LEXAPRO) 10 MG tablet TAKE 1 TABLET BY MOUTH ONCE DAILY 28 tablet 97     loperamide (IMODIUM A-D) 2 MG tablet Take 2-4 mg by mouth daily as needed for diarrhea       LORazepam (ATIVAN) 0.5 MG tablet Take 0.5 mg by mouth every 4 hours as needed for anxiety       MICRO GUARD 2 % external powder APPLY TOPICALLY TO VAGINA THREE TIMES DAILY AS NEEDED 85 g 97     morphine 2.5 MG solu-tab Take 2.5 mg by mouth 3 times daily And q2h PRN for pain, SOB.        omeprazole (PRILOSEC) 20 MG DR capsule Take 20 mg by mouth daily       senna (SENOKOT) 8.6 MG tablet Take 2 tablets by mouth daily And 1 tab PO BID PRN       traZODone (DESYREL) 50 MG tablet Take 0.5 tablets (25 mg) by mouth At Bedtime       REVIEW OF SYSTEMS: 4 point ROS including Respiratory,  CV, GI and , other than that noted in the HPI,  is negative  Objective: /75   Pulse 92   Temp 97.1  F (36.2  C)   Resp 18   Wt 51.7 kg (114 lb)   SpO2 96%   BMI 18.97 kg/m    Limited visit exam done given COVID-19 precautions.   GENERAL APPEARANCE:  in no distress, thin, cooperative, Frail  RESP:  no respiratory distress  M/S:   Nonambulatory, decreased range of motion to right shoulder, right hand and fingers flaccid, right arm swollen from fingers up to just above elbow  SKIN:  No obvious wounds or lesions noted  NEURO:   Mild right-sided facial droop, right hand flaccid decreased movement in right upper extremity and right leg  PSYCH:  insight and judgement impaired, memory impaired , affect and mood normal     Nursing report firm area, mildly tender to touch to posterior forearm, warmth to right forearm, no erythema sores or drainage noted    Labs:   Recent labs in EPIC reviewed by me today.     ASSESSMENT/PLAN:  (R22.31) Localized swelling of right upper extremity  (primary encounter diagnosis)  (I69.359) Hemiplegia affecting dominant side, post-stroke (H)  (Z51.5) Hospice care patient  Comment: Discussed with facility staff as well as via phone call with hospice nurse.  Some concern for recent neurological event such as TIA or CVA.  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 fib.  She is not anticoagulated due to falls risk, and felt that no longer beneficial given goals of care as she is on hospice.  Swelling in her arm is concerning for DVT, however feel that anticoagulation would not be appropriate as likely too high of bleeding risk, and goals of care, so I has would not treat do not feel that further work-up would be of benefit to patient.  Hospice nurse had spoken to family yesterday, who stated they only wanted her comfortable and were okay with no anticoagulation at that time.   Plan: Goal of care is comfort-continue morphine 3 times daily and as  needed as previously ordered by hospice team, may apply heat or ice to right upper extremity PRN for patient comfort.  Continue hospice cares, continue supportive cares.  If hospice feels appropriate okay to apply compression for comfort to help control edema to right hand.    Orders written by provider at facility  No new orders    Electronically signed by:  YASMINE Tan CNP     Video-Visit Details  Type of service:  Video Visit  Video End Time (time video stopped): 1140; 9-minute phone call with hospice nurse from [phone].  Distant Location (provider location):  [facility]

## 2020-07-28 NOTE — LETTER
"    7/28/2020        RE: Deanne Zayas  C/o Leeanna Moran  [address] [address] [address]        [facility]   Deanne Zayas is being evaluated via a billable video visit due to the restrictions of the Covid-19 pandemic.   The patient has been notified of following:  \"This video visit will be conducted via a call between you and your provider. We have found that certain health care needs can be provided without the need for an in-person physical exam.  This service lets us provide the care you need with a video conversation. If during the course of the call the provider feels a video visit is not appropriate, you will not be charged for this service.\"   The provider has received verbal consent for a Video Visit from the patient or first contact? Yes  Patient  or facility staff would like the video invitation sent by: N/A   Video Start Time: 1129    [facility] Medical Record Number:  [id number]  Place of Location at the time of visit: [facility]  Chief Complaint   Patient presents with     RECHECK     HPI:  Deanne Zayas  is a 87 year old (6/19/1933), who is being seen today for a visit.  HPI information obtained from: facility chart records, facility staff, patient report, [facility] chart review and hospice nurse report. Today's concern is:     Localized swelling of right upper extremity  Hemiplegia affecting dominant side, post-stroke (H)  Hospice care patient   Patient seen today per request of facility staff.  They note over the past several days has had worsening edema to right arm and some increased pain.  In the past 24 hours as significantly increasing swelling in hand, fingers and forearm.  Some firmness and tenderness noted to posterior forearm with warmth.  No redness or sores or lesions noted.  Deanne is currently on hospice, has had significant weight loss of 20 pounds in past 3-1/2 months.  She is eating poorly.  Staff also report over the weekend " that she had period of worsening right-sided weakness and right-sided facial droop.  This has improved, however today right hand appears flaccid and she is difficult bleeding controlling it.  Deanne is alert and talking denies any pain in arm except for with movement and and palpation of posterior forearm.  Denies any shortness of breath.  Rings on fingers of right hand removed by nursing staff during visit due to edema to prevent damage to fingers or impaired circulation.    Past Medical and Surgical History reviewed in Epic today.  MEDICATIONS:    Current Outpatient Medications   Medication Sig Dispense Refill     acetaminophen (TYLENOL) 650 MG suppository Place 1 suppository (650 mg) rectally every 4 hours as needed for fever       ACETAMINOPHEN EXTRA STRENGTH 500 MG tablet TAKE TWO TABLETS (1000MG) BY MOUTH EVERY 8 HOURS 180 tablet 19     atropine 1 % SOLN Place 2 drops under the tongue every 2 hours as needed for secretions       BIOFREEZE 4 % GEL APPLY TO RIGHT SHOULDER TWICE DAILY 237 mL 98     bisacodyl (DULCOLAX) 10 MG suppository Place 10 mg rectally daily as needed for constipation       Coloplast barrier cream CREA Apply topically 4 times daily as needed       diltiazem ER (TIAZAC) 240 MG 24 hr ER beaded capsule TAKE 1 CAPSULE BY MOUTH ONCE DAILY 30 capsule 97     escitalopram (LEXAPRO) 10 MG tablet TAKE 1 TABLET BY MOUTH ONCE DAILY 28 tablet 97     loperamide (IMODIUM A-D) 2 MG tablet Take 2-4 mg by mouth daily as needed for diarrhea       LORazepam (ATIVAN) 0.5 MG tablet Take 0.5 mg by mouth every 4 hours as needed for anxiety       MICRO GUARD 2 % external powder APPLY TOPICALLY TO VAGINA THREE TIMES DAILY AS NEEDED 85 g 97     morphine 2.5 MG solu-tab Take 2.5 mg by mouth 3 times daily And q2h PRN for pain, SOB.        omeprazole (PRILOSEC) 20 MG DR capsule Take 20 mg by mouth daily       senna (SENOKOT) 8.6 MG tablet Take 2 tablets by mouth daily And 1 tab PO BID PRN       traZODone (DESYREL) 50 MG  tablet Take 0.5 tablets (25 mg) by mouth At Bedtime       REVIEW OF SYSTEMS: 4 point ROS including Respiratory, CV, GI and , other than that noted in the HPI,  is negative  Objective: /75   Pulse 92   Temp 97.1  F (36.2  C)   Resp 18   Wt 51.7 kg (114 lb)   SpO2 96%   BMI 18.97 kg/m    Limited visit exam done given COVID-19 precautions.   GENERAL APPEARANCE:  in no distress, thin, cooperative, Frail  RESP:  no respiratory distress  M/S:   Nonambulatory, decreased range of motion to right shoulder, right hand and fingers flaccid, right arm swollen from fingers up to just above elbow  SKIN:  No obvious wounds or lesions noted  NEURO:   Mild right-sided facial droop, right hand flaccid decreased movement in right upper extremity and right leg  PSYCH:  insight and judgement impaired, memory impaired , affect and mood normal     Nursing report firm area, mildly tender to touch to posterior forearm, warmth to right forearm, no erythema sores or drainage noted    Labs:   Recent labs in [facility] reviewed by me today.     ASSESSMENT/PLAN:  (R22.31) Localized swelling of right upper extremity  (primary encounter diagnosis)  (I69.359) Hemiplegia affecting dominant side, post-stroke (H)  (Z51.5) Hospice care patient  Comment: Discussed with facility staff as well as via phone call with hospice nurse.  Some concern for recent neurological event such as TIA or CVA.  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 fib.  She is not anticoagulated due to falls risk, and felt that no longer beneficial given goals of care as she is on hospice.  Swelling in her arm is concerning for DVT, however feel that anticoagulation would not be appropriate as likely too high of bleeding risk, and goals of care, so I has would not treat do not feel that further work-up would be of benefit to patient.  Hospice nurse had spoken to family yesterday, who stated they only wanted her comfortable and were  okay with no anticoagulation at that time.   Plan: Goal of care is comfort-continue morphine 3 times daily and as needed as previously ordered by hospice team, may apply heat or ice to right upper extremity PRN for patient comfort.  Continue hospice cares, continue supportive cares.  If hospice feels appropriate okay to apply compression for comfort to help control edema to right hand.    Orders written by provider at facility  No new orders    Electronically signed by:  YASMINE Tan CNP     Video-Visit Details  Type of service:  Video Visit  Video End Time (time video stopped): 1140; 9-minute phone call with hospice nurse from [phone].  Distant Location (provider location):  [facility]             Sincerely,        YASMINE Tan CNP

## 2020-08-11 NOTE — PROGRESS NOTES
[facility] Care Coordination Contact    No Letter Received: 60 day tracking of letter complete, no letter received from K[facility]or on [facility]. Tracking discontinued.     Amy rToy  Care Management Specialist  [facility]  [phone]

## 2020-08-17 NOTE — PROGRESS NOTES
20 CC received notice from [facility] that members SCOTT called to say that member  this AM and wanted to know what they need to do as far as notifying [facility] and anyone else.  CC called [facility] [address] [facility] and spoke with  Nurse Ute. She confirmed Deanne did die this am. CC asked if she has a SCOTT listed for member and she does have a Gutierrez Moran whom is also listed on members CPS.   CC called [facility] at [phone], no answer and was not accepting calls or messages at this time.  CC called [phone]. Line is busy.  Kizzy Burris RN BA PHN  [facility]  [phone]

## 2020-08-18 ENCOUNTER — PATIENT OUTREACH (OUTPATIENT)
Dept: GERIATRIC MEDICINE | Facility: CLINIC | Age: 85
End: 2020-08-18

## 2020-08-18 NOTE — PROGRESS NOTES
8/18/20 CC did speak with SCOTT Moran and let him know that notifying the [facility] and [place] CC will do and will close her EW. Family should notify sliding scale which they have already done. He wanted to know if the county will take the $200 she has in he bank account or if they can donate that to a veterans association. CC let Gutierrez know he should apeak with the [facility] financial worker on that issue.  CC gave condolences on the loss of his MIL.  Kizzy Burris RN BA N  [facility]  [phone]

## 2020-08-18 NOTE — PROGRESS NOTES
8/18/20 CC tried calling SCOTT Whitley at both numbers we have for him. 1 phone is not accepting calls and the other phone is busy, fast busy signal. CC tried calling daughter Leeanna at the number listed [phone] and [facility] if the family has questions about her mother.  Kizzy Burris RN BA N  [facility] Case Management  [phone]

## 2020-08-18 NOTE — PROGRESS NOTES
[facility] Care Coordination Contact    Notified by CMS that member passed away on 8/17/20 at [place] on [facility] [address].    CC called [facility] to cancel services.    8/17 and 8/18/20 Optional: Called family member/caregiver to offer condolences.   For [facility]:  Death Notification form completed and faxed to [facility] 8/18/20.  CC faxed [phone] to [facility].     CC's encounter closed. Chart handed off to CMS to process disenrollment tasks.  Kizzy Burris RN BA PHN  [facility] Case Management  [phone]

## 2023-06-23 NOTE — PROGRESS NOTES
[facility] Care Coordination Contact    Received after visit chart from care coordinator.  Completed following tasks: Mailed copy of care plan to client and Updated services in access  Chart was returned to CC.   , Mailed copy of CL tool to member, faxed copy to AL facility, uploaded into [email or web address] and submitted authorization to health plan.   and Provider Signature - No POC Shared:  Member indicates that they do not want their POC shared with any EW providers.     Sujey Lea  Care Management Specialist  [facility]  [phone]           normal, alert, pleasant, well nourished, in no acute distress, well developed, well nourished, ambulating without difficulty
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
## 2024-06-17 PROBLEM — Z76.89 HEALTH CARE HOME: Status: RESOLVED | Noted: 2017-10-09 | Resolved: 2024-06-17
